# Patient Record
Sex: MALE | Race: WHITE | Employment: OTHER
[De-identification: names, ages, dates, MRNs, and addresses within clinical notes are randomized per-mention and may not be internally consistent; named-entity substitution may affect disease eponyms.]

---

## 2017-01-12 ENCOUNTER — CARE COORDINATION (OUTPATIENT)
Dept: CARE COORDINATION | Facility: CLINIC | Age: 72
End: 2017-01-12

## 2017-01-18 PROBLEM — N30.00 ACUTE CYSTITIS WITHOUT HEMATURIA: Status: ACTIVE | Noted: 2017-01-18

## 2017-01-23 ENCOUNTER — TELEPHONE (OUTPATIENT)
Dept: PHARMACY | Facility: CLINIC | Age: 72
End: 2017-01-23

## 2017-02-04 ENCOUNTER — HOSPITAL ENCOUNTER (OUTPATIENT)
Dept: PREADMISSION TESTING | Age: 72
Discharge: HOME | End: 2017-02-04
Payer: MEDICARE

## 2017-02-04 VITALS
OXYGEN SATURATION: 98 % | TEMPERATURE: 98 F | WEIGHT: 186 LBS | HEIGHT: 68 IN | SYSTOLIC BLOOD PRESSURE: 116 MMHG | RESPIRATION RATE: 16 BRPM | DIASTOLIC BLOOD PRESSURE: 69 MMHG | BODY MASS INDEX: 28.19 KG/M2 | HEART RATE: 68 BPM

## 2017-02-04 LAB
ABSOLUTE EOS #: 0.7 K/UL (ref 0–0.4)
ABSOLUTE LYMPH #: 1.2 K/UL (ref 1–4.8)
ABSOLUTE MONO #: 0.6 K/UL (ref 0.1–1.3)
ANION GAP SERPL CALCULATED.3IONS-SCNC: 15 MMOL/L (ref 9–17)
BASOPHILS # BLD: 1 % (ref 0–2)
BASOPHILS ABSOLUTE: 0.1 K/UL (ref 0–0.2)
BUN BLDV-MCNC: 42 MG/DL (ref 8–23)
BUN/CREAT BLD: ABNORMAL (ref 9–20)
CALCIUM SERPL-MCNC: 9.2 MG/DL (ref 8.6–10.4)
CHLORIDE BLD-SCNC: 104 MMOL/L (ref 98–107)
CO2: 19 MMOL/L (ref 20–31)
CREAT SERPL-MCNC: 3.1 MG/DL (ref 0.7–1.2)
DIFFERENTIAL TYPE: ABNORMAL
EOSINOPHILS RELATIVE PERCENT: 7 % (ref 0–4)
GFR AFRICAN AMERICAN: 24 ML/MIN
GFR NON-AFRICAN AMERICAN: 20 ML/MIN
GFR SERPL CREATININE-BSD FRML MDRD: ABNORMAL ML/MIN/{1.73_M2}
GFR SERPL CREATININE-BSD FRML MDRD: ABNORMAL ML/MIN/{1.73_M2}
GLUCOSE BLD-MCNC: 105 MG/DL (ref 70–99)
HCT VFR BLD CALC: 34.3 % (ref 41–53)
HEMOGLOBIN: 11.4 G/DL (ref 13.5–17.5)
LYMPHOCYTES # BLD: 13 % (ref 24–44)
MCH RBC QN AUTO: 29.6 PG (ref 26–34)
MCHC RBC AUTO-ENTMCNC: 33.3 G/DL (ref 31–37)
MCV RBC AUTO: 88.9 FL (ref 80–100)
MONOCYTES # BLD: 7 % (ref 1–7)
PDW BLD-RTO: 14.6 % (ref 11.5–14.9)
PLATELET # BLD: 188 K/UL (ref 150–450)
PLATELET ESTIMATE: ABNORMAL
PMV BLD AUTO: 7.8 FL (ref 6–12)
POTASSIUM SERPL-SCNC: 5.4 MMOL/L (ref 3.7–5.3)
RBC # BLD: 3.86 M/UL (ref 4.5–5.9)
RBC # BLD: ABNORMAL 10*6/UL
SEG NEUTROPHILS: 72 % (ref 36–66)
SEGMENTED NEUTROPHILS ABSOLUTE COUNT: 6.5 K/UL (ref 1.3–9.1)
SODIUM BLD-SCNC: 138 MMOL/L (ref 135–144)
WBC # BLD: 9.1 K/UL (ref 3.5–11)
WBC # BLD: ABNORMAL 10*3/UL

## 2017-02-04 PROCEDURE — 80048 BASIC METABOLIC PNL TOTAL CA: CPT

## 2017-02-04 PROCEDURE — 85025 COMPLETE CBC W/AUTO DIFF WBC: CPT

## 2017-02-04 PROCEDURE — 36415 COLL VENOUS BLD VENIPUNCTURE: CPT

## 2017-02-04 ASSESSMENT — PAIN SCALES - GENERAL: PAINLEVEL_OUTOF10: 0

## 2017-02-16 ENCOUNTER — APPOINTMENT (OUTPATIENT)
Dept: ULTRASOUND IMAGING | Age: 72
DRG: 329 | End: 2017-02-16
Attending: SURGERY
Payer: MEDICARE

## 2017-02-16 ENCOUNTER — ANESTHESIA EVENT (OUTPATIENT)
Dept: OPERATING ROOM | Age: 72
DRG: 329 | End: 2017-02-16
Payer: MEDICARE

## 2017-02-16 ENCOUNTER — HOSPITAL ENCOUNTER (INPATIENT)
Age: 72
LOS: 13 days | Discharge: NURSING, REHAB OR LONG-TERM CARE | DRG: 329 | End: 2017-03-01
Attending: SURGERY | Admitting: SURGERY
Payer: MEDICARE

## 2017-02-16 ENCOUNTER — ANESTHESIA (OUTPATIENT)
Dept: OPERATING ROOM | Age: 72
DRG: 329 | End: 2017-02-16
Payer: MEDICARE

## 2017-02-16 LAB
-: ABNORMAL
ABSOLUTE EOS #: 0.3 K/UL (ref 0–0.4)
ABSOLUTE LYMPH #: 0.9 K/UL (ref 1–4.8)
ABSOLUTE MONO #: 0.6 K/UL (ref 0.1–1.3)
AMORPHOUS: ABNORMAL
ANION GAP SERPL CALCULATED.3IONS-SCNC: 21 MMOL/L (ref 9–17)
ANION GAP SERPL CALCULATED.3IONS-SCNC: 23 MMOL/L (ref 9–17)
BACTERIA: ABNORMAL
BASOPHILS # BLD: 1 % (ref 0–2)
BASOPHILS ABSOLUTE: 0.1 K/UL (ref 0–0.2)
BILIRUBIN URINE: NEGATIVE
BUN BLDV-MCNC: 102 MG/DL (ref 8–23)
BUN BLDV-MCNC: 97 MG/DL (ref 8–23)
BUN/CREAT BLD: ABNORMAL (ref 9–20)
BUN/CREAT BLD: ABNORMAL (ref 9–20)
CALCIUM SERPL-MCNC: 9.3 MG/DL (ref 8.6–10.4)
CALCIUM SERPL-MCNC: 9.4 MG/DL (ref 8.6–10.4)
CASTS UA: ABNORMAL /LPF
CHLORIDE BLD-SCNC: 95 MMOL/L (ref 98–107)
CHLORIDE BLD-SCNC: 99 MMOL/L (ref 98–107)
CHLORIDE, UR: <20 MMOL/L
CO2: 15 MMOL/L (ref 20–31)
CO2: 16 MMOL/L (ref 20–31)
COLOR: YELLOW
COMMENT UA: ABNORMAL
CREAT SERPL-MCNC: 4.51 MG/DL (ref 0.7–1.2)
CREAT SERPL-MCNC: 4.63 MG/DL (ref 0.7–1.2)
CREATININE URINE: 256.6 MG/DL (ref 39–259)
CRYSTALS, UA: ABNORMAL /HPF
DIFFERENTIAL TYPE: ABNORMAL
EOSINOPHILS RELATIVE PERCENT: 3 % (ref 0–4)
EPITHELIAL CELLS UA: ABNORMAL /HPF
GFR AFRICAN AMERICAN: 15 ML/MIN
GFR AFRICAN AMERICAN: 16 ML/MIN
GFR NON-AFRICAN AMERICAN: 13 ML/MIN
GFR NON-AFRICAN AMERICAN: 13 ML/MIN
GFR SERPL CREATININE-BSD FRML MDRD: ABNORMAL ML/MIN/{1.73_M2}
GLUCOSE BLD-MCNC: 108 MG/DL (ref 70–99)
GLUCOSE BLD-MCNC: 126 MG/DL (ref 70–99)
GLUCOSE URINE: NEGATIVE
HCT VFR BLD CALC: 38.6 % (ref 41–53)
HEMOGLOBIN: 13 G/DL (ref 13.5–17.5)
INR BLD: 1.1
KETONES, URINE: NEGATIVE
LEUKOCYTE ESTERASE, URINE: ABNORMAL
LYMPHOCYTES # BLD: 8 % (ref 24–44)
MCH RBC QN AUTO: 29 PG (ref 26–34)
MCHC RBC AUTO-ENTMCNC: 33.5 G/DL (ref 31–37)
MCV RBC AUTO: 86.5 FL (ref 80–100)
MONOCYTES # BLD: 5 % (ref 1–7)
MUCUS: ABNORMAL
NITRITE, URINE: NEGATIVE
OTHER OBSERVATIONS UA: ABNORMAL
PARTIAL THROMBOPLASTIN TIME: 27.8 SEC (ref 23–31)
PDW BLD-RTO: 14.5 % (ref 11.5–14.9)
PH UA: 5 (ref 5–8)
PLATELET # BLD: 186 K/UL (ref 150–450)
PLATELET ESTIMATE: ABNORMAL
PMV BLD AUTO: 8 FL (ref 6–12)
POTASSIUM SERPL-SCNC: 4.1 MMOL/L (ref 3.7–5.3)
POTASSIUM SERPL-SCNC: 5.3 MMOL/L (ref 3.7–5.3)
POTASSIUM, UR: 44.6 MMOL/L
PROTEIN UA: ABNORMAL
PROTHROMBIN TIME: 11.3 SEC (ref 9.7–12)
RBC # BLD: 4.46 M/UL (ref 4.5–5.9)
RBC # BLD: ABNORMAL 10*6/UL
RBC UA: ABNORMAL /HPF
RENAL EPITHELIAL, UA: ABNORMAL /HPF
SEG NEUTROPHILS: 83 % (ref 36–66)
SEGMENTED NEUTROPHILS ABSOLUTE COUNT: 9.1 K/UL (ref 1.3–9.1)
SODIUM BLD-SCNC: 134 MMOL/L (ref 135–144)
SODIUM BLD-SCNC: 135 MMOL/L (ref 135–144)
SODIUM,UR: 20 MMOL/L
SPECIFIC GRAVITY UA: 1.02 (ref 1–1.03)
TRICHOMONAS: ABNORMAL
TURBIDITY: ABNORMAL
URINE HGB: NEGATIVE
UROBILINOGEN, URINE: NORMAL
WBC # BLD: 10.9 K/UL (ref 3.5–11)
WBC # BLD: ABNORMAL 10*3/UL
WBC UA: ABNORMAL /HPF
YEAST: ABNORMAL

## 2017-02-16 PROCEDURE — 85025 COMPLETE CBC W/AUTO DIFF WBC: CPT

## 2017-02-16 PROCEDURE — 36415 COLL VENOUS BLD VENIPUNCTURE: CPT

## 2017-02-16 PROCEDURE — 80048 BASIC METABOLIC PNL TOTAL CA: CPT

## 2017-02-16 PROCEDURE — 85610 PROTHROMBIN TIME: CPT

## 2017-02-16 PROCEDURE — 76770 US EXAM ABDO BACK WALL COMP: CPT

## 2017-02-16 PROCEDURE — 84300 ASSAY OF URINE SODIUM: CPT

## 2017-02-16 PROCEDURE — 81001 URINALYSIS AUTO W/SCOPE: CPT

## 2017-02-16 PROCEDURE — 85730 THROMBOPLASTIN TIME PARTIAL: CPT

## 2017-02-16 PROCEDURE — 1200000000 HC SEMI PRIVATE

## 2017-02-16 PROCEDURE — 84133 ASSAY OF URINE POTASSIUM: CPT

## 2017-02-16 PROCEDURE — 2500000003 HC RX 250 WO HCPCS: Performed by: INTERNAL MEDICINE

## 2017-02-16 PROCEDURE — 6360000002 HC RX W HCPCS: Performed by: SURGERY

## 2017-02-16 PROCEDURE — 2580000003 HC RX 258: Performed by: SURGERY

## 2017-02-16 PROCEDURE — 82436 ASSAY OF URINE CHLORIDE: CPT

## 2017-02-16 PROCEDURE — 2580000003 HC RX 258: Performed by: INTERNAL MEDICINE

## 2017-02-16 PROCEDURE — 82570 ASSAY OF URINE CREATININE: CPT

## 2017-02-16 RX ORDER — ONDANSETRON 2 MG/ML
4 INJECTION INTRAMUSCULAR; INTRAVENOUS
Status: DISCONTINUED | OUTPATIENT
Start: 2017-02-16 | End: 2017-02-16 | Stop reason: HOSPADM

## 2017-02-16 RX ORDER — FENTANYL CITRATE 50 UG/ML
25 INJECTION, SOLUTION INTRAMUSCULAR; INTRAVENOUS EVERY 5 MIN PRN
Status: DISCONTINUED | OUTPATIENT
Start: 2017-02-16 | End: 2017-02-16 | Stop reason: HOSPADM

## 2017-02-16 RX ORDER — MORPHINE SULFATE 2 MG/ML
1 INJECTION, SOLUTION INTRAMUSCULAR; INTRAVENOUS EVERY 5 MIN PRN
Status: DISCONTINUED | OUTPATIENT
Start: 2017-02-16 | End: 2017-02-16 | Stop reason: HOSPADM

## 2017-02-16 RX ORDER — LOPERAMIDE HYDROCHLORIDE 2 MG/1
2 CAPSULE ORAL 4 TIMES DAILY PRN
Status: DISCONTINUED | OUTPATIENT
Start: 2017-02-16 | End: 2017-02-19

## 2017-02-16 RX ORDER — MEPERIDINE HYDROCHLORIDE 25 MG/ML
12.5 INJECTION INTRAMUSCULAR; INTRAVENOUS; SUBCUTANEOUS EVERY 5 MIN PRN
Status: DISCONTINUED | OUTPATIENT
Start: 2017-02-16 | End: 2017-02-16 | Stop reason: HOSPADM

## 2017-02-16 RX ORDER — HYDROCODONE BITARTRATE AND ACETAMINOPHEN 5; 325 MG/1; MG/1
2 TABLET ORAL PRN
Status: DISCONTINUED | OUTPATIENT
Start: 2017-02-16 | End: 2017-02-16 | Stop reason: HOSPADM

## 2017-02-16 RX ORDER — HYDRALAZINE HYDROCHLORIDE 20 MG/ML
5 INJECTION INTRAMUSCULAR; INTRAVENOUS EVERY 10 MIN PRN
Status: DISCONTINUED | OUTPATIENT
Start: 2017-02-16 | End: 2017-02-16 | Stop reason: HOSPADM

## 2017-02-16 RX ORDER — FENTANYL CITRATE 50 UG/ML
50 INJECTION, SOLUTION INTRAMUSCULAR; INTRAVENOUS EVERY 5 MIN PRN
Status: DISCONTINUED | OUTPATIENT
Start: 2017-02-16 | End: 2017-02-16 | Stop reason: HOSPADM

## 2017-02-16 RX ORDER — SODIUM CHLORIDE 0.9 % (FLUSH) 0.9 %
10 SYRINGE (ML) INJECTION EVERY 12 HOURS SCHEDULED
Status: DISCONTINUED | OUTPATIENT
Start: 2017-02-16 | End: 2017-02-16 | Stop reason: HOSPADM

## 2017-02-16 RX ORDER — ASPIRIN 81 MG/1
81 TABLET ORAL DAILY
Status: DISCONTINUED | OUTPATIENT
Start: 2017-02-16 | End: 2017-02-19

## 2017-02-16 RX ORDER — CLOPIDOGREL BISULFATE 75 MG/1
75 TABLET ORAL ONCE
Status: DISCONTINUED | OUTPATIENT
Start: 2017-02-16 | End: 2017-02-19

## 2017-02-16 RX ORDER — OXYCODONE HYDROCHLORIDE AND ACETAMINOPHEN 5; 325 MG/1; MG/1
1 TABLET ORAL EVERY 4 HOURS PRN
Status: DISCONTINUED | OUTPATIENT
Start: 2017-02-16 | End: 2017-02-16 | Stop reason: HOSPADM

## 2017-02-16 RX ORDER — ONDANSETRON 2 MG/ML
4 INJECTION INTRAMUSCULAR; INTRAVENOUS EVERY 4 HOURS PRN
Status: DISCONTINUED | OUTPATIENT
Start: 2017-02-16 | End: 2017-02-16 | Stop reason: HOSPADM

## 2017-02-16 RX ORDER — HYDROCODONE BITARTRATE AND ACETAMINOPHEN 5; 325 MG/1; MG/1
1 TABLET ORAL PRN
Status: DISCONTINUED | OUTPATIENT
Start: 2017-02-16 | End: 2017-02-16 | Stop reason: HOSPADM

## 2017-02-16 RX ORDER — CARVEDILOL 6.25 MG/1
6.25 TABLET ORAL 2 TIMES DAILY WITH MEALS
Status: DISCONTINUED | OUTPATIENT
Start: 2017-02-16 | End: 2017-02-19

## 2017-02-16 RX ORDER — SODIUM CHLORIDE 9 MG/ML
INJECTION, SOLUTION INTRAVENOUS CONTINUOUS
Status: DISCONTINUED | OUTPATIENT
Start: 2017-02-16 | End: 2017-02-16

## 2017-02-16 RX ORDER — SODIUM CHLORIDE, SODIUM LACTATE, POTASSIUM CHLORIDE, CALCIUM CHLORIDE 600; 310; 30; 20 MG/100ML; MG/100ML; MG/100ML; MG/100ML
INJECTION, SOLUTION INTRAVENOUS CONTINUOUS
Status: DISCONTINUED | OUTPATIENT
Start: 2017-02-16 | End: 2017-02-16

## 2017-02-16 RX ORDER — DIPHENHYDRAMINE HYDROCHLORIDE 50 MG/ML
12.5 INJECTION INTRAMUSCULAR; INTRAVENOUS
Status: DISCONTINUED | OUTPATIENT
Start: 2017-02-16 | End: 2017-02-16 | Stop reason: HOSPADM

## 2017-02-16 RX ORDER — MORPHINE SULFATE 4 MG/ML
4 INJECTION, SOLUTION INTRAMUSCULAR; INTRAVENOUS
Status: DISCONTINUED | OUTPATIENT
Start: 2017-02-16 | End: 2017-02-16 | Stop reason: HOSPADM

## 2017-02-16 RX ORDER — METOCLOPRAMIDE HYDROCHLORIDE 5 MG/ML
10 INJECTION INTRAMUSCULAR; INTRAVENOUS
Status: DISCONTINUED | OUTPATIENT
Start: 2017-02-16 | End: 2017-02-16 | Stop reason: HOSPADM

## 2017-02-16 RX ORDER — SODIUM CHLORIDE 0.9 % (FLUSH) 0.9 %
10 SYRINGE (ML) INJECTION PRN
Status: DISCONTINUED | OUTPATIENT
Start: 2017-02-16 | End: 2017-02-16 | Stop reason: HOSPADM

## 2017-02-16 RX ORDER — OXYCODONE HYDROCHLORIDE AND ACETAMINOPHEN 5; 325 MG/1; MG/1
2 TABLET ORAL EVERY 4 HOURS PRN
Status: DISCONTINUED | OUTPATIENT
Start: 2017-02-16 | End: 2017-02-16 | Stop reason: HOSPADM

## 2017-02-16 RX ORDER — LABETALOL HYDROCHLORIDE 5 MG/ML
5 INJECTION, SOLUTION INTRAVENOUS EVERY 10 MIN PRN
Status: DISCONTINUED | OUTPATIENT
Start: 2017-02-16 | End: 2017-02-16 | Stop reason: HOSPADM

## 2017-02-16 RX ORDER — MORPHINE SULFATE 2 MG/ML
2 INJECTION, SOLUTION INTRAMUSCULAR; INTRAVENOUS
Status: DISCONTINUED | OUTPATIENT
Start: 2017-02-16 | End: 2017-02-16 | Stop reason: HOSPADM

## 2017-02-16 RX ADMIN — Medication 2 G: at 20:02

## 2017-02-16 RX ADMIN — SODIUM BICARBONATE: 84 INJECTION, SOLUTION INTRAVENOUS at 17:07

## 2017-02-16 RX ADMIN — SODIUM CHLORIDE, POTASSIUM CHLORIDE, SODIUM LACTATE AND CALCIUM CHLORIDE: 600; 310; 30; 20 INJECTION, SOLUTION INTRAVENOUS at 09:51

## 2017-02-16 ASSESSMENT — PAIN - FUNCTIONAL ASSESSMENT: PAIN_FUNCTIONAL_ASSESSMENT: 0-10

## 2017-02-17 LAB
ANION GAP SERPL CALCULATED.3IONS-SCNC: 17 MMOL/L (ref 9–17)
ANION GAP SERPL CALCULATED.3IONS-SCNC: 23 MMOL/L (ref 9–17)
BUN BLDV-MCNC: 85 MG/DL (ref 8–23)
BUN BLDV-MCNC: 99 MG/DL (ref 8–23)
BUN/CREAT BLD: ABNORMAL (ref 9–20)
BUN/CREAT BLD: ABNORMAL (ref 9–20)
CALCIUM SERPL-MCNC: 7.9 MG/DL (ref 8.6–10.4)
CALCIUM SERPL-MCNC: 8.9 MG/DL (ref 8.6–10.4)
CHLORIDE BLD-SCNC: 89 MMOL/L (ref 98–107)
CHLORIDE BLD-SCNC: 94 MMOL/L (ref 98–107)
CO2: 18 MMOL/L (ref 20–31)
CO2: 22 MMOL/L (ref 20–31)
CREAT SERPL-MCNC: 3.21 MG/DL (ref 0.7–1.2)
CREAT SERPL-MCNC: 3.87 MG/DL (ref 0.7–1.2)
GFR AFRICAN AMERICAN: 19 ML/MIN
GFR AFRICAN AMERICAN: 23 ML/MIN
GFR NON-AFRICAN AMERICAN: 15 ML/MIN
GFR NON-AFRICAN AMERICAN: 19 ML/MIN
GFR SERPL CREATININE-BSD FRML MDRD: ABNORMAL ML/MIN/{1.73_M2}
GLUCOSE BLD-MCNC: 105 MG/DL (ref 70–99)
GLUCOSE BLD-MCNC: 112 MG/DL (ref 70–99)
HCT VFR BLD CALC: 37.9 % (ref 41–53)
HEMOGLOBIN: 12.5 G/DL (ref 13.5–17.5)
MCH RBC QN AUTO: 28.6 PG (ref 26–34)
MCHC RBC AUTO-ENTMCNC: 33.1 G/DL (ref 31–37)
MCV RBC AUTO: 86.4 FL (ref 80–100)
PDW BLD-RTO: 14.4 % (ref 11.5–14.9)
PLATELET # BLD: 193 K/UL (ref 150–450)
PMV BLD AUTO: 8.1 FL (ref 6–12)
POTASSIUM SERPL-SCNC: 3.7 MMOL/L (ref 3.7–5.3)
POTASSIUM SERPL-SCNC: 4.3 MMOL/L (ref 3.7–5.3)
RBC # BLD: 4.39 M/UL (ref 4.5–5.9)
SODIUM BLD-SCNC: 128 MMOL/L (ref 135–144)
SODIUM BLD-SCNC: 135 MMOL/L (ref 135–144)
WBC # BLD: 9.1 K/UL (ref 3.5–11)

## 2017-02-17 PROCEDURE — 36415 COLL VENOUS BLD VENIPUNCTURE: CPT

## 2017-02-17 PROCEDURE — 2500000003 HC RX 250 WO HCPCS: Performed by: INTERNAL MEDICINE

## 2017-02-17 PROCEDURE — 2580000003 HC RX 258: Performed by: INTERNAL MEDICINE

## 2017-02-17 PROCEDURE — 93005 ELECTROCARDIOGRAM TRACING: CPT

## 2017-02-17 PROCEDURE — 99223 1ST HOSP IP/OBS HIGH 75: CPT | Performed by: INTERNAL MEDICINE

## 2017-02-17 PROCEDURE — 80048 BASIC METABOLIC PNL TOTAL CA: CPT

## 2017-02-17 PROCEDURE — 1200000000 HC SEMI PRIVATE

## 2017-02-17 PROCEDURE — 85027 COMPLETE CBC AUTOMATED: CPT

## 2017-02-17 PROCEDURE — 6360000002 HC RX W HCPCS: Performed by: SURGERY

## 2017-02-17 PROCEDURE — 6370000000 HC RX 637 (ALT 250 FOR IP): Performed by: INTERNAL MEDICINE

## 2017-02-17 PROCEDURE — 99222 1ST HOSP IP/OBS MODERATE 55: CPT | Performed by: INTERNAL MEDICINE

## 2017-02-17 RX ORDER — 0.9 % SODIUM CHLORIDE 0.9 %
500 INTRAVENOUS SOLUTION INTRAVENOUS ONCE
Status: COMPLETED | OUTPATIENT
Start: 2017-02-17 | End: 2017-02-17

## 2017-02-17 RX ORDER — 0.9 % SODIUM CHLORIDE 0.9 %
1000 INTRAVENOUS SOLUTION INTRAVENOUS ONCE
Status: COMPLETED | OUTPATIENT
Start: 2017-02-17 | End: 2017-02-17

## 2017-02-17 RX ADMIN — Medication 2 G: at 02:59

## 2017-02-17 RX ADMIN — CARVEDILOL 6.25 MG: 6.25 TABLET, FILM COATED ORAL at 17:22

## 2017-02-17 RX ADMIN — SODIUM CHLORIDE 500 ML: 9 INJECTION, SOLUTION INTRAVENOUS at 00:40

## 2017-02-17 RX ADMIN — SODIUM BICARBONATE: 84 INJECTION, SOLUTION INTRAVENOUS at 23:59

## 2017-02-17 RX ADMIN — SODIUM BICARBONATE: 84 INJECTION, SOLUTION INTRAVENOUS at 15:02

## 2017-02-17 RX ADMIN — SODIUM CHLORIDE 1000 ML: 9 INJECTION, SOLUTION INTRAVENOUS at 11:38

## 2017-02-18 ENCOUNTER — ANESTHESIA EVENT (OUTPATIENT)
Dept: OPERATING ROOM | Age: 72
DRG: 329 | End: 2017-02-18
Payer: MEDICARE

## 2017-02-18 LAB
ANION GAP SERPL CALCULATED.3IONS-SCNC: 13 MMOL/L (ref 9–17)
ANION GAP SERPL CALCULATED.3IONS-SCNC: 18 MMOL/L (ref 9–17)
BUN BLDV-MCNC: 62 MG/DL (ref 8–23)
BUN BLDV-MCNC: 76 MG/DL (ref 8–23)
BUN/CREAT BLD: ABNORMAL (ref 9–20)
BUN/CREAT BLD: ABNORMAL (ref 9–20)
CALCIUM SERPL-MCNC: 8.1 MG/DL (ref 8.6–10.4)
CALCIUM SERPL-MCNC: 8.5 MG/DL (ref 8.6–10.4)
CHLORIDE BLD-SCNC: 88 MMOL/L (ref 98–107)
CHLORIDE BLD-SCNC: 88 MMOL/L (ref 98–107)
CO2: 26 MMOL/L (ref 20–31)
CO2: 31 MMOL/L (ref 20–31)
CREAT SERPL-MCNC: 2.48 MG/DL (ref 0.7–1.2)
CREAT SERPL-MCNC: 2.78 MG/DL (ref 0.7–1.2)
FOLATE: 12.8 NG/ML
GFR AFRICAN AMERICAN: 27 ML/MIN
GFR AFRICAN AMERICAN: 31 ML/MIN
GFR NON-AFRICAN AMERICAN: 23 ML/MIN
GFR NON-AFRICAN AMERICAN: 26 ML/MIN
GFR SERPL CREATININE-BSD FRML MDRD: ABNORMAL ML/MIN/{1.73_M2}
GLUCOSE BLD-MCNC: 110 MG/DL (ref 70–99)
GLUCOSE BLD-MCNC: 98 MG/DL (ref 70–99)
IRON SATURATION: 24 % (ref 20–55)
IRON: 65 UG/DL (ref 59–158)
POTASSIUM SERPL-SCNC: 3.6 MMOL/L (ref 3.7–5.3)
POTASSIUM SERPL-SCNC: 4.1 MMOL/L (ref 3.7–5.3)
SODIUM BLD-SCNC: 132 MMOL/L (ref 135–144)
SODIUM BLD-SCNC: 132 MMOL/L (ref 135–144)
TOTAL IRON BINDING CAPACITY: 268 UG/DL (ref 250–450)
UNSATURATED IRON BINDING CAPACITY: 203 UG/DL (ref 112–347)
VITAMIN B-12: 229 PG/ML (ref 211–946)

## 2017-02-18 PROCEDURE — 2580000003 HC RX 258: Performed by: INTERNAL MEDICINE

## 2017-02-18 PROCEDURE — 6370000000 HC RX 637 (ALT 250 FOR IP): Performed by: INTERNAL MEDICINE

## 2017-02-18 PROCEDURE — 83550 IRON BINDING TEST: CPT

## 2017-02-18 PROCEDURE — 83540 ASSAY OF IRON: CPT

## 2017-02-18 PROCEDURE — 82746 ASSAY OF FOLIC ACID SERUM: CPT

## 2017-02-18 PROCEDURE — 2500000003 HC RX 250 WO HCPCS: Performed by: INTERNAL MEDICINE

## 2017-02-18 PROCEDURE — 36415 COLL VENOUS BLD VENIPUNCTURE: CPT

## 2017-02-18 PROCEDURE — 80048 BASIC METABOLIC PNL TOTAL CA: CPT

## 2017-02-18 PROCEDURE — 82607 VITAMIN B-12: CPT

## 2017-02-18 PROCEDURE — 1200000000 HC SEMI PRIVATE

## 2017-02-18 PROCEDURE — 99232 SBSQ HOSP IP/OBS MODERATE 35: CPT | Performed by: INTERNAL MEDICINE

## 2017-02-18 RX ORDER — SODIUM CHLORIDE 9 MG/ML
INJECTION, SOLUTION INTRAVENOUS CONTINUOUS
Status: DISCONTINUED | OUTPATIENT
Start: 2017-02-18 | End: 2017-02-24

## 2017-02-18 RX ORDER — PANTOPRAZOLE SODIUM 40 MG/1
40 TABLET, DELAYED RELEASE ORAL
Status: DISCONTINUED | OUTPATIENT
Start: 2017-02-18 | End: 2017-02-19

## 2017-02-18 RX ORDER — DIPHENOXYLATE HYDROCHLORIDE AND ATROPINE SULFATE 2.5; .025 MG/1; MG/1
1 TABLET ORAL 2 TIMES DAILY
Status: DISCONTINUED | OUTPATIENT
Start: 2017-02-18 | End: 2017-02-19

## 2017-02-18 RX ADMIN — SODIUM CHLORIDE: 9 INJECTION, SOLUTION INTRAVENOUS at 14:19

## 2017-02-18 RX ADMIN — DIPHENOXYLATE HYDROCHLORIDE AND ATROPINE SULFATE 1 TABLET: 2.5; .025 TABLET ORAL at 17:08

## 2017-02-18 RX ADMIN — CARVEDILOL 6.25 MG: 6.25 TABLET, FILM COATED ORAL at 17:08

## 2017-02-18 RX ADMIN — SODIUM CHLORIDE: 9 INJECTION, SOLUTION INTRAVENOUS at 21:37

## 2017-02-18 RX ADMIN — SODIUM BICARBONATE: 84 INJECTION, SOLUTION INTRAVENOUS at 10:06

## 2017-02-19 ENCOUNTER — ANESTHESIA (OUTPATIENT)
Dept: OPERATING ROOM | Age: 72
DRG: 329 | End: 2017-02-19
Payer: MEDICARE

## 2017-02-19 ENCOUNTER — APPOINTMENT (OUTPATIENT)
Dept: GENERAL RADIOLOGY | Age: 72
DRG: 329 | End: 2017-02-19
Attending: SURGERY
Payer: MEDICARE

## 2017-02-19 VITALS — OXYGEN SATURATION: 99 % | DIASTOLIC BLOOD PRESSURE: 56 MMHG | SYSTOLIC BLOOD PRESSURE: 117 MMHG | TEMPERATURE: 93.6 F

## 2017-02-19 LAB
ANION GAP SERPL CALCULATED.3IONS-SCNC: 12 MMOL/L (ref 9–17)
BUN BLDV-MCNC: 55 MG/DL (ref 8–23)
BUN/CREAT BLD: ABNORMAL (ref 9–20)
CALCIUM SERPL-MCNC: 8.2 MG/DL (ref 8.6–10.4)
CHLORIDE BLD-SCNC: 93 MMOL/L (ref 98–107)
CO2: 30 MMOL/L (ref 20–31)
CREAT SERPL-MCNC: 2.27 MG/DL (ref 0.7–1.2)
GFR AFRICAN AMERICAN: 35 ML/MIN
GFR NON-AFRICAN AMERICAN: 29 ML/MIN
GFR SERPL CREATININE-BSD FRML MDRD: ABNORMAL ML/MIN/{1.73_M2}
GFR SERPL CREATININE-BSD FRML MDRD: ABNORMAL ML/MIN/{1.73_M2}
GLUCOSE BLD-MCNC: 101 MG/DL (ref 70–99)
POTASSIUM SERPL-SCNC: 4.2 MMOL/L (ref 3.7–5.3)
SODIUM BLD-SCNC: 135 MMOL/L (ref 135–144)

## 2017-02-19 PROCEDURE — C1751 CATH, INF, PER/CENT/MIDLINE: HCPCS | Performed by: SURGERY

## 2017-02-19 PROCEDURE — C1765 ADHESION BARRIER: HCPCS | Performed by: SURGERY

## 2017-02-19 PROCEDURE — 2500000003 HC RX 250 WO HCPCS: Performed by: SURGERY

## 2017-02-19 PROCEDURE — 80048 BASIC METABOLIC PNL TOTAL CA: CPT

## 2017-02-19 PROCEDURE — 71010 XR CHEST LIMITED: CPT | Performed by: RADIOLOGY

## 2017-02-19 PROCEDURE — 2580000003 HC RX 258: Performed by: INTERNAL MEDICINE

## 2017-02-19 PROCEDURE — 36415 COLL VENOUS BLD VENIPUNCTURE: CPT

## 2017-02-19 PROCEDURE — 0WQF0ZZ REPAIR ABDOMINAL WALL, OPEN APPROACH: ICD-10-PCS | Performed by: SURGERY

## 2017-02-19 PROCEDURE — 6360000002 HC RX W HCPCS: Performed by: SURGERY

## 2017-02-19 PROCEDURE — S0028 INJECTION, FAMOTIDINE, 20 MG: HCPCS | Performed by: SURGERY

## 2017-02-19 PROCEDURE — 0DNW0ZZ RELEASE PERITONEUM, OPEN APPROACH: ICD-10-PCS | Performed by: SURGERY

## 2017-02-19 PROCEDURE — 2500000003 HC RX 250 WO HCPCS

## 2017-02-19 PROCEDURE — 6360000002 HC RX W HCPCS: Performed by: ANESTHESIOLOGY

## 2017-02-19 PROCEDURE — 2500000003 HC RX 250 WO HCPCS: Performed by: ANESTHESIOLOGY

## 2017-02-19 PROCEDURE — 3600000013 HC SURGERY LEVEL 3 ADDTL 15MIN: Performed by: SURGERY

## 2017-02-19 PROCEDURE — 7100000000 HC PACU RECOVERY - FIRST 15 MIN: Performed by: SURGERY

## 2017-02-19 PROCEDURE — 99232 SBSQ HOSP IP/OBS MODERATE 35: CPT | Performed by: INTERNAL MEDICINE

## 2017-02-19 PROCEDURE — 7100000001 HC PACU RECOVERY - ADDTL 15 MIN: Performed by: SURGERY

## 2017-02-19 PROCEDURE — 87086 URINE CULTURE/COLONY COUNT: CPT

## 2017-02-19 PROCEDURE — 3600000003 HC SURGERY LEVEL 3 BASE: Performed by: SURGERY

## 2017-02-19 PROCEDURE — 94770 HC ETCO2 MONITOR DAILY: CPT

## 2017-02-19 PROCEDURE — 71010 XR CHEST LIMITED: CPT

## 2017-02-19 PROCEDURE — 2580000003 HC RX 258: Performed by: ANESTHESIOLOGY

## 2017-02-19 PROCEDURE — 2580000003 HC RX 258: Performed by: SURGERY

## 2017-02-19 PROCEDURE — 2060000000 HC ICU INTERMEDIATE R&B

## 2017-02-19 PROCEDURE — 6370000000 HC RX 637 (ALT 250 FOR IP): Performed by: INTERNAL MEDICINE

## 2017-02-19 PROCEDURE — 3700000000 HC ANESTHESIA ATTENDED CARE: Performed by: SURGERY

## 2017-02-19 PROCEDURE — 88305 TISSUE EXAM BY PATHOLOGIST: CPT

## 2017-02-19 PROCEDURE — 3700000001 HC ADD 15 MINUTES (ANESTHESIA): Performed by: SURGERY

## 2017-02-19 PROCEDURE — 94664 DEMO&/EVAL PT USE INHALER: CPT

## 2017-02-19 PROCEDURE — 0DBB0ZZ EXCISION OF ILEUM, OPEN APPROACH: ICD-10-PCS | Performed by: SURGERY

## 2017-02-19 PROCEDURE — 94762 N-INVAS EAR/PLS OXIMTRY CONT: CPT

## 2017-02-19 RX ORDER — MORPHINE SULFATE/0.9% NACL/PF 1 MG/ML
SYRINGE (ML) INJECTION CONTINUOUS
Status: DISCONTINUED | OUTPATIENT
Start: 2017-02-19 | End: 2017-02-20

## 2017-02-19 RX ORDER — EPHEDRINE SULFATE 50 MG/ML
INJECTION, SOLUTION INTRAVENOUS PRN
Status: DISCONTINUED | OUTPATIENT
Start: 2017-02-19 | End: 2017-02-19 | Stop reason: SDUPTHER

## 2017-02-19 RX ORDER — LABETALOL HYDROCHLORIDE 5 MG/ML
5 INJECTION, SOLUTION INTRAVENOUS EVERY 10 MIN PRN
Status: DISCONTINUED | OUTPATIENT
Start: 2017-02-19 | End: 2017-02-19 | Stop reason: HOSPADM

## 2017-02-19 RX ORDER — GLYCOPYRROLATE 0.2 MG/ML
INJECTION INTRAMUSCULAR; INTRAVENOUS PRN
Status: DISCONTINUED | OUTPATIENT
Start: 2017-02-19 | End: 2017-02-19 | Stop reason: SDUPTHER

## 2017-02-19 RX ORDER — PROPOFOL 10 MG/ML
INJECTION, EMULSION INTRAVENOUS PRN
Status: DISCONTINUED | OUTPATIENT
Start: 2017-02-19 | End: 2017-02-19 | Stop reason: SDUPTHER

## 2017-02-19 RX ORDER — NEOSTIGMINE METHYLSULFATE 1 MG/ML
INJECTION, SOLUTION INTRAVENOUS PRN
Status: DISCONTINUED | OUTPATIENT
Start: 2017-02-19 | End: 2017-02-19 | Stop reason: SDUPTHER

## 2017-02-19 RX ORDER — FENTANYL CITRATE 50 UG/ML
INJECTION, SOLUTION INTRAMUSCULAR; INTRAVENOUS PRN
Status: DISCONTINUED | OUTPATIENT
Start: 2017-02-19 | End: 2017-02-19 | Stop reason: SDUPTHER

## 2017-02-19 RX ORDER — MORPHINE SULFATE 2 MG/ML
2 INJECTION, SOLUTION INTRAMUSCULAR; INTRAVENOUS EVERY 5 MIN PRN
Status: DISCONTINUED | OUTPATIENT
Start: 2017-02-19 | End: 2017-02-19 | Stop reason: HOSPADM

## 2017-02-19 RX ORDER — SODIUM CHLORIDE 0.9 % (FLUSH) 0.9 %
10 SYRINGE (ML) INJECTION PRN
Status: DISCONTINUED | OUTPATIENT
Start: 2017-02-19 | End: 2017-03-01 | Stop reason: HOSPADM

## 2017-02-19 RX ORDER — SODIUM CHLORIDE 9 MG/ML
INJECTION, SOLUTION INTRAVENOUS CONTINUOUS
Status: DISCONTINUED | OUTPATIENT
Start: 2017-02-19 | End: 2017-02-20

## 2017-02-19 RX ORDER — DIPHENHYDRAMINE HYDROCHLORIDE 50 MG/ML
25 INJECTION INTRAMUSCULAR; INTRAVENOUS EVERY 6 HOURS PRN
Status: DISCONTINUED | OUTPATIENT
Start: 2017-02-19 | End: 2017-02-20

## 2017-02-19 RX ORDER — CISATRACURIUM BESYLATE 2 MG/ML
INJECTION, SOLUTION INTRAVENOUS PRN
Status: DISCONTINUED | OUTPATIENT
Start: 2017-02-19 | End: 2017-02-19 | Stop reason: SDUPTHER

## 2017-02-19 RX ORDER — SENNA AND DOCUSATE SODIUM 50; 8.6 MG/1; MG/1
1 TABLET, FILM COATED ORAL DAILY
Status: DISCONTINUED | OUTPATIENT
Start: 2017-02-19 | End: 2017-02-20

## 2017-02-19 RX ORDER — DOCUSATE SODIUM 100 MG/1
100 CAPSULE, LIQUID FILLED ORAL 2 TIMES DAILY
Status: DISCONTINUED | OUTPATIENT
Start: 2017-02-19 | End: 2017-02-20

## 2017-02-19 RX ORDER — NALOXONE HYDROCHLORIDE 0.4 MG/ML
0.4 INJECTION, SOLUTION INTRAMUSCULAR; INTRAVENOUS; SUBCUTANEOUS PRN
Status: DISCONTINUED | OUTPATIENT
Start: 2017-02-19 | End: 2017-02-21 | Stop reason: ALTCHOICE

## 2017-02-19 RX ORDER — LIDOCAINE HYDROCHLORIDE 10 MG/ML
INJECTION, SOLUTION INFILTRATION; PERINEURAL PRN
Status: DISCONTINUED | OUTPATIENT
Start: 2017-02-19 | End: 2017-02-19 | Stop reason: SDUPTHER

## 2017-02-19 RX ORDER — SUCCINYLCHOLINE CHLORIDE 20 MG/ML
INJECTION INTRAMUSCULAR; INTRAVENOUS PRN
Status: DISCONTINUED | OUTPATIENT
Start: 2017-02-19 | End: 2017-02-19 | Stop reason: SDUPTHER

## 2017-02-19 RX ORDER — MEPERIDINE HYDROCHLORIDE 25 MG/ML
12.5 INJECTION INTRAMUSCULAR; INTRAVENOUS; SUBCUTANEOUS EVERY 5 MIN PRN
Status: DISCONTINUED | OUTPATIENT
Start: 2017-02-19 | End: 2017-02-19 | Stop reason: HOSPADM

## 2017-02-19 RX ORDER — ONDANSETRON 2 MG/ML
4 INJECTION INTRAMUSCULAR; INTRAVENOUS
Status: DISCONTINUED | OUTPATIENT
Start: 2017-02-19 | End: 2017-02-19 | Stop reason: HOSPADM

## 2017-02-19 RX ORDER — SODIUM CHLORIDE 0.9 % (FLUSH) 0.9 %
10 SYRINGE (ML) INJECTION EVERY 12 HOURS SCHEDULED
Status: DISCONTINUED | OUTPATIENT
Start: 2017-02-19 | End: 2017-03-01 | Stop reason: HOSPADM

## 2017-02-19 RX ORDER — NALOXONE HYDROCHLORIDE 0.4 MG/ML
0.4 INJECTION, SOLUTION INTRAMUSCULAR; INTRAVENOUS; SUBCUTANEOUS PRN
Status: DISCONTINUED | OUTPATIENT
Start: 2017-02-19 | End: 2017-02-20

## 2017-02-19 RX ORDER — ONDANSETRON 2 MG/ML
4 INJECTION INTRAMUSCULAR; INTRAVENOUS EVERY 4 HOURS PRN
Status: DISCONTINUED | OUTPATIENT
Start: 2017-02-19 | End: 2017-03-01 | Stop reason: HOSPADM

## 2017-02-19 RX ORDER — SODIUM CHLORIDE 9 MG/ML
INJECTION, SOLUTION INTRAVENOUS CONTINUOUS PRN
Status: DISCONTINUED | OUTPATIENT
Start: 2017-02-19 | End: 2017-02-19 | Stop reason: SDUPTHER

## 2017-02-19 RX ORDER — DIPHENHYDRAMINE HYDROCHLORIDE 50 MG/ML
12.5 INJECTION INTRAMUSCULAR; INTRAVENOUS
Status: DISCONTINUED | OUTPATIENT
Start: 2017-02-19 | End: 2017-02-19 | Stop reason: HOSPADM

## 2017-02-19 RX ADMIN — METOROPROLOL TARTRATE 5 MG: 5 INJECTION, SOLUTION INTRAVENOUS at 16:10

## 2017-02-19 RX ADMIN — CISATRACURIUM BESYLATE 4 MG: 2 INJECTION, SOLUTION INTRAVENOUS at 09:08

## 2017-02-19 RX ADMIN — FENTANYL CITRATE 250 MCG: 50 INJECTION, SOLUTION INTRAMUSCULAR; INTRAVENOUS at 09:02

## 2017-02-19 RX ADMIN — CISATRACURIUM BESYLATE 1 MG: 2 INJECTION, SOLUTION INTRAVENOUS at 09:02

## 2017-02-19 RX ADMIN — SUCCINYLCHOLINE CHLORIDE 140 MG: 20 INJECTION, SOLUTION INTRAMUSCULAR; INTRAVENOUS at 09:05

## 2017-02-19 RX ADMIN — MORPHINE SULFATE 30 MG: 1 INJECTION INTRAVENOUS at 19:33

## 2017-02-19 RX ADMIN — HYDROMORPHONE HYDROCHLORIDE 0.5 MG: 1 INJECTION, SOLUTION INTRAMUSCULAR; INTRAVENOUS; SUBCUTANEOUS at 11:30

## 2017-02-19 RX ADMIN — FENTANYL CITRATE 125 MCG: 50 INJECTION, SOLUTION INTRAMUSCULAR; INTRAVENOUS at 10:07

## 2017-02-19 RX ADMIN — SODIUM CHLORIDE: 9 INJECTION, SOLUTION INTRAVENOUS at 14:50

## 2017-02-19 RX ADMIN — CARVEDILOL 6.25 MG: 6.25 TABLET, FILM COATED ORAL at 07:27

## 2017-02-19 RX ADMIN — EPHEDRINE SULFATE 10 MG: 50 INJECTION INTRAMUSCULAR; INTRAVENOUS; SUBCUTANEOUS at 09:25

## 2017-02-19 RX ADMIN — Medication 10 ML: at 21:36

## 2017-02-19 RX ADMIN — METRONIDAZOLE 5000 MG: 500 INJECTION, SOLUTION INTRAVENOUS at 09:22

## 2017-02-19 RX ADMIN — EPHEDRINE SULFATE 10 MG: 50 INJECTION INTRAMUSCULAR; INTRAVENOUS; SUBCUTANEOUS at 09:17

## 2017-02-19 RX ADMIN — SODIUM CHLORIDE: 9 INJECTION, SOLUTION INTRAVENOUS at 21:36

## 2017-02-19 RX ADMIN — SODIUM CHLORIDE: 9 INJECTION, SOLUTION INTRAVENOUS at 09:00

## 2017-02-19 RX ADMIN — GLYCOPYRROLATE 0.4 MG: 0.2 INJECTION, SOLUTION INTRAMUSCULAR; INTRAVENOUS at 11:01

## 2017-02-19 RX ADMIN — PROPOFOL 120 MG: 10 INJECTION, EMULSION INTRAVENOUS at 09:02

## 2017-02-19 RX ADMIN — ONDANSETRON 4 MG: 2 INJECTION INTRAMUSCULAR; INTRAVENOUS at 20:02

## 2017-02-19 RX ADMIN — NEOSTIGMINE METHYLSULFATE 2 MG: 1 INJECTION, SOLUTION INTRAVENOUS at 11:01

## 2017-02-19 RX ADMIN — CISATRACURIUM BESYLATE 4 MG: 2 INJECTION, SOLUTION INTRAVENOUS at 10:00

## 2017-02-19 RX ADMIN — EPHEDRINE SULFATE 10 MG: 50 INJECTION INTRAMUSCULAR; INTRAVENOUS; SUBCUTANEOUS at 10:00

## 2017-02-19 RX ADMIN — CEFAZOLIN SODIUM 500 ML: 10 INJECTION, POWDER, FOR SOLUTION INTRAVENOUS at 09:22

## 2017-02-19 RX ADMIN — LIDOCAINE HYDROCHLORIDE 50 MG: 10 INJECTION, SOLUTION INFILTRATION; PERINEURAL at 09:02

## 2017-02-19 RX ADMIN — HYDROMORPHONE HYDROCHLORIDE 0.5 MG: 1 INJECTION, SOLUTION INTRAMUSCULAR; INTRAVENOUS; SUBCUTANEOUS at 11:25

## 2017-02-19 RX ADMIN — SODIUM CHLORIDE: 9 INJECTION, SOLUTION INTRAVENOUS at 06:16

## 2017-02-19 RX ADMIN — MORPHINE SULFATE 30 MG: 1 INJECTION INTRAVENOUS at 11:57

## 2017-02-19 RX ADMIN — CEFAZOLIN SODIUM: 10 INJECTION, POWDER, FOR SOLUTION INTRAVENOUS at 19:43

## 2017-02-19 RX ADMIN — FAMOTIDINE 20 MG: 10 INJECTION, SOLUTION INTRAVENOUS at 16:04

## 2017-02-19 RX ADMIN — FENTANYL CITRATE 100 MCG: 50 INJECTION, SOLUTION INTRAMUSCULAR; INTRAVENOUS at 10:15

## 2017-02-19 ASSESSMENT — PAIN SCALES - GENERAL
PAINLEVEL_OUTOF10: 8
PAINLEVEL_OUTOF10: 5
PAINLEVEL_OUTOF10: 8
PAINLEVEL_OUTOF10: 10
PAINLEVEL_OUTOF10: 0
PAINLEVEL_OUTOF10: 7
PAINLEVEL_OUTOF10: 6
PAINLEVEL_OUTOF10: 10
PAINLEVEL_OUTOF10: 6
PAINLEVEL_OUTOF10: 7
PAINLEVEL_OUTOF10: 0
PAINLEVEL_OUTOF10: 7
PAINLEVEL_OUTOF10: 5
PAINLEVEL_OUTOF10: 6
PAINLEVEL_OUTOF10: 10
PAINLEVEL_OUTOF10: 0

## 2017-02-19 ASSESSMENT — PAIN DESCRIPTION - PAIN TYPE
TYPE: SURGICAL PAIN

## 2017-02-19 ASSESSMENT — PAIN DESCRIPTION - LOCATION
LOCATION: ABDOMEN

## 2017-02-19 ASSESSMENT — PAIN DESCRIPTION - PROGRESSION
CLINICAL_PROGRESSION: GRADUALLY IMPROVING
CLINICAL_PROGRESSION: NOT CHANGED
CLINICAL_PROGRESSION: GRADUALLY IMPROVING

## 2017-02-19 ASSESSMENT — PAIN DESCRIPTION - ORIENTATION
ORIENTATION: MID
ORIENTATION: MID

## 2017-02-19 ASSESSMENT — PAIN DESCRIPTION - FREQUENCY
FREQUENCY: CONTINUOUS
FREQUENCY: CONTINUOUS

## 2017-02-19 ASSESSMENT — PAIN DESCRIPTION - ONSET
ONSET: PROGRESSIVE
ONSET: AWAKENED FROM SLEEP

## 2017-02-19 ASSESSMENT — PAIN DESCRIPTION - DESCRIPTORS
DESCRIPTORS: SHARP
DESCRIPTORS: SHARP

## 2017-02-20 LAB
ANION GAP SERPL CALCULATED.3IONS-SCNC: 14 MMOL/L (ref 9–17)
BUN BLDV-MCNC: 42 MG/DL (ref 8–23)
BUN/CREAT BLD: ABNORMAL (ref 9–20)
CALCIUM SERPL-MCNC: 7.7 MG/DL (ref 8.6–10.4)
CHLORIDE BLD-SCNC: 100 MMOL/L (ref 98–107)
CO2: 26 MMOL/L (ref 20–31)
CREAT SERPL-MCNC: 2.23 MG/DL (ref 0.7–1.2)
CULTURE: NORMAL
CULTURE: NORMAL
GFR AFRICAN AMERICAN: 35 ML/MIN
GFR NON-AFRICAN AMERICAN: 29 ML/MIN
GFR SERPL CREATININE-BSD FRML MDRD: ABNORMAL ML/MIN/{1.73_M2}
GFR SERPL CREATININE-BSD FRML MDRD: ABNORMAL ML/MIN/{1.73_M2}
GLUCOSE BLD-MCNC: 129 MG/DL (ref 70–99)
HCT VFR BLD CALC: 34.6 % (ref 41–53)
HEMOGLOBIN: 11.6 G/DL (ref 13.5–17.5)
Lab: NORMAL
MCH RBC QN AUTO: 29.7 PG (ref 26–34)
MCHC RBC AUTO-ENTMCNC: 33.4 G/DL (ref 31–37)
MCV RBC AUTO: 88.8 FL (ref 80–100)
PDW BLD-RTO: 14.7 % (ref 11.5–14.9)
PLATELET # BLD: 140 K/UL (ref 150–450)
PMV BLD AUTO: 8.6 FL (ref 6–12)
POTASSIUM SERPL-SCNC: 4.3 MMOL/L (ref 3.7–5.3)
RBC # BLD: 3.89 M/UL (ref 4.5–5.9)
SODIUM BLD-SCNC: 140 MMOL/L (ref 135–144)
SPECIMEN DESCRIPTION: NORMAL
SPECIMEN DESCRIPTION: NORMAL
STATUS: NORMAL
WBC # BLD: 12.5 K/UL (ref 3.5–11)

## 2017-02-20 PROCEDURE — 2060000000 HC ICU INTERMEDIATE R&B

## 2017-02-20 PROCEDURE — 2580000003 HC RX 258: Performed by: SURGERY

## 2017-02-20 PROCEDURE — 97162 PT EVAL MOD COMPLEX 30 MIN: CPT

## 2017-02-20 PROCEDURE — S0028 INJECTION, FAMOTIDINE, 20 MG: HCPCS | Performed by: SURGERY

## 2017-02-20 PROCEDURE — 2580000003 HC RX 258: Performed by: INTERNAL MEDICINE

## 2017-02-20 PROCEDURE — 94770 HC ETCO2 MONITOR DAILY: CPT

## 2017-02-20 PROCEDURE — 85027 COMPLETE CBC AUTOMATED: CPT

## 2017-02-20 PROCEDURE — 97530 THERAPEUTIC ACTIVITIES: CPT

## 2017-02-20 PROCEDURE — 97166 OT EVAL MOD COMPLEX 45 MIN: CPT

## 2017-02-20 PROCEDURE — 6370000000 HC RX 637 (ALT 250 FOR IP): Performed by: SURGERY

## 2017-02-20 PROCEDURE — G8988 SELF CARE GOAL STATUS: HCPCS

## 2017-02-20 PROCEDURE — 36415 COLL VENOUS BLD VENIPUNCTURE: CPT

## 2017-02-20 PROCEDURE — 99232 SBSQ HOSP IP/OBS MODERATE 35: CPT | Performed by: INTERNAL MEDICINE

## 2017-02-20 PROCEDURE — 97110 THERAPEUTIC EXERCISES: CPT

## 2017-02-20 PROCEDURE — G8987 SELF CARE CURRENT STATUS: HCPCS

## 2017-02-20 PROCEDURE — 2500000003 HC RX 250 WO HCPCS: Performed by: SURGERY

## 2017-02-20 PROCEDURE — 6360000002 HC RX W HCPCS: Performed by: SURGERY

## 2017-02-20 PROCEDURE — 80048 BASIC METABOLIC PNL TOTAL CA: CPT

## 2017-02-20 RX ORDER — BISACODYL 10 MG
10 SUPPOSITORY, RECTAL RECTAL DAILY PRN
Status: DISCONTINUED | OUTPATIENT
Start: 2017-02-20 | End: 2017-02-25

## 2017-02-20 RX ORDER — MORPHINE SULFATE/0.9% NACL/PF 1 MG/ML
SYRINGE (ML) INJECTION CONTINUOUS
Status: DISCONTINUED | OUTPATIENT
Start: 2017-02-20 | End: 2017-02-21 | Stop reason: ALTCHOICE

## 2017-02-20 RX ORDER — DIPHENHYDRAMINE HYDROCHLORIDE 50 MG/ML
25 INJECTION INTRAMUSCULAR; INTRAVENOUS EVERY 6 HOURS PRN
Status: DISCONTINUED | OUTPATIENT
Start: 2017-02-20 | End: 2017-03-01 | Stop reason: HOSPADM

## 2017-02-20 RX ADMIN — Medication 1 LOZENGE: at 15:26

## 2017-02-20 RX ADMIN — DOCUSATE SODIUM 100 MG: 100 CAPSULE, LIQUID FILLED ORAL at 10:17

## 2017-02-20 RX ADMIN — MORPHINE SULFATE 1.2 MG: 1 INJECTION INTRAVENOUS at 18:32

## 2017-02-20 RX ADMIN — SODIUM CHLORIDE: 9 INJECTION, SOLUTION INTRAVENOUS at 12:15

## 2017-02-20 RX ADMIN — SODIUM CHLORIDE: 9 INJECTION, SOLUTION INTRAVENOUS at 04:53

## 2017-02-20 RX ADMIN — ONDANSETRON 4 MG: 2 INJECTION INTRAMUSCULAR; INTRAVENOUS at 09:33

## 2017-02-20 RX ADMIN — ENOXAPARIN SODIUM 40 MG: 40 INJECTION SUBCUTANEOUS at 08:15

## 2017-02-20 RX ADMIN — Medication 1 LOZENGE: at 12:23

## 2017-02-20 RX ADMIN — METOROPROLOL TARTRATE 5 MG: 5 INJECTION, SOLUTION INTRAVENOUS at 08:17

## 2017-02-20 RX ADMIN — CEFAZOLIN SODIUM: 10 INJECTION, POWDER, FOR SOLUTION INTRAVENOUS at 02:39

## 2017-02-20 RX ADMIN — DOCUSATE SODIUM AND SENNOSIDES 1 TABLET: 8.6; 5 TABLET, FILM COATED ORAL at 10:17

## 2017-02-20 RX ADMIN — FAMOTIDINE 20 MG: 10 INJECTION, SOLUTION INTRAVENOUS at 08:15

## 2017-02-20 RX ADMIN — Medication 10 ML: at 08:15

## 2017-02-20 ASSESSMENT — PAIN DESCRIPTION - DESCRIPTORS
DESCRIPTORS: DISCOMFORT
DESCRIPTORS: DISCOMFORT

## 2017-02-20 ASSESSMENT — PAIN DESCRIPTION - ONSET: ONSET: ON-GOING

## 2017-02-20 ASSESSMENT — PAIN SCALES - GENERAL
PAINLEVEL_OUTOF10: 0
PAINLEVEL_OUTOF10: 4
PAINLEVEL_OUTOF10: 5
PAINLEVEL_OUTOF10: 3
PAINLEVEL_OUTOF10: 5
PAINLEVEL_OUTOF10: 3
PAINLEVEL_OUTOF10: 5

## 2017-02-20 ASSESSMENT — PAIN DESCRIPTION - PAIN TYPE: TYPE: SURGICAL PAIN

## 2017-02-20 ASSESSMENT — PAIN DESCRIPTION - FREQUENCY
FREQUENCY: CONTINUOUS
FREQUENCY: CONTINUOUS

## 2017-02-20 ASSESSMENT — PAIN DESCRIPTION - ORIENTATION: ORIENTATION: MID

## 2017-02-20 ASSESSMENT — PAIN DESCRIPTION - LOCATION
LOCATION: ABDOMEN
LOCATION: ABDOMEN

## 2017-02-20 ASSESSMENT — PAIN DESCRIPTION - PROGRESSION: CLINICAL_PROGRESSION: GRADUALLY IMPROVING

## 2017-02-21 ENCOUNTER — APPOINTMENT (OUTPATIENT)
Dept: GENERAL RADIOLOGY | Age: 72
DRG: 329 | End: 2017-02-21
Attending: SURGERY
Payer: MEDICARE

## 2017-02-21 LAB
ANION GAP SERPL CALCULATED.3IONS-SCNC: 16 MMOL/L (ref 9–17)
BUN BLDV-MCNC: 35 MG/DL (ref 8–23)
BUN/CREAT BLD: ABNORMAL (ref 9–20)
CALCIUM SERPL-MCNC: 8.3 MG/DL (ref 8.6–10.4)
CHLORIDE BLD-SCNC: 103 MMOL/L (ref 98–107)
CO2: 25 MMOL/L (ref 20–31)
CREAT SERPL-MCNC: 2.18 MG/DL (ref 0.7–1.2)
GFR AFRICAN AMERICAN: 36 ML/MIN
GFR NON-AFRICAN AMERICAN: 30 ML/MIN
GFR SERPL CREATININE-BSD FRML MDRD: ABNORMAL ML/MIN/{1.73_M2}
GFR SERPL CREATININE-BSD FRML MDRD: ABNORMAL ML/MIN/{1.73_M2}
GLUCOSE BLD-MCNC: 98 MG/DL (ref 70–99)
HCT VFR BLD CALC: 36.1 % (ref 41–53)
HEMOGLOBIN: 11.8 G/DL (ref 13.5–17.5)
MCH RBC QN AUTO: 29.2 PG (ref 26–34)
MCHC RBC AUTO-ENTMCNC: 32.6 G/DL (ref 31–37)
MCV RBC AUTO: 89.7 FL (ref 80–100)
PDW BLD-RTO: 14.7 % (ref 11.5–14.9)
PLATELET # BLD: 132 K/UL (ref 150–450)
PMV BLD AUTO: 8.4 FL (ref 6–12)
POTASSIUM SERPL-SCNC: 4.2 MMOL/L (ref 3.7–5.3)
RBC # BLD: 4.03 M/UL (ref 4.5–5.9)
SODIUM BLD-SCNC: 144 MMOL/L (ref 135–144)
SURGICAL PATHOLOGY REPORT: NORMAL
WBC # BLD: 12.2 K/UL (ref 3.5–11)

## 2017-02-21 PROCEDURE — S0028 INJECTION, FAMOTIDINE, 20 MG: HCPCS | Performed by: SURGERY

## 2017-02-21 PROCEDURE — 74270 X-RAY XM COLON 1CNTRST STD: CPT | Performed by: RADIOLOGY

## 2017-02-21 PROCEDURE — 36415 COLL VENOUS BLD VENIPUNCTURE: CPT

## 2017-02-21 PROCEDURE — 6370000000 HC RX 637 (ALT 250 FOR IP): Performed by: SURGERY

## 2017-02-21 PROCEDURE — 2500000003 HC RX 250 WO HCPCS: Performed by: SURGERY

## 2017-02-21 PROCEDURE — 85027 COMPLETE CBC AUTOMATED: CPT

## 2017-02-21 PROCEDURE — 2060000000 HC ICU INTERMEDIATE R&B

## 2017-02-21 PROCEDURE — 74270 X-RAY XM COLON 1CNTRST STD: CPT

## 2017-02-21 PROCEDURE — 99232 SBSQ HOSP IP/OBS MODERATE 35: CPT | Performed by: INTERNAL MEDICINE

## 2017-02-21 PROCEDURE — 94770 HC ETCO2 MONITOR DAILY: CPT

## 2017-02-21 PROCEDURE — 80048 BASIC METABOLIC PNL TOTAL CA: CPT

## 2017-02-21 PROCEDURE — 6360000004 HC RX CONTRAST MEDICATION: Performed by: SURGERY

## 2017-02-21 PROCEDURE — 6370000000 HC RX 637 (ALT 250 FOR IP): Performed by: INTERNAL MEDICINE

## 2017-02-21 PROCEDURE — 2580000003 HC RX 258: Performed by: INTERNAL MEDICINE

## 2017-02-21 PROCEDURE — 2580000003 HC RX 258: Performed by: SURGERY

## 2017-02-21 PROCEDURE — 6360000002 HC RX W HCPCS: Performed by: SURGERY

## 2017-02-21 PROCEDURE — G9500 RAD EXPOS IND/EXP TM DOC: HCPCS | Performed by: RADIOLOGY

## 2017-02-21 RX ORDER — CARVEDILOL 6.25 MG/1
6.25 TABLET ORAL 2 TIMES DAILY WITH MEALS
Status: DISCONTINUED | OUTPATIENT
Start: 2017-02-21 | End: 2017-02-27

## 2017-02-21 RX ORDER — OXYCODONE HYDROCHLORIDE AND ACETAMINOPHEN 5; 325 MG/1; MG/1
1 TABLET ORAL EVERY 4 HOURS PRN
Status: DISCONTINUED | OUTPATIENT
Start: 2017-02-21 | End: 2017-03-01 | Stop reason: HOSPADM

## 2017-02-21 RX ORDER — DOCUSATE SODIUM 100 MG/1
100 CAPSULE, LIQUID FILLED ORAL 2 TIMES DAILY
Status: DISCONTINUED | OUTPATIENT
Start: 2017-02-21 | End: 2017-02-25

## 2017-02-21 RX ORDER — ASPIRIN 81 MG/1
81 TABLET ORAL DAILY
Status: DISCONTINUED | OUTPATIENT
Start: 2017-02-21 | End: 2017-03-01 | Stop reason: HOSPADM

## 2017-02-21 RX ORDER — MORPHINE SULFATE 2 MG/ML
2 INJECTION, SOLUTION INTRAMUSCULAR; INTRAVENOUS
Status: DISCONTINUED | OUTPATIENT
Start: 2017-02-21 | End: 2017-03-01 | Stop reason: HOSPADM

## 2017-02-21 RX ORDER — MORPHINE SULFATE 10 MG/ML
6 INJECTION, SOLUTION INTRAMUSCULAR; INTRAVENOUS
Status: DISCONTINUED | OUTPATIENT
Start: 2017-02-21 | End: 2017-03-01 | Stop reason: HOSPADM

## 2017-02-21 RX ORDER — OXYCODONE HYDROCHLORIDE AND ACETAMINOPHEN 5; 325 MG/1; MG/1
2 TABLET ORAL EVERY 4 HOURS PRN
Status: DISCONTINUED | OUTPATIENT
Start: 2017-02-21 | End: 2017-03-01 | Stop reason: HOSPADM

## 2017-02-21 RX ADMIN — DOCUSATE SODIUM 100 MG: 100 CAPSULE, LIQUID FILLED ORAL at 12:07

## 2017-02-21 RX ADMIN — Medication 10 ML: at 20:24

## 2017-02-21 RX ADMIN — DOCUSATE SODIUM 100 MG: 100 CAPSULE, LIQUID FILLED ORAL at 20:24

## 2017-02-21 RX ADMIN — CARVEDILOL 6.25 MG: 6.25 TABLET, FILM COATED ORAL at 16:31

## 2017-02-21 RX ADMIN — IOHEXOL 1000 ML: 240 INJECTION, SOLUTION INTRATHECAL; INTRAVASCULAR; INTRAVENOUS; ORAL at 15:30

## 2017-02-21 RX ADMIN — FAMOTIDINE 20 MG: 10 INJECTION, SOLUTION INTRAVENOUS at 07:34

## 2017-02-21 RX ADMIN — Medication 1 LOZENGE: at 07:34

## 2017-02-21 RX ADMIN — SODIUM CHLORIDE: 9 INJECTION, SOLUTION INTRAVENOUS at 10:20

## 2017-02-21 RX ADMIN — BISACODYL 10 MG: 10 SUPPOSITORY RECTAL at 07:34

## 2017-02-21 RX ADMIN — SODIUM CHLORIDE: 9 INJECTION, SOLUTION INTRAVENOUS at 22:54

## 2017-02-21 RX ADMIN — MORPHINE SULFATE 2 MG: 2 INJECTION, SOLUTION INTRAMUSCULAR; INTRAVENOUS at 18:47

## 2017-02-21 RX ADMIN — ENOXAPARIN SODIUM 40 MG: 40 INJECTION SUBCUTANEOUS at 07:34

## 2017-02-21 RX ADMIN — Medication 10 ML: at 07:34

## 2017-02-21 RX ADMIN — MORPHINE SULFATE 2 MG: 2 INJECTION, SOLUTION INTRAMUSCULAR; INTRAVENOUS at 16:46

## 2017-02-21 RX ADMIN — MORPHINE SULFATE 2 MG: 2 INJECTION, SOLUTION INTRAMUSCULAR; INTRAVENOUS at 15:16

## 2017-02-21 RX ADMIN — ASPIRIN 81 MG: 81 TABLET, COATED ORAL at 16:46

## 2017-02-21 ASSESSMENT — PAIN DESCRIPTION - LOCATION: LOCATION: ABDOMEN

## 2017-02-21 ASSESSMENT — PAIN SCALES - GENERAL
PAINLEVEL_OUTOF10: 4
PAINLEVEL_OUTOF10: 3
PAINLEVEL_OUTOF10: 8
PAINLEVEL_OUTOF10: 0
PAINLEVEL_OUTOF10: 6
PAINLEVEL_OUTOF10: 8
PAINLEVEL_OUTOF10: 4
PAINLEVEL_OUTOF10: 0

## 2017-02-21 ASSESSMENT — PAIN DESCRIPTION - PAIN TYPE: TYPE: SURGICAL PAIN

## 2017-02-22 ENCOUNTER — CARE COORDINATOR VISIT (OUTPATIENT)
Dept: CASE MANAGEMENT | Age: 72
End: 2017-02-22

## 2017-02-22 LAB
ANION GAP SERPL CALCULATED.3IONS-SCNC: 14 MMOL/L (ref 9–17)
BUN BLDV-MCNC: 33 MG/DL (ref 8–23)
BUN/CREAT BLD: ABNORMAL (ref 9–20)
CALCIUM SERPL-MCNC: 8.6 MG/DL (ref 8.6–10.4)
CHLORIDE BLD-SCNC: 104 MMOL/L (ref 98–107)
CO2: 26 MMOL/L (ref 20–31)
CREAT SERPL-MCNC: 1.98 MG/DL (ref 0.7–1.2)
GFR AFRICAN AMERICAN: 41 ML/MIN
GFR NON-AFRICAN AMERICAN: 33 ML/MIN
GFR SERPL CREATININE-BSD FRML MDRD: ABNORMAL ML/MIN/{1.73_M2}
GFR SERPL CREATININE-BSD FRML MDRD: ABNORMAL ML/MIN/{1.73_M2}
GLUCOSE BLD-MCNC: 107 MG/DL (ref 70–99)
HCT VFR BLD CALC: 35.7 % (ref 41–53)
HEMOGLOBIN: 11.6 G/DL (ref 13.5–17.5)
MCH RBC QN AUTO: 29.5 PG (ref 26–34)
MCHC RBC AUTO-ENTMCNC: 32.6 G/DL (ref 31–37)
MCV RBC AUTO: 90.4 FL (ref 80–100)
PDW BLD-RTO: 15.2 % (ref 11.5–14.9)
PLATELET # BLD: 156 K/UL (ref 150–450)
PMV BLD AUTO: 8.6 FL (ref 6–12)
POTASSIUM SERPL-SCNC: 4.5 MMOL/L (ref 3.7–5.3)
RBC # BLD: 3.95 M/UL (ref 4.5–5.9)
SODIUM BLD-SCNC: 144 MMOL/L (ref 135–144)
WBC # BLD: 17.2 K/UL (ref 3.5–11)

## 2017-02-22 PROCEDURE — 85027 COMPLETE CBC AUTOMATED: CPT

## 2017-02-22 PROCEDURE — S0028 INJECTION, FAMOTIDINE, 20 MG: HCPCS | Performed by: SURGERY

## 2017-02-22 PROCEDURE — 80048 BASIC METABOLIC PNL TOTAL CA: CPT

## 2017-02-22 PROCEDURE — 97530 THERAPEUTIC ACTIVITIES: CPT

## 2017-02-22 PROCEDURE — 97116 GAIT TRAINING THERAPY: CPT

## 2017-02-22 PROCEDURE — 6370000000 HC RX 637 (ALT 250 FOR IP): Performed by: INTERNAL MEDICINE

## 2017-02-22 PROCEDURE — 2500000003 HC RX 250 WO HCPCS: Performed by: SURGERY

## 2017-02-22 PROCEDURE — 2580000003 HC RX 258: Performed by: SURGERY

## 2017-02-22 PROCEDURE — 97110 THERAPEUTIC EXERCISES: CPT

## 2017-02-22 PROCEDURE — 6360000002 HC RX W HCPCS: Performed by: SURGERY

## 2017-02-22 PROCEDURE — 6370000000 HC RX 637 (ALT 250 FOR IP): Performed by: SURGERY

## 2017-02-22 PROCEDURE — 2060000000 HC ICU INTERMEDIATE R&B

## 2017-02-22 PROCEDURE — 36415 COLL VENOUS BLD VENIPUNCTURE: CPT

## 2017-02-22 RX ADMIN — DOCUSATE SODIUM 100 MG: 100 CAPSULE, LIQUID FILLED ORAL at 08:30

## 2017-02-22 RX ADMIN — DOCUSATE SODIUM 100 MG: 100 CAPSULE, LIQUID FILLED ORAL at 21:03

## 2017-02-22 RX ADMIN — CARVEDILOL 6.25 MG: 6.25 TABLET, FILM COATED ORAL at 18:04

## 2017-02-22 RX ADMIN — CARVEDILOL 6.25 MG: 6.25 TABLET, FILM COATED ORAL at 10:45

## 2017-02-22 RX ADMIN — Medication 10 ML: at 21:03

## 2017-02-22 RX ADMIN — ASPIRIN 81 MG: 81 TABLET, COATED ORAL at 08:31

## 2017-02-22 RX ADMIN — FAMOTIDINE 20 MG: 10 INJECTION, SOLUTION INTRAVENOUS at 08:35

## 2017-02-22 RX ADMIN — Medication 10 ML: at 08:39

## 2017-02-22 RX ADMIN — ENOXAPARIN SODIUM 40 MG: 40 INJECTION SUBCUTANEOUS at 08:31

## 2017-02-22 RX ADMIN — MORPHINE SULFATE 2 MG: 2 INJECTION, SOLUTION INTRAMUSCULAR; INTRAVENOUS at 03:56

## 2017-02-22 ASSESSMENT — PAIN DESCRIPTION - LOCATION
LOCATION: ABDOMEN
LOCATION: ABDOMEN

## 2017-02-22 ASSESSMENT — PAIN DESCRIPTION - PAIN TYPE
TYPE: SURGICAL PAIN
TYPE: SURGICAL PAIN

## 2017-02-22 ASSESSMENT — PAIN SCALES - GENERAL
PAINLEVEL_OUTOF10: 7
PAINLEVEL_OUTOF10: 8

## 2017-02-23 LAB
ANION GAP SERPL CALCULATED.3IONS-SCNC: 9 MMOL/L (ref 9–17)
BUN BLDV-MCNC: 31 MG/DL (ref 8–23)
BUN/CREAT BLD: ABNORMAL (ref 9–20)
CALCIUM SERPL-MCNC: 8.4 MG/DL (ref 8.6–10.4)
CHLORIDE BLD-SCNC: 105 MMOL/L (ref 98–107)
CO2: 26 MMOL/L (ref 20–31)
CREAT SERPL-MCNC: 1.91 MG/DL (ref 0.7–1.2)
GFR AFRICAN AMERICAN: 42 ML/MIN
GFR NON-AFRICAN AMERICAN: 35 ML/MIN
GFR SERPL CREATININE-BSD FRML MDRD: ABNORMAL ML/MIN/{1.73_M2}
GFR SERPL CREATININE-BSD FRML MDRD: ABNORMAL ML/MIN/{1.73_M2}
GLUCOSE BLD-MCNC: 128 MG/DL (ref 70–99)
HCT VFR BLD CALC: 30.4 % (ref 41–53)
HEMOGLOBIN: 10.1 G/DL (ref 13.5–17.5)
MCH RBC QN AUTO: 29.9 PG (ref 26–34)
MCHC RBC AUTO-ENTMCNC: 33.3 G/DL (ref 31–37)
MCV RBC AUTO: 89.7 FL (ref 80–100)
PDW BLD-RTO: 14.8 % (ref 11.5–14.9)
PLATELET # BLD: 134 K/UL (ref 150–450)
PMV BLD AUTO: 8.5 FL (ref 6–12)
POTASSIUM SERPL-SCNC: 4 MMOL/L (ref 3.7–5.3)
RBC # BLD: 3.39 M/UL (ref 4.5–5.9)
SODIUM BLD-SCNC: 140 MMOL/L (ref 135–144)
WBC # BLD: 16.7 K/UL (ref 3.5–11)

## 2017-02-23 PROCEDURE — S0028 INJECTION, FAMOTIDINE, 20 MG: HCPCS | Performed by: SURGERY

## 2017-02-23 PROCEDURE — 2060000000 HC ICU INTERMEDIATE R&B

## 2017-02-23 PROCEDURE — 2500000003 HC RX 250 WO HCPCS: Performed by: SURGERY

## 2017-02-23 PROCEDURE — 85027 COMPLETE CBC AUTOMATED: CPT

## 2017-02-23 PROCEDURE — 2580000003 HC RX 258: Performed by: SURGERY

## 2017-02-23 PROCEDURE — 6370000000 HC RX 637 (ALT 250 FOR IP): Performed by: SURGERY

## 2017-02-23 PROCEDURE — 36415 COLL VENOUS BLD VENIPUNCTURE: CPT

## 2017-02-23 PROCEDURE — 6360000002 HC RX W HCPCS: Performed by: SURGERY

## 2017-02-23 PROCEDURE — 6370000000 HC RX 637 (ALT 250 FOR IP): Performed by: INTERNAL MEDICINE

## 2017-02-23 PROCEDURE — 93971 EXTREMITY STUDY: CPT

## 2017-02-23 PROCEDURE — 97530 THERAPEUTIC ACTIVITIES: CPT

## 2017-02-23 PROCEDURE — 99232 SBSQ HOSP IP/OBS MODERATE 35: CPT | Performed by: INTERNAL MEDICINE

## 2017-02-23 PROCEDURE — 2580000003 HC RX 258: Performed by: INTERNAL MEDICINE

## 2017-02-23 PROCEDURE — 80048 BASIC METABOLIC PNL TOTAL CA: CPT

## 2017-02-23 RX ADMIN — ENOXAPARIN SODIUM 40 MG: 40 INJECTION SUBCUTANEOUS at 08:41

## 2017-02-23 RX ADMIN — CARVEDILOL 6.25 MG: 6.25 TABLET, FILM COATED ORAL at 08:41

## 2017-02-23 RX ADMIN — CARVEDILOL 6.25 MG: 6.25 TABLET, FILM COATED ORAL at 16:58

## 2017-02-23 RX ADMIN — OXYCODONE HYDROCHLORIDE AND ACETAMINOPHEN 2 TABLET: 5; 325 TABLET ORAL at 00:57

## 2017-02-23 RX ADMIN — MORPHINE SULFATE 2 MG: 2 INJECTION, SOLUTION INTRAMUSCULAR; INTRAVENOUS at 11:10

## 2017-02-23 RX ADMIN — DOCUSATE SODIUM 100 MG: 100 CAPSULE, LIQUID FILLED ORAL at 08:42

## 2017-02-23 RX ADMIN — SODIUM CHLORIDE: 9 INJECTION, SOLUTION INTRAVENOUS at 14:23

## 2017-02-23 RX ADMIN — FAMOTIDINE 20 MG: 10 INJECTION, SOLUTION INTRAVENOUS at 08:42

## 2017-02-23 RX ADMIN — Medication 10 ML: at 08:43

## 2017-02-23 RX ADMIN — ASPIRIN 81 MG: 81 TABLET, COATED ORAL at 08:42

## 2017-02-23 RX ADMIN — OXYCODONE HYDROCHLORIDE AND ACETAMINOPHEN 2 TABLET: 5; 325 TABLET ORAL at 18:13

## 2017-02-23 ASSESSMENT — PAIN DESCRIPTION - DESCRIPTORS
DESCRIPTORS: TENDER;SORE
DESCRIPTORS: DISCOMFORT
DESCRIPTORS: DISCOMFORT

## 2017-02-23 ASSESSMENT — PAIN SCALES - GENERAL
PAINLEVEL_OUTOF10: 5
PAINLEVEL_OUTOF10: 3
PAINLEVEL_OUTOF10: 8
PAINLEVEL_OUTOF10: 0
PAINLEVEL_OUTOF10: 0
PAINLEVEL_OUTOF10: 8
PAINLEVEL_OUTOF10: 8
PAINLEVEL_OUTOF10: 0
PAINLEVEL_OUTOF10: 7

## 2017-02-23 ASSESSMENT — PAIN DESCRIPTION - ONSET
ONSET: ON-GOING

## 2017-02-23 ASSESSMENT — PAIN DESCRIPTION - PROGRESSION
CLINICAL_PROGRESSION: GRADUALLY IMPROVING

## 2017-02-23 ASSESSMENT — PAIN DESCRIPTION - PAIN TYPE
TYPE: ACUTE PAIN;SURGICAL PAIN
TYPE: SURGICAL PAIN
TYPE: ACUTE PAIN;SURGICAL PAIN

## 2017-02-23 ASSESSMENT — PAIN DESCRIPTION - FREQUENCY
FREQUENCY: INTERMITTENT
FREQUENCY: CONTINUOUS
FREQUENCY: CONTINUOUS

## 2017-02-23 ASSESSMENT — PAIN DESCRIPTION - ORIENTATION
ORIENTATION: MID

## 2017-02-23 ASSESSMENT — PAIN DESCRIPTION - LOCATION
LOCATION: ABDOMEN

## 2017-02-24 LAB
ANION GAP SERPL CALCULATED.3IONS-SCNC: 12 MMOL/L (ref 9–17)
BUN BLDV-MCNC: 30 MG/DL (ref 8–23)
BUN/CREAT BLD: ABNORMAL (ref 9–20)
CALCIUM SERPL-MCNC: 8.4 MG/DL (ref 8.6–10.4)
CHLORIDE BLD-SCNC: 106 MMOL/L (ref 98–107)
CO2: 23 MMOL/L (ref 20–31)
CREAT SERPL-MCNC: 1.68 MG/DL (ref 0.7–1.2)
GFR AFRICAN AMERICAN: 49 ML/MIN
GFR NON-AFRICAN AMERICAN: 40 ML/MIN
GFR SERPL CREATININE-BSD FRML MDRD: ABNORMAL ML/MIN/{1.73_M2}
GFR SERPL CREATININE-BSD FRML MDRD: ABNORMAL ML/MIN/{1.73_M2}
GLUCOSE BLD-MCNC: 129 MG/DL (ref 70–99)
HCT VFR BLD CALC: 33.5 % (ref 41–53)
HEMOGLOBIN: 11 G/DL (ref 13.5–17.5)
MCH RBC QN AUTO: 29.6 PG (ref 26–34)
MCHC RBC AUTO-ENTMCNC: 33 G/DL (ref 31–37)
MCV RBC AUTO: 89.6 FL (ref 80–100)
PDW BLD-RTO: 15 % (ref 11.5–14.9)
PLATELET # BLD: 157 K/UL (ref 150–450)
PMV BLD AUTO: 8.3 FL (ref 6–12)
POTASSIUM SERPL-SCNC: 4 MMOL/L (ref 3.7–5.3)
RBC # BLD: 3.74 M/UL (ref 4.5–5.9)
SODIUM BLD-SCNC: 141 MMOL/L (ref 135–144)
WBC # BLD: 17.2 K/UL (ref 3.5–11)

## 2017-02-24 PROCEDURE — 2580000003 HC RX 258: Performed by: SURGERY

## 2017-02-24 PROCEDURE — 6370000000 HC RX 637 (ALT 250 FOR IP): Performed by: INTERNAL MEDICINE

## 2017-02-24 PROCEDURE — S0028 INJECTION, FAMOTIDINE, 20 MG: HCPCS | Performed by: SURGERY

## 2017-02-24 PROCEDURE — 2500000003 HC RX 250 WO HCPCS: Performed by: SURGERY

## 2017-02-24 PROCEDURE — 1200000000 HC SEMI PRIVATE

## 2017-02-24 PROCEDURE — 85027 COMPLETE CBC AUTOMATED: CPT

## 2017-02-24 PROCEDURE — 97110 THERAPEUTIC EXERCISES: CPT

## 2017-02-24 PROCEDURE — 6370000000 HC RX 637 (ALT 250 FOR IP): Performed by: SURGERY

## 2017-02-24 PROCEDURE — 97116 GAIT TRAINING THERAPY: CPT

## 2017-02-24 PROCEDURE — 99232 SBSQ HOSP IP/OBS MODERATE 35: CPT | Performed by: INTERNAL MEDICINE

## 2017-02-24 PROCEDURE — 80048 BASIC METABOLIC PNL TOTAL CA: CPT

## 2017-02-24 PROCEDURE — 36415 COLL VENOUS BLD VENIPUNCTURE: CPT

## 2017-02-24 PROCEDURE — 6360000002 HC RX W HCPCS: Performed by: SURGERY

## 2017-02-24 RX ADMIN — FAMOTIDINE 20 MG: 10 INJECTION, SOLUTION INTRAVENOUS at 07:45

## 2017-02-24 RX ADMIN — OXYCODONE HYDROCHLORIDE AND ACETAMINOPHEN 2 TABLET: 5; 325 TABLET ORAL at 11:37

## 2017-02-24 RX ADMIN — Medication 10 ML: at 01:26

## 2017-02-24 RX ADMIN — Medication 10 ML: at 07:46

## 2017-02-24 RX ADMIN — CARVEDILOL 6.25 MG: 6.25 TABLET, FILM COATED ORAL at 17:45

## 2017-02-24 RX ADMIN — OXYCODONE HYDROCHLORIDE AND ACETAMINOPHEN 2 TABLET: 5; 325 TABLET ORAL at 01:26

## 2017-02-24 RX ADMIN — DOCUSATE SODIUM 100 MG: 100 CAPSULE, LIQUID FILLED ORAL at 19:48

## 2017-02-24 RX ADMIN — CARVEDILOL 6.25 MG: 6.25 TABLET, FILM COATED ORAL at 07:45

## 2017-02-24 RX ADMIN — ENOXAPARIN SODIUM 40 MG: 40 INJECTION SUBCUTANEOUS at 07:45

## 2017-02-24 RX ADMIN — DOCUSATE SODIUM 100 MG: 100 CAPSULE, LIQUID FILLED ORAL at 07:45

## 2017-02-24 RX ADMIN — ASPIRIN 81 MG: 81 TABLET, COATED ORAL at 07:45

## 2017-02-24 RX ADMIN — OXYCODONE HYDROCHLORIDE AND ACETAMINOPHEN 2 TABLET: 5; 325 TABLET ORAL at 17:45

## 2017-02-24 ASSESSMENT — PAIN DESCRIPTION - ORIENTATION: ORIENTATION: RIGHT;LEFT;MID

## 2017-02-24 ASSESSMENT — PAIN DESCRIPTION - PAIN TYPE: TYPE: SURGICAL PAIN

## 2017-02-24 ASSESSMENT — PAIN SCALES - GENERAL
PAINLEVEL_OUTOF10: 0
PAINLEVEL_OUTOF10: 8
PAINLEVEL_OUTOF10: 6
PAINLEVEL_OUTOF10: 4
PAINLEVEL_OUTOF10: 4

## 2017-02-24 ASSESSMENT — PAIN DESCRIPTION - PROGRESSION: CLINICAL_PROGRESSION: GRADUALLY IMPROVING

## 2017-02-24 ASSESSMENT — PAIN DESCRIPTION - LOCATION: LOCATION: ABDOMEN

## 2017-02-24 ASSESSMENT — PAIN DESCRIPTION - ONSET: ONSET: ON-GOING

## 2017-02-24 ASSESSMENT — PAIN DESCRIPTION - FREQUENCY: FREQUENCY: INTERMITTENT

## 2017-02-24 ASSESSMENT — PAIN DESCRIPTION - DESCRIPTORS: DESCRIPTORS: SORE

## 2017-02-25 ENCOUNTER — APPOINTMENT (OUTPATIENT)
Dept: GENERAL RADIOLOGY | Age: 72
DRG: 329 | End: 2017-02-25
Attending: SURGERY
Payer: MEDICARE

## 2017-02-25 LAB
ANION GAP SERPL CALCULATED.3IONS-SCNC: 14 MMOL/L (ref 9–17)
BUN BLDV-MCNC: 41 MG/DL (ref 8–23)
BUN/CREAT BLD: ABNORMAL (ref 9–20)
CALCIUM SERPL-MCNC: 8.6 MG/DL (ref 8.6–10.4)
CHLORIDE BLD-SCNC: 102 MMOL/L (ref 98–107)
CO2: 22 MMOL/L (ref 20–31)
CREAT SERPL-MCNC: 2.15 MG/DL (ref 0.7–1.2)
GFR AFRICAN AMERICAN: 37 ML/MIN
GFR NON-AFRICAN AMERICAN: 30 ML/MIN
GFR SERPL CREATININE-BSD FRML MDRD: ABNORMAL ML/MIN/{1.73_M2}
GFR SERPL CREATININE-BSD FRML MDRD: ABNORMAL ML/MIN/{1.73_M2}
GLUCOSE BLD-MCNC: 104 MG/DL (ref 70–99)
HCT VFR BLD CALC: 33.1 % (ref 41–53)
HEMOGLOBIN: 10.9 G/DL (ref 13.5–17.5)
MCH RBC QN AUTO: 29.6 PG (ref 26–34)
MCHC RBC AUTO-ENTMCNC: 33 G/DL (ref 31–37)
MCV RBC AUTO: 89.5 FL (ref 80–100)
PDW BLD-RTO: 14.9 % (ref 11.5–14.9)
PLATELET # BLD: 147 K/UL (ref 150–450)
PMV BLD AUTO: 8.5 FL (ref 6–12)
POTASSIUM SERPL-SCNC: 5.1 MMOL/L (ref 3.7–5.3)
RBC # BLD: 3.69 M/UL (ref 4.5–5.9)
SODIUM BLD-SCNC: 138 MMOL/L (ref 135–144)
WBC # BLD: 18.1 K/UL (ref 3.5–11)

## 2017-02-25 PROCEDURE — S0028 INJECTION, FAMOTIDINE, 20 MG: HCPCS | Performed by: SURGERY

## 2017-02-25 PROCEDURE — 99232 SBSQ HOSP IP/OBS MODERATE 35: CPT | Performed by: INTERNAL MEDICINE

## 2017-02-25 PROCEDURE — 71010 XR CHEST PORTABLE: CPT

## 2017-02-25 PROCEDURE — 2500000003 HC RX 250 WO HCPCS: Performed by: SURGERY

## 2017-02-25 PROCEDURE — 97110 THERAPEUTIC EXERCISES: CPT

## 2017-02-25 PROCEDURE — 6370000000 HC RX 637 (ALT 250 FOR IP): Performed by: SURGERY

## 2017-02-25 PROCEDURE — 2580000003 HC RX 258: Performed by: INTERNAL MEDICINE

## 2017-02-25 PROCEDURE — 85027 COMPLETE CBC AUTOMATED: CPT

## 2017-02-25 PROCEDURE — 1200000000 HC SEMI PRIVATE

## 2017-02-25 PROCEDURE — 6360000002 HC RX W HCPCS: Performed by: SURGERY

## 2017-02-25 PROCEDURE — 36415 COLL VENOUS BLD VENIPUNCTURE: CPT

## 2017-02-25 PROCEDURE — 80048 BASIC METABOLIC PNL TOTAL CA: CPT

## 2017-02-25 PROCEDURE — 97530 THERAPEUTIC ACTIVITIES: CPT

## 2017-02-25 PROCEDURE — 2580000003 HC RX 258: Performed by: SURGERY

## 2017-02-25 PROCEDURE — 6370000000 HC RX 637 (ALT 250 FOR IP): Performed by: INTERNAL MEDICINE

## 2017-02-25 PROCEDURE — 87040 BLOOD CULTURE FOR BACTERIA: CPT

## 2017-02-25 PROCEDURE — 97116 GAIT TRAINING THERAPY: CPT

## 2017-02-25 PROCEDURE — 71010 XR CHEST PORTABLE: CPT | Performed by: RADIOLOGY

## 2017-02-25 RX ORDER — LOPERAMIDE HYDROCHLORIDE 2 MG/1
2 CAPSULE ORAL 3 TIMES DAILY PRN
Status: DISCONTINUED | OUTPATIENT
Start: 2017-02-25 | End: 2017-03-01 | Stop reason: HOSPADM

## 2017-02-25 RX ORDER — SODIUM CHLORIDE 9 MG/ML
INJECTION, SOLUTION INTRAVENOUS CONTINUOUS
Status: DISCONTINUED | OUTPATIENT
Start: 2017-02-25 | End: 2017-02-28

## 2017-02-25 RX ADMIN — OXYCODONE HYDROCHLORIDE AND ACETAMINOPHEN 2 TABLET: 5; 325 TABLET ORAL at 00:23

## 2017-02-25 RX ADMIN — Medication 10 ML: at 21:31

## 2017-02-25 RX ADMIN — OXYCODONE HYDROCHLORIDE AND ACETAMINOPHEN 2 TABLET: 5; 325 TABLET ORAL at 21:31

## 2017-02-25 RX ADMIN — Medication 10 ML: at 00:26

## 2017-02-25 RX ADMIN — DOCUSATE SODIUM 100 MG: 100 CAPSULE, LIQUID FILLED ORAL at 07:28

## 2017-02-25 RX ADMIN — ASPIRIN 81 MG: 81 TABLET, COATED ORAL at 07:28

## 2017-02-25 RX ADMIN — SODIUM CHLORIDE: 9 INJECTION, SOLUTION INTRAVENOUS at 08:56

## 2017-02-25 RX ADMIN — Medication 10 ML: at 07:28

## 2017-02-25 RX ADMIN — OXYCODONE HYDROCHLORIDE AND ACETAMINOPHEN 2 TABLET: 5; 325 TABLET ORAL at 10:36

## 2017-02-25 RX ADMIN — ENOXAPARIN SODIUM 40 MG: 40 INJECTION SUBCUTANEOUS at 07:27

## 2017-02-25 RX ADMIN — CARVEDILOL 6.25 MG: 6.25 TABLET, FILM COATED ORAL at 17:31

## 2017-02-25 RX ADMIN — FAMOTIDINE 20 MG: 10 INJECTION, SOLUTION INTRAVENOUS at 07:27

## 2017-02-25 RX ADMIN — CARVEDILOL 6.25 MG: 6.25 TABLET, FILM COATED ORAL at 07:27

## 2017-02-25 RX ADMIN — OXYCODONE HYDROCHLORIDE AND ACETAMINOPHEN 2 TABLET: 5; 325 TABLET ORAL at 17:53

## 2017-02-25 RX ADMIN — OXYCODONE HYDROCHLORIDE AND ACETAMINOPHEN 2 TABLET: 5; 325 TABLET ORAL at 06:46

## 2017-02-25 ASSESSMENT — PAIN DESCRIPTION - LOCATION
LOCATION: ABDOMEN
LOCATION: ABDOMEN

## 2017-02-25 ASSESSMENT — PAIN DESCRIPTION - FREQUENCY
FREQUENCY: INTERMITTENT
FREQUENCY: INTERMITTENT

## 2017-02-25 ASSESSMENT — PAIN DESCRIPTION - PAIN TYPE
TYPE: SURGICAL PAIN
TYPE: SURGICAL PAIN

## 2017-02-25 ASSESSMENT — PAIN SCALES - GENERAL
PAINLEVEL_OUTOF10: 6
PAINLEVEL_OUTOF10: 0
PAINLEVEL_OUTOF10: 6
PAINLEVEL_OUTOF10: 7
PAINLEVEL_OUTOF10: 5
PAINLEVEL_OUTOF10: 8
PAINLEVEL_OUTOF10: 7
PAINLEVEL_OUTOF10: 8
PAINLEVEL_OUTOF10: 6
PAINLEVEL_OUTOF10: 8
PAINLEVEL_OUTOF10: 6

## 2017-02-25 ASSESSMENT — PAIN DESCRIPTION - ORIENTATION
ORIENTATION: MID
ORIENTATION: RIGHT;LEFT;MID

## 2017-02-25 ASSESSMENT — PAIN DESCRIPTION - DESCRIPTORS
DESCRIPTORS: SORE
DESCRIPTORS: ACHING;SORE

## 2017-02-25 ASSESSMENT — PAIN DESCRIPTION - PROGRESSION: CLINICAL_PROGRESSION: GRADUALLY IMPROVING

## 2017-02-25 ASSESSMENT — PAIN DESCRIPTION - ONSET: ONSET: ON-GOING

## 2017-02-26 LAB
-: ABNORMAL
AMORPHOUS: ABNORMAL
ANION GAP SERPL CALCULATED.3IONS-SCNC: 11 MMOL/L (ref 9–17)
BACTERIA: ABNORMAL
BILIRUBIN URINE: ABNORMAL
BUN BLDV-MCNC: 51 MG/DL (ref 8–23)
BUN/CREAT BLD: ABNORMAL (ref 9–20)
CALCIUM SERPL-MCNC: 8.2 MG/DL (ref 8.6–10.4)
CASTS UA: ABNORMAL /LPF
CHLORIDE BLD-SCNC: 104 MMOL/L (ref 98–107)
CO2: 24 MMOL/L (ref 20–31)
COLOR: YELLOW
COMMENT UA: ABNORMAL
CREAT SERPL-MCNC: 2.27 MG/DL (ref 0.7–1.2)
CRYSTALS, UA: ABNORMAL /HPF
EPITHELIAL CELLS UA: ABNORMAL /HPF
GFR AFRICAN AMERICAN: 35 ML/MIN
GFR NON-AFRICAN AMERICAN: 29 ML/MIN
GFR SERPL CREATININE-BSD FRML MDRD: ABNORMAL ML/MIN/{1.73_M2}
GFR SERPL CREATININE-BSD FRML MDRD: ABNORMAL ML/MIN/{1.73_M2}
GLUCOSE BLD-MCNC: 95 MG/DL (ref 70–99)
GLUCOSE URINE: NEGATIVE
HCT VFR BLD CALC: 31.3 % (ref 41–53)
HEMOGLOBIN: 9.8 G/DL (ref 13.5–17.5)
KETONES, URINE: NEGATIVE
LEUKOCYTE ESTERASE, URINE: ABNORMAL
MCH RBC QN AUTO: 28.3 PG (ref 26–34)
MCHC RBC AUTO-ENTMCNC: 31.3 G/DL (ref 31–37)
MCV RBC AUTO: 90.4 FL (ref 80–100)
MUCUS: ABNORMAL
NITRITE, URINE: POSITIVE
OTHER OBSERVATIONS UA: ABNORMAL
PDW BLD-RTO: 15.1 % (ref 11.5–14.9)
PH UA: 5.5 (ref 5–8)
PLATELET # BLD: 199 K/UL (ref 150–450)
PMV BLD AUTO: 8.3 FL (ref 6–12)
POTASSIUM SERPL-SCNC: 4.2 MMOL/L (ref 3.7–5.3)
PROTEIN UA: ABNORMAL
RBC # BLD: 3.46 M/UL (ref 4.5–5.9)
RBC UA: ABNORMAL /HPF
RENAL EPITHELIAL, UA: ABNORMAL /HPF
SODIUM BLD-SCNC: 139 MMOL/L (ref 135–144)
SPECIFIC GRAVITY UA: 1.02 (ref 1–1.03)
TRICHOMONAS: ABNORMAL
TURBIDITY: ABNORMAL
URINE HGB: ABNORMAL
UROBILINOGEN, URINE: NORMAL
WBC # BLD: 11.2 K/UL (ref 3.5–11)
WBC UA: ABNORMAL /HPF
YEAST: ABNORMAL

## 2017-02-26 PROCEDURE — 80048 BASIC METABOLIC PNL TOTAL CA: CPT

## 2017-02-26 PROCEDURE — 81001 URINALYSIS AUTO W/SCOPE: CPT

## 2017-02-26 PROCEDURE — 6370000000 HC RX 637 (ALT 250 FOR IP): Performed by: SURGERY

## 2017-02-26 PROCEDURE — 6370000000 HC RX 637 (ALT 250 FOR IP): Performed by: INTERNAL MEDICINE

## 2017-02-26 PROCEDURE — 2580000003 HC RX 258: Performed by: SURGERY

## 2017-02-26 PROCEDURE — 85027 COMPLETE CBC AUTOMATED: CPT

## 2017-02-26 PROCEDURE — 87077 CULTURE AEROBIC IDENTIFY: CPT

## 2017-02-26 PROCEDURE — 97110 THERAPEUTIC EXERCISES: CPT

## 2017-02-26 PROCEDURE — 87086 URINE CULTURE/COLONY COUNT: CPT

## 2017-02-26 PROCEDURE — 6360000002 HC RX W HCPCS: Performed by: SURGERY

## 2017-02-26 PROCEDURE — 1200000000 HC SEMI PRIVATE

## 2017-02-26 PROCEDURE — 97116 GAIT TRAINING THERAPY: CPT

## 2017-02-26 PROCEDURE — 2580000003 HC RX 258: Performed by: INTERNAL MEDICINE

## 2017-02-26 PROCEDURE — 87186 SC STD MICRODIL/AGAR DIL: CPT

## 2017-02-26 PROCEDURE — 97530 THERAPEUTIC ACTIVITIES: CPT

## 2017-02-26 PROCEDURE — 99232 SBSQ HOSP IP/OBS MODERATE 35: CPT | Performed by: INTERNAL MEDICINE

## 2017-02-26 PROCEDURE — 87184 SC STD DISK METHOD PER PLATE: CPT

## 2017-02-26 RX ORDER — OXYCODONE HYDROCHLORIDE AND ACETAMINOPHEN 5; 325 MG/1; MG/1
TABLET ORAL
Qty: 30 TABLET | Refills: 0 | Status: SHIPPED | OUTPATIENT
Start: 2017-02-26 | End: 2017-06-20 | Stop reason: ALTCHOICE

## 2017-02-26 RX ADMIN — SODIUM CHLORIDE: 9 INJECTION, SOLUTION INTRAVENOUS at 20:55

## 2017-02-26 RX ADMIN — ENOXAPARIN SODIUM 40 MG: 40 INJECTION SUBCUTANEOUS at 08:47

## 2017-02-26 RX ADMIN — OXYCODONE HYDROCHLORIDE AND ACETAMINOPHEN 2 TABLET: 5; 325 TABLET ORAL at 03:03

## 2017-02-26 RX ADMIN — OXYCODONE HYDROCHLORIDE AND ACETAMINOPHEN 2 TABLET: 5; 325 TABLET ORAL at 20:55

## 2017-02-26 RX ADMIN — OXYCODONE HYDROCHLORIDE AND ACETAMINOPHEN 2 TABLET: 5; 325 TABLET ORAL at 16:06

## 2017-02-26 RX ADMIN — OXYCODONE HYDROCHLORIDE AND ACETAMINOPHEN 2 TABLET: 5; 325 TABLET ORAL at 08:44

## 2017-02-26 RX ADMIN — SODIUM CHLORIDE: 9 INJECTION, SOLUTION INTRAVENOUS at 03:03

## 2017-02-26 RX ADMIN — ASPIRIN 81 MG: 81 TABLET, COATED ORAL at 08:47

## 2017-02-26 RX ADMIN — Medication 10 ML: at 08:47

## 2017-02-26 ASSESSMENT — PAIN DESCRIPTION - PROGRESSION

## 2017-02-26 ASSESSMENT — PAIN DESCRIPTION - PAIN TYPE
TYPE: SURGICAL PAIN

## 2017-02-26 ASSESSMENT — PAIN DESCRIPTION - DESCRIPTORS
DESCRIPTORS: ACHING;SORE
DESCRIPTORS: ACHING;SORE
DESCRIPTORS: SORE

## 2017-02-26 ASSESSMENT — PAIN SCALES - GENERAL
PAINLEVEL_OUTOF10: 7
PAINLEVEL_OUTOF10: 5
PAINLEVEL_OUTOF10: 8
PAINLEVEL_OUTOF10: 6
PAINLEVEL_OUTOF10: 0
PAINLEVEL_OUTOF10: 8

## 2017-02-26 ASSESSMENT — PAIN DESCRIPTION - FREQUENCY
FREQUENCY: INTERMITTENT

## 2017-02-26 ASSESSMENT — PAIN DESCRIPTION - ONSET
ONSET: ON-GOING
ONSET: ON-GOING
ONSET: GRADUAL

## 2017-02-26 ASSESSMENT — PAIN DESCRIPTION - LOCATION
LOCATION: ABDOMEN

## 2017-02-26 ASSESSMENT — PAIN DESCRIPTION - ORIENTATION
ORIENTATION: MID

## 2017-02-27 ENCOUNTER — CARE COORDINATOR VISIT (OUTPATIENT)
Dept: CASE MANAGEMENT | Age: 72
End: 2017-02-27

## 2017-02-27 LAB
ANION GAP SERPL CALCULATED.3IONS-SCNC: 12 MMOL/L (ref 9–17)
BUN BLDV-MCNC: 51 MG/DL (ref 8–23)
BUN/CREAT BLD: ABNORMAL (ref 9–20)
CALCIUM SERPL-MCNC: 8 MG/DL (ref 8.6–10.4)
CHLORIDE BLD-SCNC: 105 MMOL/L (ref 98–107)
CO2: 25 MMOL/L (ref 20–31)
CREAT SERPL-MCNC: 2.25 MG/DL (ref 0.7–1.2)
GFR AFRICAN AMERICAN: 35 ML/MIN
GFR NON-AFRICAN AMERICAN: 29 ML/MIN
GFR SERPL CREATININE-BSD FRML MDRD: ABNORMAL ML/MIN/{1.73_M2}
GFR SERPL CREATININE-BSD FRML MDRD: ABNORMAL ML/MIN/{1.73_M2}
GLUCOSE BLD-MCNC: 98 MG/DL (ref 70–99)
HCT VFR BLD CALC: 34.5 % (ref 41–53)
HEMOGLOBIN: 10.9 G/DL (ref 13.5–17.5)
MCH RBC QN AUTO: 28.7 PG (ref 26–34)
MCHC RBC AUTO-ENTMCNC: 31.6 G/DL (ref 31–37)
MCV RBC AUTO: 90.9 FL (ref 80–100)
PDW BLD-RTO: 15.5 % (ref 11.5–14.9)
PLATELET # BLD: 246 K/UL (ref 150–450)
PMV BLD AUTO: 8 FL (ref 6–12)
POTASSIUM SERPL-SCNC: 4.2 MMOL/L (ref 3.7–5.3)
RBC # BLD: 3.79 M/UL (ref 4.5–5.9)
SODIUM BLD-SCNC: 142 MMOL/L (ref 135–144)
WBC # BLD: 10.7 K/UL (ref 3.5–11)

## 2017-02-27 PROCEDURE — 6360000002 HC RX W HCPCS: Performed by: INTERNAL MEDICINE

## 2017-02-27 PROCEDURE — 84300 ASSAY OF URINE SODIUM: CPT

## 2017-02-27 PROCEDURE — 87205 SMEAR GRAM STAIN: CPT

## 2017-02-27 PROCEDURE — 6360000002 HC RX W HCPCS: Performed by: SURGERY

## 2017-02-27 PROCEDURE — 36415 COLL VENOUS BLD VENIPUNCTURE: CPT

## 2017-02-27 PROCEDURE — 80048 BASIC METABOLIC PNL TOTAL CA: CPT

## 2017-02-27 PROCEDURE — 6370000000 HC RX 637 (ALT 250 FOR IP): Performed by: INTERNAL MEDICINE

## 2017-02-27 PROCEDURE — 87493 C DIFF AMPLIFIED PROBE: CPT

## 2017-02-27 PROCEDURE — 85027 COMPLETE CBC AUTOMATED: CPT

## 2017-02-27 PROCEDURE — 87070 CULTURE OTHR SPECIMN AEROBIC: CPT

## 2017-02-27 PROCEDURE — 97530 THERAPEUTIC ACTIVITIES: CPT

## 2017-02-27 PROCEDURE — 99232 SBSQ HOSP IP/OBS MODERATE 35: CPT | Performed by: INTERNAL MEDICINE

## 2017-02-27 PROCEDURE — 1200000000 HC SEMI PRIVATE

## 2017-02-27 PROCEDURE — 2580000003 HC RX 258: Performed by: INTERNAL MEDICINE

## 2017-02-27 PROCEDURE — 6370000000 HC RX 637 (ALT 250 FOR IP): Performed by: SURGERY

## 2017-02-27 RX ORDER — CIPROFLOXACIN 2 MG/ML
400 INJECTION, SOLUTION INTRAVENOUS EVERY 12 HOURS
Status: DISCONTINUED | OUTPATIENT
Start: 2017-02-27 | End: 2017-03-01

## 2017-02-27 RX ORDER — CARVEDILOL 3.12 MG/1
3.12 TABLET ORAL 2 TIMES DAILY WITH MEALS
Status: DISCONTINUED | OUTPATIENT
Start: 2017-02-27 | End: 2017-03-01 | Stop reason: HOSPADM

## 2017-02-27 RX ADMIN — SODIUM CHLORIDE: 9 INJECTION, SOLUTION INTRAVENOUS at 21:59

## 2017-02-27 RX ADMIN — CARVEDILOL 6.25 MG: 6.25 TABLET, FILM COATED ORAL at 08:44

## 2017-02-27 RX ADMIN — ENOXAPARIN SODIUM 40 MG: 40 INJECTION SUBCUTANEOUS at 08:44

## 2017-02-27 RX ADMIN — ASPIRIN 81 MG: 81 TABLET, COATED ORAL at 08:44

## 2017-02-27 RX ADMIN — OXYCODONE HYDROCHLORIDE AND ACETAMINOPHEN 2 TABLET: 5; 325 TABLET ORAL at 15:23

## 2017-02-27 RX ADMIN — OXYCODONE HYDROCHLORIDE AND ACETAMINOPHEN 2 TABLET: 5; 325 TABLET ORAL at 08:51

## 2017-02-27 RX ADMIN — OXYCODONE HYDROCHLORIDE AND ACETAMINOPHEN 2 TABLET: 5; 325 TABLET ORAL at 19:48

## 2017-02-27 RX ADMIN — CIPROFLOXACIN 400 MG: 2 INJECTION, SOLUTION INTRAVENOUS at 15:25

## 2017-02-27 RX ADMIN — CARVEDILOL 3.12 MG: 3.12 TABLET, FILM COATED ORAL at 18:30

## 2017-02-27 ASSESSMENT — PAIN DESCRIPTION - LOCATION
LOCATION: ABDOMEN
LOCATION: ABDOMEN

## 2017-02-27 ASSESSMENT — PAIN DESCRIPTION - DESCRIPTORS
DESCRIPTORS: ACHING;DISCOMFORT
DESCRIPTORS: ACHING;DISCOMFORT

## 2017-02-27 ASSESSMENT — PAIN SCALES - GENERAL
PAINLEVEL_OUTOF10: 8
PAINLEVEL_OUTOF10: 8
PAINLEVEL_OUTOF10: 5
PAINLEVEL_OUTOF10: 0
PAINLEVEL_OUTOF10: 8

## 2017-02-27 ASSESSMENT — PAIN DESCRIPTION - ORIENTATION
ORIENTATION: MID
ORIENTATION: MID

## 2017-02-27 ASSESSMENT — PAIN DESCRIPTION - PAIN TYPE
TYPE: SURGICAL PAIN
TYPE: SURGICAL PAIN

## 2017-02-28 ENCOUNTER — HOSPITAL ENCOUNTER (OUTPATIENT)
Dept: WOUND CARE | Age: 72
Discharge: HOME | End: 2017-02-28
Payer: MEDICARE

## 2017-02-28 LAB
-: NORMAL
ANION GAP SERPL CALCULATED.3IONS-SCNC: 10 MMOL/L (ref 9–17)
BUN BLDV-MCNC: 42 MG/DL (ref 8–23)
BUN/CREAT BLD: ABNORMAL (ref 9–20)
CALCIUM SERPL-MCNC: 7.8 MG/DL (ref 8.6–10.4)
CHLORIDE BLD-SCNC: 110 MMOL/L (ref 98–107)
CO2: 25 MMOL/L (ref 20–31)
CREAT SERPL-MCNC: 1.92 MG/DL (ref 0.7–1.2)
CREATININE URINE: 107.3 MG/DL (ref 39–259)
EOSINOPHIL,URINE: NEGATIVE
GFR AFRICAN AMERICAN: 42 ML/MIN
GFR NON-AFRICAN AMERICAN: 35 ML/MIN
GFR SERPL CREATININE-BSD FRML MDRD: ABNORMAL ML/MIN/{1.73_M2}
GFR SERPL CREATININE-BSD FRML MDRD: ABNORMAL ML/MIN/{1.73_M2}
GLUCOSE BLD-MCNC: 114 MG/DL (ref 70–99)
HCT VFR BLD CALC: 31.3 % (ref 41–53)
HEMOGLOBIN: 9.7 G/DL (ref 13.5–17.5)
MAGNESIUM: 1.7 MG/DL (ref 1.6–2.6)
MCH RBC QN AUTO: 28.3 PG (ref 26–34)
MCHC RBC AUTO-ENTMCNC: 31 G/DL (ref 31–37)
MCV RBC AUTO: 91.1 FL (ref 80–100)
MICROALBUMIN/CREAT 24H UR: <12 MG/L
MICROALBUMIN/CREAT UR-RTO: 11 MCG/MG CREAT
PDW BLD-RTO: 15.6 % (ref 11.5–14.9)
PHOSPHORUS: 2.4 MG/DL (ref 2.5–4.5)
PLATELET # BLD: 221 K/UL (ref 150–450)
PMV BLD AUTO: 7.8 FL (ref 6–12)
POTASSIUM SERPL-SCNC: 4.4 MMOL/L (ref 3.7–5.3)
RBC # BLD: 3.44 M/UL (ref 4.5–5.9)
REASON FOR REJECTION: NORMAL
SODIUM BLD-SCNC: 145 MMOL/L (ref 135–144)
SODIUM,UR: 20 MMOL/L
VITAMIN D 25-HYDROXY: 5.4 NG/ML (ref 30–100)
WBC # BLD: 7.8 K/UL (ref 3.5–11)
ZZ NTE CLEAN UP: ORDERED TEST: NORMAL
ZZ NTE WITH NAME CLEAN UP: SPECIMEN SOURCE: NORMAL

## 2017-02-28 PROCEDURE — 99232 SBSQ HOSP IP/OBS MODERATE 35: CPT | Performed by: INTERNAL MEDICINE

## 2017-02-28 PROCEDURE — 87205 SMEAR GRAM STAIN: CPT

## 2017-02-28 PROCEDURE — 84100 ASSAY OF PHOSPHORUS: CPT

## 2017-02-28 PROCEDURE — 36415 COLL VENOUS BLD VENIPUNCTURE: CPT

## 2017-02-28 PROCEDURE — 82570 ASSAY OF URINE CREATININE: CPT

## 2017-02-28 PROCEDURE — 84300 ASSAY OF URINE SODIUM: CPT

## 2017-02-28 PROCEDURE — 1200000000 HC SEMI PRIVATE

## 2017-02-28 PROCEDURE — 83735 ASSAY OF MAGNESIUM: CPT

## 2017-02-28 PROCEDURE — 2500000003 HC RX 250 WO HCPCS: Performed by: INTERNAL MEDICINE

## 2017-02-28 PROCEDURE — 6360000002 HC RX W HCPCS: Performed by: SURGERY

## 2017-02-28 PROCEDURE — 6360000002 HC RX W HCPCS: Performed by: INTERNAL MEDICINE

## 2017-02-28 PROCEDURE — 6370000000 HC RX 637 (ALT 250 FOR IP): Performed by: INTERNAL MEDICINE

## 2017-02-28 PROCEDURE — 85027 COMPLETE CBC AUTOMATED: CPT

## 2017-02-28 PROCEDURE — 2580000003 HC RX 258: Performed by: INTERNAL MEDICINE

## 2017-02-28 PROCEDURE — 87086 URINE CULTURE/COLONY COUNT: CPT

## 2017-02-28 PROCEDURE — 82043 UR ALBUMIN QUANTITATIVE: CPT

## 2017-02-28 PROCEDURE — 80048 BASIC METABOLIC PNL TOTAL CA: CPT

## 2017-02-28 PROCEDURE — 6370000000 HC RX 637 (ALT 250 FOR IP): Performed by: SURGERY

## 2017-02-28 PROCEDURE — 82306 VITAMIN D 25 HYDROXY: CPT

## 2017-02-28 RX ORDER — ERGOCALCIFEROL 1.25 MG/1
50000 CAPSULE ORAL WEEKLY
Status: DISCONTINUED | OUTPATIENT
Start: 2017-02-28 | End: 2017-03-01 | Stop reason: HOSPADM

## 2017-02-28 RX ORDER — DEXTROSE MONOHYDRATE 50 MG/ML
INJECTION, SOLUTION INTRAVENOUS CONTINUOUS
Status: DISCONTINUED | OUTPATIENT
Start: 2017-02-28 | End: 2017-03-01

## 2017-02-28 RX ADMIN — CIPROFLOXACIN 400 MG: 2 INJECTION, SOLUTION INTRAVENOUS at 04:07

## 2017-02-28 RX ADMIN — OXYCODONE HYDROCHLORIDE AND ACETAMINOPHEN 2 TABLET: 5; 325 TABLET ORAL at 14:34

## 2017-02-28 RX ADMIN — CIPROFLOXACIN 400 MG: 2 INJECTION, SOLUTION INTRAVENOUS at 16:36

## 2017-02-28 RX ADMIN — SODIUM CHLORIDE: 9 INJECTION, SOLUTION INTRAVENOUS at 07:09

## 2017-02-28 RX ADMIN — CARVEDILOL 3.12 MG: 3.12 TABLET, FILM COATED ORAL at 10:17

## 2017-02-28 RX ADMIN — OXYCODONE HYDROCHLORIDE AND ACETAMINOPHEN 2 TABLET: 5; 325 TABLET ORAL at 10:17

## 2017-02-28 RX ADMIN — SODIUM PHOSPHATE, MONOBASIC, MONOHYDRATE AND SODIUM PHOSPHATE, DIBASIC, ANHYDROUS 15 MMOL: 276; 142 INJECTION, SOLUTION INTRAVENOUS at 12:23

## 2017-02-28 RX ADMIN — ASPIRIN 81 MG: 81 TABLET, COATED ORAL at 10:17

## 2017-02-28 RX ADMIN — ERGOCALCIFEROL 50000 UNITS: 1.25 CAPSULE ORAL at 18:32

## 2017-02-28 RX ADMIN — ENOXAPARIN SODIUM 40 MG: 40 INJECTION SUBCUTANEOUS at 10:17

## 2017-02-28 RX ADMIN — DEXTROSE MONOHYDRATE: 50 INJECTION, SOLUTION INTRAVENOUS at 12:23

## 2017-02-28 ASSESSMENT — PAIN DESCRIPTION - PAIN TYPE
TYPE: SURGICAL PAIN

## 2017-02-28 ASSESSMENT — PAIN SCALES - GENERAL
PAINLEVEL_OUTOF10: 8
PAINLEVEL_OUTOF10: 5
PAINLEVEL_OUTOF10: 3
PAINLEVEL_OUTOF10: 8

## 2017-02-28 ASSESSMENT — PAIN DESCRIPTION - PROGRESSION

## 2017-02-28 ASSESSMENT — PAIN DESCRIPTION - ORIENTATION
ORIENTATION: MID;RIGHT;LEFT
ORIENTATION: MID;RIGHT;LEFT

## 2017-02-28 ASSESSMENT — PAIN DESCRIPTION - LOCATION
LOCATION: ABDOMEN

## 2017-02-28 ASSESSMENT — PAIN DESCRIPTION - FREQUENCY
FREQUENCY: CONTINUOUS
FREQUENCY: CONTINUOUS

## 2017-02-28 ASSESSMENT — PAIN DESCRIPTION - DESCRIPTORS
DESCRIPTORS: CRAMPING
DESCRIPTORS: CRAMPING

## 2017-02-28 ASSESSMENT — PAIN DESCRIPTION - ONSET
ONSET: ON-GOING
ONSET: ON-GOING

## 2017-03-01 VITALS
WEIGHT: 202.6 LBS | BODY MASS INDEX: 30.71 KG/M2 | DIASTOLIC BLOOD PRESSURE: 55 MMHG | OXYGEN SATURATION: 97 % | TEMPERATURE: 98.4 F | RESPIRATION RATE: 16 BRPM | HEART RATE: 75 BPM | SYSTOLIC BLOOD PRESSURE: 111 MMHG | HEIGHT: 68 IN

## 2017-03-01 LAB
ANION GAP SERPL CALCULATED.3IONS-SCNC: 12 MMOL/L (ref 9–17)
BUN BLDV-MCNC: 34 MG/DL (ref 8–23)
BUN/CREAT BLD: ABNORMAL (ref 9–20)
CALCIUM SERPL-MCNC: 7.7 MG/DL (ref 8.6–10.4)
CHLORIDE BLD-SCNC: 106 MMOL/L (ref 98–107)
CO2: 23 MMOL/L (ref 20–31)
CREAT SERPL-MCNC: 1.76 MG/DL (ref 0.7–1.2)
CULTURE: ABNORMAL
CULTURE: ABNORMAL
CULTURE: NORMAL
DIRECT EXAM: NORMAL
GFR AFRICAN AMERICAN: 46 ML/MIN
GFR NON-AFRICAN AMERICAN: 38 ML/MIN
GFR SERPL CREATININE-BSD FRML MDRD: ABNORMAL ML/MIN/{1.73_M2}
GFR SERPL CREATININE-BSD FRML MDRD: ABNORMAL ML/MIN/{1.73_M2}
GLUCOSE BLD-MCNC: 107 MG/DL (ref 70–99)
HCT VFR BLD CALC: 31.5 % (ref 41–53)
HEMOGLOBIN: 10.1 G/DL (ref 13.5–17.5)
Lab: ABNORMAL
Lab: NORMAL
Lab: NORMAL
MAGNESIUM: 1.5 MG/DL (ref 1.6–2.6)
MCH RBC QN AUTO: 29.2 PG (ref 26–34)
MCHC RBC AUTO-ENTMCNC: 32 G/DL (ref 31–37)
MCV RBC AUTO: 91.2 FL (ref 80–100)
ORGANISM: ABNORMAL
ORGANISM: ABNORMAL
PDW BLD-RTO: 15.4 % (ref 11.5–14.9)
PHOSPHORUS: 2.2 MG/DL (ref 2.5–4.5)
PLATELET # BLD: 240 K/UL (ref 150–450)
PMV BLD AUTO: 7.6 FL (ref 6–12)
POTASSIUM SERPL-SCNC: 3.4 MMOL/L (ref 3.7–5.3)
RBC # BLD: 3.45 M/UL (ref 4.5–5.9)
SODIUM BLD-SCNC: 141 MMOL/L (ref 135–144)
SPECIMEN DESCRIPTION: ABNORMAL
SPECIMEN DESCRIPTION: ABNORMAL
SPECIMEN DESCRIPTION: NORMAL
STATUS: ABNORMAL
STATUS: NORMAL
STATUS: NORMAL
WBC # BLD: 10.7 K/UL (ref 3.5–11)

## 2017-03-01 PROCEDURE — 6370000000 HC RX 637 (ALT 250 FOR IP): Performed by: SURGERY

## 2017-03-01 PROCEDURE — 6360000002 HC RX W HCPCS: Performed by: INTERNAL MEDICINE

## 2017-03-01 PROCEDURE — 80048 BASIC METABOLIC PNL TOTAL CA: CPT

## 2017-03-01 PROCEDURE — 36415 COLL VENOUS BLD VENIPUNCTURE: CPT

## 2017-03-01 PROCEDURE — 83735 ASSAY OF MAGNESIUM: CPT

## 2017-03-01 PROCEDURE — 6370000000 HC RX 637 (ALT 250 FOR IP): Performed by: INTERNAL MEDICINE

## 2017-03-01 PROCEDURE — 84100 ASSAY OF PHOSPHORUS: CPT

## 2017-03-01 PROCEDURE — 6360000002 HC RX W HCPCS: Performed by: SURGERY

## 2017-03-01 PROCEDURE — 97530 THERAPEUTIC ACTIVITIES: CPT

## 2017-03-01 PROCEDURE — 2580000003 HC RX 258: Performed by: SURGERY

## 2017-03-01 PROCEDURE — 85027 COMPLETE CBC AUTOMATED: CPT

## 2017-03-01 PROCEDURE — 2580000003 HC RX 258: Performed by: INTERNAL MEDICINE

## 2017-03-01 PROCEDURE — 99232 SBSQ HOSP IP/OBS MODERATE 35: CPT | Performed by: INTERNAL MEDICINE

## 2017-03-01 RX ORDER — CIPROFLOXACIN 500 MG/1
500 TABLET, FILM COATED ORAL EVERY 12 HOURS SCHEDULED
Status: DISCONTINUED | OUTPATIENT
Start: 2017-03-01 | End: 2017-03-01 | Stop reason: HOSPADM

## 2017-03-01 RX ORDER — CIPROFLOXACIN 500 MG/1
500 TABLET, FILM COATED ORAL EVERY 12 HOURS SCHEDULED
Qty: 14 TABLET | Refills: 0 | DISCHARGE
Start: 2017-03-01 | End: 2017-03-08

## 2017-03-01 RX ORDER — FUROSEMIDE 10 MG/ML
20 INJECTION INTRAMUSCULAR; INTRAVENOUS ONCE
Status: COMPLETED | OUTPATIENT
Start: 2017-03-01 | End: 2017-03-01

## 2017-03-01 RX ORDER — FUROSEMIDE 20 MG/1
20 TABLET ORAL DAILY
Qty: 60 TABLET | Refills: 3 | Status: SHIPPED | OUTPATIENT
Start: 2017-03-01 | End: 2017-10-15 | Stop reason: SDUPTHER

## 2017-03-01 RX ORDER — ERGOCALCIFEROL (VITAMIN D2) 1250 MCG
50000 CAPSULE ORAL WEEKLY
Qty: 16 CAPSULE | Refills: 0 | Status: SHIPPED | OUTPATIENT
Start: 2017-03-01 | End: 2017-04-19 | Stop reason: SDUPTHER

## 2017-03-01 RX ORDER — POTASSIUM CHLORIDE 20 MEQ/1
20 TABLET, EXTENDED RELEASE ORAL 2 TIMES DAILY WITH MEALS
Status: DISCONTINUED | OUTPATIENT
Start: 2017-03-01 | End: 2017-03-01 | Stop reason: HOSPADM

## 2017-03-01 RX ADMIN — LOPERAMIDE HYDROCHLORIDE 2 MG: 2 CAPSULE ORAL at 08:29

## 2017-03-01 RX ADMIN — LOPERAMIDE HYDROCHLORIDE 2 MG: 2 CAPSULE ORAL at 03:29

## 2017-03-01 RX ADMIN — ASPIRIN 81 MG: 81 TABLET, COATED ORAL at 08:36

## 2017-03-01 RX ADMIN — CARVEDILOL 3.12 MG: 3.12 TABLET, FILM COATED ORAL at 07:54

## 2017-03-01 RX ADMIN — LOPERAMIDE HYDROCHLORIDE 2 MG: 2 CAPSULE ORAL at 13:17

## 2017-03-01 RX ADMIN — LOPERAMIDE HYDROCHLORIDE 2 MG: 2 CAPSULE ORAL at 17:42

## 2017-03-01 RX ADMIN — POTASSIUM CHLORIDE 20 MEQ: 20 TABLET, EXTENDED RELEASE ORAL at 11:37

## 2017-03-01 RX ADMIN — CARVEDILOL 3.12 MG: 3.12 TABLET, FILM COATED ORAL at 17:42

## 2017-03-01 RX ADMIN — ENOXAPARIN SODIUM 40 MG: 40 INJECTION SUBCUTANEOUS at 08:28

## 2017-03-01 RX ADMIN — POTASSIUM CHLORIDE 20 MEQ: 20 TABLET, EXTENDED RELEASE ORAL at 17:42

## 2017-03-01 RX ADMIN — CIPROFLOXACIN HYDROCHLORIDE 500 MG: 500 TABLET, FILM COATED ORAL at 11:36

## 2017-03-01 RX ADMIN — FUROSEMIDE 20 MG: 10 INJECTION, SOLUTION INTRAVENOUS at 13:21

## 2017-03-01 RX ADMIN — DEXTROSE MONOHYDRATE: 50 INJECTION, SOLUTION INTRAVENOUS at 03:52

## 2017-03-01 RX ADMIN — MAGNESIUM SULFATE HEPTAHYDRATE 2 G: 500 INJECTION, SOLUTION INTRAMUSCULAR; INTRAVENOUS at 09:29

## 2017-03-01 RX ADMIN — CIPROFLOXACIN 400 MG: 2 INJECTION, SOLUTION INTRAVENOUS at 03:52

## 2017-03-01 ASSESSMENT — PAIN DESCRIPTION - PROGRESSION

## 2017-03-02 LAB
EKG ATRIAL RATE: 77 BPM
EKG P AXIS: 15 DEGREES
EKG P-R INTERVAL: 240 MS
EKG Q-T INTERVAL: 408 MS
EKG QRS DURATION: 136 MS
EKG QTC CALCULATION (BAZETT): 461 MS
EKG R AXIS: -33 DEGREES
EKG T AXIS: 62 DEGREES
EKG VENTRICULAR RATE: 77 BPM

## 2017-03-03 LAB
CULTURE: NORMAL
Lab: NORMAL
SPECIMEN DESCRIPTION: NORMAL
STATUS: NORMAL
STATUS: NORMAL

## 2017-03-07 ENCOUNTER — HOSPITAL ENCOUNTER (OUTPATIENT)
Dept: WOUND CARE | Age: 72
Discharge: HOME | End: 2017-03-07
Payer: MEDICARE

## 2017-03-07 VITALS
DIASTOLIC BLOOD PRESSURE: 61 MMHG | RESPIRATION RATE: 16 BRPM | TEMPERATURE: 98.8 F | HEART RATE: 86 BPM | SYSTOLIC BLOOD PRESSURE: 108 MMHG

## 2017-03-07 DIAGNOSIS — L97.521 SKIN ULCER OF LEFT FOOT, LIMITED TO BREAKDOWN OF SKIN (HCC): Chronic | ICD-10-CM

## 2017-03-07 DIAGNOSIS — I77.9 PERIPHERAL ARTERIAL OCCLUSIVE DISEASE (HCC): Chronic | ICD-10-CM

## 2017-03-07 DIAGNOSIS — T81.89XA DELAYED SURGICAL WOUND HEALING, INITIAL ENCOUNTER: ICD-10-CM

## 2017-03-07 PROBLEM — N30.00 ACUTE CYSTITIS WITHOUT HEMATURIA: Status: RESOLVED | Noted: 2017-01-18 | Resolved: 2017-03-07

## 2017-03-07 PROCEDURE — G0463 HOSPITAL OUTPT CLINIC VISIT: HCPCS

## 2017-03-07 PROCEDURE — 11042 DBRDMT SUBQ TIS 1ST 20SQCM/<: CPT

## 2017-03-07 PROCEDURE — 6370000000 HC RX 637 (ALT 250 FOR IP): Performed by: SURGERY

## 2017-03-07 PROCEDURE — 97607 NEG PRS WND THR NDME<=50SQCM: CPT

## 2017-03-07 RX ADMIN — LIDOCAINE HYDROCHLORIDE: 20 JELLY TOPICAL at 10:59

## 2017-03-07 ASSESSMENT — PAIN SCALES - GENERAL: PAINLEVEL_OUTOF10: 0

## 2017-03-14 ENCOUNTER — CARE COORDINATION (OUTPATIENT)
Dept: CASE MANAGEMENT | Age: 72
End: 2017-03-14

## 2017-03-14 ENCOUNTER — HOSPITAL ENCOUNTER (OUTPATIENT)
Dept: WOUND CARE | Age: 72
Discharge: HOME | End: 2017-03-14
Payer: MEDICARE

## 2017-03-14 VITALS
SYSTOLIC BLOOD PRESSURE: 135 MMHG | TEMPERATURE: 97.6 F | RESPIRATION RATE: 16 BRPM | DIASTOLIC BLOOD PRESSURE: 77 MMHG | HEART RATE: 84 BPM

## 2017-03-14 PROCEDURE — 11042 DBRDMT SUBQ TIS 1ST 20SQCM/<: CPT

## 2017-03-14 RX ORDER — FERROUS SULFATE 325(65) MG
325 TABLET ORAL
COMMUNITY
End: 2017-06-20 | Stop reason: ALTCHOICE

## 2017-03-21 ENCOUNTER — HOSPITAL ENCOUNTER (OUTPATIENT)
Dept: WOUND CARE | Age: 72
Discharge: HOME HEALTH | End: 2017-03-21
Payer: COMMERCIAL

## 2017-03-21 VITALS
DIASTOLIC BLOOD PRESSURE: 64 MMHG | SYSTOLIC BLOOD PRESSURE: 133 MMHG | TEMPERATURE: 97.6 F | RESPIRATION RATE: 16 BRPM | HEART RATE: 77 BPM

## 2017-03-21 PROBLEM — L97.521 SKIN ULCER OF LEFT FOOT, LIMITED TO BREAKDOWN OF SKIN (HCC): Chronic | Status: RESOLVED | Noted: 2017-03-07 | Resolved: 2017-03-21

## 2017-03-21 PROCEDURE — 97597 DBRDMT OPN WND 1ST 20 CM/<: CPT

## 2017-03-21 ASSESSMENT — PAIN SCALES - GENERAL: PAINLEVEL_OUTOF10: 0

## 2017-03-23 ENCOUNTER — CARE COORDINATION (OUTPATIENT)
Dept: CARE COORDINATION | Age: 72
End: 2017-03-23

## 2017-03-23 ENCOUNTER — CARE COORDINATION (OUTPATIENT)
Dept: CASE MANAGEMENT | Age: 72
End: 2017-03-23

## 2017-03-24 ENCOUNTER — CARE COORDINATION (OUTPATIENT)
Dept: CASE MANAGEMENT | Age: 72
End: 2017-03-24

## 2017-03-27 ENCOUNTER — HOSPITAL ENCOUNTER (OUTPATIENT)
Age: 72
Setting detail: SPECIMEN
Discharge: HOME | End: 2017-03-27
Payer: MEDICARE

## 2017-03-27 LAB
ABSOLUTE EOS #: 0.3 K/UL (ref 0–0.4)
ABSOLUTE LYMPH #: 1 K/UL (ref 1–4.8)
ABSOLUTE MONO #: 0.5 K/UL (ref 0.2–0.8)
ALBUMIN SERPL-MCNC: 3.2 G/DL (ref 3.5–5.2)
ALBUMIN/GLOBULIN RATIO: ABNORMAL (ref 1–2.5)
ALP BLD-CCNC: 83 U/L (ref 40–129)
ALT SERPL-CCNC: 6 U/L (ref 5–41)
ANION GAP SERPL CALCULATED.3IONS-SCNC: 12 MMOL/L (ref 9–17)
AST SERPL-CCNC: 11 U/L
BASOPHILS # BLD: 0 % (ref 0–2)
BASOPHILS ABSOLUTE: 0 K/UL (ref 0–0.2)
BILIRUB SERPL-MCNC: 0.68 MG/DL (ref 0.3–1.2)
BUN BLDV-MCNC: 33 MG/DL (ref 8–23)
BUN/CREAT BLD: 23 (ref 9–20)
CALCIUM SERPL-MCNC: 8.7 MG/DL (ref 8.6–10.4)
CHLORIDE BLD-SCNC: 104 MMOL/L (ref 98–107)
CO2: 26 MMOL/L (ref 20–31)
CREAT SERPL-MCNC: 1.43 MG/DL (ref 0.7–1.2)
DIFFERENTIAL TYPE: ABNORMAL
EOSINOPHILS RELATIVE PERCENT: 4 % (ref 1–4)
GFR AFRICAN AMERICAN: 59 ML/MIN
GFR NON-AFRICAN AMERICAN: 49 ML/MIN
GFR SERPL CREATININE-BSD FRML MDRD: ABNORMAL ML/MIN/{1.73_M2}
GFR SERPL CREATININE-BSD FRML MDRD: ABNORMAL ML/MIN/{1.73_M2}
GLUCOSE BLD-MCNC: 89 MG/DL (ref 70–99)
HCT VFR BLD CALC: 28 % (ref 41–53)
HEMOGLOBIN: 9 G/DL (ref 13.5–17.5)
LYMPHOCYTES # BLD: 16 % (ref 24–44)
MCH RBC QN AUTO: 28.7 PG (ref 26–34)
MCHC RBC AUTO-ENTMCNC: 32.2 G/DL (ref 31–37)
MCV RBC AUTO: 89.1 FL (ref 80–100)
MONOCYTES # BLD: 9 % (ref 1–7)
PDW BLD-RTO: 16.1 % (ref 11.5–14.5)
PLATELET # BLD: 203 K/UL (ref 130–400)
PLATELET ESTIMATE: ABNORMAL
PMV BLD AUTO: 7.9 FL (ref 6–12)
POTASSIUM SERPL-SCNC: 4.3 MMOL/L (ref 3.7–5.3)
RBC # BLD: 3.14 M/UL (ref 4.5–5.9)
RBC # BLD: ABNORMAL 10*6/UL
SEG NEUTROPHILS: 71 % (ref 36–66)
SEGMENTED NEUTROPHILS ABSOLUTE COUNT: 4.4 K/UL (ref 1.8–7.7)
SODIUM BLD-SCNC: 142 MMOL/L (ref 135–144)
TOTAL PROTEIN: 6.1 G/DL (ref 6.4–8.3)
WBC # BLD: 6.3 K/UL (ref 3.5–11)
WBC # BLD: ABNORMAL 10*3/UL

## 2017-03-27 PROCEDURE — 80053 COMPREHEN METABOLIC PANEL: CPT

## 2017-03-27 PROCEDURE — P9603 ONE-WAY ALLOW PRORATED MILES: HCPCS

## 2017-03-27 PROCEDURE — 36415 COLL VENOUS BLD VENIPUNCTURE: CPT

## 2017-03-27 PROCEDURE — 85025 COMPLETE CBC W/AUTO DIFF WBC: CPT

## 2017-03-28 ENCOUNTER — CARE COORDINATION (OUTPATIENT)
Dept: CASE MANAGEMENT | Age: 72
End: 2017-03-28

## 2017-03-28 ENCOUNTER — HOSPITAL ENCOUNTER (OUTPATIENT)
Dept: WOUND CARE | Age: 72
Discharge: HOME | End: 2017-03-28
Payer: COMMERCIAL

## 2017-03-28 VITALS
SYSTOLIC BLOOD PRESSURE: 122 MMHG | RESPIRATION RATE: 16 BRPM | TEMPERATURE: 97.6 F | HEART RATE: 74 BPM | DIASTOLIC BLOOD PRESSURE: 72 MMHG

## 2017-03-28 PROCEDURE — 6370000000 HC RX 637 (ALT 250 FOR IP): Performed by: SURGERY

## 2017-03-28 PROCEDURE — 97597 DBRDMT OPN WND 1ST 20 CM/<: CPT

## 2017-03-28 RX ADMIN — LIDOCAINE HYDROCHLORIDE: 20 JELLY TOPICAL at 10:24

## 2017-03-31 ENCOUNTER — CARE COORDINATION (OUTPATIENT)
Dept: CASE MANAGEMENT | Age: 72
End: 2017-03-31

## 2017-04-01 ENCOUNTER — CARE COORDINATION (OUTPATIENT)
Dept: CASE MANAGEMENT | Age: 72
End: 2017-04-01

## 2017-04-02 ENCOUNTER — CARE COORDINATION (OUTPATIENT)
Dept: CASE MANAGEMENT | Age: 72
End: 2017-04-02

## 2017-04-03 ENCOUNTER — CARE COORDINATION (OUTPATIENT)
Dept: CARE COORDINATION | Age: 72
End: 2017-04-03

## 2017-04-03 ENCOUNTER — CARE COORDINATION (OUTPATIENT)
Dept: CASE MANAGEMENT | Age: 72
End: 2017-04-03

## 2017-04-04 ENCOUNTER — HOSPITAL ENCOUNTER (OUTPATIENT)
Dept: WOUND CARE | Age: 72
Discharge: HOME | End: 2017-04-04
Payer: MEDICARE

## 2017-04-04 VITALS
DIASTOLIC BLOOD PRESSURE: 70 MMHG | RESPIRATION RATE: 16 BRPM | HEART RATE: 70 BPM | SYSTOLIC BLOOD PRESSURE: 140 MMHG | TEMPERATURE: 97.7 F

## 2017-04-04 PROCEDURE — 6370000000 HC RX 637 (ALT 250 FOR IP): Performed by: NURSE PRACTITIONER

## 2017-04-04 PROCEDURE — 97597 DBRDMT OPN WND 1ST 20 CM/<: CPT

## 2017-04-04 PROCEDURE — 97597 DBRDMT OPN WND 1ST 20 CM/<: CPT | Performed by: NURSE PRACTITIONER

## 2017-04-04 RX ADMIN — LIDOCAINE HYDROCHLORIDE: 20 JELLY TOPICAL at 09:53

## 2017-04-11 ENCOUNTER — HOSPITAL ENCOUNTER (OUTPATIENT)
Dept: WOUND CARE | Age: 72
Discharge: HOME | End: 2017-04-11
Payer: MEDICARE

## 2017-04-11 VITALS — HEART RATE: 75 BPM | RESPIRATION RATE: 18 BRPM | TEMPERATURE: 97.8 F

## 2017-04-11 PROCEDURE — 99212 OFFICE O/P EST SF 10 MIN: CPT

## 2017-04-11 PROCEDURE — 6370000000 HC RX 637 (ALT 250 FOR IP): Performed by: SURGERY

## 2017-04-11 RX ADMIN — LIDOCAINE HYDROCHLORIDE: 20 JELLY TOPICAL at 09:54

## 2017-05-17 ENCOUNTER — HOSPITAL ENCOUNTER (OUTPATIENT)
Age: 72
Setting detail: SPECIMEN
Discharge: HOME | End: 2017-05-17
Payer: MEDICARE

## 2017-05-17 DIAGNOSIS — E55.9 VITAMIN D DEFICIENCY: ICD-10-CM

## 2017-05-17 DIAGNOSIS — N18.9 ANEMIA IN CKD (CHRONIC KIDNEY DISEASE): ICD-10-CM

## 2017-05-17 DIAGNOSIS — N18.30 BENIGN HYPERTENSION WITH CKD (CHRONIC KIDNEY DISEASE) STAGE III (HCC): ICD-10-CM

## 2017-05-17 DIAGNOSIS — I12.9 BENIGN HYPERTENSION WITH CKD (CHRONIC KIDNEY DISEASE) STAGE III (HCC): ICD-10-CM

## 2017-05-17 DIAGNOSIS — N18.30 CKD (CHRONIC KIDNEY DISEASE) STAGE 3, GFR 30-59 ML/MIN (HCC): ICD-10-CM

## 2017-05-17 DIAGNOSIS — D63.1 ANEMIA IN CKD (CHRONIC KIDNEY DISEASE): ICD-10-CM

## 2017-05-17 LAB
ANION GAP SERPL CALCULATED.3IONS-SCNC: 14 MMOL/L (ref 9–17)
BUN BLDV-MCNC: 25 MG/DL (ref 8–23)
BUN/CREAT BLD: ABNORMAL (ref 9–20)
CALCIUM SERPL-MCNC: 9.1 MG/DL (ref 8.6–10.4)
CHLORIDE BLD-SCNC: 103 MMOL/L (ref 98–107)
CO2: 25 MMOL/L (ref 20–31)
CREAT SERPL-MCNC: 1.59 MG/DL (ref 0.7–1.2)
GFR AFRICAN AMERICAN: 52 ML/MIN
GFR NON-AFRICAN AMERICAN: 43 ML/MIN
GFR SERPL CREATININE-BSD FRML MDRD: ABNORMAL ML/MIN/{1.73_M2}
GFR SERPL CREATININE-BSD FRML MDRD: ABNORMAL ML/MIN/{1.73_M2}
GLUCOSE BLD-MCNC: 96 MG/DL (ref 70–99)
POTASSIUM SERPL-SCNC: 4.2 MMOL/L (ref 3.7–5.3)
SODIUM BLD-SCNC: 142 MMOL/L (ref 135–144)

## 2017-05-24 ENCOUNTER — CARE COORDINATION (OUTPATIENT)
Dept: CARE COORDINATION | Age: 72
End: 2017-05-24

## 2017-07-06 ENCOUNTER — HOSPITAL ENCOUNTER (OUTPATIENT)
Age: 72
Setting detail: SPECIMEN
Discharge: HOME | End: 2017-07-06
Payer: MEDICARE

## 2017-07-06 DIAGNOSIS — N18.30 BENIGN HYPERTENSION WITH CKD (CHRONIC KIDNEY DISEASE) STAGE III (HCC): ICD-10-CM

## 2017-07-06 DIAGNOSIS — N18.30 CKD (CHRONIC KIDNEY DISEASE) STAGE 3, GFR 30-59 ML/MIN (HCC): ICD-10-CM

## 2017-07-06 DIAGNOSIS — E55.9 VITAMIN D DEFICIENCY: ICD-10-CM

## 2017-07-06 DIAGNOSIS — I12.9 BENIGN HYPERTENSION WITH CKD (CHRONIC KIDNEY DISEASE) STAGE III (HCC): ICD-10-CM

## 2017-07-06 LAB — VITAMIN D 25-HYDROXY: 33.6 NG/ML (ref 30–100)

## 2017-08-16 ENCOUNTER — TELEPHONE (OUTPATIENT)
Dept: INTERNAL MEDICINE CLINIC | Age: 72
End: 2017-08-16

## 2017-08-16 ENCOUNTER — OFFICE VISIT (OUTPATIENT)
Dept: INTERNAL MEDICINE CLINIC | Age: 72
End: 2017-08-16
Payer: MEDICARE

## 2017-08-16 VITALS
BODY MASS INDEX: 34.25 KG/M2 | WEIGHT: 226 LBS | HEIGHT: 68 IN | DIASTOLIC BLOOD PRESSURE: 84 MMHG | SYSTOLIC BLOOD PRESSURE: 130 MMHG

## 2017-08-16 DIAGNOSIS — K50.019 CROHN'S DISEASE OF SMALL INTESTINE WITH COMPLICATION (HCC): Chronic | ICD-10-CM

## 2017-08-16 DIAGNOSIS — I10 ESSENTIAL HYPERTENSION: Chronic | ICD-10-CM

## 2017-08-16 DIAGNOSIS — K50.119 CROHN'S DISEASE OF COLON, UNSPECIFIED COMPLICATION (HCC): ICD-10-CM

## 2017-08-16 DIAGNOSIS — R73.9 HYPERGLYCEMIA: Primary | ICD-10-CM

## 2017-08-16 DIAGNOSIS — N18.3 CHRONIC KIDNEY DISEASE (CKD), STAGE 3 (MODERATE): Chronic | ICD-10-CM

## 2017-08-16 PROBLEM — Z93.3 COLOSTOMY IN PLACE (HCC): Status: RESOLVED | Noted: 2017-08-16 | Resolved: 2017-08-16

## 2017-08-16 PROBLEM — Z93.3 COLOSTOMY IN PLACE (HCC): Status: ACTIVE | Noted: 2017-08-16

## 2017-08-16 PROCEDURE — G8417 CALC BMI ABV UP PARAM F/U: HCPCS | Performed by: INTERNAL MEDICINE

## 2017-08-16 PROCEDURE — 99214 OFFICE O/P EST MOD 30 MIN: CPT | Performed by: INTERNAL MEDICINE

## 2017-08-16 PROCEDURE — G8427 DOCREV CUR MEDS BY ELIG CLIN: HCPCS | Performed by: INTERNAL MEDICINE

## 2017-08-16 PROCEDURE — 3017F COLORECTAL CA SCREEN DOC REV: CPT | Performed by: INTERNAL MEDICINE

## 2017-08-16 PROCEDURE — 4040F PNEUMOC VAC/ADMIN/RCVD: CPT | Performed by: INTERNAL MEDICINE

## 2017-08-16 PROCEDURE — 1036F TOBACCO NON-USER: CPT | Performed by: INTERNAL MEDICINE

## 2017-08-16 PROCEDURE — 1123F ACP DISCUSS/DSCN MKR DOCD: CPT | Performed by: INTERNAL MEDICINE

## 2017-08-16 PROCEDURE — G8598 ASA/ANTIPLAT THER USED: HCPCS | Performed by: INTERNAL MEDICINE

## 2017-08-16 RX ORDER — LANOLIN ALCOHOL/MO/W.PET/CERES
325 CREAM (GRAM) TOPICAL 2 TIMES DAILY
Qty: 90 TABLET | Refills: 3 | Status: SHIPPED | OUTPATIENT
Start: 2017-08-16 | End: 2017-10-16 | Stop reason: SDUPTHER

## 2017-08-16 RX ORDER — AMLODIPINE BESYLATE 5 MG/1
5 TABLET ORAL DAILY
Qty: 30 TABLET | Refills: 3 | Status: SHIPPED | OUTPATIENT
Start: 2017-08-16 | End: 2017-08-16

## 2017-08-16 ASSESSMENT — PATIENT HEALTH QUESTIONNAIRE - PHQ9
SUM OF ALL RESPONSES TO PHQ QUESTIONS 1-9: 0
SUM OF ALL RESPONSES TO PHQ9 QUESTIONS 1 & 2: 0
2. FEELING DOWN, DEPRESSED OR HOPELESS: 0
1. LITTLE INTEREST OR PLEASURE IN DOING THINGS: 0

## 2017-08-17 ENCOUNTER — HOSPITAL ENCOUNTER (OUTPATIENT)
Age: 72
Setting detail: SPECIMEN
Discharge: HOME | End: 2017-08-17
Payer: MEDICARE

## 2017-08-17 DIAGNOSIS — R73.9 HYPERGLYCEMIA: ICD-10-CM

## 2017-08-17 LAB
ANION GAP SERPL CALCULATED.3IONS-SCNC: 18 MMOL/L (ref 9–17)
BUN BLDV-MCNC: 28 MG/DL (ref 8–23)
BUN/CREAT BLD: ABNORMAL (ref 9–20)
CALCIUM SERPL-MCNC: 9.1 MG/DL (ref 8.6–10.4)
CHLORIDE BLD-SCNC: 104 MMOL/L (ref 98–107)
CO2: 26 MMOL/L (ref 20–31)
CREAT SERPL-MCNC: 1.52 MG/DL (ref 0.7–1.2)
ESTIMATED AVERAGE GLUCOSE: 111 MG/DL
GFR AFRICAN AMERICAN: 55 ML/MIN
GFR NON-AFRICAN AMERICAN: 45 ML/MIN
GFR SERPL CREATININE-BSD FRML MDRD: ABNORMAL ML/MIN/{1.73_M2}
GFR SERPL CREATININE-BSD FRML MDRD: ABNORMAL ML/MIN/{1.73_M2}
GLUCOSE BLD-MCNC: 115 MG/DL (ref 70–99)
HBA1C MFR BLD: 5.5 % (ref 4–6)
POTASSIUM SERPL-SCNC: 4.4 MMOL/L (ref 3.7–5.3)
SODIUM BLD-SCNC: 148 MMOL/L (ref 135–144)

## 2017-09-27 ENCOUNTER — HOSPITAL ENCOUNTER (OUTPATIENT)
Age: 72
Setting detail: SPECIMEN
Discharge: HOME | End: 2017-09-27
Payer: MEDICARE

## 2017-09-27 DIAGNOSIS — N18.30 CKD (CHRONIC KIDNEY DISEASE) STAGE 3, GFR 30-59 ML/MIN (HCC): ICD-10-CM

## 2017-09-27 DIAGNOSIS — I12.9 BENIGN HYPERTENSION WITH CKD (CHRONIC KIDNEY DISEASE) STAGE III (HCC): ICD-10-CM

## 2017-09-27 DIAGNOSIS — N18.30 BENIGN HYPERTENSION WITH CKD (CHRONIC KIDNEY DISEASE) STAGE III (HCC): ICD-10-CM

## 2017-09-27 LAB
ABSOLUTE EOS #: 0.34 K/UL (ref 0–0.4)
ABSOLUTE LYMPH #: 1.09 K/UL (ref 1–4.8)
ABSOLUTE MONO #: 0.34 K/UL (ref 0.1–0.8)
ALBUMIN SERPL-MCNC: 3.9 G/DL (ref 3.5–5.2)
ALBUMIN/GLOBULIN RATIO: 1.3 (ref 1–2.5)
ALP BLD-CCNC: 90 U/L (ref 40–129)
ALT SERPL-CCNC: 25 U/L (ref 5–41)
ANION GAP SERPL CALCULATED.3IONS-SCNC: 13 MMOL/L (ref 9–17)
AST SERPL-CCNC: 21 U/L
BASOPHILS # BLD: 0 %
BASOPHILS ABSOLUTE: 0 K/UL (ref 0–0.2)
BILIRUB SERPL-MCNC: 0.93 MG/DL (ref 0.3–1.2)
BUN BLDV-MCNC: 23 MG/DL (ref 8–23)
BUN/CREAT BLD: ABNORMAL (ref 9–20)
CALCIUM SERPL-MCNC: 9 MG/DL (ref 8.6–10.4)
CHLORIDE BLD-SCNC: 103 MMOL/L (ref 98–107)
CO2: 27 MMOL/L (ref 20–31)
CREAT SERPL-MCNC: 1.6 MG/DL (ref 0.7–1.2)
DIFFERENTIAL TYPE: ABNORMAL
EOSINOPHILS RELATIVE PERCENT: 4 %
GFR AFRICAN AMERICAN: 52 ML/MIN
GFR NON-AFRICAN AMERICAN: 43 ML/MIN
GFR SERPL CREATININE-BSD FRML MDRD: ABNORMAL ML/MIN/{1.73_M2}
GFR SERPL CREATININE-BSD FRML MDRD: ABNORMAL ML/MIN/{1.73_M2}
GLUCOSE BLD-MCNC: 126 MG/DL (ref 70–99)
HCT VFR BLD CALC: 37.1 % (ref 41–53)
HEMOGLOBIN: 11.8 G/DL (ref 13.5–17.5)
LYMPHOCYTES # BLD: 13 %
MAGNESIUM: 2.2 MG/DL (ref 1.6–2.6)
MCH RBC QN AUTO: 26.7 PG (ref 26–34)
MCHC RBC AUTO-ENTMCNC: 31.8 G/DL (ref 31–37)
MCV RBC AUTO: 83.9 FL (ref 80–100)
MONOCYTES # BLD: 4 %
MORPHOLOGY: ABNORMAL
PDW BLD-RTO: 19 % (ref 12.5–15.4)
PHOSPHORUS: 2.1 MG/DL (ref 2.5–4.5)
PLATELET # BLD: 200 K/UL (ref 140–450)
PLATELET ESTIMATE: ABNORMAL
PMV BLD AUTO: 7.9 FL (ref 6–12)
POTASSIUM SERPL-SCNC: 4.4 MMOL/L (ref 3.7–5.3)
RBC # BLD: 4.42 M/UL (ref 4.5–5.9)
RBC # BLD: ABNORMAL 10*6/UL
SEG NEUTROPHILS: 79 %
SEGMENTED NEUTROPHILS ABSOLUTE COUNT: 6.63 K/UL (ref 1.8–7.7)
SODIUM BLD-SCNC: 143 MMOL/L (ref 135–144)
TOTAL PROTEIN: 6.9 G/DL (ref 6.4–8.3)
WBC # BLD: 8.4 K/UL (ref 3.5–11)
WBC # BLD: ABNORMAL 10*3/UL

## 2017-10-16 ENCOUNTER — OFFICE VISIT (OUTPATIENT)
Dept: INTERNAL MEDICINE CLINIC | Age: 72
End: 2017-10-16
Payer: MEDICARE

## 2017-10-16 VITALS
BODY MASS INDEX: 35.35 KG/M2 | WEIGHT: 233.25 LBS | SYSTOLIC BLOOD PRESSURE: 130 MMHG | DIASTOLIC BLOOD PRESSURE: 78 MMHG | HEIGHT: 68 IN

## 2017-10-16 DIAGNOSIS — I25.10 CORONARY ARTERY DISEASE INVOLVING NATIVE CORONARY ARTERY OF NATIVE HEART WITHOUT ANGINA PECTORIS: Primary | Chronic | ICD-10-CM

## 2017-10-16 DIAGNOSIS — I10 ESSENTIAL HYPERTENSION: Chronic | ICD-10-CM

## 2017-10-16 DIAGNOSIS — K50.019 CROHN'S DISEASE OF SMALL INTESTINE WITH COMPLICATION (HCC): Chronic | ICD-10-CM

## 2017-10-16 PROCEDURE — G8484 FLU IMMUNIZE NO ADMIN: HCPCS | Performed by: INTERNAL MEDICINE

## 2017-10-16 PROCEDURE — 3017F COLORECTAL CA SCREEN DOC REV: CPT | Performed by: INTERNAL MEDICINE

## 2017-10-16 PROCEDURE — 99213 OFFICE O/P EST LOW 20 MIN: CPT | Performed by: INTERNAL MEDICINE

## 2017-10-16 PROCEDURE — G8598 ASA/ANTIPLAT THER USED: HCPCS | Performed by: INTERNAL MEDICINE

## 2017-10-16 PROCEDURE — G8427 DOCREV CUR MEDS BY ELIG CLIN: HCPCS | Performed by: INTERNAL MEDICINE

## 2017-10-16 PROCEDURE — 1036F TOBACCO NON-USER: CPT | Performed by: INTERNAL MEDICINE

## 2017-10-16 PROCEDURE — G8417 CALC BMI ABV UP PARAM F/U: HCPCS | Performed by: INTERNAL MEDICINE

## 2017-10-16 PROCEDURE — 4040F PNEUMOC VAC/ADMIN/RCVD: CPT | Performed by: INTERNAL MEDICINE

## 2017-10-16 PROCEDURE — 1123F ACP DISCUSS/DSCN MKR DOCD: CPT | Performed by: INTERNAL MEDICINE

## 2017-10-16 RX ORDER — LANOLIN ALCOHOL/MO/W.PET/CERES
325 CREAM (GRAM) TOPICAL 2 TIMES DAILY
Qty: 90 TABLET | Refills: 3 | Status: SHIPPED | OUTPATIENT
Start: 2017-10-16 | End: 2018-10-15 | Stop reason: SDUPTHER

## 2017-10-16 RX ORDER — AMLODIPINE BESYLATE 5 MG/1
5 TABLET ORAL DAILY
Qty: 90 TABLET | Refills: 3 | Status: SHIPPED | OUTPATIENT
Start: 2017-10-16 | End: 2018-08-20 | Stop reason: SDUPTHER

## 2017-10-16 NOTE — PROGRESS NOTES
screen  04/18/2018    Colon cancer screen colonoscopy  12/06/2019    AAA screen  Completed       HPI\    Chief Complaint   Patient presents with    Hypertension     pt states BP has been elevated lately, he did bring readings with him.  Hyperlipidemia    Chronic Kidney Disease     The patient reports HTN been well controlled on current medication. No complaints of dizziness, shortness of breath or chest pain. The patient does check his BP at home noted    CRF doing well cr is 1.6 k is 4.4     cronhs post sx doing well         Review of Systems  Past Medical History:   Diagnosis Date    Abdominal hernia     Atrophy of muscle of multiple sites 8/19/2016    CAD (coronary artery disease)     CKD (chronic kidney disease) stage 3, GFR 30-59 ml/min     Colon polyp     [facility]     Crohn's colitis ([facility]     Decubitus ulcer of coccyx, stage 2 8/8/2016    Diarrhea     Diverticulosis     Hemorrhoids     History of atrial fibrillation     Hypertension     I[facility] in place [facility]) 8/18/2016    Internal hemorrhoids     NSVT (nonsustained ventricular tachycardia) ([facility] 08/18/2016    Other and unspecified hyperlipidemia     HISTORY OF HIGH CHOLESTEROL    Past heart attack     Tobacco use disorder     Tubular adenoma      Social History   Substance Use Topics    Smoking status: Former Smoker     Types: Cigarettes     Quit date: 8/1/2016    Smokeless tobacco: Never Used    Alcohol use No       ROS  CVS no chest pain no sob no orthopnea  Resp no cough   General no weight loss no fatigue no fever      Objective:   Physical Exam     Blood pressure 130/78, height 5' 7.99\" (1.727 m), weight 233 lb 4 oz (105.8 kg).   General Appearance NAD conversant  Neck FROM supple no JVD  Lungs normal effort Clear to auscultation  Cardio regular rhythm no murmur  Abdomen Soft nontender no HSM  Ext chronic changes 1 + edema  no cyanosis no clubbing   Skin redness chronic   Neuro no focal deficits

## 2018-03-19 ENCOUNTER — HOSPITAL ENCOUNTER (OUTPATIENT)
Age: 73
Setting detail: SPECIMEN
Discharge: HOME | End: 2018-03-19
Payer: MEDICARE

## 2018-03-19 DIAGNOSIS — N18.30 CKD (CHRONIC KIDNEY DISEASE) STAGE 3, GFR 30-59 ML/MIN (HCC): ICD-10-CM

## 2018-03-19 DIAGNOSIS — N18.30 HYPERTENSIVE KIDNEY DISEASE WITH CHRONIC KIDNEY DISEASE STAGE III (HCC): ICD-10-CM

## 2018-03-19 DIAGNOSIS — I12.9 BENIGN HYPERTENSION WITH CKD (CHRONIC KIDNEY DISEASE) STAGE III (HCC): ICD-10-CM

## 2018-03-19 DIAGNOSIS — I12.9 HYPERTENSIVE KIDNEY DISEASE WITH CHRONIC KIDNEY DISEASE STAGE III (HCC): ICD-10-CM

## 2018-03-19 DIAGNOSIS — N18.30 BENIGN HYPERTENSION WITH CKD (CHRONIC KIDNEY DISEASE) STAGE III (HCC): ICD-10-CM

## 2018-03-19 LAB
ABSOLUTE EOS #: 0.32 K/UL (ref 0–0.44)
ABSOLUTE IMMATURE GRANULOCYTE: 0.03 K/UL (ref 0–0.3)
ABSOLUTE LYMPH #: 1.16 K/UL (ref 1.1–3.7)
ABSOLUTE MONO #: 0.51 K/UL (ref 0.1–1.2)
ALBUMIN SERPL-MCNC: 3.9 G/DL (ref 3.5–5.2)
ALBUMIN/GLOBULIN RATIO: 1.2 (ref 1–2.5)
ALP BLD-CCNC: 81 U/L (ref 40–129)
ALT SERPL-CCNC: 25 U/L (ref 5–41)
ANION GAP SERPL CALCULATED.3IONS-SCNC: 11 MMOL/L (ref 9–17)
AST SERPL-CCNC: 26 U/L
BASOPHILS # BLD: 0 % (ref 0–2)
BASOPHILS ABSOLUTE: 0.03 K/UL (ref 0–0.2)
BILIRUB SERPL-MCNC: 0.96 MG/DL (ref 0.3–1.2)
BUN BLDV-MCNC: 21 MG/DL (ref 8–23)
BUN/CREAT BLD: ABNORMAL (ref 9–20)
CALCIUM SERPL-MCNC: 9 MG/DL (ref 8.6–10.4)
CHLORIDE BLD-SCNC: 103 MMOL/L (ref 98–107)
CO2: 30 MMOL/L (ref 20–31)
CREAT SERPL-MCNC: 1.39 MG/DL (ref 0.7–1.2)
CREATININE URINE: 233 MG/DL (ref 39–259)
DIFFERENTIAL TYPE: ABNORMAL
EOSINOPHILS RELATIVE PERCENT: 4 % (ref 1–4)
GFR AFRICAN AMERICAN: >60 ML/MIN
GFR NON-AFRICAN AMERICAN: 50 ML/MIN
GFR SERPL CREATININE-BSD FRML MDRD: ABNORMAL ML/MIN/{1.73_M2}
GFR SERPL CREATININE-BSD FRML MDRD: ABNORMAL ML/MIN/{1.73_M2}
GLUCOSE BLD-MCNC: 111 MG/DL (ref 70–99)
HCT VFR BLD CALC: 41.5 % (ref 40.7–50.3)
HEMOGLOBIN: 12.4 G/DL (ref 13–17)
IMMATURE GRANULOCYTES: 0 %
LYMPHOCYTES # BLD: 14 % (ref 24–43)
MAGNESIUM: 2.1 MG/DL (ref 1.6–2.6)
MCH RBC QN AUTO: 27.3 PG (ref 25.2–33.5)
MCHC RBC AUTO-ENTMCNC: 29.9 G/DL (ref 28.4–34.8)
MCV RBC AUTO: 91.4 FL (ref 82.6–102.9)
MONOCYTES # BLD: 6 % (ref 3–12)
NRBC AUTOMATED: 0 PER 100 WBC
PDW BLD-RTO: 17.2 % (ref 11.8–14.4)
PHOSPHORUS: 2.5 MG/DL (ref 2.5–4.5)
PLATELET # BLD: 179 K/UL (ref 138–453)
PLATELET ESTIMATE: ABNORMAL
PMV BLD AUTO: 9.6 FL (ref 8.1–13.5)
POTASSIUM SERPL-SCNC: 4.6 MMOL/L (ref 3.7–5.3)
RBC # BLD: 4.54 M/UL (ref 4.21–5.77)
RBC # BLD: ABNORMAL 10*6/UL
SEG NEUTROPHILS: 76 % (ref 36–65)
SEGMENTED NEUTROPHILS ABSOLUTE COUNT: 6.35 K/UL (ref 1.5–8.1)
SODIUM BLD-SCNC: 144 MMOL/L (ref 135–144)
TOTAL PROTEIN, URINE: 57 MG/DL
TOTAL PROTEIN: 7.1 G/DL (ref 6.4–8.3)
URINE TOTAL PROTEIN CREATININE RATIO: 0.24 (ref 0–0.2)
WBC # BLD: 8.4 K/UL (ref 3.5–11.3)
WBC # BLD: ABNORMAL 10*3/UL

## 2018-03-26 PROBLEM — K50.019 CROHN'S DISEASE OF SMALL INTESTINE WITH COMPLICATION (HCC): Status: RESOLVED | Noted: 2017-08-16 | Resolved: 2018-03-26

## 2018-04-09 ENCOUNTER — HOSPITAL ENCOUNTER (OUTPATIENT)
Age: 73
Setting detail: SPECIMEN
Discharge: HOME | End: 2018-04-09
Payer: MEDICARE

## 2018-04-09 DIAGNOSIS — I12.9 BENIGN HYPERTENSION WITH CKD (CHRONIC KIDNEY DISEASE) STAGE III (HCC): ICD-10-CM

## 2018-04-09 DIAGNOSIS — N18.30 ANEMIA IN STAGE 3 CHRONIC KIDNEY DISEASE (HCC): ICD-10-CM

## 2018-04-09 DIAGNOSIS — D63.1 ANEMIA IN STAGE 3 CHRONIC KIDNEY DISEASE (HCC): ICD-10-CM

## 2018-04-09 DIAGNOSIS — N18.30 BENIGN HYPERTENSION WITH CKD (CHRONIC KIDNEY DISEASE) STAGE III (HCC): ICD-10-CM

## 2018-04-09 DIAGNOSIS — N18.30 CKD (CHRONIC KIDNEY DISEASE) STAGE 3, GFR 30-59 ML/MIN (HCC): ICD-10-CM

## 2018-04-09 LAB — VITAMIN D 25-HYDROXY: 46.6 NG/ML (ref 30–100)

## 2018-04-16 ENCOUNTER — OFFICE VISIT (OUTPATIENT)
Dept: INTERNAL MEDICINE CLINIC | Age: 73
End: 2018-04-16
Payer: MEDICARE

## 2018-04-16 VITALS
DIASTOLIC BLOOD PRESSURE: 82 MMHG | SYSTOLIC BLOOD PRESSURE: 130 MMHG | WEIGHT: 237.44 LBS | BODY MASS INDEX: 35.99 KG/M2 | HEIGHT: 68 IN

## 2018-04-16 DIAGNOSIS — K50.019 CROHN'S DISEASE OF SMALL INTESTINE WITH COMPLICATION (HCC): ICD-10-CM

## 2018-04-16 DIAGNOSIS — I10 ESSENTIAL HYPERTENSION: Primary | Chronic | ICD-10-CM

## 2018-04-16 DIAGNOSIS — I25.10 CORONARY ARTERY DISEASE INVOLVING NATIVE CORONARY ARTERY OF NATIVE HEART WITHOUT ANGINA PECTORIS: Chronic | ICD-10-CM

## 2018-04-16 DIAGNOSIS — I77.9 PERIPHERAL ARTERIAL OCCLUSIVE DISEASE (HCC): ICD-10-CM

## 2018-04-16 DIAGNOSIS — N18.30 CRF (CHRONIC RENAL FAILURE), STAGE 3 (MODERATE) (HCC): ICD-10-CM

## 2018-04-16 DIAGNOSIS — Z90.49 H/O RESECTION OF SMALL BOWEL: Chronic | ICD-10-CM

## 2018-04-16 PROCEDURE — 99213 OFFICE O/P EST LOW 20 MIN: CPT | Performed by: INTERNAL MEDICINE

## 2018-04-16 PROCEDURE — 1123F ACP DISCUSS/DSCN MKR DOCD: CPT | Performed by: INTERNAL MEDICINE

## 2018-04-16 PROCEDURE — 3017F COLORECTAL CA SCREEN DOC REV: CPT | Performed by: INTERNAL MEDICINE

## 2018-04-16 PROCEDURE — G8599 NO ASA/ANTIPLAT THER USE RNG: HCPCS | Performed by: INTERNAL MEDICINE

## 2018-04-16 PROCEDURE — 4040F PNEUMOC VAC/ADMIN/RCVD: CPT | Performed by: INTERNAL MEDICINE

## 2018-04-16 PROCEDURE — G8417 CALC BMI ABV UP PARAM F/U: HCPCS | Performed by: INTERNAL MEDICINE

## 2018-04-16 PROCEDURE — G8427 DOCREV CUR MEDS BY ELIG CLIN: HCPCS | Performed by: INTERNAL MEDICINE

## 2018-04-16 PROCEDURE — 1036F TOBACCO NON-USER: CPT | Performed by: INTERNAL MEDICINE

## 2018-04-16 RX ORDER — ATORVASTATIN CALCIUM 10 MG/1
10 TABLET, FILM COATED ORAL
COMMUNITY
Start: 2018-03-29 | End: 2018-10-15 | Stop reason: SINTOL

## 2018-08-21 RX ORDER — AMLODIPINE BESYLATE 5 MG/1
5 TABLET ORAL DAILY
Qty: 90 TABLET | Refills: 3 | Status: SHIPPED | OUTPATIENT
Start: 2018-08-21 | End: 2018-10-15 | Stop reason: SDUPTHER

## 2018-10-15 ENCOUNTER — OFFICE VISIT (OUTPATIENT)
Dept: INTERNAL MEDICINE CLINIC | Age: 73
End: 2018-10-15
Payer: MEDICARE

## 2018-10-15 VITALS
BODY MASS INDEX: 35.99 KG/M2 | WEIGHT: 237.44 LBS | HEIGHT: 68 IN | DIASTOLIC BLOOD PRESSURE: 84 MMHG | SYSTOLIC BLOOD PRESSURE: 130 MMHG

## 2018-10-15 DIAGNOSIS — K50.019 CROHN'S DISEASE OF SMALL INTESTINE WITH COMPLICATION (HCC): Chronic | ICD-10-CM

## 2018-10-15 DIAGNOSIS — N18.30 STAGE 3 CHRONIC KIDNEY DISEASE (HCC): ICD-10-CM

## 2018-10-15 DIAGNOSIS — I77.9 PERIPHERAL ARTERIAL OCCLUSIVE DISEASE (HCC): Chronic | ICD-10-CM

## 2018-10-15 DIAGNOSIS — Z23 NEED FOR VACCINATION: Primary | ICD-10-CM

## 2018-10-15 DIAGNOSIS — I10 ESSENTIAL HYPERTENSION: Chronic | ICD-10-CM

## 2018-10-15 DIAGNOSIS — I25.10 CORONARY ARTERY DISEASE INVOLVING NATIVE CORONARY ARTERY OF NATIVE HEART WITHOUT ANGINA PECTORIS: Chronic | ICD-10-CM

## 2018-10-15 DIAGNOSIS — E66.09 CLASS 2 OBESITY DUE TO EXCESS CALORIES WITHOUT SERIOUS COMORBIDITY WITH BODY MASS INDEX (BMI) OF 36.0 TO 36.9 IN ADULT: ICD-10-CM

## 2018-10-15 PROCEDURE — G8417 CALC BMI ABV UP PARAM F/U: HCPCS | Performed by: INTERNAL MEDICINE

## 2018-10-15 PROCEDURE — 1036F TOBACCO NON-USER: CPT | Performed by: INTERNAL MEDICINE

## 2018-10-15 PROCEDURE — 99213 OFFICE O/P EST LOW 20 MIN: CPT | Performed by: INTERNAL MEDICINE

## 2018-10-15 PROCEDURE — 90670 PCV13 VACCINE IM: CPT | Performed by: INTERNAL MEDICINE

## 2018-10-15 PROCEDURE — 1101F PT FALLS ASSESS-DOCD LE1/YR: CPT | Performed by: INTERNAL MEDICINE

## 2018-10-15 PROCEDURE — 1123F ACP DISCUSS/DSCN MKR DOCD: CPT | Performed by: INTERNAL MEDICINE

## 2018-10-15 PROCEDURE — 3017F COLORECTAL CA SCREEN DOC REV: CPT | Performed by: INTERNAL MEDICINE

## 2018-10-15 PROCEDURE — 90662 IIV NO PRSV INCREASED AG IM: CPT | Performed by: INTERNAL MEDICINE

## 2018-10-15 PROCEDURE — G8598 ASA/ANTIPLAT THER USED: HCPCS | Performed by: INTERNAL MEDICINE

## 2018-10-15 PROCEDURE — G8427 DOCREV CUR MEDS BY ELIG CLIN: HCPCS | Performed by: INTERNAL MEDICINE

## 2018-10-15 PROCEDURE — G0008 ADMIN INFLUENZA VIRUS VAC: HCPCS | Performed by: INTERNAL MEDICINE

## 2018-10-15 PROCEDURE — G8482 FLU IMMUNIZE ORDER/ADMIN: HCPCS | Performed by: INTERNAL MEDICINE

## 2018-10-15 PROCEDURE — 4040F PNEUMOC VAC/ADMIN/RCVD: CPT | Performed by: INTERNAL MEDICINE

## 2018-10-15 PROCEDURE — G0009 ADMIN PNEUMOCOCCAL VACCINE: HCPCS | Performed by: INTERNAL MEDICINE

## 2018-10-15 ASSESSMENT — PATIENT HEALTH QUESTIONNAIRE - PHQ9
2. FEELING DOWN, DEPRESSED OR HOPELESS: 0
1. LITTLE INTEREST OR PLEASURE IN DOING THINGS: 0
SUM OF ALL RESPONSES TO PHQ QUESTIONS 1-9: 0
SUM OF ALL RESPONSES TO PHQ9 QUESTIONS 1 & 2: 0
SUM OF ALL RESPONSES TO PHQ QUESTIONS 1-9: 0

## 2018-10-15 NOTE — PROGRESS NOTES
 loperamide (IMODIUM) 2 MG capsule Take 1 capsule by mouth 4 times daily as needed for Diarrhea 60 capsule 0    aspirin 81 MG EC tablet Take 1 tablet by mouth daily. (Patient taking differently: Take 81 mg by mouth every morning ) 90 tablet 3     No current facility-administered medications on file prior to visit. SOCIAL HISTORY    Reviewed and no change from previous record. Lethia Duane  reports that he quit smoking about 2 years ago. His smoking use included Cigarettes. He has a 12.50 pack-year smoking history.  He has never used smokeless tobacco.    FAMILY HISTORY:    Reviewed and No change from previous visit    REVIEW OF SYSTEMS:    ENT: negative  Respiratory: no cough, shortness of breath, or wheezing  Cardiovascular: no chest pain or dyspnea on exertion  Gastrointestinal: no abdominal pain, black or bloody stools  Neurological: no TIA or stroke symptoms  Endocrine: negative  Genito-Urinary: no dysuria, trouble voiding, or hematuria  Musculoskeletal: negative  Dermatological: negative    PHYSICAL EXAM:      Vitals:    10/15/18 1305   BP: 130/84     General appearance - alert, well appearing, and in no distress  Neck - supple, no significant adenopathy  Chest - clear to auscultation, no wheezes, rales or rhonchi, symmetric air entry  Heart - normal rate, regular rhythm, normal S1, S2, no murmurs, rubs, clicks or gallops  Back exam - full range of motion, no tenderness, palpable spasm or pain on motion  Neurological - alert, oriented, normal speech, no focal findings or movement disorder noted  Musculoskeletal - no joint tenderness, deformity or swelling  Extremities - peripheral pulses normal, no pedal edema, no clubbing or cyanosis  Skin - normal coloration and turgor, no rashes, no suspicious skin lesions noted    LABORATORY FINDINGS:    CBC:  Lab Results   Component Value Date    WBC 8.4 03/19/2018    HGB 12.4 03/19/2018     03/19/2018     04/18/2012     BMP:    Lab Results   Component tablet; Refill: 3    6. Stage 3 chronic kidney disease (HCC)    - ferrous sulfate (FE TABS) 325 (65 Fe) MG EC tablet; Take 1 tablet by mouth 2 times daily  Dispense: 90 tablet; Refill: 3    7. Class 2 obesity due to excess calories without serious comorbidity with body mass index (BMI) of 36.0 to 36.9 in adult  Life style modifications          FOLLOW UP:       1.  Arabella Sue received counseling on the following healthy behaviors: nutrition, exercise and medication adherence    3. Discussed use, benefit, and side effects of prescribed medications. Barriers to medication compliance addressed. All patient questions answered. Pt voiced understanding. 4.  Reviewed prior labs and health maintenance  5. Continue current medications, diet and exercise. Cassie Cai MD, PGY-3 Internal Medicine Resident  [address] [place] [id number], [facility]    10/15/2018  1:30 PM       IM Attending    Pt seen and examined today   I have discussed the care of pt , including pertinent history and exam findings,  with the resident. I have reviewed the key elements of all parts of the encounter with the resident. I agree with the assessment, plan and orders as documented by the resident.     Electronically signed by Cassie Bledsoe MD on 10/28/2018 at 6:43 PM

## 2018-10-15 NOTE — PROGRESS NOTES
Subjective:      Patient ID: Mono Hughes is a 67 y.o. male. Chronic Disease Visit Information    BP Readings from Last 3 Encounters:   10/15/18 130/84   04/16/18 130/82   03/26/18 138/84          Hemoglobin A1C (%)   Date Value   08/17/2017 5.5     Microalb/Crt. Ratio (mcg/mg creat)   Date Value   02/28/2017 11     LDL Cholesterol (mg/dL)   Date Value   04/18/2013 86     HDL (mg/dL)   Date Value   04/18/2013 39 (L)     BUN (mg/dL)   Date Value   03/19/2018 21     CREATININE (mg/dL)   Date Value   03/19/2018 1.39 (H)     Glucose (mg/dL)   Date Value   03/19/2018 111 (H)   04/18/2012 94            Have you changed or started any medications since your last visit including any over-the-counter medicines, vitamins, or herbal medicines? no   Are you having any side effects from any of your medications? -  no  Have you stopped taking any of your medications? Is so, why? -  no    Have you seen any other physician or provider since your last visit? No  Have you had any other diagnostic tests since your last visit? No  Have you been seen in the emergency room and/or had an admission to a hospital since we last saw you? No  Have you had your annual diabetic retinal (eye) exam? No  Have you had your routine dental cleaning in the past 6 months? yes -     Have you activated your [employer] account? If not, what are your barriers?  No:      Patient Care Team:  Courtney Brooks MD as PCP - General  Courtney Brooks MD as PCP - S Attributed Provider  Primitivo Pond MD as Consulting Physician (Gastroenterology)     Chief Complaint   Patient presents with    Hypertension    Hyperlipidemia              Medical History Review  Past Medical, Family, and Social History reviewed and does contribute to the patient presenting condition    Health Maintenance   Topic Date Due    Hepatitis C screen  1945    Lipid screen  04/18/2018    DTaP/Tdap/Td vaccine (1 - Tdap) 04/16/2019 (Originally 11/25/1964)    Shingles Vaccine (1 of 2 - 2 Dose Series) 10/09/2019 (Originally 11/25/1995)    Potassium monitoring  03/19/2019    Creatinine monitoring  03/19/2019    Pneumococcal low/med risk (2 of 2 - PPSV23) 10/15/2019    Colon cancer screen colonoscopy  12/06/2019    Flu vaccine  Completed    AAA screen  Completed       HPI    Review of Systems    Objective:   Physical Exam    Assessment:          Plan:           Jocelyn Solomon MD

## 2018-10-28 RX ORDER — CARVEDILOL 12.5 MG/1
12.5 TABLET ORAL 2 TIMES DAILY WITH MEALS
Qty: 60 TABLET | Refills: 3 | Status: SHIPPED | OUTPATIENT
Start: 2018-10-28 | End: 2020-07-20

## 2018-10-28 RX ORDER — LANOLIN ALCOHOL/MO/W.PET/CERES
325 CREAM (GRAM) TOPICAL 2 TIMES DAILY
Qty: 90 TABLET | Refills: 3 | Status: SHIPPED | OUTPATIENT
Start: 2018-10-28 | End: 2020-07-20

## 2018-10-28 RX ORDER — LOPERAMIDE HYDROCHLORIDE 2 MG/1
2 CAPSULE ORAL 4 TIMES DAILY PRN
Qty: 60 CAPSULE | Refills: 2 | Status: SHIPPED | OUTPATIENT
Start: 2018-10-28 | End: 2020-06-01 | Stop reason: SDUPTHER

## 2018-10-28 RX ORDER — ASPIRIN 81 MG/1
81 TABLET ORAL EVERY MORNING
Qty: 30 TABLET | Refills: 3 | Status: SHIPPED | OUTPATIENT
Start: 2018-10-28 | End: 2022-09-12

## 2018-10-28 RX ORDER — FUROSEMIDE 20 MG/1
20 TABLET ORAL DAILY
Qty: 90 TABLET | Refills: 3 | Status: SHIPPED | OUTPATIENT
Start: 2018-10-28 | End: 2019-09-18 | Stop reason: SDUPTHER

## 2018-10-28 RX ORDER — FOLIC ACID/MULTIVIT,IRON,MINER 0.4MG-18MG
TABLET ORAL
Qty: 30 CAPSULE | Refills: 1 | Status: SHIPPED | OUTPATIENT
Start: 2018-10-28 | End: 2020-07-20

## 2018-10-28 RX ORDER — AMLODIPINE BESYLATE 5 MG/1
5 TABLET ORAL DAILY
Qty: 90 TABLET | Refills: 3 | Status: SHIPPED | OUTPATIENT
Start: 2018-10-28 | End: 2019-09-18

## 2018-11-21 ENCOUNTER — TELEPHONE (OUTPATIENT)
Dept: INTERNAL MEDICINE CLINIC | Age: 73
End: 2018-11-21

## 2018-11-21 DIAGNOSIS — Z13.1 DIABETES MELLITUS SCREENING: Primary | ICD-10-CM

## 2018-11-21 DIAGNOSIS — E78.49 OTHER HYPERLIPIDEMIA: ICD-10-CM

## 2018-11-21 DIAGNOSIS — Z86.79 HISTORY OF ATRIAL FIBRILLATION: Chronic | ICD-10-CM

## 2018-11-21 DIAGNOSIS — E83.10 DISORDER OF IRON METABOLISM: ICD-10-CM

## 2018-11-26 ENCOUNTER — HOSPITAL ENCOUNTER (OUTPATIENT)
Age: 73
Setting detail: SPECIMEN
Discharge: HOME | End: 2018-11-26
Payer: MEDICARE

## 2018-11-26 DIAGNOSIS — E83.10 DISORDER OF IRON METABOLISM: ICD-10-CM

## 2018-11-26 DIAGNOSIS — Z13.1 DIABETES MELLITUS SCREENING: ICD-10-CM

## 2018-11-26 DIAGNOSIS — E78.49 OTHER HYPERLIPIDEMIA: ICD-10-CM

## 2018-11-26 DIAGNOSIS — Z86.79 HISTORY OF ATRIAL FIBRILLATION: Chronic | ICD-10-CM

## 2018-11-26 LAB
CHOLESTEROL/HDL RATIO: 3.6
CHOLESTEROL: 142 MG/DL
ESTIMATED AVERAGE GLUCOSE: 128 MG/DL
HBA1C MFR BLD: 6.1 % (ref 4–6)
HDLC SERPL-MCNC: 39 MG/DL
LDL CHOLESTEROL: 81 MG/DL (ref 0–130)
TRIGL SERPL-MCNC: 110 MG/DL
VLDLC SERPL CALC-MCNC: ABNORMAL MG/DL (ref 1–30)

## 2019-04-03 ENCOUNTER — OFFICE VISIT (OUTPATIENT)
Dept: INTERNAL MEDICINE CLINIC | Age: 74
End: 2019-04-03
Payer: MEDICARE

## 2019-04-03 VITALS
HEIGHT: 68 IN | SYSTOLIC BLOOD PRESSURE: 128 MMHG | WEIGHT: 237.44 LBS | DIASTOLIC BLOOD PRESSURE: 74 MMHG | BODY MASS INDEX: 35.99 KG/M2

## 2019-04-03 DIAGNOSIS — K50.019 CROHN'S DISEASE OF SMALL INTESTINE WITH COMPLICATION (HCC): ICD-10-CM

## 2019-04-03 DIAGNOSIS — I77.9 PERIPHERAL ARTERIAL OCCLUSIVE DISEASE (HCC): ICD-10-CM

## 2019-04-03 PROCEDURE — 99213 OFFICE O/P EST LOW 20 MIN: CPT | Performed by: INTERNAL MEDICINE

## 2019-04-03 PROCEDURE — 3017F COLORECTAL CA SCREEN DOC REV: CPT | Performed by: INTERNAL MEDICINE

## 2019-04-03 PROCEDURE — 4040F PNEUMOC VAC/ADMIN/RCVD: CPT | Performed by: INTERNAL MEDICINE

## 2019-04-03 PROCEDURE — G8598 ASA/ANTIPLAT THER USED: HCPCS | Performed by: INTERNAL MEDICINE

## 2019-04-03 PROCEDURE — 1036F TOBACCO NON-USER: CPT | Performed by: INTERNAL MEDICINE

## 2019-04-03 PROCEDURE — 1123F ACP DISCUSS/DSCN MKR DOCD: CPT | Performed by: INTERNAL MEDICINE

## 2019-04-03 PROCEDURE — G8417 CALC BMI ABV UP PARAM F/U: HCPCS | Performed by: INTERNAL MEDICINE

## 2019-04-03 PROCEDURE — G8427 DOCREV CUR MEDS BY ELIG CLIN: HCPCS | Performed by: INTERNAL MEDICINE

## 2019-04-03 ASSESSMENT — PATIENT HEALTH QUESTIONNAIRE - PHQ9
SUM OF ALL RESPONSES TO PHQ QUESTIONS 1-9: 0
2. FEELING DOWN, DEPRESSED OR HOPELESS: 0
1. LITTLE INTEREST OR PLEASURE IN DOING THINGS: 0
SUM OF ALL RESPONSES TO PHQ9 QUESTIONS 1 & 2: 0
SUM OF ALL RESPONSES TO PHQ QUESTIONS 1-9: 0

## 2019-04-03 NOTE — PROGRESS NOTES
Subjective:      Patient ID: Natali Patel is a 68 y.o. male. Chronic Disease Visit Information    BP Readings from Last 3 Encounters:   04/03/19 128/74   10/15/18 130/84   04/16/18 130/82          Hemoglobin A1C (%)   Date Value   11/26/2018 6.1 (H)   08/17/2017 5.5     Microalb/Crt. Ratio (mcg/mg creat)   Date Value   02/28/2017 11     LDL Cholesterol (mg/dL)   Date Value   11/26/2018 81     HDL (mg/dL)   Date Value   11/26/2018 39 (L)     BUN (mg/dL)   Date Value   03/19/2018 21     CREATININE (mg/dL)   Date Value   03/19/2018 1.39 (H)     Glucose (mg/dL)   Date Value   03/19/2018 111 (H)   04/18/2012 94            Have you changed or started any medications since your last visit including any over-the-counter medicines, vitamins, or herbal medicines? no   Are you having any side effects from any of your medications? -  no  Have you stopped taking any of your medications? Is so, why? -  no    Have you seen any other physician or provider since your last visit? No  Have you had any other diagnostic tests since your last visit? No  Have you been seen in the emergency room and/or had an admission to a hospital since we last saw you? No  Have you had your annual diabetic retinal (eye) exam? No  Have you had your routine dental cleaning in the past 6 months? no    Have you activated your [facility] account? If not, what are your barriers?  No:      Patient Care Team:  Homero Hernadez MD as PCP - General  Homero Hernadez MD as PCP - S Attributed Provider  John Galvan MD as Consulting Physician (Gastroenterology)     Chief Complaint   Patient presents with    Hypertension    Coronary Artery Disease    Other     risk assessment 3              Medical History Review  Past Medical, Family, and Social History reviewed and does contribute to the patient presenting condition    Health Maintenance   Topic Date Due    Hepatitis C screen  1945    DTaP/Tdap/Td vaccine (1 - Tdap) 11/25/1964    Potassium monitoring  2019    Creatinine monitoring  2019    Shingles Vaccine (1 of 2) 10/09/2019 (Originally 1995)    Pneumococcal 65+ years Vaccine (2 of 2 - PPSV23) 10/15/2019    A1C test (Diabetic or Prediabetic)  2019    Colon cancer screen colonoscopy  2019    Lipid screen  2023    Flu vaccine  Completed    AAA screen  Completed       HPI  Chief Complaint   Patient presents with    Hypertension    Coronary Artery Disease    Other     risk assessment 3     The patient reports HTN been well controlled on current medication. No complaints of dizziness, shortness of breath or chest pain.  The patient does not check his BP at home on norvasc lasix coreg       Review of Systems  Past Medical History:   Diagnosis Date    Abdominal hernia     Atrophy of muscle of multiple sites 2016    CAD (coronary artery disease)     CKD (chronic kidney disease) stage 3, GFR 30-59 ml/min ([facility]     Colon polyp     Crohn disease ([address]     Cro[facility]'s colitis ([facility] [address] [facility]     Decubitus ulcer of coccyx, stage 2 2016    Diarrhea     Diverticulosis     Hemorrhoids     History of atrial fibrillation     Hypertension     Ileostomy in place [facility]) 2016    Internal hemorrhoids     NSVT (nonsustained ventricular tachycardia) ([facility] [address] 2016    Other and unspecified hyperlipidemia     HISTORY OF HIGH CHOLESTEROL    Past heart attack     Tobacco use disorder     Tubular adenoma      Social History     Tobacco Use    Smoking status: Former Smoker     Packs/day: 0.50     Years: 25.00     Pack years: 12.50     Types: Cigarettes     Last attempt to quit: 2016     Years since quittin.7    Smokeless tobacco: Never Used   Substance Use Topics    Alcohol use: No     Alcohol/week: 0.0 oz       ROS  CVS no chest pain no sob no orthopnea  Resp no cough   General no weight loss no fatigue no fever      Blood pressure 128/74, height 5' 7.99\" (1.727 m), weight 237 lb 7 oz (107.7 kg).  General Appearance NAD conversant  Neck FROM supple no JVD  Lungs normal effort Clear to auscultation  Cardio regular rhythm no murmur  Abdomen Soft nontender no HSM  Ext 2+ edema no cyanosis no clubbing   Skin no rashes no ulcers  Neuro no focal deficits   Musculoskeletal no spinal tenderness no kyphosis     Objective:   Physical Exam    Assessment:       Diagnosis Orders   1. Peripheral arterial occlusive disease ([address]     2. Crohn's disease of small intestine with complication ([address]     3.  Crohn's disease of small intestine with complication ([address]             Plan:    pt cronhs resolved s/p sx    HTN well controlled   pad stable          Earl Ricci MD

## 2019-09-18 ENCOUNTER — OFFICE VISIT (OUTPATIENT)
Dept: INTERNAL MEDICINE CLINIC | Age: 74
End: 2019-09-18
Payer: MEDICARE

## 2019-09-18 ENCOUNTER — HOSPITAL ENCOUNTER (OUTPATIENT)
Age: 74
Setting detail: SPECIMEN
Discharge: HOME | End: 2019-09-18
Payer: MEDICARE

## 2019-09-18 VITALS
HEIGHT: 68 IN | WEIGHT: 213 LBS | SYSTOLIC BLOOD PRESSURE: 124 MMHG | BODY MASS INDEX: 32.28 KG/M2 | DIASTOLIC BLOOD PRESSURE: 72 MMHG

## 2019-09-18 DIAGNOSIS — R35.0 BENIGN PROSTATIC HYPERPLASIA WITH URINARY FREQUENCY: Primary | ICD-10-CM

## 2019-09-18 DIAGNOSIS — I10 ESSENTIAL HYPERTENSION: Chronic | ICD-10-CM

## 2019-09-18 DIAGNOSIS — N40.1 BENIGN PROSTATIC HYPERPLASIA WITH URINARY FREQUENCY: Primary | ICD-10-CM

## 2019-09-18 DIAGNOSIS — R35.0 BENIGN PROSTATIC HYPERPLASIA WITH URINARY FREQUENCY: ICD-10-CM

## 2019-09-18 DIAGNOSIS — I50.22 CHRONIC SYSTOLIC (CONGESTIVE) HEART FAILURE (HCC): ICD-10-CM

## 2019-09-18 DIAGNOSIS — R60.0 PEDAL EDEMA: ICD-10-CM

## 2019-09-18 DIAGNOSIS — N40.1 BENIGN PROSTATIC HYPERPLASIA WITH URINARY FREQUENCY: ICD-10-CM

## 2019-09-18 LAB — PROSTATE SPECIFIC ANTIGEN: 4.43 UG/L

## 2019-09-18 PROCEDURE — G8417 CALC BMI ABV UP PARAM F/U: HCPCS | Performed by: INTERNAL MEDICINE

## 2019-09-18 PROCEDURE — G8427 DOCREV CUR MEDS BY ELIG CLIN: HCPCS | Performed by: INTERNAL MEDICINE

## 2019-09-18 PROCEDURE — 99214 OFFICE O/P EST MOD 30 MIN: CPT | Performed by: INTERNAL MEDICINE

## 2019-09-18 PROCEDURE — 3017F COLORECTAL CA SCREEN DOC REV: CPT | Performed by: INTERNAL MEDICINE

## 2019-09-18 PROCEDURE — 4040F PNEUMOC VAC/ADMIN/RCVD: CPT | Performed by: INTERNAL MEDICINE

## 2019-09-18 PROCEDURE — 1123F ACP DISCUSS/DSCN MKR DOCD: CPT | Performed by: INTERNAL MEDICINE

## 2019-09-18 PROCEDURE — 1036F TOBACCO NON-USER: CPT | Performed by: INTERNAL MEDICINE

## 2019-09-18 PROCEDURE — G8598 ASA/ANTIPLAT THER USED: HCPCS | Performed by: INTERNAL MEDICINE

## 2019-09-18 RX ORDER — TAMSULOSIN HYDROCHLORIDE 0.4 MG/1
0.4 CAPSULE ORAL DAILY
Qty: 90 CAPSULE | Refills: 1 | Status: SHIPPED | OUTPATIENT
Start: 2019-09-18 | End: 2019-10-24 | Stop reason: SDUPTHER

## 2019-09-18 RX ORDER — FUROSEMIDE 20 MG/1
20 TABLET ORAL DAILY
Qty: 90 TABLET | Refills: 3 | Status: SHIPPED | OUTPATIENT
Start: 2019-09-18 | End: 2020-06-01 | Stop reason: SDUPTHER

## 2019-09-18 NOTE — PROGRESS NOTES
kg).  General Appearance NAD conversant  Neck FROM supple no JVD  Lungs normal effort Clear to auscultation  Cardio regular rhythm no murmur  Abdomen Soft nontender no HSM  Ext b/l le edema no cyanosis no clubbing   Skin no rashes no ulcers  Neuro no focal deficits   Musculoskeletal no spinal tenderness no kyphosis       Objective:   Physical Exam    Assessment:       Diagnosis Orders   1. Benign prostatic hyperplasia with urinary frequency  PSA, Diagnostic   2. Essential hypertension  furosemide (LASIX) 20 MG tablet   3. Pedal edema  furosemide (LASIX) 20 MG tablet   4.  Chronic systolic (congestive) heart failure (HCC)           Plan:    Add lasix last echo reviewed will f/u in 1 month for f/u echo    HTN well controlled           Magy Norton MD

## 2019-09-25 DIAGNOSIS — N40.1 BENIGN PROSTATIC HYPERPLASIA WITH URINARY FREQUENCY: Primary | ICD-10-CM

## 2019-09-25 DIAGNOSIS — R35.0 BENIGN PROSTATIC HYPERPLASIA WITH URINARY FREQUENCY: Primary | ICD-10-CM

## 2019-09-25 DIAGNOSIS — R97.20 ELEVATED PSA: ICD-10-CM

## 2019-09-29 PROBLEM — I50.22 CHRONIC SYSTOLIC (CONGESTIVE) HEART FAILURE (HCC): Status: ACTIVE | Noted: 2019-09-29

## 2019-10-24 RX ORDER — TAMSULOSIN HYDROCHLORIDE 0.4 MG/1
0.8 CAPSULE ORAL DAILY
Qty: 180 CAPSULE | Refills: 3 | Status: SHIPPED | OUTPATIENT
Start: 2019-10-24 | End: 2020-07-20

## 2019-11-27 ENCOUNTER — TELEPHONE (OUTPATIENT)
Dept: INTERNAL MEDICINE CLINIC | Age: 74
End: 2019-11-27

## 2019-12-03 ENCOUNTER — OFFICE VISIT (OUTPATIENT)
Dept: INTERNAL MEDICINE CLINIC | Age: 74
End: 2019-12-03
Payer: MEDICARE

## 2019-12-03 VITALS
SYSTOLIC BLOOD PRESSURE: 138 MMHG | OXYGEN SATURATION: 92 % | BODY MASS INDEX: 29.86 KG/M2 | HEIGHT: 68 IN | RESPIRATION RATE: 18 BRPM | WEIGHT: 197 LBS | DIASTOLIC BLOOD PRESSURE: 80 MMHG | HEART RATE: 95 BPM

## 2019-12-03 DIAGNOSIS — I10 ESSENTIAL HYPERTENSION: Chronic | ICD-10-CM

## 2019-12-03 DIAGNOSIS — N18.30 STAGE 3 CHRONIC KIDNEY DISEASE (HCC): Primary | Chronic | ICD-10-CM

## 2019-12-03 PROCEDURE — 1123F ACP DISCUSS/DSCN MKR DOCD: CPT | Performed by: INTERNAL MEDICINE

## 2019-12-03 PROCEDURE — G8417 CALC BMI ABV UP PARAM F/U: HCPCS | Performed by: INTERNAL MEDICINE

## 2019-12-03 PROCEDURE — 3017F COLORECTAL CA SCREEN DOC REV: CPT | Performed by: INTERNAL MEDICINE

## 2019-12-03 PROCEDURE — 99214 OFFICE O/P EST MOD 30 MIN: CPT | Performed by: INTERNAL MEDICINE

## 2019-12-03 PROCEDURE — 4040F PNEUMOC VAC/ADMIN/RCVD: CPT | Performed by: INTERNAL MEDICINE

## 2019-12-03 PROCEDURE — G8598 ASA/ANTIPLAT THER USED: HCPCS | Performed by: INTERNAL MEDICINE

## 2019-12-03 PROCEDURE — G8427 DOCREV CUR MEDS BY ELIG CLIN: HCPCS | Performed by: INTERNAL MEDICINE

## 2019-12-03 PROCEDURE — 1036F TOBACCO NON-USER: CPT | Performed by: INTERNAL MEDICINE

## 2019-12-03 PROCEDURE — G8484 FLU IMMUNIZE NO ADMIN: HCPCS | Performed by: INTERNAL MEDICINE

## 2019-12-03 ASSESSMENT — ENCOUNTER SYMPTOMS
COUGH: 0
TROUBLE SWALLOWING: 0
SHORTNESS OF BREATH: 0
BLOOD IN STOOL: 0
COLOR CHANGE: 0
EYE DISCHARGE: 0
WHEEZING: 0
ABDOMINAL DISTENTION: 0
EYE PAIN: 0
DIARRHEA: 0

## 2020-06-01 RX ORDER — LOPERAMIDE HYDROCHLORIDE 2 MG/1
2 CAPSULE ORAL 4 TIMES DAILY PRN
Qty: 60 CAPSULE | Refills: 2 | Status: SHIPPED | OUTPATIENT
Start: 2020-06-01 | End: 2020-07-20

## 2020-06-01 RX ORDER — FUROSEMIDE 20 MG/1
20 TABLET ORAL DAILY
Qty: 90 TABLET | Refills: 3 | Status: SHIPPED | OUTPATIENT
Start: 2020-06-01 | End: 2021-03-29

## 2020-06-01 NOTE — TELEPHONE ENCOUNTER
Medication: furosemide   immodium  Last visit: 12/03/19  Next visit: 7/20/2020  Last refill: 09/18/19  Pharmacy: [facility]

## 2020-07-20 ENCOUNTER — OFFICE VISIT (OUTPATIENT)
Dept: INTERNAL MEDICINE CLINIC | Age: 75
End: 2020-07-20
Payer: MEDICARE

## 2020-07-20 VITALS
HEIGHT: 67 IN | HEART RATE: 81 BPM | OXYGEN SATURATION: 96 % | SYSTOLIC BLOOD PRESSURE: 168 MMHG | BODY MASS INDEX: 35 KG/M2 | WEIGHT: 223 LBS | DIASTOLIC BLOOD PRESSURE: 72 MMHG | TEMPERATURE: 98 F

## 2020-07-20 PROCEDURE — 3017F COLORECTAL CA SCREEN DOC REV: CPT | Performed by: INTERNAL MEDICINE

## 2020-07-20 PROCEDURE — 4040F PNEUMOC VAC/ADMIN/RCVD: CPT | Performed by: INTERNAL MEDICINE

## 2020-07-20 PROCEDURE — G0438 PPPS, INITIAL VISIT: HCPCS | Performed by: INTERNAL MEDICINE

## 2020-07-20 PROCEDURE — 1123F ACP DISCUSS/DSCN MKR DOCD: CPT | Performed by: INTERNAL MEDICINE

## 2020-07-20 RX ORDER — LOPERAMIDE HYDROCHLORIDE 2 MG/1
2 CAPSULE ORAL PRN
COMMUNITY
End: 2020-11-03 | Stop reason: SDUPTHER

## 2020-07-20 ASSESSMENT — PATIENT HEALTH QUESTIONNAIRE - PHQ9
SUM OF ALL RESPONSES TO PHQ QUESTIONS 1-9: 2
SUM OF ALL RESPONSES TO PHQ QUESTIONS 1-9: 2

## 2020-07-20 ASSESSMENT — LIFESTYLE VARIABLES: HOW OFTEN DO YOU HAVE A DRINK CONTAINING ALCOHOL: 0

## 2020-07-20 NOTE — PATIENT INSTRUCTIONS
Personalized Preventive Plan for Roddy Mcguire - 7/20/2020  Medicare offers a range of preventive health benefits. Some of the tests and screenings are paid in full while other may be subject to a deductible, co-insurance, and/or copay. Some of these benefits include a comprehensive review of your medical history including lifestyle, illnesses that may run in your family, and various assessments and screenings as appropriate. After reviewing your medical record and screening and assessments performed today your provider may have ordered immunizations, labs, imaging, and/or referrals for you. A list of these orders (if applicable) as well as your Preventive Care list are included within your After Visit Summary for your review. Other Preventive Recommendations:    · A preventive eye exam performed by an eye specialist is recommended every 1-2 years to screen for glaucoma; cataracts, macular degeneration, and other eye disorders. · A preventive dental visit is recommended every 6 months. · Try to get at least 150 minutes of exercise per week or 10,000 steps per day on a pedometer . · Order or download the FREE \"Exercise & Physical Activity: Your Everyday Guide\" from [employer]. Call 4-243.229.3902 or search [employer] online. · You need 2599-9559 mg of calcium and 2098-5927 IU of vitamin D per day. It is possible to meet your calcium requirement with diet alone, but a vitamin D supplement is usually necessary to meet this goal.  · When exposed to the sun, use a sunscreen that protects against both UVA and UVB radiation with an SPF of 30 or greater. Reapply every 2 to 3 hours or after sweating, drying off with a towel, or swimming. · Always wear a seat belt when traveling in a car. Always wear a helmet when riding a bicycle or motorcycle.

## 2020-07-20 NOTE — PROGRESS NOTES
Medicare Annual Wellness Visit  Name: Marimar Macias Date: 2020   MRN: [id number] Sex: Male   Age: 76 y.o. Ethnicity: Non-/Non    : 1945 Race: Rohit [facility] is here for Medicare AWV    Screenings for behavioral, psychosocial and functional/safety risks, and cognitive dysfunction are all negative except as indicated below. These results, as well as other patient data from the [address] form, are documented in Flowsheets linked to this Encounter. No Known Allergies      Prior to Visit Medications    Medication Sig Taking? Authorizing Provider   Cholecalciferol (VITAMIN D3) 125 MCG (5000 UT) TABS Take by mouth Yes Historical Provider, MD   loperamide (IMODIUM) 2 MG capsule Take 2 mg by mouth as needed for Diarrhea Yes Historical Provider, MD   furosemide (LASIX) 20 MG tablet Take 1 tablet by mouth daily Yes Nelli King MD   aspirin 81 MG EC tablet Take 1 tablet by mouth every morning Yes Jesica Lake MD         Past Medical History:   Diagnosis Date    Abdominal hernia     Atrophy of muscle of multiple sites 2016    CAD (coronary artery disease)     CKD (chronic kidney disease) stage 3, GFR 30-59 ml/min ([facility])     Colon polyp     [facility]     Crohn's colitis ([facility]     Decubitus ulcer of coccyx, stage 2 ([facility] [address] [facility] 2016    Diarrhea     Diverticulosis     Hemorrhoids     History of atrial fibrillation     Hypertension     Ileostomy in place [facility]) 2016    Internal hemorrhoids     NSVT (nonsustained ventricular tachycardia) ([facility] [address] [facility] 2016    Other and unspecified hyperlipidemia     HISTORY OF HIGH CHOLESTEROL    Past heart attack     Tobacco use disorder     Tubular adenoma        Past Surgical History:   Procedure Laterality Date    ABDOMEN SURGERY  2016    explor.  lap., ileostomy    ABDOMEN SURGERY  2017    colostomy/ileostomy takedown with wound vac application hernia peristomal repair    CARDIAC CATHETERIZATION  2010    NO STENTS    COLONOSCOPY  6/20/2005  4/2/2007  11/10/2009    COLONOSCOPY  4/19/16    extreme spasmatic colon; extensive diverticulosis; ventral herniation; flat polyp; large internal hemorrhoids    COLONOSCOPY  12/06/2016    through ileostomy    ILEOSTOMY OR JEJUNOSTOMY      POLYPECTOMY      DC REPAIR INCISIONAL HERNIA,REDUCIBLE N/A 2/19/2017    HERNIA PERISTOMAL REPAIR RIGHT performed by Jared Paulson MD at [address] N/A 2/19/2017    COLOSTOMY ILEOSTOMY TAKEDOWN WITH WOUND VAC APPLICATION performed by Jared Paulson MD at [address] [facility] History   Problem Relation Age of Onset    Cancer Father     Heart Disease Father     Diabetes Mother     Heart Disease Mother     High Blood Pressure Mother        CareTeam (Including outside providers/suppliers regularly involved in providing care):   Patient Care Team:  Danay Rizzo MD as PCP - General (Internal Medicine)  Danay Rizzo MD as PCP - [facility] Empaneled Provider  Jerry Garcia MD as Consulting Physician (Gastroenterology)    Wt Readings from Last 3 Encounters:   07/20/20 223 lb (101.2 kg)   12/03/19 197 lb (89.4 kg)   09/18/19 213 lb (96.6 kg)     Vitals:    07/20/20 1318   BP: (!) 168/72   Pulse: 81   Temp: 98 °F (36.7 °C)   SpO2: 96%   Weight: 223 lb (101.2 kg)   Height: 5' 7\" (1.702 m)     Body mass index is 34.93 kg/m². Based upon direct observation of the patient, evaluation of cognition reveals recent and remote memory intact.     Skin: warm and dry, no rash or erythema  Head: normocephalic and atraumatic  Eyes: pupils equal, round, and reactive to light, extraocular eye movements intact, conjunctivae normal  ENT: tympanic membrane, external ear and ear canal normal bilaterally, nose without deformity, nasal mucosa and turbinates normal without polyps  Neck: supple and non-tender without mass, no thyromegaly or thyroid nodules, no cervical lymphadenopathy  Pulmonary/Chest: clear to auscultation bilaterally- no wheezes, rales or rhonchi, normal air movement, no respiratory distress  Cardiovascular: normal rate, regular rhythm, normal S1 and S2, no murmurs, rubs, clicks, or gallops, distal pulses intact, no carotid bruits  Abdomen: soft, non-tender, non-distended, normal bowel sounds, no masses or organomegaly  Extremities: no cyanosis, clubbing or edema  Musculoskeletal: normal range of motion, no joint swelling, deformity or tenderness  Neurologic: reflexes normal and symmetric, no cranial nerve deficit, gait, coordination and speech normal    Patient's complete Health Risk Assessment and screening values have been reviewed and are found in Flowsheets. The following problems were reviewed today and where indicated follow up appointments were made and/or referrals ordered. Positive Risk Factor Screenings with Interventions:     Cognitive:  Clock Drawing Test (CDT) Score: Normal  Total Score Interpretation: Positive Mini-Cog  Cognitive Impairment Interventions:  · Labs ordered- bmp    General Health:  General  In general, how would you say your health is?: Fair  In the past 7 days, have you experienced any of the following? New or Increased Pain, New or Increased Fatigue, Loneliness, Social Isolation, Stress or Anger?: (!) Anger  Do you get the social and emotional support that you need?: Yes  Do you have a Living Will?: Yes  General Health Risk Interventions:  · refuisgn any more colon screening    Health Habits/Nutrition:  Health Habits/Nutrition  Do you exercise for at least 20 minutes 2-3 times per week?: Yes  Have you lost any weight without trying in the past 3 months?: No  Do you eat fewer than 2 meals per day?: No  Have you seen a dentist within the past year?: (!) No  Body mass index is 34.93 kg/m².   Health Habits/Nutrition Interventions:  · Inadequate physical activity:  patient agrees to exercise for at least 150 minutes/week    Hearing/Vision:  No exam data present  Hearing/Vision  Do you or your family notice any trouble with your hearing?: No  Do you have difficulty driving, watching TV, or doing any of your daily activities because of your eyesight?: No  Have you had an eye exam within the past year?: (!) No  Hearing/Vision Interventions:  · no issues    Personalized Preventive Plan   Current Health Maintenance Status  Immunization History   Administered Date(s) Administered    Influenza, High Dose (Fluzone 65 yrs and older) 10/15/2018    Pneumococcal Conjugate 13-valent ([facility] Stalling) 10/15/2018        Health Maintenance   Topic Date Due    Hepatitis C screen  1945    DTaP/Tdap/Td vaccine (1 - Tdap) 11/25/1964    Shingles Vaccine (1 of 2) 11/25/1995    Potassium monitoring  03/19/2019    Creatinine monitoring  03/19/2019    Annual Wellness Visit (AWV)  05/29/2019    Pneumococcal 65+ years Vaccine (2 of 2 - PPSV23) 10/15/2019    A1C test (Diabetic or Prediabetic)  11/26/2019    Colon cancer screen colonoscopy  12/06/2019    Flu vaccine (1) 09/01/2020    PSA counseling  09/18/2020    Lipid screen  11/26/2023    AAA screen  Completed    Hepatitis A vaccine  Aged Out    Hepatitis B vaccine  Aged Out    Hib vaccine  Aged Out    Meningococcal (ACWY) vaccine  Aged Out     Recommendations for for[MD] Due: see orders and patient instructions/AVS.  . Recommended screening schedule for the next 5-10 years is provided to the patient in written form: see Patient Elizabeth Bello was seen today for medicare awv. Diagnoses and all orders for this visit:    Stage 3 chronic kidney disease ([facility]  -     CBC Auto Differential; Future  -     Basic Metabolic Panel; Future  -     Hepatitis C Antibody; Future  -     Echocardiogram complete; Future    Refused pneumococcal vaccine    Prediabetes  -     Hemoglobin A1C; Future  -     Lipid Panel; Future  -     Echocardiogram complete;  Future    Abnormal electrocardiogram (ECG) (EKG)   - Echocardiogram complete;  Future        pt has multiple colon and colectomy for crohns  Refusing any further colon testing    chf   With ecema  Stasis dermatis  jessica do echo

## 2020-07-20 NOTE — PROGRESS NOTES
Visit Information    Have you changed or started any medications since your last visit including any over-the-counter medicines, vitamins, or herbal medicines? no   Are you having any side effects from any of your medications? -  no  Have you stopped taking any of your medications? Is so, why? -  no    Have you seen any other physician or provider since your last visit? No  Have you had any other diagnostic tests since your last visit? No  Have you been seen in the emergency room and/or had an admission to a hospital since we last saw you? No  Have you had your routine dental cleaning in the past 6 months? no    Have you activated your [employer] account? If not, what are your barriers?  Yes     Patient Care Team:  Tiffany Saunders MD as PCP - General (Internal Medicine)  Tiffany Saunders MD as PCP - [facility] Provider  Pankaj Ely MD as Consulting Physician (Gastroenterology)    Medical History Review  Past Medical, Family, and Social History reviewed and does not contribute to the patient presenting condition    Health Maintenance   Topic Date Due    Hepatitis C screen  1945    DTaP/Tdap/Td vaccine (1 - Tdap) 11/25/1964    Shingles Vaccine (1 of 2) 11/25/1995    Potassium monitoring  03/19/2019    Creatinine monitoring  03/19/2019    Annual Wellness Visit (AWV)  05/29/2019    Pneumococcal 65+ years Vaccine (2 of 2 - PPSV23) 10/15/2019    A1C test (Diabetic or Prediabetic)  11/26/2019    Colon cancer screen colonoscopy  12/06/2019    Flu vaccine (1) 09/01/2020    PSA counseling  09/18/2020    Lipid screen  11/26/2023    AAA screen  Completed    Hepatitis A vaccine  Aged Out    Hepatitis B vaccine  Aged Out    Hib vaccine  Aged Out    Meningococcal (ACWY) vaccine  Aged Out     Chief Complaint   Patient presents with   [employer] AWV

## 2020-07-21 ENCOUNTER — HOSPITAL ENCOUNTER (OUTPATIENT)
Age: 75
Setting detail: SPECIMEN
Discharge: HOME | End: 2020-07-21
Payer: MEDICARE

## 2020-07-21 LAB
ABSOLUTE EOS #: 0.26 K/UL (ref 0–0.44)
ABSOLUTE IMMATURE GRANULOCYTE: 0.04 K/UL (ref 0–0.3)
ABSOLUTE LYMPH #: 0.85 K/UL (ref 1.1–3.7)
ABSOLUTE MONO #: 0.53 K/UL (ref 0.1–1.2)
ANION GAP SERPL CALCULATED.3IONS-SCNC: 14 MMOL/L (ref 9–17)
BASOPHILS # BLD: 0 % (ref 0–2)
BASOPHILS ABSOLUTE: 0.03 K/UL (ref 0–0.2)
BUN BLDV-MCNC: 26 MG/DL (ref 8–23)
BUN/CREAT BLD: ABNORMAL (ref 9–20)
CALCIUM SERPL-MCNC: 8.6 MG/DL (ref 8.6–10.4)
CHLORIDE BLD-SCNC: 101 MMOL/L (ref 98–107)
CO2: 27 MMOL/L (ref 20–31)
CREAT SERPL-MCNC: 1.42 MG/DL (ref 0.7–1.2)
DIFFERENTIAL TYPE: ABNORMAL
EOSINOPHILS RELATIVE PERCENT: 3 % (ref 1–4)
ESTIMATED AVERAGE GLUCOSE: 123 MG/DL
GFR AFRICAN AMERICAN: 59 ML/MIN
GFR NON-AFRICAN AMERICAN: 49 ML/MIN
GFR SERPL CREATININE-BSD FRML MDRD: ABNORMAL ML/MIN/{1.73_M2}
GFR SERPL CREATININE-BSD FRML MDRD: ABNORMAL ML/MIN/{1.73_M2}
GLUCOSE BLD-MCNC: 77 MG/DL (ref 70–99)
HBA1C MFR BLD: 5.9 % (ref 4–6)
HCT VFR BLD CALC: 41.9 % (ref 40.7–50.3)
HEMOGLOBIN: 11.9 G/DL (ref 13–17)
HEPATITIS C ANTIBODY: NONREACTIVE
IMMATURE GRANULOCYTES: 1 %
LYMPHOCYTES # BLD: 11 % (ref 24–43)
MCH RBC QN AUTO: 26 PG (ref 25.2–33.5)
MCHC RBC AUTO-ENTMCNC: 28.4 G/DL (ref 28.4–34.8)
MCV RBC AUTO: 91.5 FL (ref 82.6–102.9)
MONOCYTES # BLD: 7 % (ref 3–12)
NRBC AUTOMATED: 0 PER 100 WBC
PDW BLD-RTO: 17.8 % (ref 11.8–14.4)
PLATELET # BLD: 264 K/UL (ref 138–453)
PLATELET ESTIMATE: ABNORMAL
PMV BLD AUTO: 10.4 FL (ref 8.1–13.5)
POTASSIUM SERPL-SCNC: 4.7 MMOL/L (ref 3.7–5.3)
RBC # BLD: 4.58 M/UL (ref 4.21–5.77)
RBC # BLD: ABNORMAL 10*6/UL
SEG NEUTROPHILS: 78 % (ref 36–65)
SEGMENTED NEUTROPHILS ABSOLUTE COUNT: 6.29 K/UL (ref 1.5–8.1)
SODIUM BLD-SCNC: 142 MMOL/L (ref 135–144)
WBC # BLD: 8 K/UL (ref 3.5–11.3)
WBC # BLD: ABNORMAL 10*3/UL

## 2020-07-31 ENCOUNTER — HOSPITAL ENCOUNTER (OUTPATIENT)
Dept: NON INVASIVE DIAGNOSTICS | Age: 75
Discharge: HOME | End: 2020-07-31
Payer: MEDICARE

## 2020-07-31 LAB
LV EF: 43 %
LVEF MODALITY: NORMAL

## 2020-07-31 PROCEDURE — 93306 TTE W/DOPPLER COMPLETE: CPT

## 2020-08-03 ENCOUNTER — OFFICE VISIT (OUTPATIENT)
Dept: INTERNAL MEDICINE CLINIC | Age: 75
End: 2020-08-03
Payer: MEDICARE

## 2020-08-03 VITALS
HEIGHT: 67 IN | WEIGHT: 226 LBS | HEART RATE: 88 BPM | TEMPERATURE: 98.6 F | RESPIRATION RATE: 16 BRPM | BODY MASS INDEX: 35.47 KG/M2 | SYSTOLIC BLOOD PRESSURE: 150 MMHG | DIASTOLIC BLOOD PRESSURE: 76 MMHG

## 2020-08-03 PROBLEM — I89.0 LYMPHEDEMA OF BOTH LOWER EXTREMITIES: Status: ACTIVE | Noted: 2020-08-03

## 2020-08-03 PROCEDURE — 99214 OFFICE O/P EST MOD 30 MIN: CPT | Performed by: INTERNAL MEDICINE

## 2020-08-03 PROCEDURE — 1036F TOBACCO NON-USER: CPT | Performed by: INTERNAL MEDICINE

## 2020-08-03 PROCEDURE — G8427 DOCREV CUR MEDS BY ELIG CLIN: HCPCS | Performed by: INTERNAL MEDICINE

## 2020-08-03 PROCEDURE — 4040F PNEUMOC VAC/ADMIN/RCVD: CPT | Performed by: INTERNAL MEDICINE

## 2020-08-03 PROCEDURE — 1123F ACP DISCUSS/DSCN MKR DOCD: CPT | Performed by: INTERNAL MEDICINE

## 2020-08-03 PROCEDURE — 3017F COLORECTAL CA SCREEN DOC REV: CPT | Performed by: INTERNAL MEDICINE

## 2020-08-03 PROCEDURE — G8417 CALC BMI ABV UP PARAM F/U: HCPCS | Performed by: INTERNAL MEDICINE

## 2020-08-03 ASSESSMENT — ENCOUNTER SYMPTOMS
WHEEZING: 0
COLOR CHANGE: 0
COUGH: 0
TROUBLE SWALLOWING: 0
BLOOD IN STOOL: 0
EYE DISCHARGE: 0
DIARRHEA: 0
EYE PAIN: 0
ABDOMINAL DISTENTION: 0
SHORTNESS OF BREATH: 0

## 2020-08-03 ASSESSMENT — PATIENT HEALTH QUESTIONNAIRE - PHQ9
SUM OF ALL RESPONSES TO PHQ9 QUESTIONS 1 & 2: 0
SUM OF ALL RESPONSES TO PHQ QUESTIONS 1-9: 0
1. LITTLE INTEREST OR PLEASURE IN DOING THINGS: 0
SUM OF ALL RESPONSES TO PHQ QUESTIONS 1-9: 0
2. FEELING DOWN, DEPRESSED OR HOPELESS: 0

## 2020-08-03 NOTE — PROGRESS NOTES
Visit Information    Have you changed or started any medications since your last visit including any over-the-counter medicines, vitamins, or herbal medicines? no   Are you having any side effects from any of your medications? -  no  Have you stopped taking any of your medications? Is so, why? -  no    Have you seen any other physician or provider since your last visit? No  Have you had any other diagnostic tests since your last visit? No  Have you been seen in the emergency room and/or had an admission to a hospital since we last saw you? No  Have you had your routine dental cleaning in the past 6 months? no    Have you activated your [employer] account? If not, what are your barriers? No:     Patient Care Team:  Iliana Arteaga MD as PCP - General (Internal Medicine)  Iliana Arteaga MD as PCP - [facility]  Yasir Paul MD as Consulting Physician (Gastroenterology)    Medical History Review  Past Medical, Family, and Social History reviewed and does contribute to the patient presenting condition    Health Maintenance   Topic Date Due    DTaP/Tdap/Td vaccine (1 - Tdap) 11/25/1964    Shingles Vaccine (1 of 2) 11/25/1995    Pneumococcal 65+ years Vaccine (2 of 2 - PPSV23) 10/15/2019    Colon cancer screen colonoscopy  12/06/2019    Flu vaccine (1) 09/01/2020    PSA counseling  09/18/2020    A1C test (Diabetic or Prediabetic)  07/21/2021    Annual Wellness Visit (AWV)  07/21/2021    Potassium monitoring  07/21/2021    Creatinine monitoring  07/21/2021    Lipid screen  11/26/2023    AAA screen  Completed    Hepatitis C screen  Completed    Hepatitis A vaccine  Aged Out    Hepatitis B vaccine  Aged Out    Hib vaccine  Aged Out    Meningococcal (ACWY) vaccine  Aged Out     Chief Complaint   Patient presents with    Chronic Kidney Disease     stage 3. last A1C was 5.9 on 7-.

## 2020-08-03 NOTE — PROGRESS NOTES
[address] [facility] [address]  [place] [address]  Dept: [phone]  Dept Fax: [phone]    Andrew Mendez is a 76 y.o. male who presents today for his medical conditions/complaintsas noted below. Andrew Mendez is c/o of   Chief Complaint   Patient presents with    Chronic Kidney Disease     stage 3. last A1C was 5.9 on 7-. HPI:     HTN  Onset more than 2 years ago  rosario mild to mod  Controlled with current po meds  Not associated with headaches or blurry vision  No chest pain  HLD  Onset more than 5 years ago  Severity is mild, not getting worse  Not associated with pancreatitis  Tolerating statin well no muscle pain    radha leg edema        Hemoglobin A1C (%)   Date Value   07/21/2020 5.9   11/26/2018 6.1 (H)   08/17/2017 5.5             ( goal A1Cis < 7)   Microalb/Crt.  Ratio (mcg/mg creat)   Date Value   02/28/2017 11     LDL Cholesterol (mg/dL)   Date Value   11/26/2018 81   04/18/2013 86   04/18/2012 74       (goal LDL is <100)   AST (U/L)   Date Value   03/19/2018 26     ALT (U/L)   Date Value   03/19/2018 25     BUN (mg/dL)   Date Value   07/21/2020 26 (H)     BP Readings from Last 3 Encounters:   08/03/20 (!) 150/76   07/20/20 (!) 168/72   12/03/19 138/80          (goal 120/80)    Past Medical History:   Diagnosis Date    Abdominal hernia     Atrophy of muscle of multiple sites 8/19/2016    CAD (coronary artery disease)     CKD (chronic kidney disease) stage 3, GFR 30-59 ml/min ([facility])     Colon polyp     Crohn disease ([address]     Crohn's colitis ([address]     Decubitus ulcer of coccyx, stage 2 ([address] 8/8/2016    Diarrhea     Diverticulosis     Hemorrhoids     History of atrial fibrillation     Hypertension     Ileostomy in place ([address] 8/18/2016    Internal hemorrhoids     NSVT (nonsustained ventricular tachycardia) ([address] 08/18/2016    Other and unspecified hyperlipidemia     HISTORY OF HIGH CHOLESTEROL    Past heart attack     Tobacco use disorder     Tubular adenoma       Past Surgical History:   Procedure Laterality Date    ABDOMEN SURGERY  2016    explor. lap., ileostomy    ABDOMEN SURGERY  2017    colostomy/ileostomy takedown with wound vac application hernia peristomal repair    CARDIAC CATHETERIZATION      NO STENTS    COLONOSCOPY  2005  2007  11/10/2009    COLONOSCOPY  16    extreme spasmatic colon; extensive diverticulosis; ventral herniation; flat polyp; large internal hemorrhoids    COLONOSCOPY  2016    through ileostomy    ILEOSTOMY OR JEJUNOSTOMY      POLYPECTOMY      GA REPAIR INCISIONAL HERNIA,REDUCIBLE N/A 2017    HERNIA PERISTOMAL REPAIR RIGHT performed by Aguila Stiles MD at [address] N/A 2017    COLOSTOMY ILEOSTOMY TAKEDOWN WITH WOUND VAC APPLICATION performed by Aguila Stiles MD at [address] History   Problem Relation Age of Onset    Cancer Father     Heart Disease Father     Diabetes Mother     Heart Disease Mother     High Blood Pressure Mother        Social History     Tobacco Use    Smoking status: Former Smoker     Packs/day: 0.50     Years: 25.00     Pack years: 12.50     Types: Cigarettes     Last attempt to quit: 2016     Years since quittin.0    Smokeless tobacco: Never Used   Substance Use Topics    Alcohol use: No     Alcohol/week: 0.0 standard drinks      Current Outpatient Medications   Medication Sig Dispense Refill    Cholecalciferol (VITAMIN D3) 125 MCG (5000 UT) TABS Take by mouth      loperamide (IMODIUM) 2 MG capsule Take 2 mg by mouth as needed for Diarrhea      furosemide (LASIX) 20 MG tablet Take 1 tablet by mouth daily 90 tablet 3    aspirin 81 MG EC tablet Take 1 tablet by mouth every morning 30 tablet 3     No current facility-administered medications for this visit.       No Known Allergies    Health Maintenance   Topic Date Due    DTaP/Tdap/Td vaccine (1 - Tdap) 1964    Shingles Vaccine (1 of 2) 11/25/1995    Pneumococcal 65+ years Vaccine (2 of 2 - PPSV23) 10/15/2019    Colon cancer screen colonoscopy  12/06/2019    Flu vaccine (1) 09/01/2020    PSA counseling  09/18/2020    A1C test (Diabetic or Prediabetic)  07/21/2021    Annual Wellness Visit (AWV)  07/21/2021    Potassium monitoring  07/21/2021    Creatinine monitoring  07/21/2021    Lipid screen  11/26/2023    AAA screen  Completed    Hepatitis C screen  Completed    Hepatitis A vaccine  Aged Out    Hepatitis B vaccine  Aged Out    Hib vaccine  Aged Out    Meningococcal (ACWY) vaccine  Aged Out       Subjective:     Review of Systems   Constitutional: Negative for appetite change, diaphoresis and fatigue. HENT: Negative for ear discharge and trouble swallowing. Eyes: Negative for pain and discharge. Respiratory: Negative for cough, shortness of breath and wheezing. Cardiovascular: Positive for leg swelling. Negative for chest pain and palpitations. Gastrointestinal: Negative for abdominal distention, blood in stool and diarrhea. Endocrine: Negative for polydipsia and polyphagia. Genitourinary: Negative for difficulty urinating and frequency. Musculoskeletal: Positive for arthralgias. Negative for gait problem, myalgias and neck pain. Skin: Negative for color change and rash. Allergic/Immunologic: Negative for environmental allergies and food allergies. Neurological: Negative for dizziness and headaches. Hematological: Negative for adenopathy. Does not bruise/bleed easily. Psychiatric/Behavioral: Negative for behavioral problems and sleep disturbance. Objective:     Physical Exam  Constitutional:       Appearance: He is well-developed. He is not diaphoretic. HENT:      Head: Normocephalic and atraumatic. Eyes:      General:         Right eye: No discharge. Left eye: No discharge. Extraocular Movements:      Right eye: Normal extraocular motion.       Left eye: Normal extraocular 88   Temp 98.6 °F (37 °C)   Resp 16   Ht 5' 7\" (1.702 m)   Wt 226 lb (102.5 kg)   BMI 35.40 kg/m²     Assessment:       Diagnosis Orders   1. Lymphedema of both lower extremities  Stephanie Bee MD, Vascular Surgery, [place]   2. Screening for malignant neoplasm of colon  Cologuard (For External Results Only)   3. Chronic systolic (congestive) heart failure ([facility]     4. Crohn's disease of small intestine with complication ([facility]     5. Peripheral arterial occlusive disease (HCC)     6. Stage 3 chronic kidney disease ([facility]               Plan:      No follow-ups on file. Orders Placed This Encounter   Procedures    Cologuard (For External Results Only)     This test is performed by an external laboratory and is used for result attachment only. It is required that this order requisition be faxed to: [employer] @ [phone]. See [email or web address] for further information. Standing Status:   Future     Standing Expiration Date:   9/3/2020   Stephanie Bee MD, Vascular Surgery, [place]     Referral Priority:   Routine     Referral Type:   Eval and Treat     Referral Reason:   Specialty Services Required     Referred to Provider:   Belle Murillo MD     Requested Specialty:   Vascular Surgery     Number of Visits Requested:   1     No orders of the defined types were placed in this encounter. leg elevation  Ace wrpas  Ref to vascualr     Patient given educational materials - see patient instructions. Discussed use, benefit, and side effects of prescribed medications. All patientquestions answered. Pt voiced understanding. Reviewed health maintenance. Instructedto continue current medications, diet and exercise. Patient agreed with treatmentplan. Follow up as directed.      Electronically signed by Kishan Kirkland MD on 8/3/2020 at 2:44 PM

## 2020-09-01 ENCOUNTER — HOSPITAL ENCOUNTER (OUTPATIENT)
Dept: OCCUPATIONAL THERAPY | Age: 75
Setting detail: THERAPIES SERIES
Discharge: HOME | End: 2020-09-01
Payer: MEDICARE

## 2020-09-01 PROCEDURE — 97535 SELF CARE MNGMENT TRAINING: CPT

## 2020-09-01 PROCEDURE — 97166 OT EVAL MOD COMPLEX 45 MIN: CPT

## 2020-09-01 NOTE — CONSULTS
[facility]  Lymphedema Services  Initial Evaluation      Date:  2020  Patient: Griselda Franklin  : 1945             MRN: [id number]  Referring Physician: Ravindra Subramanian MD  Phone Number: [phone]  Fax Number: [phone]  Insurance: MEDICARE   Medical Diagnosis: Lymphedema of both lower extremities     Rehab Codes: I89.0  Onset Date:     Visit# / total visits: ; Progress note for Medicare patient due at visit 10      Patient is a 76year old [x]male []female referred to the Lymphedema Clinic with a diagnosis of B LE Lymphedema. PHYSICIAN NOTES from Dr. Gary Wills on 2018: This patient is followed in our office for lymphedema of the legs. Since last seen there has been significant improvement with the swelling after patient started elevating his legs and wrapping them and using the lymphopress boots. He denies any pain in the legs. OT INTAKE: Pt arrives to today's treatment with no compression in place. Pt states that he wraps his legs daily with ACE bandages that he uses daily. Pt states that he also uses his vasopneuatic pump several times a week to assist with keeping the legs down in size. Pt states that after her wraps his legs they are drastically down in size. Pt state that he is not certain that the pump is helping. Pt is uncertain what pressure the pump is set, however he puts its on the highest setting. Pt states that his pump is set up to run in 15 min increments only, so it will shut off and then he turns back on for up to 1 hour. Prior to leaving pt is also set up with compression tubigrips for the ( B LE, size E  ) (calf and foot)  as well as provided with education for proper use. Pt is encouraged to monitor the extremity daily to ensure the device does not fold over. Pt is also instructed to take tubigrip off nightly and inspect the extremity for any wounds/ reduction in size.  Pt states she has good understanding and has home []  One story   [x] Two story   [] Split level   Number of stairs to enter 4   With handrail on the [x]  Right to enter   [] Left to enter   Bathroom has a []  Tub only  [] Tub/shower combo   [x] Walk in shower    []  Grab bars   Washing machine is on []  Main level   [] Second level   [x] Basement   Employer    Job Status []  Normal duty   [] Light duty   [] Off due to condition    [x]  Retired   [] Not employed   [] Disability  [] Other:  []  Return to work:    Work activities/duties      ADL/IADL Previous level of function Current level of function Who currently assists the patient with task   Bathing  [x] Independent  [] Assist [x] Independent  [] Assist    Dress/grooming [x] Independent  [] Assist [x] Independent  [] Assist    Transfer/mobility [x] Independent  [] Assist [x] Independent  [] Assist    Feeding [x] Independent  [] Assist [x] Independent  [] Assist    Toileting [x] Independent  [] Assist [x] Independent  [] Assist    Driving [x] Independent  [] Assist [x] Independent  [] Assist    Housekeeping [x] Independent  [] Assist [x] Independent  [] Assist    Grocery shop/meal prep [x] Independent  [] Assist [x] Independent  [] Assist      Gait Prior level of function Current level of function    [x] Independent  [] Assist [x] Independent  [] Assist   Device: [] Independent [] Independent    [] Straight Cane [] Quad cane [] Straight Cane [] Quad cane    [] Standard walker [] Rolling walker   [] 4 wheeled walker [] Standard walker [] Rolling walker   [] 4 wheeled walker    [] Wheelchair [] Wheelchair     Restrictions/Precautions:   [x] None              [] No blood pressure/blood draw in: [] LUE [] RUE     Fall Risk   [x] No    [] Yes        If yes, intervention:     Self MLD suspended d/t:  [] Acute cellulitis  [] recent thrombosis [] inflammation w/ infection [] untreated CHF []Other:    Conservative Treatments Utilized in the Past:  [] None     [x] Compression Garments      [] Bandaging [] Elevation [] Exercise    []Self MLD       [x] Vasopneumatic Pump            Current Lymphedmea Treatments:   [] None     [x] Compression Garments    [] Bandaging [] Elevation  [] Exercise    []Self MLD       [x] Vasopneumatic Pump               Lymphedema Contraindication Assessment:  [] None      [] CHF primary cause of swelling- Monitor weight loss - (no more than 5 pounds in 1 day)    [] Hyperthyroidism-No MLD to neck   []DVT-acute, No MLD to limb       [] Pelvic DVT-MLD lifetime contraindication    [] Kidney Dysfunction     [] Prenancy   [] Advanced Renal Dz-No compression bandaging  [x] Age 60+-No MLD to neck         [] GFF-No abdominal MLD     [] Limb paralysis-Precaution with bandaging d/t no sensory feedback  [] Cardiac edema/Heart disease-absolute contraindication-No pneumo-massage or bandaging  [] Inflammatory intestine (Chrohns- had surgery and intenstines removed, diverticulitis, ulcerative colitis) -No abdominal MLD  [] Cellulitis (warm, tender/pain, swelling, rash, fever, less defined erythematous borders) - No MLD until 72 hours of antibiotics   [] Erysipelas (fiery/red, swollen/shiny, raised defined borders).  Fever, chills, shivering - No MLD until 72 hours of antibiotics        History of Cancer:  []Yes [x]No    Skin Integrity/Appearance: location   [] Normal                          [x] Dry              [] Warmth/Red/Itching/ Rash  [] Recent cellulitis     [] Papillomatosis                    [] Brawny/hardened/ Fibrosis     [] Jessamine hump-dorsum         [] Fatty lobules                                                  [] Loose, lobular                     []  Soft, doughy       [] Hyperplasia                        []Moist/weeping/blisters with lymphorrhea     [] Hyperkeratosis                    [] Congestive Dermatitis     [] Hyperpigmentation/hemosiderin staining/gaitor distribution                                                               Symptom Onset:   [x] Slow   [] Rapid     [] Acute  Swelling Location:   [x] Truncal/abdominal swelling   [] Genital swelling  [] Fungal rash in skin folds   Stemmer sign:       [] Absent         [x] Positive    Wounds Location:   [] None  [] Being addressed by Reva Carpenter  [] Erythema ( Superficial Reddening)     [] Excoriation (Linear erosion marks ex:cat scratch)      [x] Exudate (fluids expelled)    [x] Purulent (Pus or discharge present in drainage)  [] Maceration (soften/breaking down of skin due to prolonged exposure to moisture)  [] Denuded (loss of epidermis due to moisture or friction)    [] Shallow, painful venous                    [] Radiation burns/ulcers              [] Eschar (hard crust/scab)                Color of affected area:  [] Normal [] Mottled [] Flushed/erythema   [] Other    Pitting Edema:  [x] R LE  [x]  L LE   [] R UE    [] L UE  [] 1+ Edema [x] 2+ Edema  [] 3+ Edema [] 4+ Edema    Aggravating factors:  [] Activity  [] Stress  [x] Sleep Position- pt sleeps in a chair without legs elevated  [] Travel [] Hot Temperatures [] Diet   []  Other   Relieving factors:       [] Activity  [x] Compression  [] Rest  [] Cold Temperatures [] Diet   []  Other   Response to elevation: [] Persists   [] Reduces   [x] Unaffected    Education Included:  [x] General Lymphedema Information  [] Self MLD [] Self Bandaging [] Kinesiotaping    [] Nutrition   [] Home Exercises   [] Skin/nail care   [] NLN Risk Reduction Practice handout    Learner: [x] Patient [] Spouse  []Other [] Family  Method: [x] Verbal [] Demonstration [] Handouts [] Exercise booklet  Response:  [x] Verbalized understanding [] Demonstrated understanding      [] Needs assist            [] No understanding    Physical Status:  Range of motion: Deficits [x] Yes [] No       Comment: Some difficulty noted, however does not let it stop him. Strength:       Deficits [x] Yes [] No        Comment: Some weakness noted in the B LE.    Sensation:       Deficits [] Yes [x] No        Comment:  Transfer: Deficits [] Yes [x] No        Comment:  Ambulation:       Deficits [x] Yes [] No        Comment: Difficulty walking longer distances d/t easily fatigues and the legs starting to burn. Functional Status:  Self Care:   [x] Independent [] Needs Assist [] Dependent   Home Maintenance:    [x] Independent [] Needs Assist [] Dependent     Measurements Lower Extremity  Measurements taken from nail base of D2 digit.   They are circumferential.   Measurements in Centimeters Right Left   MTS          Dorsum      10 29.2 33.0   Inframalleolar  16       40.0 41.0   Supramalleolar    20  29.7 33.7   10   27 34.0 39.5   20   37 .37.5 47.0   30  47 38.5 42.0   Knee   52 38.5 40.0   10 cm above knee     Thigh-widest     Hip-widest     Total 247.4 276.2     Assessment  Knowledge of home program:  [x] Good [] Fair  [] Poor  Family can assist with programming: [x] Yes  [] No  Instructions for patient:   [x] Verbal [x] Demonstration [x] Written    [] Education reviewed   [] Risk Reduction Practices I-VI handout issued    Compression Garment recommendation for medical management:    Possible Lower Extremity Garments Recommendations:   [x] Compression Stockings ( Knee High) of patient choice  [] Circ-aid Juxtafit Essentials-leggings and compression anklets, 6 month warranty  [x] Erwina Shy Wrap B LE - foot and leg pieces   [x] CompreFlex Lite with sock lines ( foot/ ankle compression)   [x] CompreBoot  [x] Compression Marilynn Pants  [] [facility] [employer]   [] Custom made Flat Knit Marilynn Pants      Regular:   [x] 20/30 mmHg-UEs  [] 30/40 mmHg-Stage II -starting point for LEs, no skin changes [] 40/50 mmHg-Stage III, skin changes  [] 50/60 mmHg    Custom:   [] 18-21mmHg         [x]23-32mmhg            Lymphedema Stage: Per ISL Guidelines  [] 0 - Subclinical with no evidence on physical exam  [] 1 - Early onset, swelling/heaviness, pitting edema, subsides with limb elevation  [] 2 - More advanced, fibrosis resulting in non-pitting edema, does not respond to elevation, thickening of the tissues  [x] 3 - Elephantiasis, pitting absent, huge limbs with dry/scaly, papillomatosis, hyperkeratosis, fluid may be leaking with recurrent cellulitis. Acanthosis (deep body folds). Elevation &   diuretics ineffective. Patient would benefit from skilled occupational therapy services in order to: To reduce the size of the leg and improve overall quality of life. Rehabilitation Potential/ Commitment: [x] Good [] Fair     [] Poor  Post-treatment symptom assessment for swelling, heaviness, tightness to the affected limb, chest or truncal area: Same as on arrival as this is patients evaluation. Patient's Goal: To reduce the size of the extremity. Therapy Goals:                 LOWER EXTREMITY GOALS:     1. Pt will demonstrate compliance of maintaining lymphedema precautions to reduce the risks of infection and further exacerbations by the 3rd visit. 2. (Pt/Family) will demonstrate proper technique with bandaging and understand the principles and theory behind bandaging technique in order to assist with decreasing swelling up to ( 5+ ) cm.     3. Pt will demonstrate independence with decongestive exercise program in order to expedite fluid rerouting. 4. Pt will demonstrate competence with (traditional) lymphatic drainage massage in order to reroute lymphatic pathways for decreased swelling. 5. Pt will demonstrate competence with kinesiotape technique to affected area(s) to assist with decreasing swelling. 6. Pt will demonstrate independence donning the device with safe and effective use of pneumatic pump in order for possible home use. 7. Pt will demonstrate safe and effective use of compression garment to maintain decreased size upon discharge. 8. Pt will demonstrate safe and effective use of alternative bandaging/ velcro devices to simplify and reduce size upon discharge.     Plan:   Pt will be seen 1-3 times per week for 10 visits and reduce down as appropriate within 90 days, with focus on short and long term goals listed above. Continue to address:  []Bandaging  [] Self Bandaging/Family Assist  [x]MLD  [x]Self Massage  [x]Exercise  [x]Education  [x]Obtain referral for garments  []Medical Hold  []Discharge to Home Program    Functional Assessment Used: Lymphedema Life Impact Scale (LLIS)  Functional Impact Score: [x] Eval ____ [] 10th visit____  [] 20th visit ____ [] 30th visit____  [] D/C ____      Physican signature is required for :  [x] Medicare requirement and/or POC- faxed to physician   [x] Eval  [] 10th visit  [] 20th visit  [] 30th visit   [] D/C     Clearance needed:  []Yes    [x]No       [] Obtained  Physician/s giving Clearance:    Charges   Minutes   Units   Evaluation (80146)            Low            Moderate 45           High     Manual Therapy ([id number]): pre,intra or post hands-on MLD service to modulate pain, increase ROM, reduce swelling, inflammation, or restriction; improve contractile/noncontractile tissue extensibility     Therapeutic activities (46836): dynamic activities to improve functional performance with or w/o bandages with direct pt contact using multiple perameters e.g. balance, strength, ROM & multiple outcomes     Therapeutic procedure (42292)-exercise for strength,endurance, ROM, flexibility (active, active-assist or passive)     Self care/home mgmt (54709)-pt educ re: self MLD for home program, self bandaging, do's/don'ts, activity guidelines 30    Vasopneumatic Pump:      Total Treatment Time          Today's total: $  112.54  Last total: $  Total: $    Time In:  1:00 PM  Time Out: 2:30 PM      Electronically signed by Brunilda Ponce OT on 9/1/2020 at 8:30 AM      Physician Signature: _________________________        Date: _______________  By signing above or cosigning this note, I have reviewed this plan of care and certify a need to continue medically necessary rehabilitation services.       *PLEASE SIGN ABOVE AND FAX BACK .[phone] WITH ALL PAGES FOR CONSENT TO CONTINUE LYMPHEDEMA TREATMENT*

## 2020-09-02 ENCOUNTER — TELEPHONE (OUTPATIENT)
Dept: INTERNAL MEDICINE CLINIC | Age: 75
End: 2020-09-02

## 2020-09-02 NOTE — TELEPHONE ENCOUNTER
PT called & stated he was seen for the first time yesterday at the [address] [place] [address] therapist recommended pt call office & request Dr. Jorge Luis Alvarez to rx atbx to pts pharmacy for bilateral leg swelling & redness. Writer tried to get pt to schedule an appt  to come in and be seen but pt refused & stated he was just here on 8/3/20.     No Known Allergies

## 2020-09-02 NOTE — TELEPHONE ENCOUNTER
Per Dr. Kathreen Homans, labs ordered. Writer lm on pts phone per Dr. Kathreen Homans,   Get labs done and schedule a VV next Thursday 9/10/20 w/ Dr. Kathreen Homans. Pt is also to check for body fever. Dr. Kathreen Homans is not prescribing atbx's at this time.

## 2020-09-02 NOTE — TELEPHONE ENCOUNTER
Order cbc and crp  And make a virual vist next Thursday  No abx at thist time  Check for fever body temp

## 2020-09-03 ENCOUNTER — HOSPITAL ENCOUNTER (OUTPATIENT)
Age: 75
Setting detail: SPECIMEN
Discharge: HOME | End: 2020-09-03
Payer: MEDICARE

## 2020-09-03 LAB
ABSOLUTE EOS #: 0.37 K/UL (ref 0–0.44)
ABSOLUTE IMMATURE GRANULOCYTE: 0.03 K/UL (ref 0–0.3)
ABSOLUTE LYMPH #: 1.11 K/UL (ref 1.1–3.7)
ABSOLUTE MONO #: 0.4 K/UL (ref 0.1–1.2)
BASOPHILS # BLD: 1 % (ref 0–2)
BASOPHILS ABSOLUTE: 0.04 K/UL (ref 0–0.2)
C-REACTIVE PROTEIN: 36.3 MG/L (ref 0–5)
DIFFERENTIAL TYPE: ABNORMAL
EOSINOPHILS RELATIVE PERCENT: 5 % (ref 1–4)
HCT VFR BLD CALC: 39.4 % (ref 40.7–50.3)
HEMOGLOBIN: 11.3 G/DL (ref 13–17)
IMMATURE GRANULOCYTES: 0 %
LYMPHOCYTES # BLD: 16 % (ref 24–43)
MCH RBC QN AUTO: 26.3 PG (ref 25.2–33.5)
MCHC RBC AUTO-ENTMCNC: 28.7 G/DL (ref 28.4–34.8)
MCV RBC AUTO: 91.8 FL (ref 82.6–102.9)
MONOCYTES # BLD: 6 % (ref 3–12)
NRBC AUTOMATED: 0 PER 100 WBC
PDW BLD-RTO: 18.2 % (ref 11.8–14.4)
PLATELET # BLD: 239 K/UL (ref 138–453)
PLATELET ESTIMATE: ABNORMAL
PMV BLD AUTO: 9.5 FL (ref 8.1–13.5)
RBC # BLD: 4.29 M/UL (ref 4.21–5.77)
RBC # BLD: ABNORMAL 10*6/UL
SEG NEUTROPHILS: 72 % (ref 36–65)
SEGMENTED NEUTROPHILS ABSOLUTE COUNT: 5.18 K/UL (ref 1.5–8.1)
WBC # BLD: 7.1 K/UL (ref 3.5–11.3)
WBC # BLD: ABNORMAL 10*3/UL

## 2020-09-04 NOTE — TELEPHONE ENCOUNTER
Pt ty'd appt w/ Susy for next Thurs 9/10/20. Labs completed that were requested by Dr. Loretta Martin.

## 2020-09-10 ENCOUNTER — OFFICE VISIT (OUTPATIENT)
Dept: INTERNAL MEDICINE CLINIC | Age: 75
End: 2020-09-10
Payer: MEDICARE

## 2020-09-10 VITALS
WEIGHT: 222 LBS | HEIGHT: 67 IN | TEMPERATURE: 97 F | OXYGEN SATURATION: 96 % | HEART RATE: 67 BPM | SYSTOLIC BLOOD PRESSURE: 138 MMHG | BODY MASS INDEX: 34.84 KG/M2 | DIASTOLIC BLOOD PRESSURE: 86 MMHG

## 2020-09-10 PROBLEM — E66.01 MORBIDLY OBESE (HCC): Status: ACTIVE | Noted: 2020-09-10

## 2020-09-10 PROCEDURE — 1123F ACP DISCUSS/DSCN MKR DOCD: CPT | Performed by: INTERNAL MEDICINE

## 2020-09-10 PROCEDURE — G8417 CALC BMI ABV UP PARAM F/U: HCPCS | Performed by: INTERNAL MEDICINE

## 2020-09-10 PROCEDURE — 1036F TOBACCO NON-USER: CPT | Performed by: INTERNAL MEDICINE

## 2020-09-10 PROCEDURE — 4040F PNEUMOC VAC/ADMIN/RCVD: CPT | Performed by: INTERNAL MEDICINE

## 2020-09-10 PROCEDURE — 3017F COLORECTAL CA SCREEN DOC REV: CPT | Performed by: INTERNAL MEDICINE

## 2020-09-10 PROCEDURE — G8427 DOCREV CUR MEDS BY ELIG CLIN: HCPCS | Performed by: INTERNAL MEDICINE

## 2020-09-10 PROCEDURE — 99214 OFFICE O/P EST MOD 30 MIN: CPT | Performed by: INTERNAL MEDICINE

## 2020-09-10 RX ORDER — CEFUROXIME AXETIL 250 MG/1
250 TABLET ORAL 2 TIMES DAILY
Qty: 20 TABLET | Refills: 0 | Status: SHIPPED | OUTPATIENT
Start: 2020-09-10 | End: 2020-09-20

## 2020-09-10 RX ORDER — DIAPER,BRIEF,INFANT-TODD,DISP
EACH MISCELLANEOUS
Qty: 1 TUBE | Refills: 1 | Status: SHIPPED | OUTPATIENT
Start: 2020-09-10 | End: 2020-09-17

## 2020-09-10 ASSESSMENT — ENCOUNTER SYMPTOMS
COLOR CHANGE: 0
WHEEZING: 0
EYE DISCHARGE: 0
EYE PAIN: 0
ABDOMINAL DISTENTION: 0
SHORTNESS OF BREATH: 0
TROUBLE SWALLOWING: 0
COUGH: 0
BLOOD IN STOOL: 0
DIARRHEA: 0

## 2020-09-10 NOTE — PROGRESS NOTES
Visit Information    Have you changed or started any medications since your last visit including any over-the-counter medicines, vitamins, or herbal medicines? no   Are you having any side effects from any of your medications? -  no  Have you stopped taking any of your medications? Is so, why? -  no    Have you seen any other physician or provider since your last visit? Yes - Records Obtained  Have you had any other diagnostic tests since your last visit? Yes - Records Obtained  Have you been seen in the emergency room and/or had an admission to a hospital since we last saw you? No  Have you had your routine dental cleaning in the past 6 months? no    Have you activated your [employer] account? If not, what are your barriers?  No:      Patient Care Team:  Sarah Grijalva MD as PCP - General (Internal Medicine)  Sarah Grijalva MD as PCP - [facility]  Elizabeth Valderrama MD as Consulting Physician (Gastroenterology)    Medical History Review  Past Medical, Family, and Social History reviewed and does not contribute to the patient presenting condition    Health Maintenance   Topic Date Due    DTaP/Tdap/Td vaccine (1 - Tdap) 11/25/1964    Shingles Vaccine (1 of 2) 11/25/1995    Pneumococcal 65+ years Vaccine (2 of 2 - PPSV23) 10/15/2019    Colon cancer screen colonoscopy  12/06/2019    Flu vaccine (1) 09/01/2020    PSA counseling  09/18/2020    A1C test (Diabetic or Prediabetic)  07/21/2021    Annual Wellness Visit (AWV)  07/21/2021    Potassium monitoring  07/21/2021    Creatinine monitoring  07/21/2021    Lipid screen  11/26/2023    AAA screen  Completed    Hepatitis C screen  Completed    Hepatitis A vaccine  Aged Out    Hepatitis B vaccine  Aged Out    Hib vaccine  Aged Out    Meningococcal (ACWY) vaccine  Aged Out

## 2020-09-10 NOTE — PROGRESS NOTES
[address]  [place] [address]  Dept: [phone]  Dept Fax: [phone]    [place] Don is a 76 y.o. male who presents today for his medical conditions/complaintsas noted below. [place] is c/o of   Chief Complaint   Patient presents with    Follow-up         HPI:     HTN  Onset more than 2 years ago  rosario mild to mod  Controlled with current po meds  Not associated with headaches or blurry vision  No chest pain  edemqa legs  Same  celllutlits        Hemoglobin A1C (%)   Date Value   07/21/2020 5.9   11/26/2018 6.1 (H)   08/17/2017 5.5             ( goal A1Cis < 7)   Microalb/Crt. Ratio (mcg/mg creat)   Date Value   02/28/2017 11     LDL Cholesterol (mg/dL)   Date Value   11/26/2018 81   04/18/2013 86   04/18/2012 74       (goal LDL is <100)   AST (U/L)   Date Value   03/19/2018 26     ALT (U/L)   Date Value   03/19/2018 25     BUN (mg/dL)   Date Value   07/21/2020 26 (H)     BP Readings from Last 3 Encounters:   09/10/20 138/86   08/03/20 (!) 150/76   07/20/20 (!) 168/72          (goal 120/80)    Past Medical History:   Diagnosis Date    Abdominal hernia     Atrophy of muscle of multiple sites 8/19/2016    CAD (coronary artery disease)     CKD (chronic kidney disease) stage 3, GFR 30-59 ml/min (HCC)     Colon polyp     Crohn disease ([address]     Crohn's colitis ([address]     Decubitus ulcer of coccyx, stage 2 ([address] 8/8/2016    Diarrhea     Diverticulosis     Hemorrhoids     History of atrial fibrillation     Hypertension     Ileostomy in place [facility]) 8/18/2016    Internal hemorrhoids     NSVT (nonsustained ventricular tachycardia) ([facility] 08/18/2016    Other and unspecified hyperlipidemia     HISTORY OF HIGH CHOLESTEROL    Past heart attack     Tobacco use disorder     Tubular adenoma       Past Surgical History:   Procedure Laterality Date    ABDOMEN SURGERY  08/02/2016    explor.  lap., ileostomy    ABDOMEN SURGERY  02/19/2017 Conjunctiva/sclera: Conjunctivae normal.      Right eye: Right conjunctiva is not injected. Left eye: Left conjunctiva is not injected. Neck:      Musculoskeletal: Normal range of motion and neck supple. No edema or erythema. Thyroid: No thyroid mass or thyromegaly. Vascular: No JVD. Cardiovascular:      Rate and Rhythm: Normal rate and regular rhythm. Heart sounds: No murmur. No friction rub. Pulmonary:      Effort: Pulmonary effort is normal. No tachypnea, bradypnea, accessory muscle usage or respiratory distress. Breath sounds: Normal breath sounds. No wheezing or rales. Abdominal:      General: Bowel sounds are normal. There is no distension. Palpations: Abdomen is soft. Tenderness: There is no abdominal tenderness. There is no rebound. Musculoskeletal: Normal range of motion. General: No tenderness. Right lower leg: Edema present. Left lower leg: Edema present. Comments: seever stasis dermatiis both legs  With diffuse scales     Lymphadenopathy:      Head:      Right side of head: No submental or submandibular adenopathy. Left side of head: No submental or submandibular adenopathy. Cervical: No cervical adenopathy. Skin:     General: Skin is warm. Coloration: Skin is not pale. Findings: No bruising, ecchymosis or rash. Neurological:      Mental Status: He is alert and oriented to person, place, and time. Cranial Nerves: No cranial nerve deficit. Sensory: No sensory deficit. Motor: No atrophy or abnormal muscle tone. Coordination: Coordination normal.   Psychiatric:         Mood and Affect: Mood is not anxious. Affect is not angry. Speech: Speech is not slurred. Behavior: Behavior normal. Behavior is not aggressive. Thought Content: Thought content does not include homicidal ideation. Cognition and Memory: Memory is not impaired.        /86   Pulse 67   Temp 97 °F (36.1 °C)   Ht 5' 7\" (1.702 m)   Wt 222 lb (100.7 kg)   SpO2 96%   BMI 34.77 kg/m²     Assessment:       Diagnosis Orders   1. Essential hypertension     2. Morbidly obese (HCC)     3. Stage 3 chronic kidney disease ([facility]     4. Cro[facility]'s disease of small intestine with complication ([facility]     5. Chronic systolic (congestive) heart failure (HCC)     6. Lymphedema of both lower extremities     7. Cellulitis of lower extremity, unspecified laterality     8. Venous stasis dermatitis of both lower extremities               Plan:      No follow-ups on file. No orders of the defined types were placed in this encounter. Orders Placed This Encounter   Medications    cefUROXime (CEFTIN) 250 MG tablet     Sig: Take 1 tablet by mouth 2 times daily for 10 days     Dispense:  20 tablet     Refill:  0    hydrocortisone 1 % cream     Sig: Apply topically 2 times daily. Dispense:  1 Tube     Refill:  1       Patient given educational materials - see patient instructions. Discussed use, benefit, and side effects of prescribed medications. All patientquestions answered. Pt voiced understanding. Reviewed health maintenance. Instructedto continue current medications, diet and exercise. Patient agreed with treatmentplan. Follow up as directed.      Electronically signed by Mauro Christine MD on 9/10/2020 at 11:33 AM

## 2020-10-06 ENCOUNTER — HOSPITAL ENCOUNTER (OUTPATIENT)
Dept: OCCUPATIONAL THERAPY | Age: 75
Setting detail: THERAPIES SERIES
Discharge: HOME | End: 2020-10-06
Payer: MEDICARE

## 2020-10-06 PROCEDURE — 97140 MANUAL THERAPY 1/> REGIONS: CPT

## 2020-10-06 PROCEDURE — 97110 THERAPEUTIC EXERCISES: CPT

## 2020-10-06 PROCEDURE — 97535 SELF CARE MNGMENT TRAINING: CPT

## 2020-10-06 NOTE — FLOWSHEET NOTE
[facility]  Lymphedema Services  Treatment Note      Date:  10/6/2020  Patient: Buck Negron  : 1945             MRN: [id number]  Referring Physician: Carolin Reaves MD  Phone Number: [phone]  Fax Number: [phone]  Insurance: MEDICARE   Medical Diagnosis: Lymphedema of both lower extremities     Rehab Codes: I89.0  Onset Date:     Visit# / total visits: ; Progress note for Medicare patient due at visit 10      SUBJECTIVE: 10/6/2020: Pt arrives to today's treatment with compression tubigrips in place. Pt states he is not certain that they are helping. Pt states that he is wearing these , but will remove them to wash them. When he does this he will wrap his legs with ACE bandages and notice an even better reduction in size. Pt states that he is also using his pump daily, however there are some days that he does miss. Pt states he does not know a difference when he uses the compression, however, when he uses the pump the legs are smaller in size. Pt states that he got the vasopneuamtic pump online off the Internet for $100.00 that are in 3 separate sections, pt states he does not use the thigh portion. Pt states that he also ordered Velcro devices in a magazine as well, which he does not use. Pt states that he has not used this in quiet some time. Pt is also educated on NLN/ risk reduction techniques of which the pt verbalizes good understanding. Examples provided were the following:   · Keep the affected extremity clean/dry and no picking of loose skin. · Apply moisturizer daily to prevent chapping of skin. · Use care with razors to avoid nicks and skin irritation. · If scratches/punctures to skin occur, wash and apply a neosporin like product and observe for signs of infection.    · If rash, itching, redness, pain, increased skin temperature, increased swelling, fever or flu like symptoms occur, contact PCP immediately and doff bandages that are in place. · Avoid exposure to extreme cold which can be associated with rebound swelling or chapping of skin. · Avoid prolonged exposure to heat, particular hot tubs and saunas. · Proper sitting positions  · Protect exposed skin with sunscreen and insect repellant. Pt is educated on MLD/deep breathing techniques, clearing nodes at the deep abdominals, cervical, bilateral underarms, bilateral groin and superficial abdominals. Pt compelted 5-10 reps with  ( min ) vc's required for correct techniques. Pt states she has no other questions as this time. OT bandages pt from the lake of the foot to the base of the knee with approx 50% pull to encourage lymphatic flow production and maintaining a reduced size. OT applies Eucerine lotion to the L LE only to include the foot and leg. Stockinette is applied over top of the leg as a protective barrier from the lotion. Artiflex fluff is applied to the L foot as the protective barrier from the foot and Rosidal foam is place on the leg from the ankle to just below the knee. Comprilan bandage is then placed from the lake of the foot to the base of the knee with approx 50% pull. Herringbone technique is used to progress up the leg and assist with venous return, and lymphatic flow production/ stimulating a suctioning effect. Pain:  [] YES    [x] NO  Location:  B FEET - pt states he will still get a burning sensation in his feet at night time.    Pain Rating: ( 0-10 scale) : 0  Pain Description:  [x] Achy,Worse at end of day  [] Distention, Discomfort  [] Painful with palpation  [] Guarding, Tingling, Numbness      [] Hypersensitivity       Restrictions/Precautions:   [x] None              [] No blood pressure/blood draw in: [] LUE [] RUE     Fall Risk   [x] No    [] Yes        If yes, intervention:     Self MLD suspended d/t:  [] Acute cellulitis  [] recent thrombosis [] inflammation w/ infection [] untreated CHF []Other:    Lymphedema Contraindication [] Excoriation (Linear erosion marks ex:cat scratch)      [x] Exudate (fluids expelled)    [x] Purulent (Pus or discharge present in drainage)  [] Maceration (soften/breaking down of skin due to prolonged exposure to moisture)  [] Denuded (loss of epidermis due to moisture or friction)    [] Shallow, painful venous                    [] Radiation burns/ulcers              [] Eschar (hard crust/scab)                Color of affected area:  [] Normal [] Mottled [] Flushed/erythema   [] Other    Pitting Edema:  [x] R LE  [x]  L LE   [] R UE    [] L UE  [] 1+ Edema [x] 2+ Edema  [] 3+ Edema [] 4+ Edema    Education Included:  [x] General Lymphedema Information  [] Self MLD [] Self Bandaging [] Kinesiotaping    [] Nutrition   [] Home Exercises   [] Skin/nail care   [] NLN Risk Reduction Practice handout    Learner: [x] Patient [] Spouse  []Other [] Family  Method: [x] Verbal [] Demonstration [] Handouts [] Exercise booklet  Response:  [x] Verbalized understanding [] Demonstrated understanding      [] Needs assist            [] No understanding    Physical Status:  Range of motion: Deficits [x] Yes [] No       Comment: Some difficulty noted, however does not let it stop him. Strength:       Deficits [x] Yes [] No        Comment: Some weakness noted in the B LE. Sensation:       Deficits [] Yes [x] No        Comment:  Transfer:       Deficits [] Yes [x] No        Comment:  Ambulation:       Deficits [x] Yes [] No        Comment: Difficulty walking longer distances d/t easily fatigues and the legs starting to burn. Functional Status:  Self Care:   [x] Independent [] Needs Assist [] Dependent   Home Maintenance:    [x] Independent [] Needs Assist [] Dependent     Measurements Lower Extremity  Measurements taken from nail base of D2 digit.   They are circumferential.   Measurements in Centimeters Right Left   MTS          Dorsum      10 28.0 30.0   Inframalleolar  16       37.5 40.5   Supramalleolar    20  28.5 32.8 10   27 33.5 39.5   20   37 41.0 47.5   30  47 38.5 43.5   Knee   52 38.5 42.0   10 cm above knee     Thigh-widest     Hip-widest     Total 245.5    247.4 275.8    276.2     Assessment Patient would benefit from skilled occupational therapy services in order to: To reduce the size of the leg and improve overall quality of life. Knowledge of home program:  [x] Good [] Fair  [] Poor  Family can assist with programming: [x] Yes  [] No  Instructions for patient:   [x] Verbal [x] Demonstration [x] Written    [] Education reviewed   [] Risk Reduction Practices I-VI handout issued    Rehabilitation Potential/ Commitment: [x] Good [] Fair     [] Poor  Post-treatment symptom assessment for swelling, heaviness, tightness to the affected limb, chest or truncal area: Same as on arrival as this is patients evaluation. Therapy Goals:                 LOWER EXTREMITY GOALS:     1. Pt will demonstrate compliance of maintaining lymphedema precautions to reduce the risks of infection and further exacerbations by the 3rd visit. 2. (Pt/Family) will demonstrate proper technique with bandaging and understand the principles and theory behind bandaging technique in order to assist with decreasing swelling up to ( 5+ ) cm.     3. Pt will demonstrate independence with decongestive exercise program in order to expedite fluid rerouting. 4. Pt will demonstrate competence with (traditional) lymphatic drainage massage in order to reroute lymphatic pathways for decreased swelling. 5. Pt will demonstrate competence with kinesiotape technique to affected area(s) to assist with decreasing swelling. 6. Pt will demonstrate independence donning the device with safe and effective use of pneumatic pump in order for possible home use. 7. Pt will demonstrate safe and effective use of compression garment to maintain decreased size upon discharge.     8. Pt will demonstrate safe and effective use of alternative bandaging/ velcro

## 2020-10-08 ENCOUNTER — HOSPITAL ENCOUNTER (OUTPATIENT)
Dept: OCCUPATIONAL THERAPY | Age: 75
Setting detail: THERAPIES SERIES
Discharge: HOME | End: 2020-10-08
Payer: MEDICARE

## 2020-10-08 PROCEDURE — 97535 SELF CARE MNGMENT TRAINING: CPT

## 2020-10-08 PROCEDURE — 97140 MANUAL THERAPY 1/> REGIONS: CPT

## 2020-10-08 PROCEDURE — 97110 THERAPEUTIC EXERCISES: CPT

## 2020-10-08 NOTE — FLOWSHEET NOTE
[facility]  Lymphedema Services  Treatment Note      Date:  10/8/2020  Patient: Alysa Patino  : 1945             MRN: [id number]  Referring Physician: Jaxson Lo MD  Phone Number: [phone]  Fax Number: [phone]  Insurance: MEDICARE   Medical Diagnosis: Lymphedema of both lower extremities     Rehab Codes: I89.0  Onset Date:     Visit# / total visits: 3/9; Progress note for Medicare patient due at visit 10      SUBJECTIVE: Pt arrives to today's treatment with compression bandages in place. Pt states they were not bothersome to him over the last several days and he did not feel any major differences. Pt states that he did use the pumps, walked 6000 steps and rode his bike 5 miles. Pt is educated that he is to not use his pumps overtop of the compression bandages that he had in place. Bandages are removed and LE is cleansed. A visible reduction in size of the LE is noted today. New measurements were unable to be taken today due to the extensive time it took for wound care and cleansing of the LE. OT completes washing of the B LE with soap and water after all bandages and previous wound bandages are removed. Pt states that the exercises we went over are going ok. He was able to do them 2x a day without difficulty. Pt then reviews MLD/deep breathing techniques that they were educated on at the previous session. Pt compelted 5-10 reps with  ( min ) vc's required for correct techniques. Pt states she has no other questions as this time. Wound care is again completed, prior to donning the bandges with ABD pads placed over top of areas with leakage. OT bandages pt from the lake of the foot to the base of the knee with approx 50% pull to encourage lymphatic flow production and maintaining a reduced size. OT applies Eucerine lotion to the L LE only to include the foot and leg.  Stockinette is applied over top of the leg as a protective barrier from the lotion. Artiflex fluff is applied to the L foot as the protective barrier from the foot and Rosidal foam is place on the leg from the ankle to just below the knee. Comprilan bandage is then placed from the lake of the foot to the base of the knee with approx 50% pull. Herringbone technique is used to progress up the leg and assist with venous return, and lymphatic flow production/ stimulating a suctioning effect. Pain:  [] YES    [x] NO  Location:  B FEET - pt states he will still get a burning sensation in his feet at night time. Pain Rating: ( 0-10 scale) : 0  Pain Description:  [x] Achy,Worse at end of day  [] Distention, Discomfort  [] Painful with palpation  [] Guarding, Tingling, Numbness      [] Hypersensitivity       Restrictions/Precautions:   [x] None              [] No blood pressure/blood draw in: [] LUE [] RUE     Fall Risk   [x] No    [] Yes        If yes, intervention:     Self MLD suspended d/t:  [] Acute cellulitis  [] recent thrombosis [] inflammation w/ infection [] untreated CHF []Other:    Lymphedema Contraindication Assessment:  [] None      [] CHF primary cause of swelling- Monitor weight loss - (no more than 5 pounds in 1 day)    [] Hyperthyroidism-No MLD to neck   []DVT-acute, No MLD to limb       [] Pelvic DVT-MLD lifetime contraindication    [] Kidney Dysfunction     [] Prenancy   [] Advanced Renal Dz-No compression bandaging  [x] Age 60+-No MLD to neck         [] GFF-No abdominal MLD     [] Limb paralysis-Precaution with bandaging d/t no sensory feedback  [] Cardiac edema/Heart disease-absolute contraindication-No pneumo-massage or bandaging  [] Inflammatory intestine (Chrohns- had surgery and intenstines removed, diverticulitis, ulcerative colitis) -No abdominal MLD  [] Cellulitis (warm, tender/pain, swelling, rash, fever, less defined erythematous borders) - No MLD until 72 hours of antibiotics   [] Erysipelas (fiery/red, swollen/shiny, raised defined borders).  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                    [x] Brawny/hardened/ Fibrosis     [] Richardson hump-dorsum         [] Fatty lobules                                                  [] Loose, lobular                     []  Soft, doughy       [x] Hyperplasia                        [x]Moist/weeping/blisters with lymphorrhea     [x] Hyperkeratosis                    [] Congestive Dermatitis     [x] Hyperpigmentation/hemosiderin staining/gaitor distribution                                                               Symptom Onset:   [x] Slow   [] Rapid     [] Acute  Swelling Location:   [x] Truncal/abdominal swelling   [] Genital swelling  [] Fungal rash in skin folds   Stemmer sign:       [] Absent         [x] Positive    Wounds Location:   [] None  [] Being addressed by Reva Carpenter  [] Erythema ( Superficial Reddening)     [] Excoriation (Linear erosion marks ex:cat scratch)      [x] Exudate (fluids expelled)    [x] Purulent (Pus or discharge present in drainage)  [x] Maceration (soften/breaking down of skin due to prolonged exposure to moisture)  [x] Denuded (loss of epidermis due to moisture or friction)    [] Shallow, painful venous                    [] Radiation burns/ulcers              [] Eschar (hard crust/scab)                Pitting Edema:  [x] R LE  [x]  L LE   [] R UE    [] L UE  [] 1+ Edema [x] 2+ Edema  [] 3+ Edema [] 4+ Edema    Education Included:  [x] General Lymphedema Information  [] Self MLD [] Self Bandaging [] Kinesiotaping    [] Nutrition   [] Home Exercises   [] Skin/nail care   [] NLN Risk Reduction Practice handout    Learner: [x] Patient [] Spouse  []Other [] Family  Method: [x] Verbal [] Demonstration [] Handouts [] Exercise booklet  Response:  [x] Verbalized understanding [] Demonstrated understanding      [] Needs assist            [] No understanding    Physical Status:  Range of motion: Deficits [x] Yes [] No       Comment: Some difficulty noted, however does not let it stop him. Strength:       Deficits [x] Yes [] No        Comment: Some weakness noted in the B LE. Sensation:       Deficits [] Yes [x] No        Comment:  Transfer:       Deficits [] Yes [x] No        Comment:  Ambulation:       Deficits [x] Yes [] No        Comment: Difficulty walking longer distances d/t easily fatigues and the legs starting to burn. Functional Status:  Self Care:   [x] Independent [] Needs Assist [] Dependent   Home Maintenance:    [x] Independent [] Needs Assist [] Dependent     Measurements Lower Extremity  Measurements taken from nail base of D2 digit. They are circumferential.   Measurements in Centimeters Right Left   MTS          Dorsum      10 28.0 30.0   Inframalleolar  16       37.5 40.5   Supramalleolar    20  28.5 32.8   10   27 33.5 39.5   20   37 41.0 47.5   30  47 38.5 43.5   Knee   52 38.5 42.0   10 cm above knee     Thigh-widest     Hip-widest     Total 245.5    247.4 275.8    276.2     Assessment Patient would benefit from skilled occupational therapy services in order to: To reduce the size of the leg and improve overall quality of life. Knowledge of home program:  [x] Good [] Fair  [] Poor  Family can assist with programming: [x] Yes  [] No  Instructions for patient:   [x] Verbal [x] Demonstration [x] Written    [] Education reviewed   [] Risk Reduction Practices I-VI handout issued    Rehabilitation Potential/ Commitment: [x] Good [] Fair     [] Poor  Post-treatment symptom assessment for swelling, heaviness, tightness to the affected limb, chest or truncal area: Decrease in edema, less pain and heaviness. Therapy Goals:                 LOWER EXTREMITY GOALS:     1.  Pt will demonstrate compliance of maintaining lymphedema precautions to reduce the risks of infection and further exacerbations by the 3rd visit. 2. (Pt/Family) will demonstrate proper technique with bandaging and understand the principles and theory behind bandaging technique in order to assist with decreasing swelling up to ( 5+ ) cm.     3. Pt will demonstrate independence with decongestive exercise program in order to expedite fluid rerouting. 4. Pt will demonstrate competence with (traditional) lymphatic drainage massage in order to reroute lymphatic pathways for decreased swelling. 5. Pt will demonstrate competence with kinesiotape technique to affected area(s) to assist with decreasing swelling. 6. Pt will demonstrate independence donning the device with safe and effective use of pneumatic pump in order for possible home use. 7. Pt will demonstrate safe and effective use of compression garment to maintain decreased size upon discharge. 8. Pt will demonstrate safe and effective use of alternative bandaging/ velcro devices to simplify and reduce size upon discharge. Plan:   Pt will be seen 1-3 times per week for 10 visits and reduce down as appropriate within 90 days, with focus on short and long term goals listed above.    Continue to address:  [x]Bandaging  [] Self Bandaging/Family Assist  [x]MLD  [x]Self Massage  [x]Exercise  [x]Education  [x]Obtain referral for garments  []Medical Hold  []Discharge to Home Program    Functional Assessment Used: Lymphedema Life Impact Scale (LLIS)  Functional Impact Score: [x] Eval _43%___ [] 10th visit____  [] 20th visit ____ [] 30th visit____  [] D/C ____        Charges   Minutes   Units   Evaluation (38763)            Low            Moderate            High     Manual Therapy (12100): pre,intra or post hands-on MLD service to modulate pain, increase ROM, reduce swelling, inflammation, or restriction; improve contractile/noncontractile tissue extensibility 45    Therapeutic activities (83085): dynamic activities to improve functional performance with or w/o bandages with direct pt contact using multiple perameters e.g. balance, strength, ROM & multiple outcomes     Therapeutic procedure (67228)-exercise for strength,endurance, ROM, flexibility (active, active-assist or passive) 15    Self care/home mgmt (75688)-pt educ re: self MLD for home program, self bandaging, do's/don'ts, activity guidelines 30    Vasopneumatic Pump:      Total Treatment Time    90      Today's total: $ 146.14  Last total: $ 260.20  Total: $  406.34    Time In: 2:30 PM  Time Out: 4:00 PM      Electronically signed by Zhou Terry OT on 10/8/2020 at 2:36 PM

## 2020-10-13 ENCOUNTER — HOSPITAL ENCOUNTER (OUTPATIENT)
Dept: OCCUPATIONAL THERAPY | Age: 75
Setting detail: THERAPIES SERIES
Discharge: HOME | End: 2020-10-13
Payer: MEDICARE

## 2020-10-13 PROCEDURE — 97110 THERAPEUTIC EXERCISES: CPT

## 2020-10-13 PROCEDURE — 97535 SELF CARE MNGMENT TRAINING: CPT

## 2020-10-13 PROCEDURE — 97140 MANUAL THERAPY 1/> REGIONS: CPT

## 2020-10-13 NOTE — FLOWSHEET NOTE
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                    [x] Brawny/hardened/ Fibrosis     [] Runnels hump-dorsum         [] Fatty lobules                                                  [] Loose, lobular                     []  Soft, doughy       [x] Hyperplasia                        [x]Moist/weeping/blisters with lymphorrhea     [x] Hyperkeratosis                    [] Congestive Dermatitis     [x] Hyperpigmentation/hemosiderin staining/gaitor distribution                                                               Symptom Onset:   [x] Slow   [] Rapid     [] Acute  Swelling Location:   [x] Truncal/abdominal swelling   [] Genital swelling  [] Fungal rash in skin folds   Stemmer sign:       [] Absent         [x] Positive    Wounds Location:   [] None  [] Being addressed by Reva Carpenter  [] Erythema ( Superficial Reddening)     [] Excoriation (Linear erosion marks ex:cat scratch)      [x] Exudate (fluids expelled)    [x] Purulent (Pus or discharge present in drainage)  [x] Maceration (soften/breaking down of skin due to prolonged exposure to moisture)  [x] Denuded (loss of epidermis due to moisture or friction)    [] Shallow, painful venous                    [] Radiation burns/ulcers              [] Eschar (hard crust/scab)                Pitting Edema:  [x] R LE  [x]  L LE   [] R UE    [] L UE  [] 1+ Edema [x] 2+ Edema  [] 3+ Edema [] 4+ Edema    Education Included:  [x] General Lymphedema Information  [] Self MLD [] Self Bandaging [] Kinesiotaping    [] Nutrition   [] Home Exercises   [] Skin/nail care   [] NLN Risk Reduction Practice handout    Learner: [x] Patient [] Spouse  []Other [] Family  Method: [x] Verbal [] Demonstration [] Handouts [] Exercise booklet  Response:  [x] Verbalized understanding [] Demonstrated understanding      [] Needs assist            [] No understanding    Physical Status:  Range of motion: Deficits [x] Yes [] No       Comment: Some difficulty noted, however does not let it stop him.   Strength:       Deficits [x] Yes [] No        Comment: Some weakness noted in the B LE. Sensation:       Deficits [] Yes [x] No        Comment:  Transfer:       Deficits [] Yes [x] No        Comment:  Ambulation:       Deficits [x] Yes [] No        Comment: Difficulty walking longer distances d/t easily fatigues and the legs starting to burn. Functional Status:  Self Care:   [x] Independent [] Needs Assist [] Dependent   Home Maintenance:    [x] Independent [] Needs Assist [] Dependent     Measurements Lower Extremity  Measurements taken from nail base of D2 digit. They are circumferential.   Measurements in Centimeters Right Left   MTS          Dorsum      10 24.5 26.5   Inframalleolar  16       34.5 37.0   Supramalleolar    20  26.4 30.0   10   27 29.5 34.5   20   37 32.5 40.5   30  47 37.5 42.5   Knee   52 37.0 40.5   10 cm above knee     Thigh-widest     Hip-widest     Total 221.9    245.5    247.4 251.5    275.8    276.2     Assessment Patient would benefit from skilled occupational therapy services in order to: To reduce the size of the leg and improve overall quality of life. Knowledge of home program:  [x] Good [] Fair  [] Poor  Family can assist with programming: [x] Yes  [] No  Instructions for patient:   [x] Verbal [x] Demonstration [x] Written    [] Education reviewed   [] Risk Reduction Practices I-VI handout issued    Rehabilitation Potential/ Commitment: [x] Good [] Fair     [] Poor  Post-treatment symptom assessment for swelling, heaviness, tightness to the affected limb, chest or truncal area: Decrease in edema, less pain and heaviness. Therapy Goals:                 LOWER EXTREMITY GOALS:     1. Pt will demonstrate compliance of maintaining lymphedema precautions to reduce the risks of infection and further exacerbations by the 3rd visit.     2. (Pt/Family) will demonstrate proper technique with bandaging and understand the principles and theory behind bandaging technique in

## 2020-10-15 ENCOUNTER — HOSPITAL ENCOUNTER (OUTPATIENT)
Dept: OCCUPATIONAL THERAPY | Age: 75
Setting detail: THERAPIES SERIES
Discharge: HOME | End: 2020-10-15
Payer: MEDICARE

## 2020-10-15 PROCEDURE — 97140 MANUAL THERAPY 1/> REGIONS: CPT

## 2020-10-15 PROCEDURE — 97535 SELF CARE MNGMENT TRAINING: CPT

## 2020-10-15 PROCEDURE — 97110 THERAPEUTIC EXERCISES: CPT

## 2020-10-15 NOTE — FLOWSHEET NOTE
[facility]  Lymphedema Services  Treatment Note      Date:  10/15/2020  Patient: Brock Jeffries  : 1945             MRN: [id number]  Referring Physician: Magen Wynne MD  Phone Number: [phone]  Fax Number: [phone]  Insurance: MEDICARE   Medical Diagnosis: Lymphedema of both lower extremities     Rehab Codes: I89.0  Onset Date:     Visit# / total visits: ; Progress note for Medicare patient due at visit 10      SUBJECTIVE: Pt arrives to today treatement with no compression in place. Pt states he took them off around 4 am this morning to shower and soak his legs prior to coming in. Pt states that he did not put lotion on prior to coming in today as he was not certain he should of. Overall his legs are down nicely in size and pt is finding it easier to walk and moved around. Pt states that he continues to do 5 miles on his stationary bike and 6000 steps on his stepping machine. A visible reduction in size of the LE is noted today, with seepage lessening and showing improvements. New measurements are taken, which do show a significant decrease in size as noted below. Pt states that he continues to do his exercises 2x daily without difficulty. Pt demonstrates review of MLD/deep breathing techniques as well as MLD to the upper thigh ( top/outter and inside)  that they were educated on at the previous session. Pt compelted 5-10 reps with  ( min ) vc's required for correct techniques. Pt states she has no other questions as this time. Pt is educated on the difference of velcro devices, agreeable to be sized at next session. Also initiated discussion on vasopneautmic pumps. Pt forgot to bring bandage with him today. OT completes wound care, by wrapping the legs in ABD padding and additional coverings. Size E tubigrips are placed on on the B LE over the wound items. Pt is provided with instcutions that he is agreeable with.           Pain:  [] YES [x] NO  Location:  B FEET - pt states he will still get a burning sensation in his feet at night time.    Pain Rating: ( 0-10 scale) : 0  Pain Description:  [x] Achy,Worse at end of day  [] Distention, Discomfort  [] Painful with palpation  [] Guarding, Tingling, Numbness      [] Hypersensitivity       Restrictions/Precautions:   [x] None              [] No blood pressure/blood draw in: [] LUE [] RUE     Fall Risk   [x] No    [] Yes        If yes, intervention:     Self MLD suspended d/t:  [] Acute cellulitis  [] recent thrombosis [] inflammation w/ infection [] untreated CHF []Other:    Lymphedema Contraindication Assessment:  [x] Age 60+-No MLD to neck         [x] Inflammatory intestine (Chrohns- had surgery and intenstines removed, diverticulitis, ulcerative colitis) -No abdominal MLD  [] Cellulitis (warm, tender/pain, swelling, rash, fever, less defined erythematous borders) - No MLD until 72 hours of antibiotics     History of Cancer:  []Yes [x]No    Skin Integrity/Appearance: location   [] Normal                          [x] Dry              [] Warmth/Red/Itching/ Rash  [] Recent 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                    [x] Brawny/hardened/ Fibrosis     [] [place] hump-dorsum         [] Fatty lobules                                                  [] Loose, lobular                     []  Soft, doughy       [x] Hyperplasia                        [x]Moist/weeping/blisters with lymphorrhea     [x] Hyperkeratosis                    [] Congestive Dermatitis     [x] Hyperpigmentation/hemosiderin staining/gaitor distribution                                                               Symptom Onset:   [x] Slow   [] Rapid     [] Acute  Swelling Location:   [x] Truncal/abdominal swelling   [] Genital swelling  [] Fungal rash in skin folds   Stemmer sign:       [] Absent         [x] Positive    Wounds Location:   [] None  [] Being addressed by Reva Carpenter  [] Erythema ( Superficial Reddening)     [] Excoriation (Linear erosion marks ex:cat scratch)      [x] Exudate (fluids expelled)    [x] Purulent (Pus or discharge present in drainage)  [x] Maceration (soften/breaking down of skin due to prolonged exposure to moisture)  [x] Denuded (loss of epidermis due to moisture or friction)    [] Shallow, painful venous                    [] Radiation burns/ulcers              [] Eschar (hard crust/scab)                Pitting Edema:  [x] R LE  [x]  L LE   [] R UE    [] L UE  [] 1+ Edema [x] 2+ Edema  [] 3+ Edema [] 4+ Edema    Education Included:  [x] General Lymphedema Information  [] Self MLD [] Self Bandaging [] Kinesiotaping    [] Nutrition   [] Home Exercises   [] Skin/nail care   [] NLN Risk Reduction Practice handout    Learner: [x] Patient [] Spouse  []Other [] Family  Method: [x] Verbal [] Demonstration [] Handouts [] Exercise booklet  Response:  [x] Verbalized understanding [] Demonstrated understanding      [] Needs assist            [] No understanding    Physical Status:  Range of motion: Deficits [x] Yes [] No       Comment: Some difficulty noted, however does not let it stop him. Strength:       Deficits [x] Yes [] No        Comment: Some weakness noted in the B LE. Sensation:       Deficits [] Yes [x] No        Comment:  Transfer:       Deficits [] Yes [x] No        Comment:  Ambulation:       Deficits [x] Yes [] No        Comment: Difficulty walking longer distances d/t easily fatigues and the legs starting to burn. Functional Status:  Self Care:   [x] Independent [] Needs Assist [] Dependent   Home Maintenance:    [x] Independent [] Needs Assist [] Dependent. Measurements Lower Extremity  Measurements taken from nail base of D2 digit.   They are circumferential.   Measurements in Centimeters Right Left   MTS          [place]      10 26.0 29.0   Inframalleolar  16       36.2 38.0   Supramalleolar    20  28.2 31.5   10   27 30.5 37.5   20   37 34.5 44.0   30  47 38.0 42.0   Knee   52 39.0 41.0   10 cm above knee     Thigh-widest     Hip-widest     Total 221.9    245.5    247.4 251.5    275.8    276.2     Assessment Patient would benefit from skilled occupational therapy services in order to: To reduce the size of the leg and improve overall quality of life. Knowledge of home program:  [x] Good [] Fair  [] Poor  Family can assist with programming: [x] Yes  [] No  Instructions for patient:   [x] Verbal [x] Demonstration [x] Written    [] Education reviewed   [] Risk Reduction Practices I-VI handout issued    Rehabilitation Potential/ Commitment: [x] Good [] Fair     [] Poor  Post-treatment symptom assessment for swelling, heaviness, tightness to the affected limb, chest or truncal area: Decrease in edema, less pain and heaviness. Therapy Goals:                 LOWER EXTREMITY GOALS:     1. Pt will demonstrate compliance of maintaining lymphedema precautions to reduce the risks of infection and further exacerbations by the 3rd visit. 2. (Pt/Family) will demonstrate proper technique with bandaging and understand the principles and theory behind bandaging technique in order to assist with decreasing swelling up to ( 5+ ) cm.     3. Pt will demonstrate independence with decongestive exercise program in order to expedite fluid rerouting. 4. Pt will demonstrate competence with (traditional) lymphatic drainage massage in order to reroute lymphatic pathways for decreased swelling. 5. Pt will demonstrate competence with kinesiotape technique to affected area(s) to assist with decreasing swelling. 6. Pt will demonstrate independence donning the device with safe and effective use of pneumatic pump in order for possible home use. 7. Pt will demonstrate safe and effective use of compression garment to maintain decreased size upon discharge. 8. Pt will demonstrate safe and effective use of alternative bandaging/ velcro devices to simplify and reduce size upon discharge.     Plan:   Pt will be seen 1-3 times per week for 10 visits and reduce down as appropriate within 90 days, with focus on short and long term goals listed above.    Continue to address:  [x]Bandaging  [] Self Bandaging/Family Assist  [x]MLD  [x]Self Massage  [x]Exercise  [x]Education  [x]Obtain referral for garments  []Medical Hold  []Discharge to Home Program    Functional Assessment Used: Lymphedema Life Impact Scale (LLIS)  Functional Impact Score: [x] Eval _43%___ [] 10th visit____  [] 20th visit ____ [] 30th visit____  [] D/C ____        Charges   Minutes   Units   Evaluation (88415)            Low            Moderate            High     Manual Therapy (02847): pre,intra or post hands-on MLD service to modulate pain, increase ROM, reduce swelling, inflammation, or restriction; improve contractile/noncontractile tissue extensibility 23 2   Therapeutic activities (69869): dynamic activities to improve functional performance with or w/o bandages with direct pt contact using multiple perameters e.g. balance, strength, ROM & multiple outcomes     Therapeutic procedure (00114)-exercise for strength,endurance, ROM, flexibility (active, active-assist or passive) 30 2   Self care/home mgmt (32999)-pt educ re: self MLD for home program, self bandaging, do's/don'ts, activity guidelines 30 2   Vasopneumatic Pump:      Total Treatment Time    83      Today's total: $ 147.66  Last total: $  554.00   Total: $ 701.66    Time In: 1:00 PM  Time Out: 2:23 PM      Electronically signed by Fatou Yates OT on 10/15/2020 at 1:09 PM

## 2020-10-20 ENCOUNTER — HOSPITAL ENCOUNTER (OUTPATIENT)
Dept: OCCUPATIONAL THERAPY | Age: 75
Setting detail: THERAPIES SERIES
Discharge: HOME | End: 2020-10-20
Payer: MEDICARE

## 2020-10-20 PROCEDURE — 97535 SELF CARE MNGMENT TRAINING: CPT

## 2020-10-20 PROCEDURE — 97110 THERAPEUTIC EXERCISES: CPT

## 2020-10-20 PROCEDURE — 97140 MANUAL THERAPY 1/> REGIONS: CPT

## 2020-10-20 NOTE — FLOWSHEET NOTE
[facility]  Lymphedema Services  Treatment Note      Date:  10/20/2020  Patient: Casey Uriostegui  : 1945             MRN: [id number]  Referring Physician: Tony Mustafa MD  Phone Number: [phone]  Fax Number: [phone]  Insurance: MEDICARE   Medical Diagnosis: Lymphedema of both lower extremities     Rehab Codes: I89.0  Onset Date:     Visit# / total visits: ; Progress note for Medicare patient due at visit 10      SUBJECTIVE: Pt arrives to today treatement with no compression in place. Pt states he took them off around 10 am this morning to shower and soak his legs prior to coming in. Pt states that he continues to do 5 miles on his stationary bike and 6000 steps on his stepping machine. Pt states that he used a soft wash cloth to wash his feet this time and noticed that it did not cause as many microabrasions. A visible reduction in size of the LE is noted today, with seepage lessening and showing improvements. New measurements are taken, which do show a significant decrease in size as noted below. Pt states that he continues to do his exercises 2x daily without difficulty. Pt demonstrates review of MLD/deep breathing techniques as well as MLD to the upper thigh ( top/outter and inside)  that they were educated on at the previous session. Pt compelted 5-10 reps with  ( min ) vc's required for correct techniques. Pt states she has no other questions as this time. Pt is fitted for velcro devices today and OT assist pt in ordering. Pt is provided with a copy of order number and phone number to contact the company if needed. Wound care is completed. OT bandages pt from the lake of the foot to the base of the knee with approx 50% pull to encourage lymphatic flow production and maintaining a reduced size. OT applies Eucerine lotion to the L LE only to include the foot and leg.  Stockinette is applied over top of the leg as a protective barrier from the lotion. Artiflex fluff is applied to the L foot as the protective barrier from the foot and Rosidal foam is place on the leg from the ankle to just below the knee. Comprilan bandage is then placed from the lake of the foot to the base of the knee with approx 50% pull. Herringbone technique is used to progress up the leg and assist with venous return, and lymphatic flow production/ stimulating a suctioning effect. Pain:  [] YES    [x] NO  Location:  B FEET - pt states he will still get a burning sensation in his feet at night time.    Pain Rating: ( 0-10 scale) : 0  Pain Description:  [x] Achy,Worse at end of day  [] Distention, Discomfort  [] Painful with palpation  [] Guarding, Tingling, Numbness      [] Hypersensitivity       Restrictions/Precautions:   [x] None              [] No blood pressure/blood draw in: [] LUE [] RUE     Fall Risk   [x] No    [] Yes        If yes, intervention:     Self MLD suspended d/t:  [] Acute cellulitis  [] recent thrombosis [] inflammation w/ infection [] untreated CHF []Other:    Lymphedema Contraindication Assessment:  [x] Age 60+-No MLD to neck         [x] Inflammatory intestine (Chrohns- had surgery and intenstines removed, diverticulitis, ulcerative colitis) -No abdominal MLD  [] Cellulitis (warm, tender/pain, swelling, rash, fever, less defined erythematous borders) - No MLD until 72 hours of antibiotics     History of Cancer:  []Yes [x]No    Skin Integrity/Appearance: location   [] Normal                          [x] Dry              [] Warmth/Red/Itching/ Rash  [] Recent 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                    [x] Brawny/hardened/ Fibrosis     [] [place] hump-dorsum         [] Fatty lobules                                                  [] Loose, lobular                     []  Soft, doughy       [x] Hyperplasia                        [x]Moist/weeping/blisters with lymphorrhea     [x] Hyperkeratosis                    [] Congestive Dermatitis     [x] Hyperpigmentation/hemosiderin staining/gaitor distribution                                                               Symptom Onset:   [x] Slow   [] Rapid     [] Acute  Swelling Location:   [x] Truncal/abdominal swelling   [] Genital swelling  [] Fungal rash in skin folds   Stemmer sign:       [] Absent         [x] Positive    Wounds Location:   [] None  [] Being addressed by Reva Carpenter  [] Erythema ( Superficial Reddening)     [] Excoriation (Linear erosion marks ex:cat scratch)      [x] Exudate (fluids expelled)    [x] Purulent (Pus or discharge present in drainage)  [x] Maceration (soften/breaking down of skin due to prolonged exposure to moisture)  [x] Denuded (loss of epidermis due to moisture or friction)    [] Shallow, painful venous                    [] Radiation burns/ulcers              [] Eschar (hard crust/scab)                Pitting Edema:  [x] R LE  [x]  L LE   [] R UE    [] L UE  [] 1+ Edema [x] 2+ Edema  [] 3+ Edema [] 4+ Edema    Education Included:  [x] General Lymphedema Information  [] Self MLD [] Self Bandaging [] Kinesiotaping    [] Nutrition   [] Home Exercises   [] Skin/nail care   [] NLN Risk Reduction Practice handout    Learner: [x] Patient [] Spouse  []Other [] Family  Method: [x] Verbal [] Demonstration [] Handouts [] Exercise booklet  Response:  [x] Verbalized understanding [] Demonstrated understanding      [] Needs assist            [] No understanding    Physical Status:  Range of motion: Deficits [x] Yes [] No       Comment: Some difficulty noted, however does not let it stop him. Strength:       Deficits [x] Yes [] No        Comment: Some weakness noted in the B LE. Sensation:       Deficits [] Yes [x] No        Comment:  Transfer:       Deficits [] Yes [x] No        Comment:  Ambulation:       Deficits [x] Yes [] No        Comment: Difficulty walking longer distances d/t easily fatigues and the legs starting to burn.    Functional Status:  Self Care:   [x] signed by Toni Chaidez OT on 10/20/2020 at 1:12 PM

## 2020-10-22 ENCOUNTER — HOSPITAL ENCOUNTER (OUTPATIENT)
Dept: OCCUPATIONAL THERAPY | Age: 75
Setting detail: THERAPIES SERIES
Discharge: HOME | End: 2020-10-22
Payer: MEDICARE

## 2020-10-22 PROCEDURE — 97016 VASOPNEUMATIC DEVICE THERAPY: CPT

## 2020-10-22 PROCEDURE — 97110 THERAPEUTIC EXERCISES: CPT

## 2020-10-22 PROCEDURE — 97535 SELF CARE MNGMENT TRAINING: CPT

## 2020-10-22 PROCEDURE — 97140 MANUAL THERAPY 1/> REGIONS: CPT

## 2020-10-22 NOTE — FLOWSHEET NOTE
[facility]  Lymphedema Services  Treatment Note      Date:  10/22/2020  Patient: Joe Minaya  : 1945             MRN: [id number]  Referring Physician: Bharath Foote MD  Phone Number: [phone]  Fax Number: [phone]  Insurance: MEDICARE   Medical Diagnosis: Lymphedema of both lower extremities     Rehab Codes: I89.0  Onset Date:     Visit# / total visits: ; Progress note for Medicare patient due at visit 10      SUBJECTIVE: Pt arrives to today treatement with no compression in place. Pt states he took them off around 10 am this morning to shower and soak his legs prior to coming in. Pt states that he continues to do 5 miles on his stationary bike and 6000 steps on his stepping machine. Pt states that he used a soft wash cloth to wash his feet this time and noticed that it did not cause as many microabrasions. A visible reduction in size of the LE is noted today, with seepage lessening and showing improvements. New measurements are taken, which do show a significant decrease in size as noted below. Pt states that he continues to do his exercises 2x daily without difficulty. Pt demonstrates review of MLD/deep breathing techniques as well as MLD to the upper thigh ( top/outter and inside)  that they were educated on at the previous session. Pt compelted 5-10 reps with  ( min ) vc's required for correct techniques. Pt states she has no other questions as this time. Pt is educated on the difference between 2 vasopneumatic pumps today. OT reminds the pt that the MLD exercises are of most importance used in conjunction with the pump she will see best results. Pt trails the basic pump in clinc today. Reduction is noted and minimal seepage is expelled from the wounds. Wound care is completed. OT bandages pt from the lake of the foot to the base of the knee with approx 50% pull to encourage lymphatic flow production and maintaining a reduced size.   OT applies Eucerine lotion to the L LE only to include the foot and leg. Stockinette is applied over top of the leg as a protective barrier from the lotion. Artiflex fluff is applied to the L foot as the protective barrier from the foot and Rosidal foam is place on the leg from the ankle to just below the knee. Comprilan bandage is then placed from the lake of the foot to the base of the knee with approx 50% pull. Herringbone technique is used to progress up the leg and assist with venous return, and lymphatic flow production/ stimulating a suctioning effect. Pain:  [] YES    [x] NO  Location:  B FEET - pt states he will still get a burning sensation in his feet at night time.    Pain Rating: ( 0-10 scale) : 0  Pain Description:  [x] Achy,Worse at end of day  [] Distention, Discomfort  [] Painful with palpation  [] Guarding, Tingling, Numbness      [] Hypersensitivity       Restrictions/Precautions:   [x] None              [] No blood pressure/blood draw in: [] LUE [] RUE     Fall Risk   [x] No    [] Yes        If yes, intervention:     Self MLD suspended d/t:  [] Acute cellulitis  [] recent thrombosis [] inflammation w/ infection [] untreated CHF []Other:    Lymphedema Contraindication Assessment:  [x] Age 60+-No MLD to neck         [x] Inflammatory intestine (Chrohns- had surgery and intenstines removed, diverticulitis, ulcerative colitis) -No abdominal MLD  [] Cellulitis (warm, tender/pain, swelling, rash, fever, less defined erythematous borders) - No MLD until 72 hours of antibiotics     History of Cancer:  []Yes [x]No    Skin Integrity/Appearance: location   [] Normal                          [x] Dry              [] Warmth/Red/Itching/ Rash  [] Recent 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                    [x] Brawny/hardened/ Fibrosis     [] [place] hump-dorsum         [] Fatty lobules                                                  [] Loose, lobular                     []  Soft, doughy       [x] Hyperplasia [x]Moist/weeping/blisters with lymphorrhea     [x] Hyperkeratosis                    [] Congestive Dermatitis     [x] Hyperpigmentation/hemosiderin staining/gaitor distribution                                                               Symptom Onset:   [x] Slow   [] Rapid     [] Acute  Swelling Location:   [x] Truncal/abdominal swelling   [] Genital swelling  [] Fungal rash in skin folds   Stemmer sign:       [] Absent         [x] Positive    Wounds Location:   [] None  [] Being addressed by Reva Carpenter  [] Erythema ( Superficial Reddening)     [] Excoriation (Linear erosion marks ex:cat scratch)      [x] Exudate (fluids expelled)    [x] Purulent (Pus or discharge present in drainage)  [x] Maceration (soften/breaking down of skin due to prolonged exposure to moisture)  [x] Denuded (loss of epidermis due to moisture or friction)    [] Shallow, painful venous                    [] Radiation burns/ulcers              [] Eschar (hard crust/scab)                Pitting Edema:  [x] R LE  [x]  L LE   [] R UE    [] L UE  [] 1+ Edema [x] 2+ Edema  [] 3+ Edema [] 4+ Edema    Education Included:  [x] General Lymphedema Information  [] Self MLD [] Self Bandaging [] Kinesiotaping    [] Nutrition   [] Home Exercises   [] Skin/nail care   [] NLN Risk Reduction Practice handout    Learner: [x] Patient [] Spouse  []Other [] Family  Method: [x] Verbal [] Demonstration [] Handouts [] Exercise booklet  Response:  [x] Verbalized understanding [] Demonstrated understanding      [] Needs assist            [] No understanding    Physical Status:  Range of motion: Deficits [x] Yes [] No       Comment: Some difficulty noted, however does not let it stop him. Strength:       Deficits [x] Yes [] No        Comment: Some weakness noted in the B LE.    Sensation:       Deficits [] Yes [x] No        Comment:  Transfer:       Deficits [] Yes [x] No        Comment:  Ambulation:       Deficits [x] Yes [] No Comment: Difficulty walking longer distances d/t easily fatigues and the legs starting to burn. Functional Status:  Self Care:   [x] Independent [] Needs Assist [] Dependent   Home Maintenance:    [x] Independent [] Needs Assist [] Dependent. Measurements Lower Extremity  Measurements taken from nail base of D2 digit. They are circumferential.   Measurements in Centimeters Right Left   MTS          Dorsum      10 24.5 27.3   Inframalleolar  16       34.7 36.4   Supramalleolar    20  27.0 30.0   10   27 27.3 34.0   20   37 31.4 38.5   30  47 37.4 40.0   Knee   52 38.5 40.7   10 cm above knee     Thigh-widest     Hip-widest     Total 220.8    226.8    232.4    221.9    245.5    247.4 246.9    250.0    263    251.5    275.8    276.2     Assessment Patient would benefit from skilled occupational therapy services in order to: To reduce the size of the leg and improve overall quality of life. Knowledge of home program:  [x] Good [] Fair  [] Poor  Family can assist with programming: [x] Yes  [] No  Instructions for patient:   [x] Verbal [x] Demonstration [x] Written    [] Education reviewed   [] Risk Reduction Practices I-VI handout issued    Rehabilitation Potential/ Commitment: [x] Good [] Fair     [] Poor  Post-treatment symptom assessment for swelling, heaviness, tightness to the affected limb, chest or truncal area: Decrease in edema, less pain and heaviness. Therapy Goals:                 LOWER EXTREMITY GOALS:     1. Pt will demonstrate compliance of maintaining lymphedema precautions to reduce the risks of infection and further exacerbations by the 3rd visit. 2. (Pt/Family) will demonstrate proper technique with bandaging and understand the principles and theory behind bandaging technique in order to assist with decreasing swelling up to ( 5+ ) cm.     3. Pt will demonstrate independence with decongestive exercise program in order to expedite fluid rerouting.     4. Pt will demonstrate competence with (traditional) lymphatic drainage massage in order to reroute lymphatic pathways for decreased swelling. 5. Pt will demonstrate competence with kinesiotape technique to affected area(s) to assist with decreasing swelling. 6. Pt will demonstrate independence donning the device with safe and effective use of pneumatic pump in order for possible home use. 7. Pt will demonstrate safe and effective use of compression garment to maintain decreased size upon discharge. 8. Pt will demonstrate safe and effective use of alternative bandaging/ velcro devices to simplify and reduce size upon discharge. Plan:   Pt will be seen 1-3 times per week for 10 visits and reduce down as appropriate within 90 days, with focus on short and long term goals listed above.    Continue to address:  [x]Bandaging  [] Self Bandaging/Family Assist  [x]MLD  [x]Self Massage  [x]Exercise  [x]Education  [x]Obtain referral for garments  []Medical Hold  []Discharge to Home Program    Functional Assessment Used: Lymphedema Life Impact Scale (LLIS)  Functional Impact Score: [x] Eval _43%___ [] 10th visit____  [] 20th visit ____ [] 30th visit____  [] D/C ____        Charges   Minutes   Units   Evaluation (03932)            Low            Moderate            High     Manual Therapy (84811): pre,intra or post hands-on MLD service to modulate pain, increase ROM, reduce swelling, inflammation, or restriction; improve contractile/noncontractile tissue extensibility 15    Therapeutic activities (13136): dynamic activities to improve functional performance with or w/o bandages with direct pt contact using multiple perameters e.g. balance, strength, ROM & multiple outcomes     Therapeutic procedure (49072)-exercise for strength,endurance, ROM, flexibility (active, active-assist or passive) 15    Self care/home mgmt (70937)-pt educ re: self MLD for home program, self bandaging, do's/don'ts, activity guidelines 45    Vasopneumatic Pump:  15    Total Treatment Time          Today's total: $136.95  Last total: $ 850.95  Total: $ 987.90    Time In: 1:00  PM  Time Out: 2:30 PM      Electronically signed by Chente Muñoz OT on 10/22/2020 at 1:02 PM

## 2020-10-27 ENCOUNTER — HOSPITAL ENCOUNTER (OUTPATIENT)
Dept: OCCUPATIONAL THERAPY | Age: 75
Setting detail: THERAPIES SERIES
Discharge: HOME | End: 2020-10-27
Payer: MEDICARE

## 2020-10-27 PROCEDURE — 97140 MANUAL THERAPY 1/> REGIONS: CPT

## 2020-10-27 PROCEDURE — 97016 VASOPNEUMATIC DEVICE THERAPY: CPT

## 2020-10-27 PROCEDURE — 97535 SELF CARE MNGMENT TRAINING: CPT

## 2020-10-27 NOTE — FLOWSHEET NOTE
[facility]  Lymphedema Services  Treatment Note      Date:  10/27/2020  Patient: Radha Corral  : 1945             MRN: [id number]  Referring Physician: Nico Gaffney MD  Phone Number: [phone]  Fax Number: [phone]  Insurance: MEDICARE   Medical Diagnosis: Lymphedema of both lower extremities     Rehab Codes: I89.0  Onset Date:     Visit# / total visits: ; Progress note for Medicare patient due at visit 10      SUBJECTIVE: Pt arrives to today treatement with no compression in place. Pt states he took them off around 10 am this morning to shower and soak his legs prior to coming in. Pt states that he has increased to 8 miles on his stationary bike and 6000 steps on his stepping machine. Pt states that he has been washing with a smooth cloth as on his last several times, which has helped minimize abrasions. A visible reduction in size of the LE is noted today, with minimal to no seepage remaining as a noted improvement. New measurements are taken, which do show a significant decrease in size as noted below. Pt trails the basic pump in clinc today while discussing home management and the option for the advanced pump. Reduction is noted and minimal to no seepage is expelled from the wounds. Pt is agreeable to have information sent to [address] [place] [address] for insurance verification. OT also follows up on order that was purchased last week. [employer] report the devices will ship out at the end of this week and pt will have them early next week. Minimal wound care needed completed today. OT then follows up with bandages pt from the lake of the foot to the base of the knee with approx 50% pull to encourage lymphatic flow production and maintaining a reduced size. OT applies Eucerine lotion to the L LE only to include the foot and leg. Stockinette is applied over top of the leg as a protective barrier from the lotion.  Artiflex fluff is applied to the L foot as the protective barrier from the foot and Rosidal foam is place on the leg from the ankle to just below the knee. Comprilan bandage is then placed from the lake of the foot to the base of the knee with approx 50% pull. Herringbone technique is used to progress up the leg and assist with venous return, and lymphatic flow production/ stimulating a suctioning effect. Pain:  [] YES    [x] NO  Location:  B FEET - pt states he will still get a burning sensation in his feet at night time.    Pain Rating: ( 0-10 scale) : 0  Pain Description:  [x] Achy,Worse at end of day  [] Distention, Discomfort  [] Painful with palpation  [] Guarding, Tingling, Numbness      [] Hypersensitivity       Restrictions/Precautions:   [x] None              [] No blood pressure/blood draw in: [] LUE [] RUE     Fall Risk   [x] No    [] Yes        If yes, intervention:     Self MLD suspended d/t:  [] Acute cellulitis  [] recent thrombosis [] inflammation w/ infection [] untreated CHF []Other:    Lymphedema Contraindication Assessment:  [x] Age 60+-No MLD to neck         [x] Inflammatory intestine (Chrohns- had surgery and intenstines removed, diverticulitis, ulcerative colitis) -No abdominal MLD  [] Cellulitis (warm, tender/pain, swelling, rash, fever, less defined erythematous borders) - No MLD until 72 hours of antibiotics     History of Cancer:  []Yes [x]No    Skin Integrity/Appearance: location   [] Normal                          [x] Dry              [] Warmth/Red/Itching/ Rash  [] Recent 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                    [x] Brawny/hardened/ Fibrosis     [] [place] hump-dorsum         [] Fatty lobules                                                  [] Loose, lobular                     []  Soft, doughy       [x] Hyperplasia                        [x]Moist/weeping/blisters with lymphorrhea     [x] Hyperkeratosis                    [] Congestive Dermatitis     [x] Hyperpigmentation/hemosiderin staining/gaitor distribution                                                               Symptom Onset:   [x] Slow   [] Rapid     [] Acute  Swelling Location:   [x] Truncal/abdominal swelling   [] Genital swelling  [] Fungal rash in skin folds   Stemmer sign:       [] Absent         [x] Positive    Wounds Location:   [] None  [] Being addressed by Reva Carpenter  [] Erythema ( Superficial Reddening)     [] Excoriation (Linear erosion marks ex:cat scratch)      [] Exudate (fluids expelled)    [] Purulent (Pus or discharge present in drainage)  [] Maceration (soften/breaking down of skin due to prolonged exposure to moisture)  [x] Denuded (loss of epidermis due to moisture or friction)    [] Shallow, painful venous                    [] Radiation burns/ulcers              [] Eschar (hard crust/scab)                Pitting Edema:  [x] R LE  [x]  L LE   [] R UE    [] L UE  [] 1+ Edema [x] 2+ Edema  [] 3+ Edema [] 4+ Edema    Education Included:  [x] General Lymphedema Information  [] Self MLD [] Self Bandaging [] Kinesiotaping    [] Nutrition   [] Home Exercises   [] Skin/nail care   [] NLN Risk Reduction Practice handout    Learner: [x] Patient [] Spouse  []Other [] Family  Method: [x] Verbal [] Demonstration [] Handouts [] Exercise booklet  Response:  [x] Verbalized understanding [] Demonstrated understanding      [] Needs assist            [] No understanding    Physical Status:  Range of motion: Deficits [x] Yes [] No       Comment: Some difficulty noted, however does not let it stop him. Strength:       Deficits [x] Yes [] No        Comment: Some weakness noted in the B LE. Sensation:       Deficits [] Yes [x] No        Comment:  Transfer:       Deficits [] Yes [x] No        Comment:  Ambulation:       Deficits [x] Yes [] No        Comment: Difficulty walking longer distances d/t easily fatigues and the legs starting to burn.    Functional Status:  Self Care:   [x] Independent [] Needs Assist [] Dependent Home Maintenance:    [x] Independent [] Needs Assist [] Dependent. Measurements Lower Extremity  Measurements taken from nail base of D2 digit. They are circumferential.   Measurements in Centimeters Right Left   MTS          Dorsum      10 24.0 26.5   Inframalleolar  16       35.0 36.4   Supramalleolar    20  27.0 29.5   10   27 26.2 32.4   20   37 31.4 36.0   30  47 39.0 39.0   Knee   52 38.0 40.0   10 cm above knee     Thigh-widest     Hip-widest     Total 220.6    220.8    226.8    232.4    221.9    245.5    247.4 239.8    246.9    250.0    263    251.5    275.8    276.2     Assessment Patient would benefit from skilled occupational therapy services in order to: To reduce the size of the leg and improve overall quality of life. Knowledge of home program:  [x] Good [] Fair  [] Poor  Family can assist with programming: [x] Yes  [] No  Instructions for patient:   [x] Verbal [x] Demonstration [x] Written    [] Education reviewed   [] Risk Reduction Practices I-VI handout issued    Rehabilitation Potential/ Commitment: [x] Good [] Fair     [] Poor  Post-treatment symptom assessment for swelling, heaviness, tightness to the affected limb, chest or truncal area: Continued decreased in edema noted as well as minimal to no seepage remaining. Therapy Goals:                 LOWER EXTREMITY GOALS:     1. Pt will demonstrate compliance of maintaining lymphedema precautions to reduce the risks of infection and further exacerbations by the 3rd visit. 2. (Pt/Family) will demonstrate proper technique with bandaging and understand the principles and theory behind bandaging technique in order to assist with decreasing swelling up to ( 5+ ) cm.     3. Pt will demonstrate independence with decongestive exercise program in order to expedite fluid rerouting. 4. Pt will demonstrate competence with (traditional) lymphatic drainage massage in order to reroute lymphatic pathways for decreased swelling.     5. Pt will demonstrate competence with kinesiotape technique to affected area(s) to assist with decreasing swelling. 6. Pt will demonstrate independence donning the device with safe and effective use of pneumatic pump in order for possible home use. 7. Pt will demonstrate safe and effective use of compression garment to maintain decreased size upon discharge. 8. Pt will demonstrate safe and effective use of alternative bandaging/ velcro devices to simplify and reduce size upon discharge. Plan:   Pt will be seen 1-3 times per week for 10 visits and reduce down as appropriate within 90 days, with focus on short and long term goals listed above.    Continue to address:  [x]Bandaging  [] Self Bandaging/Family Assist  [x]MLD  [x]Self Massage  [x]Exercise  [x]Education  [x]Obtain referral for garments  []Medical Hold  []Discharge to Home Program    Functional Assessment Used: Lymphedema Life Impact Scale (LLIS)  Functional Impact Score: [x] Eval _43%___ [] 10th visit____  [] 20th visit ____ [] 30th visit____  [] D/C ____        Charges   Minutes   Units   Evaluation (00844)            Low            Moderate            High     Manual Therapy (07804): pre,intra or post hands-on MLD service to modulate pain, increase ROM, reduce swelling, inflammation, or restriction; improve contractile/noncontractile tissue extensibility 30    Therapeutic activities (19618): dynamic activities to improve functional performance with or w/o bandages with direct pt contact using multiple perameters e.g. balance, strength, ROM & multiple outcomes     Therapeutic procedure (47879)-exercise for strength,endurance, ROM, flexibility (active, active-assist or passive)     Self care/home mgmt (23777)-pt educ re: self MLD for home program, self bandaging, do's/don'ts, activity guidelines 45    Vasopneumatic Pump:  15    Total Treatment Time          Today's total: $134.63  Last total: $ 987.90  Total: $1122.53    Time In: 1:00  PM  Time Out: 2:30

## 2020-10-29 ENCOUNTER — HOSPITAL ENCOUNTER (OUTPATIENT)
Dept: OCCUPATIONAL THERAPY | Age: 75
Setting detail: THERAPIES SERIES
Discharge: HOME | End: 2020-10-29
Payer: MEDICARE

## 2020-10-29 PROCEDURE — 97535 SELF CARE MNGMENT TRAINING: CPT

## 2020-10-29 PROCEDURE — 97016 VASOPNEUMATIC DEVICE THERAPY: CPT

## 2020-10-29 PROCEDURE — 97140 MANUAL THERAPY 1/> REGIONS: CPT

## 2020-10-29 NOTE — FLOWSHEET NOTE
daily use of a pump to help control the edema. Minimal wound care needed completed today. OT then follows up with bandages pt from the lake of the foot to the base of the knee with approx 50% pull to encourage lymphatic flow production and maintaining a reduced size. OT applies Eucerine lotion to the L LE only to include the foot and leg. Stockinette is applied over top of the leg as a protective barrier from the lotion. Artiflex fluff is applied to the L foot as the protective barrier from the foot and Rosidal foam is place on the leg from the ankle to just below the knee. Comprilan bandage is then placed from the lake of the foot to the base of the knee with approx 50% pull. Herringbone technique is used to progress up the leg and assist with venous return, and lymphatic flow production/ stimulating a suctioning effect. Pain:  [] YES    [x] NO  Location:  B FEET - pt states he will still get a burning sensation in his feet at night time.    Pain Rating: ( 0-10 scale) : 0  Pain Description:  [x] Achy,Worse at end of day  [] Distention, Discomfort  [] Painful with palpation  [] Guarding, Tingling, Numbness      [] Hypersensitivity       Restrictions/Precautions:   [x] None              [] No blood pressure/blood draw in: [] LUE [] RUE     Fall Risk   [x] No    [] Yes        If yes, intervention:     Self MLD suspended d/t:  [] Acute cellulitis  [] recent thrombosis [] inflammation w/ infection [] untreated CHF []Other:    Lymphedema Contraindication Assessment:  [x] Age 60+-No MLD to neck         [x] Inflammatory intestine (Chrohns- had surgery and intenstines removed, diverticulitis, ulcerative colitis) -No abdominal MLD  [] Cellulitis (warm, tender/pain, swelling, rash, fever, less defined erythematous borders) - No MLD until 72 hours of antibiotics     History of Cancer:  []Yes [x]No    Skin Integrity/Appearance: location   [] Normal                          [x] Dry              [] Warmth/Red/Itching/ Rash  [] Recent 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                    [x] Brawny/hardened/ Fibrosis     [] [place] hump-dorsum         [] Fatty lobules                                                  [] Loose, lobular                     []  Soft, doughy       [x] Hyperplasia                        [x]Moist/weeping/blisters with lymphorrhea     [x] Hyperkeratosis                    [] Congestive Dermatitis     [x] Hyperpigmentation/hemosiderin staining/gaitor distribution                                                               Symptom Onset:   [x] Slow   [] Rapid     [] Acute  Swelling Location:   [x] Truncal/abdominal swelling   [] Genital swelling  [] Fungal rash in skin folds   Stemmer sign:       [] Absent         [x] Positive    Wounds Location:   [x] None  [] Being addressed by Reva Carpenter  [] Erythema ( Superficial Reddening)     [] Excoriation (Linear erosion marks ex:cat scratch)      [] Exudate (fluids expelled)    [] Purulent (Pus or discharge present in drainage)  [] Maceration (soften/breaking down of skin due to prolonged exposure to moisture)  [] Denuded (loss of epidermis due to moisture or friction)    [] Shallow, painful venous                    [] Radiation burns/ulcers              [] Eschar (hard crust/scab)                Pitting Edema:  [x] R LE  [x]  L LE   [] R UE    [] L UE  [] 1+ Edema [x] 2+ Edema  [] 3+ Edema [] 4+ Edema    Education Included:  [x] General Lymphedema Information  [] Self MLD [] Self Bandaging [] Kinesiotaping    [] Nutrition   [] Home Exercises   [] Skin/nail care   [] NLN Risk Reduction Practice handout    Learner: [x] Patient [] Spouse  []Other [] Family  Method: [x] Verbal [] Demonstration [] Handouts [] Exercise booklet  Response:  [x] Verbalized understanding [] Demonstrated understanding      [] Needs assist            [] No understanding    Physical Status:  Range of motion: Deficits [x] Yes [] No       Comment: Some difficulty noted, however does not let it stop him. Strength:       Deficits [x] Yes [] No        Comment: Some weakness noted in the B LE. Sensation:       Deficits [] Yes [x] No        Comment:  Transfer:       Deficits [] Yes [x] No        Comment:  Ambulation:       Deficits [x] Yes [] No        Comment: Difficulty walking longer distances d/t easily fatigues and the legs starting to burn. Functional Status:  Self Care:   [x] Independent [] Needs Assist [] Dependent   Home Maintenance:    [x] Independent [] Needs Assist [] Dependent. Measurements Lower Extremity  Measurements taken from nail base of D2 digit. They are circumferential.   Measurements in Centimeters Right Left   MTS          Dorsum      10 24.0 26.0   Inframalleolar  16       34.5 35.7   Supramalleolar    20  25.0 29.0   10   27 25.5 31.2   20   37 31.0 34.5   30  47 37.4 39.6   Knee   52 38.5 40.5   10 cm above knee     Thigh-widest     Hip-widest     Total 215.6    220.6    220.8    226.8    232.4    221.9    245.5    247.4 236.5    239.8    246.9    250.0    263    251.5    275.8    276.2     Assessment Patient would benefit from skilled occupational therapy services in order to: To reduce the size of the leg and improve overall quality of life. Knowledge of home program:  [x] Good [] Fair  [] Poor  Family can assist with programming: [x] Yes  [] No  Instructions for patient:   [x] Verbal [x] Demonstration [x] Written    [] Education reviewed   [] Risk Reduction Practices I-VI handout issued    Rehabilitation Potential/ Commitment: [x] Good [] Fair     [] Poor  Post-treatment symptom assessment for swelling, heaviness, tightness to the affected limb, chest or truncal area: Continued decreased in edema noted as well as minimal to no seepage remaining. Therapy Goals:                 LOWER EXTREMITY GOALS:     1. Pt will demonstrate compliance of maintaining lymphedema precautions to reduce the risks of infection and further exacerbations by the 3rd visit.     2. (Pt/Family) will demonstrate proper technique with bandaging and understand the principles and theory behind bandaging technique in order to assist with decreasing swelling up to ( 5+ ) cm.     3. Pt will demonstrate independence with decongestive exercise program in order to expedite fluid rerouting. 4. Pt will demonstrate competence with (traditional) lymphatic drainage massage in order to reroute lymphatic pathways for decreased swelling. 5. Pt will demonstrate competence with kinesiotape technique to affected area(s) to assist with decreasing swelling. 6. Pt will demonstrate independence donning the device with safe and effective use of pneumatic pump in order for possible home use. 7. Pt will demonstrate safe and effective use of compression garment to maintain decreased size upon discharge. 8. Pt will demonstrate safe and effective use of alternative bandaging/ velcro devices to simplify and reduce size upon discharge. Plan:   Pt will be seen 1-3 times per week for 10 visits and reduce down as appropriate within 90 days, with focus on short and long term goals listed above.    Continue to address:  [x]Bandaging  [] Self Bandaging/Family Assist  [x]MLD  [x]Self Massage  [x]Exercise  [x]Education  [x]Obtain referral for garments  []Medical Hold  []Discharge to Home Program    Functional Assessment Used: Lymphedema Life Impact Scale (LLIS)  Functional Impact Score: [x] Eval _43%___ [] 10th visit____  [] 20th visit ____ [] 30th visit____  [] D/C ____        Charges   Minutes   Units   Evaluation (61084)            Low            Moderate            High     Manual Therapy (75945): pre,intra or post hands-on MLD service to modulate pain, increase ROM, reduce swelling, inflammation, or restriction; improve contractile/noncontractile tissue extensibility 30 2   Therapeutic activities (93132): dynamic activities to improve functional performance with or w/o bandages with direct pt contact using multiple perameters e.g. balance, strength, ROM & multiple outcomes     Therapeutic procedure (23366)-exercise for strength,endurance, ROM, flexibility (active, active-assist or passive)     Self care/home mgmt (96764)-pt educ re: self MLD for home program, self bandaging, do's/don'ts, activity guidelines 45 3   Vasopneumatic Pump:  15 1   Total Treatment Time    84 6     Today's total: $135.43  Last total: $ 1122.53  Total: $1257.96    Time In: 1:00  PM  Time Out: 2:24 PM      Electronically signed by Neri Knapp OT on 10/29/2020 at 1:10 PM

## 2020-11-02 RX ORDER — CLOPIDOGREL BISULFATE 75 MG/1
TABLET ORAL
COMMUNITY
End: 2021-11-01

## 2020-11-02 RX ORDER — ASPIRIN 81 MG/1
TABLET ORAL
COMMUNITY
End: 2020-11-02

## 2020-11-02 RX ORDER — TAMSULOSIN HYDROCHLORIDE 0.4 MG/1
CAPSULE ORAL
COMMUNITY
End: 2021-11-01

## 2020-11-02 RX ORDER — CEPHALEXIN 500 MG/1
CAPSULE ORAL
COMMUNITY
Start: 2020-01-20 | End: 2021-11-01

## 2020-11-02 RX ORDER — CARVEDILOL 3.12 MG/1
TABLET ORAL
COMMUNITY
End: 2021-11-01

## 2020-11-02 RX ORDER — CIPROFLOXACIN 500 MG/1
TABLET, FILM COATED ORAL
COMMUNITY
Start: 2019-11-11 | End: 2021-11-01

## 2020-11-02 RX ORDER — HYDROCODONE BITARTRATE AND ACETAMINOPHEN 5; 325 MG/1; MG/1
TABLET ORAL
COMMUNITY
Start: 2020-01-20 | End: 2021-11-01

## 2020-11-03 ENCOUNTER — OFFICE VISIT (OUTPATIENT)
Dept: INTERNAL MEDICINE CLINIC | Age: 75
End: 2020-11-03
Payer: MEDICARE

## 2020-11-03 ENCOUNTER — HOSPITAL ENCOUNTER (OUTPATIENT)
Age: 75
Setting detail: SPECIMEN
Discharge: HOME | End: 2020-11-03
Payer: MEDICARE

## 2020-11-03 VITALS
SYSTOLIC BLOOD PRESSURE: 155 MMHG | DIASTOLIC BLOOD PRESSURE: 88 MMHG | OXYGEN SATURATION: 98 % | WEIGHT: 225 LBS | BODY MASS INDEX: 35.31 KG/M2 | HEIGHT: 67 IN | TEMPERATURE: 98.1 F | HEART RATE: 86 BPM

## 2020-11-03 PROBLEM — I25.10 CAD (CORONARY ARTERY DISEASE): Status: RESOLVED | Noted: 2020-11-03 | Resolved: 2020-11-03

## 2020-11-03 LAB
ANION GAP SERPL CALCULATED.3IONS-SCNC: 12 MMOL/L (ref 9–17)
BUN BLDV-MCNC: 19 MG/DL (ref 8–23)
BUN/CREAT BLD: ABNORMAL (ref 9–20)
CALCIUM SERPL-MCNC: 9.2 MG/DL (ref 8.6–10.4)
CHLORIDE BLD-SCNC: 104 MMOL/L (ref 98–107)
CO2: 27 MMOL/L (ref 20–31)
CREAT SERPL-MCNC: 1.33 MG/DL (ref 0.7–1.2)
GFR AFRICAN AMERICAN: >60 ML/MIN
GFR NON-AFRICAN AMERICAN: 53 ML/MIN
GFR SERPL CREATININE-BSD FRML MDRD: ABNORMAL ML/MIN/{1.73_M2}
GFR SERPL CREATININE-BSD FRML MDRD: ABNORMAL ML/MIN/{1.73_M2}
GLUCOSE BLD-MCNC: 88 MG/DL (ref 70–99)
POTASSIUM SERPL-SCNC: 4.4 MMOL/L (ref 3.7–5.3)
SODIUM BLD-SCNC: 143 MMOL/L (ref 135–144)

## 2020-11-03 PROCEDURE — 1123F ACP DISCUSS/DSCN MKR DOCD: CPT | Performed by: INTERNAL MEDICINE

## 2020-11-03 PROCEDURE — G8427 DOCREV CUR MEDS BY ELIG CLIN: HCPCS | Performed by: INTERNAL MEDICINE

## 2020-11-03 PROCEDURE — 3017F COLORECTAL CA SCREEN DOC REV: CPT | Performed by: INTERNAL MEDICINE

## 2020-11-03 PROCEDURE — G8417 CALC BMI ABV UP PARAM F/U: HCPCS | Performed by: INTERNAL MEDICINE

## 2020-11-03 PROCEDURE — 4040F PNEUMOC VAC/ADMIN/RCVD: CPT | Performed by: INTERNAL MEDICINE

## 2020-11-03 PROCEDURE — G8484 FLU IMMUNIZE NO ADMIN: HCPCS | Performed by: INTERNAL MEDICINE

## 2020-11-03 PROCEDURE — 1036F TOBACCO NON-USER: CPT | Performed by: INTERNAL MEDICINE

## 2020-11-03 PROCEDURE — 99214 OFFICE O/P EST MOD 30 MIN: CPT | Performed by: INTERNAL MEDICINE

## 2020-11-03 RX ORDER — LISINOPRIL 20 MG/1
20 TABLET ORAL DAILY
Qty: 30 TABLET | Refills: 3 | Status: SHIPPED | OUTPATIENT
Start: 2020-11-03 | End: 2020-12-29 | Stop reason: SDUPTHER

## 2020-11-03 RX ORDER — LOPERAMIDE HYDROCHLORIDE 2 MG/1
2 CAPSULE ORAL PRN
Qty: 180 CAPSULE | Refills: 3 | Status: SHIPPED | OUTPATIENT
Start: 2020-11-03 | End: 2022-01-03

## 2020-11-03 ASSESSMENT — ENCOUNTER SYMPTOMS
TROUBLE SWALLOWING: 0
EYE DISCHARGE: 0
WHEEZING: 0
SHORTNESS OF BREATH: 0
BLOOD IN STOOL: 0
DIARRHEA: 0
COLOR CHANGE: 0
COUGH: 0
EYE PAIN: 0
ABDOMINAL DISTENTION: 0

## 2020-11-03 NOTE — PROGRESS NOTES
History:   Procedure Laterality Date    ABDOMEN SURGERY  2016    explor.  lap., ileostomy    ABDOMEN SURGERY  2017    colostomy/ileostomy takedown with wound vac application hernia peristomal repair    CARDIAC CATHETERIZATION      NO STENTS    COLONOSCOPY  2005  2007  11/10/2009    COLONOSCOPY  16    extreme spasmatic colon; extensive diverticulosis; ventral herniation; flat polyp; large internal hemorrhoids    COLONOSCOPY  2016    through ileostomy    ILEOSTOMY OR JEJUNOSTOMY      POLYPECTOMY      CO REPAIR INCISIONAL HERNIA,REDUCIBLE N/A 2017    HERNIA PERISTOMAL REPAIR RIGHT performed by Tyson Garza MD at [facility] N/A 2017    COLOSTOMY ILEOSTOMY TAKEDOWN WITH WOUND VAC APPLICATION performed by Tyson Garza MD at [address] History   Problem Relation Age of Onset    Cancer Father     Heart Disease Father     Diabetes Mother     Heart Disease Mother     High Blood Pressure Mother        Social History     Tobacco Use    Smoking status: Former Smoker     Packs/day: 0.50     Years: 25.00     Pack years: 12.50     Types: Cigarettes     Last attempt to quit: 2016     Years since quittin.2    Smokeless tobacco: Never Used   Substance Use Topics    Alcohol use: No     Alcohol/week: 0.0 standard drinks      Current Outpatient Medications   Medication Sig Dispense Refill    loperamide (IMODIUM) 2 MG capsule Take 1 capsule by mouth as needed for Diarrhea 180 capsule 3    lisinopril (PRINIVIL;ZESTRIL) 20 MG tablet Take 1 tablet by mouth daily 30 tablet 3    albuterol (PROVENTIL) (5 MG/ML) 0.5% nebulizer solution 0.5 ml      carvedilol (COREG) 3.125 MG tablet       ciprofloxacin (CIPRO) 500 MG tablet 1 tablet      tamsulosin (FLOMAX) 0.4 MG capsule       HYDROcodone-acetaminophen (NORCO) 5-325 MG per tablet 1 tablet as needed      cephALEXin (KEFLEX) 500 MG capsule 1 capsule      clopidogrel (PLAVIX) 75 MG tablet 1 tablet      Cholecalciferol (VITAMIN D3) 125 MCG (5000 UT) TABS Take by mouth      furosemide (LASIX) 20 MG tablet Take 1 tablet by mouth daily 90 tablet 3    aspirin 81 MG EC tablet Take 1 tablet by mouth every morning 30 tablet 3     No current facility-administered medications for this visit. No Known Allergies    Health Maintenance   Topic Date Due    DTaP/Tdap/Td vaccine (1 - Tdap) 11/25/1964    Shingles Vaccine (1 of 2) 11/25/1995    Pneumococcal 65+ years Vaccine (2 of 2 - PPSV23) 10/15/2019    Flu vaccine (1) 09/01/2020    PSA counseling  09/18/2020    A1C test (Diabetic or Prediabetic)  07/21/2021    Annual Wellness Visit (AWV)  07/21/2021    Potassium monitoring  07/21/2021    Creatinine monitoring  07/21/2021    Colon cancer screen colonoscopy  12/06/2021    Lipid screen  11/26/2023    AAA screen  Completed    Hepatitis C screen  Completed    Hepatitis A vaccine  Aged Out    Hepatitis B vaccine  Aged Out    Hib vaccine  Aged Out    Meningococcal (ACWY) vaccine  Aged Out       Subjective:     Review of Systems   Constitutional: Negative for appetite change, diaphoresis and fatigue. HENT: Negative for ear discharge and trouble swallowing. Eyes: Negative for pain and discharge. Respiratory: Negative for cough, shortness of breath and wheezing. Cardiovascular: Positive for leg swelling. Negative for chest pain and palpitations. Gastrointestinal: Negative for abdominal distention, blood in stool and diarrhea. Endocrine: Negative for polydipsia and polyphagia. Genitourinary: Negative for difficulty urinating and frequency. Musculoskeletal: Positive for arthralgias. Negative for gait problem, myalgias and neck pain. Skin: Negative for color change and rash. Allergic/Immunologic: Negative for environmental allergies and food allergies. Neurological: Negative for dizziness and headaches. Hematological: Negative for adenopathy.  Does not bruise/bleed easily. Psychiatric/Behavioral: Negative for behavioral problems and sleep disturbance. Objective:     Physical Exam  Constitutional:       Appearance: He is well-developed. He is not diaphoretic. HENT:      Head: Normocephalic and atraumatic. Eyes:      General:         Right eye: No discharge. Left eye: No discharge. Extraocular Movements:      Right eye: Normal extraocular motion. Left eye: Normal extraocular motion. Conjunctiva/sclera: Conjunctivae normal.      Right eye: Right conjunctiva is not injected. Left eye: Left conjunctiva is not injected. Neck:      Musculoskeletal: Normal range of motion and neck supple. No edema or erythema. Thyroid: No thyroid mass or thyromegaly. Vascular: No JVD. Cardiovascular:      Rate and Rhythm: Normal rate and regular rhythm. Heart sounds: No murmur. No friction rub. Pulmonary:      Effort: Pulmonary effort is normal. No tachypnea, bradypnea, accessory muscle usage or respiratory distress. Breath sounds: Normal breath sounds. No wheezing or rales. Abdominal:      General: Bowel sounds are normal. There is no distension. Palpations: Abdomen is soft. Tenderness: There is no abdominal tenderness. There is no rebound. Musculoskeletal: Normal range of motion. General: No tenderness. Right lower leg: Edema present. Left lower leg: Edema present. Lymphadenopathy:      Head:      Right side of head: No submental or submandibular adenopathy. Left side of head: No submental or submandibular adenopathy. Cervical: No cervical adenopathy. Skin:     General: Skin is warm. Coloration: Skin is not pale. Findings: No bruising, ecchymosis or rash. Neurological:      Mental Status: He is alert and oriented to person, place, and time. Cranial Nerves: No cranial nerve deficit. Sensory: No sensory deficit. Motor: No atrophy or abnormal muscle tone.

## 2020-11-03 NOTE — PROGRESS NOTES
Visit Information    Have you changed or started any medications since your last visit including any over-the-counter medicines, vitamins, or herbal medicines? no   Are you having any side effects from any of your medications? -  no  Have you stopped taking any of your medications? Is so, why? -  no    Have you seen any other physician or provider since your last visit? No  Have you had any other diagnostic tests since your last visit? No  Have you been seen in the emergency room and/or had an admission to a hospital since we last saw you? No  Have you had your routine dental cleaning in the past 6 months? no    Have you activated your [employer] account? If not, what are your barriers?  No:      Patient Care Team:  Jaxson Lo MD as PCP - General (Internal Medicine)  Jaxson Lo MD as PCP - [facility] Provider  Luanna Hamman, MD as Consulting Physician (Gastroenterology)    Medical History Review  Past Medical, Family, and Social History reviewed and does not contribute to the patient presenting condition    Health Maintenance   Topic Date Due    DTaP/Tdap/Td vaccine (1 - Tdap) 11/25/1964    Shingles Vaccine (1 of 2) 11/25/1995    Pneumococcal 65+ years Vaccine (2 of 2 - PPSV23) 10/15/2019    Flu vaccine (1) 09/01/2020    PSA counseling  09/18/2020    A1C test (Diabetic or Prediabetic)  07/21/2021    Annual Wellness Visit (AWV)  07/21/2021    Potassium monitoring  07/21/2021    Creatinine monitoring  07/21/2021    Colon cancer screen colonoscopy  12/06/2021    Lipid screen  11/26/2023    AAA screen  Completed    Hepatitis C screen  Completed    Hepatitis A vaccine  Aged Out    Hepatitis B vaccine  Aged Out    Hib vaccine  Aged Out    Meningococcal (ACWY) vaccine  Aged Out

## 2020-11-05 ENCOUNTER — TELEPHONE (OUTPATIENT)
Dept: INTERNAL MEDICINE CLINIC | Age: 75
End: 2020-11-05

## 2020-12-04 ENCOUNTER — TELEPHONE (OUTPATIENT)
Dept: INTERNAL MEDICINE CLINIC | Age: 75
End: 2020-12-04

## 2020-12-09 ENCOUNTER — OFFICE VISIT (OUTPATIENT)
Dept: INTERNAL MEDICINE CLINIC | Age: 75
End: 2020-12-09
Payer: MEDICARE

## 2020-12-09 VITALS
SYSTOLIC BLOOD PRESSURE: 138 MMHG | BODY MASS INDEX: 36.88 KG/M2 | HEIGHT: 67 IN | DIASTOLIC BLOOD PRESSURE: 88 MMHG | TEMPERATURE: 97.2 F | WEIGHT: 235 LBS | OXYGEN SATURATION: 82 % | HEART RATE: 97 BPM

## 2020-12-09 PROCEDURE — 99214 OFFICE O/P EST MOD 30 MIN: CPT | Performed by: INTERNAL MEDICINE

## 2020-12-09 PROCEDURE — 3017F COLORECTAL CA SCREEN DOC REV: CPT | Performed by: INTERNAL MEDICINE

## 2020-12-09 PROCEDURE — G8427 DOCREV CUR MEDS BY ELIG CLIN: HCPCS | Performed by: INTERNAL MEDICINE

## 2020-12-09 PROCEDURE — 1036F TOBACCO NON-USER: CPT | Performed by: INTERNAL MEDICINE

## 2020-12-09 PROCEDURE — 1123F ACP DISCUSS/DSCN MKR DOCD: CPT | Performed by: INTERNAL MEDICINE

## 2020-12-09 PROCEDURE — G8417 CALC BMI ABV UP PARAM F/U: HCPCS | Performed by: INTERNAL MEDICINE

## 2020-12-09 PROCEDURE — G8484 FLU IMMUNIZE NO ADMIN: HCPCS | Performed by: INTERNAL MEDICINE

## 2020-12-09 PROCEDURE — 4040F PNEUMOC VAC/ADMIN/RCVD: CPT | Performed by: INTERNAL MEDICINE

## 2020-12-09 ASSESSMENT — ENCOUNTER SYMPTOMS
SHORTNESS OF BREATH: 0
TROUBLE SWALLOWING: 0
COUGH: 0
EYE DISCHARGE: 0
ABDOMINAL DISTENTION: 0
WHEEZING: 0
DIARRHEA: 0
COLOR CHANGE: 0
BLOOD IN STOOL: 0
EYE PAIN: 0

## 2020-12-09 NOTE — PROGRESS NOTES
[address]  [place] [address]  Dept: [phone]  Dept Fax: [phone]    Ajay Da Silva is a 76 y.o. male who presents today for his medical conditions/complaintsas noted below. Ajay Da Silva is c/o of   Chief Complaint   Patient presents with    Follow-up         HPI:     HTN  Onset more than 2 years ago  rosario mild to mod  Controlled with current po meds  Not associated with headaches or blurry vision  No chest pain  Diabetes   Duration more than 7 years  Modifying factors on Glucophage and other med  Severity uncontrolled sever  Associated signs and symtoms neuropathy/ckd/ CAD. aggravated with sugar diet and better with low sugar diet           Hemoglobin A1C (%)   Date Value   07/21/2020 5.9   11/26/2018 6.1 (H)   08/17/2017 5.5             ( goal A1Cis < 7)   Microalb/Crt.  Ratio (mcg/mg creat)   Date Value   02/28/2017 11     LDL Cholesterol (mg/dL)   Date Value   11/26/2018 81   04/18/2013 86   04/18/2012 74       (goal LDL is <100)   AST (U/L)   Date Value   03/19/2018 26     ALT (U/L)   Date Value   03/19/2018 25     BUN (mg/dL)   Date Value   11/03/2020 19     BP Readings from Last 3 Encounters:   12/09/20 138/88   11/03/20 (!) 155/88   09/10/20 138/86          (goal 120/80)    Past Medical History:   Diagnosis Date    Abdominal hernia     Atrophy of muscle of multiple sites 8/19/2016    CAD (coronary artery disease)     CKD (chronic kidney disease) stage 3, GFR 30-59 ml/min     Colon polyp     Crohn disease ([address]     Crohn's colitis ([address]     Decubitus ulcer of coccyx, stage 2 ([address] 8/8/2016    Diarrhea     Diverticulosis     Hemorrhoids     History of atrial fibrillation     Hypertension     Ileostomy in place ([address] 8/18/2016    Internal hemorrhoids     NSVT (nonsustained ventricular tachycardia) ([address] 08/18/2016    Other and unspecified hyperlipidemia     HISTORY OF HIGH CHOLESTEROL    Past heart attack     Tobacco use disorder     Tubular adenoma       Past Surgical History:   Procedure Laterality Date    ABDOMEN SURGERY  2016    explor.  lap., ileostomy    ABDOMEN SURGERY  2017    colostomy/ileostomy takedown with wound vac application hernia peristomal repair    CARDIAC CATHETERIZATION      NO STENTS    COLONOSCOPY  2005  2007  11/10/2009    COLONOSCOPY  16    extreme spasmatic colon; extensive diverticulosis; ventral herniation; flat polyp; large internal hemorrhoids    COLONOSCOPY  2016    through ileostomy    ILEOSTOMY OR JEJUNOSTOMY      POLYPECTOMY      RI REPAIR INCISIONAL HERNIA,REDUCIBLE N/A 2017    HERNIA PERISTOMAL REPAIR RIGHT performed by Harman Phalen, MD at [address] [place] [address] N/A 2017    COLOSTOMY ILEOSTOMY TAKEDOWN WITH WOUND VAC APPLICATION performed by Harman Phalen, MD at [facility] History   Problem Relation Age of Onset    Cancer Father     Heart Disease Father     Diabetes Mother     Heart Disease Mother     High Blood Pressure Mother        Social History     Tobacco Use    Smoking status: Former Smoker     Packs/day: 0.50     Years: 25.00     Pack years: 12.50     Types: Cigarettes     Last attempt to quit: 2016     Years since quittin.3    Smokeless tobacco: Never Used   Substance Use Topics    Alcohol use: No     Alcohol/week: 0.0 standard drinks      Current Outpatient Medications   Medication Sig Dispense Refill    loperamide (IMODIUM) 2 MG capsule Take 1 capsule by mouth as needed for Diarrhea 180 capsule 3    albuterol (PROVENTIL) (5 MG/ML) 0.5% nebulizer solution 0.5 ml      carvedilol (COREG) 3.125 MG tablet       ciprofloxacin (CIPRO) 500 MG tablet 1 tablet      tamsulosin (FLOMAX) 0.4 MG capsule       HYDROcodone-acetaminophen (NORCO) 5-325 MG per tablet 1 tablet as needed      cephALEXin (KEFLEX) 500 MG capsule 1 capsule      clopidogrel (PLAVIX) 75 MG tablet 1 tablet      Cholecalciferol (VITAMIN D3) 125 MCG (5000 UT) TABS Take by mouth      furosemide (LASIX) 20 MG tablet Take 1 tablet by mouth daily 90 tablet 3    aspirin 81 MG EC tablet Take 1 tablet by mouth every morning 30 tablet 3    lisinopril (PRINIVIL;ZESTRIL) 20 MG tablet Take 1 tablet by mouth daily 30 tablet 3     No current facility-administered medications for this visit. No Known Allergies    Health Maintenance   Topic Date Due    DTaP/Tdap/Td vaccine (1 - Tdap) 11/25/1964    Shingles Vaccine (1 of 2) 11/25/1995    Pneumococcal 65+ years Vaccine (2 of 2 - PPSV23) 10/15/2019    Flu vaccine (1) 09/01/2020    PSA counseling  09/18/2020    A1C test (Diabetic or Prediabetic)  07/21/2021    Annual Wellness Visit (AWV)  07/21/2021    Potassium monitoring  11/03/2021    Creatinine monitoring  11/03/2021    Colon cancer screen colonoscopy  12/06/2021    Lipid screen  11/26/2023    AAA screen  Completed    Hepatitis C screen  Completed    Hepatitis A vaccine  Aged Out    Hepatitis B vaccine  Aged Out    Hib vaccine  Aged Out    Meningococcal (ACWY) vaccine  Aged Out       Subjective:     Review of Systems   Constitutional: Negative for appetite change, diaphoresis and fatigue. HENT: Negative for ear discharge and trouble swallowing. Eyes: Negative for pain and discharge. Respiratory: Negative for cough, shortness of breath and wheezing. Cardiovascular: Negative for chest pain and palpitations. Gastrointestinal: Negative for abdominal distention, blood in stool and diarrhea. Endocrine: Negative for polydipsia and polyphagia. Genitourinary: Negative for difficulty urinating and frequency. Musculoskeletal: Positive for arthralgias. Negative for gait problem, myalgias and neck pain. Skin: Negative for color change and rash. Allergic/Immunologic: Negative for environmental allergies and food allergies. Neurological: Negative for dizziness and headaches. Hematological: Negative for adenopathy.  Does not bruise/bleed easily. Psychiatric/Behavioral: Negative for behavioral problems and sleep disturbance. Objective:     Physical Exam  Constitutional:       Appearance: He is well-developed. He is not diaphoretic. HENT:      Head: Normocephalic and atraumatic. Eyes:      General:         Right eye: No discharge. Left eye: No discharge. Extraocular Movements:      Right eye: Normal extraocular motion. Left eye: Normal extraocular motion. Conjunctiva/sclera: Conjunctivae normal.      Right eye: Right conjunctiva is not injected. Left eye: Left conjunctiva is not injected. Neck:      Musculoskeletal: Normal range of motion and neck supple. No edema or erythema. Thyroid: No thyroid mass or thyromegaly. Vascular: No JVD. Cardiovascular:      Rate and Rhythm: Normal rate and regular rhythm. Heart sounds: No murmur. No friction rub. Pulmonary:      Effort: Pulmonary effort is normal. No tachypnea, bradypnea, accessory muscle usage or respiratory distress. Breath sounds: Normal breath sounds. No wheezing or rales. Abdominal:      General: Bowel sounds are normal. There is no distension. Palpations: Abdomen is soft. Tenderness: There is no abdominal tenderness. There is no rebound. Musculoskeletal: Normal range of motion. General: No tenderness. Right lower leg: Edema present. Left lower leg: Edema present. Lymphadenopathy:      Head:      Right side of head: No submental or submandibular adenopathy. Left side of head: No submental or submandibular adenopathy. Cervical: No cervical adenopathy. Skin:     General: Skin is warm. Coloration: Skin is not pale. Findings: No bruising, ecchymosis or rash. Neurological:      Mental Status: He is alert and oriented to person, place, and time. Cranial Nerves: No cranial nerve deficit. Sensory: No sensory deficit.       Motor: No atrophy or abnormal muscle tone.      Coordination: Coordination normal.   Psychiatric:         Mood and Affect: Mood is not anxious. Affect is not angry. Speech: Speech is not slurred. Behavior: Behavior normal. Behavior is not aggressive. Thought Content: Thought content does not include homicidal ideation. Cognition and Memory: Memory is not impaired. /88   Pulse 97   Temp 97.2 °F (36.2 °C)   Ht 5' 7\" (1.702 m)   Wt 235 lb (106.6 kg)   SpO2 (!) 82%   BMI 36.81 kg/m²     Assessment:       Diagnosis Orders   1. Chronic systolic (congestive) heart failure ([address]     2. Crohn's disease of small intestine with complication ([address]     3. Essential hypertension     4. Morbidly obese ([address]     5. Peripheral arterial occlusive disease (HCC)     6. Stage 3b chronic kidney disease               Plan:      Return in about 3 months (around 3/9/2021). No orders of the defined types were placed in this encounter. No orders of the defined types were placed in this encounter. sgned for pneumtic pumps legs  Chronic lymphdema  Lasix daily  No norvasc  rvsp not calcugtated  Ef if  45  Seen vascualr before  dont want to see anymore  previsous dvt scan in 2017 no dvt  Wt loss advseid  Ace wrps and or yolanda stocking  Declined flu shot     Patient given educational materials - see patient instructions. Discussed use, benefit, and side effects of prescribed medications. All patientquestions answered. Pt voiced understanding. Reviewed health maintenance. Instructedto continue current medications, diet and exercise. Patient agreed with treatmentplan. Follow up as directed.      Electronically signed by Michelle Funk MD on 12/9/2020 at 1:10 PM

## 2020-12-29 RX ORDER — LISINOPRIL 20 MG/1
20 TABLET ORAL DAILY
Qty: 30 TABLET | Refills: 3 | Status: SHIPPED | OUTPATIENT
Start: 2020-12-29 | End: 2021-04-12 | Stop reason: SDUPTHER

## 2021-01-12 ENCOUNTER — HOSPITAL ENCOUNTER (OUTPATIENT)
Dept: OCCUPATIONAL THERAPY | Age: 76
Setting detail: THERAPIES SERIES
Discharge: HOME | End: 2021-01-12
Payer: MEDICARE

## 2021-01-12 PROCEDURE — 97016 VASOPNEUMATIC DEVICE THERAPY: CPT

## 2021-01-12 PROCEDURE — 97535 SELF CARE MNGMENT TRAINING: CPT

## 2021-01-12 NOTE — PROGRESS NOTES
[facility]  Lymphedema Services  Progress Note      Date:  2021  Patient: Julissa Lee  : 1945             MRN: [id number]  Referring Physician: Joy Crane MD  Phone Number: [phone]  Fax Number: [phone]  Insurance: MEDICARE   Medical Diagnosis: Lymphedema of both lower extremities     Rehab Codes: I89.0  Onset Date:     Visit# / total visits: 10; Progress note for Medicare patient due at visit 10        SUBJECTIVE:  \" I have tried all the combinations I possibly could, but I can not tolerate it on my feet. It is hurting so bad. I wish it would squeeze more in my legs. Observation/ Assessment:   Pt arrives with no compression in place, he states it is because he was coming here for his apt. He states that he will wear them daily, everyday is different, but occasionally he will need to take them off around 2 am so his legs will stop burning. He has found that since  he has become more sedentary and wants to get back to moving more often. Pt also states around mid December his legs started leaking again, more on the L LE vs the R LE. Pt states he has stopped walking and riding his bike at this time as he does not feel mentally there. However, he states he would like to re-initiate this soon and will consider doing it this week. Pt's BLE has increased drastically in size with measurements noted below. Lymphorrhea is present in the L LE as he leaves a puddle of fluids under the LE and minimal lymphorrhea is present on the R LE. Pt trails the adavnce pump in clinic today. During the pump trail, pt discusses/ re-educates on exercises     Patient presents with stage II lymphedema tarda in B lower extremities.  Despite compliance with regular diet and conservative treatments: compression wrapping/20-30 mmhg compressions garments, elevation and exercise for at least the past 4 weeks, the patient continues to present with hyperkeratosis, hyperpigmentation, papillomatosis, elephantiasis and lymphorrhea. In addition, the entre basic pump has been used daily (30mmhg) over the past 4 weeks, which has now caused swelling and fluid to pool into the truncal region. The patient is performing deep breathing and self MLD to improve lymphatic flow. In order to decrease swelling and fibrosis, decrease infection and improve quality of life, the Flexitouch pneumatic compression pump is being recommended. This pump will safely and comfortably improve lymphatic flow in both the trunk and bilateral lower extremities. The basic pump should be discontinued at this time due to ineffectiveness. Pt did not bring his velcro devices or bandages with him today. He states he will put them on when he gets on. OT strongly encourages pt to do so. Pt states, \" I have accepted this is how my legs are, I will try though. \" OT offered and encouraged pt to set patient up for additional apts, pt states that he does not want to be at this time. He would like to trail getting his LE down in size at home with the use of his pump and velcro devices. Pt is placed on a 60 day hold and encouraged to call with additional questions or if he is unsuccessful. Pt is agreeable. Pain:  [] YES    [x] NO   Location:  B FEET - pt states he will still get a burning sensation in his feet at night time.    Pain Rating: ( 0-10 scale) : 0  Pain Description:  [x] Achy,Worse at end of day  [] Distention, Discomfort  [] Painful with palpation   [] Guarding, Tingling, Numbness      [] Hypersensitivity       Restrictions/Precautions:   [x] None              [] No blood pressure/blood draw in: [] LUE [] RUE     Fall Risk   [x] No    [] Yes        If yes, intervention:     Lymphedema Contraindication Assessment:  [x] Age 60+-No MLD to neck         [x] Inflammatory intestine (Chrohns- had surgery and intenstines removed, diverticulitis, ulcerative colitis) -No abdominal MLD  [] Cellulitis (warm, tender/pain, swelling, rash, fever, less defined erythematous borders) - No MLD until 72 hours of antibiotics     History of Cancer:  []Yes [x]No    Skin Integrity/Appearance: location   [] Normal                          [x] Dry              [] Warmth/Red/Itching/ Rash  [] Recent 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                    [x] Brawny/hardened/ Fibrosis     [] [place] hump-dorsum         [] Fatty lobules                                                  [] Loose, lobular                     []  Soft, doughy       [x] Hyperplasia                        [x]Moist/weeping/blisters with lymphorrhea     [x] Hyperkeratosis                    [] Congestive Dermatitis     [x] Hyperpigmentation/hemosiderin staining/gaitor distribution                                                               Symptom Onset:   [x] Slow   [] Rapid     [] Acute  Swelling Location:   [x] Truncal/abdominal swelling   [] Genital swelling  [] Fungal rash in skin folds   Stemmer sign:       [] Absent         [x] Positive    Wounds Location:   [] None  [] Being addressed by Reva Carpenter  [x] Erythema ( Superficial Reddening)     [] Excoriation (Linear erosion marks ex:cat scratch)      [x] Exudate (fluids expelled)    [] Purulent (Pus or discharge present in drainage)  [x] Maceration (soften/breaking down of skin due to prolonged exposure to moisture)  [] Denuded (loss of epidermis due to moisture or friction)    [] Shallow, painful venous                    [] Radiation burns/ulcers              [] Eschar (hard crust/scab)                Pitting Edema:  [x] R LE  [x]  L LE   [] R UE    [] L UE  [] 1+ Edema [x] 2+ Edema  [] 3+ Edema [] 4+ Edema    Education Included:  [x] General Lymphedema Information  [] Self MLD [] Self Bandaging [] Kinesiotaping    [] Nutrition   [] Home Exercises   [] Skin/nail care   [] NLN Risk Reduction Practice handout    Learner: [x] Patient [] Spouse  []Other [] Family  Method: [x] Verbal [] Demonstration [] Handouts [] Exercise booklet  Response:  [x] Verbalized understanding [] Demonstrated understanding      [] Needs assist            [] No understanding    Physical Status:  Range of motion: Deficits [x] Yes [] No       Comment: Some difficulty noted, however does not let it stop him. Strength:       Deficits [x] Yes [] No        Comment: Some weakness noted in the B LE. Sensation:       Deficits [] Yes [x] No        Comment:  Transfer:       Deficits [] Yes [x] No        Comment:  Ambulation:       Deficits [x] Yes [] No        Comment: Difficulty walking longer distances d/t easily fatigues and the legs starting to burn. Functional Status:  Self Care:   [x] Independent [] Needs Assist [] Dependent   Home Maintenance:    [x] Independent [] Needs Assist [] Dependent. Measurements Lower Extremity  Measurements taken from nail base of D2 digit. They are circumferential.   Measurements in Centimeters Right Left   MTS          D[place]um      10 26.5 29.5   Inframalleolar  16       36.5 38.0   Supramalleolar    20  28.0 31.5   10   27 34.0 38.7   20   37 40.5 47.5   30  47 37.5 48.0   Knee   52 38.5 41.0   10 cm above knee     Thigh-widest     Hip-widest  ( 121.6 on 10/29/2020)  124.0    Total 241.5    215.6    220.6    220.8    226.8    232.4    221.9    245.5    247.4 274.2    236.5    239.8    246.9    250.0    263    251.5    275.8    276.2     Assessment Patient would benefit from skilled occupational therapy services in order to: To reduce the size of the leg and improve overall quality of life.      Knowledge of home program:  [x] Good [] Fair  [] Poor  Family can assist with programming: [x] Yes  [] No  Instructions for patient:   [x] Verbal [x] Demonstration [x] Written    [] Education reviewed   [] Risk Reduction Practices I-VI handout issued    Rehabilitation Potential/ Commitment: [x] Good [] Fair     [] Poor  Post-treatment symptom assessment for swelling, heaviness, tightness to the affected limb, increase ROM, reduce swelling, inflammation, or restriction; improve contractile/noncontractile tissue extensibility     Therapeutic activities (61166): dynamic activities to improve functional performance with or w/o bandages with direct pt contact using multiple perameters e.g. balance, strength, ROM & multiple outcomes     Therapeutic procedure (88783)-exercise for strength,endurance, ROM, flexibility (active, active-assist or passive)     Self care/home mgmt (48045)-pt educ re: self MLD for home program, self bandaging, do's/don'ts, activity guidelines 75 5   Vasopneumatic Pump:  15 1   Total Treatment Time    75 6     Today's total: $ 141.73  Last total: $   Total:     Time In: 2:00  PM  Time Out: 3:15 PM      Electronically signed by hSarmila Rey OT on 1/12/2021 at 2:05 PM

## 2021-03-28 DIAGNOSIS — R60.0 PEDAL EDEMA: ICD-10-CM

## 2021-03-28 DIAGNOSIS — I10 ESSENTIAL HYPERTENSION: Chronic | ICD-10-CM

## 2021-03-29 RX ORDER — FUROSEMIDE 20 MG/1
20 TABLET ORAL DAILY
Qty: 90 TABLET | Refills: 3 | Status: SHIPPED | OUTPATIENT
Start: 2021-03-29 | End: 2021-04-12 | Stop reason: SDUPTHER

## 2021-04-12 ENCOUNTER — OFFICE VISIT (OUTPATIENT)
Dept: INTERNAL MEDICINE CLINIC | Age: 76
End: 2021-04-12
Payer: MEDICARE

## 2021-04-12 VITALS
OXYGEN SATURATION: 96 % | HEIGHT: 67 IN | DIASTOLIC BLOOD PRESSURE: 82 MMHG | TEMPERATURE: 98.1 F | HEART RATE: 62 BPM | SYSTOLIC BLOOD PRESSURE: 138 MMHG | WEIGHT: 232 LBS | BODY MASS INDEX: 36.41 KG/M2

## 2021-04-12 DIAGNOSIS — R60.0 PEDAL EDEMA: ICD-10-CM

## 2021-04-12 DIAGNOSIS — I10 ESSENTIAL HYPERTENSION: Chronic | ICD-10-CM

## 2021-04-12 DIAGNOSIS — E66.01 MORBIDLY OBESE (HCC): ICD-10-CM

## 2021-04-12 DIAGNOSIS — I50.22 CHRONIC SYSTOLIC (CONGESTIVE) HEART FAILURE (HCC): Primary | ICD-10-CM

## 2021-04-12 PROCEDURE — G8417 CALC BMI ABV UP PARAM F/U: HCPCS | Performed by: INTERNAL MEDICINE

## 2021-04-12 PROCEDURE — 1123F ACP DISCUSS/DSCN MKR DOCD: CPT | Performed by: INTERNAL MEDICINE

## 2021-04-12 PROCEDURE — 3017F COLORECTAL CA SCREEN DOC REV: CPT | Performed by: INTERNAL MEDICINE

## 2021-04-12 PROCEDURE — 4040F PNEUMOC VAC/ADMIN/RCVD: CPT | Performed by: INTERNAL MEDICINE

## 2021-04-12 PROCEDURE — 1036F TOBACCO NON-USER: CPT | Performed by: INTERNAL MEDICINE

## 2021-04-12 PROCEDURE — G8427 DOCREV CUR MEDS BY ELIG CLIN: HCPCS | Performed by: INTERNAL MEDICINE

## 2021-04-12 PROCEDURE — 99214 OFFICE O/P EST MOD 30 MIN: CPT | Performed by: INTERNAL MEDICINE

## 2021-04-12 RX ORDER — LISINOPRIL 20 MG/1
20 TABLET ORAL DAILY
Qty: 90 TABLET | Refills: 3 | Status: SHIPPED | OUTPATIENT
Start: 2021-04-12 | End: 2022-03-07 | Stop reason: SDUPTHER

## 2021-04-12 RX ORDER — FUROSEMIDE 40 MG/1
40 TABLET ORAL DAILY
Qty: 90 TABLET | Refills: 3 | Status: SHIPPED | OUTPATIENT
Start: 2021-04-12 | End: 2022-03-07 | Stop reason: SDUPTHER

## 2021-04-12 ASSESSMENT — ENCOUNTER SYMPTOMS
COLOR CHANGE: 0
DIARRHEA: 0
EYE DISCHARGE: 0
COUGH: 0
WHEEZING: 0
BLOOD IN STOOL: 0
SHORTNESS OF BREATH: 0
EYE PAIN: 0
ABDOMINAL DISTENTION: 0
TROUBLE SWALLOWING: 0

## 2021-04-12 ASSESSMENT — PATIENT HEALTH QUESTIONNAIRE - PHQ9
SUM OF ALL RESPONSES TO PHQ QUESTIONS 1-9: 0
SUM OF ALL RESPONSES TO PHQ QUESTIONS 1-9: 0

## 2021-04-12 NOTE — PROGRESS NOTES
Visit Information    Have you changed or started any medications since your last visit including any over-the-counter medicines, vitamins, or herbal medicines? no   Are you having any side effects from any of your medications? -  no  Have you stopped taking any of your medications? Is so, why? -  no    Have you seen any other physician or provider since your last visit? No  Have you had any other diagnostic tests since your last visit? No  Have you been seen in the emergency room and/or had an admission to a hospital since we last saw you? No  Have you had your routine dental cleaning in the past 6 months? no    Have you activated your [employer] account? If not, what are your barriers?  No:      Patient Care Team:  Jonel Pate MD as PCP - General (Internal Medicine)  Jonel Pate MD as PCP - [facility] Provider  Rajendra Fagan MD as Consulting Physician (Gastroenterology)    Medical History Review  Past Medical, Family, and Social History reviewed and does not contribute to the patient presenting condition    Health Maintenance   Topic Date Due    COVID-19 Vaccine (1) Never done    DTaP/Tdap/Td vaccine (1 - Tdap) Never done    Shingles Vaccine (1 of 2) Never done    Pneumococcal 65+ years Vaccine (2 of 2 - PPSV23) 10/15/2019    PSA counseling  09/18/2020    A1C test (Diabetic or Prediabetic)  07/21/2021    Annual Wellness Visit (AWV)  07/21/2021    Flu vaccine (Season Ended) 09/01/2021    Potassium monitoring  11/03/2021    Creatinine monitoring  11/03/2021    Colon cancer screen colonoscopy  12/06/2021    Lipid screen  11/26/2023    AAA screen  Completed    Hepatitis C screen  Completed    Hepatitis A vaccine  Aged Out    Hepatitis B vaccine  Aged Out    Hib vaccine  Aged Out    Meningococcal (ACWY) vaccine  Aged Out

## 2021-04-12 NOTE — PROGRESS NOTES
2007  11/10/2009    COLONOSCOPY  16    extreme spasmatic colon; extensive diverticulosis; ventral herniation; flat polyp; large internal hemorrhoids    COLONOSCOPY  2016    through ileostomy    ILEOSTOMY OR JEJUNOSTOMY      POLYPECTOMY      ME REPAIR INCISIONAL HERNIA,REDUCIBLE N/A 2017    HERNIA PERISTOMAL REPAIR RIGHT performed by Mahnaz Guerra MD at [address] N/A 2017    COLOSTOMY ILEOSTOMY TAKEDOWN WITH WOUND VAC APPLICATION performed by Mahnaz Guerra MD at [address]  History   Problem Relation Age of Onset    Cancer Father     Heart Disease Father     Diabetes Mother     Heart Disease Mother     High Blood Pressure Mother        Social History     Tobacco Use    Smoking status: Former Smoker     Packs/day: 0.50     Years: 25.00     Pack years: 12.50     Types: Cigarettes     Quit date: 2016     Years since quittin.6    Smokeless tobacco: Never Used   Substance Use Topics    Alcohol use: No     Alcohol/week: 0.0 standard drinks      Current Outpatient Medications   Medication Sig Dispense Refill    furosemide (LASIX) 40 MG tablet Take 1 tablet by mouth daily 90 tablet 3    lisinopril (PRINIVIL;ZESTRIL) 20 MG tablet Take 1 tablet by mouth daily 90 tablet 3    loperamide (IMODIUM) 2 MG capsule Take 1 capsule by mouth as needed for Diarrhea 180 capsule 3    Cholecalciferol (VITAMIN D3) 125 MCG (5000 UT) TABS Take by mouth      aspirin 81 MG EC tablet Take 1 tablet by mouth every morning 30 tablet 3    albuterol (PROVENTIL) (5 MG/ML) 0.5% nebulizer solution 0.5 ml      carvedilol (COREG) 3.125 MG tablet       ciprofloxacin (CIPRO) 500 MG tablet 1 tablet      tamsulosin (FLOMAX) 0.4 MG capsule       HYDROcodone-acetaminophen (NORCO) 5-325 MG per tablet 1 tablet as needed      cephALEXin (KEFLEX) 500 MG capsule 1 capsule      clopidogrel (PLAVIX) 75 MG tablet 1 tablet       No current facility-administered medications for this Right eye: No discharge. Left eye: No discharge. Extraocular Movements:      Right eye: Normal extraocular motion. Left eye: Normal extraocular motion. Conjunctiva/sclera: Conjunctivae normal.      Right eye: Right conjunctiva is not injected. Left eye: Left conjunctiva is not injected. Neck:      Musculoskeletal: Normal range of motion and neck supple. No edema or erythema. Thyroid: No thyroid mass or thyromegaly. Vascular: No JVD. Cardiovascular:      Rate and Rhythm: Normal rate and regular rhythm. Heart sounds: No murmur. No friction rub. Pulmonary:      Effort: Pulmonary effort is normal. No tachypnea, bradypnea, accessory muscle usage or respiratory distress. Breath sounds: Normal breath sounds. No wheezing or rales. Abdominal:      General: Bowel sounds are normal. There is no distension. Palpations: Abdomen is soft. Tenderness: There is no abdominal tenderness. There is no rebound. Musculoskeletal: Normal range of motion. General: No tenderness. Right lower leg: Edema present. Left lower leg: Edema present. Lymphadenopathy:      Head:      Right side of head: No submental or submandibular adenopathy. Left side of head: No submental or submandibular adenopathy. Cervical: No cervical adenopathy. Skin:     General: Skin is warm. Coloration: Skin is not pale. Findings: No bruising, ecchymosis or rash. Neurological:      Mental Status: He is alert and oriented to person, place, and time. Cranial Nerves: No cranial nerve deficit. Sensory: No sensory deficit. Motor: No atrophy or abnormal muscle tone. Coordination: Coordination normal.   Psychiatric:         Mood and Affect: Mood is not anxious. Affect is not angry. Speech: Speech is not slurred. Behavior: Behavior normal. Behavior is not aggressive. Thought Content:  Thought content does not include homicidal

## 2021-09-02 ENCOUNTER — CLINICAL DOCUMENTATION (OUTPATIENT)
Dept: OCCUPATIONAL THERAPY | Age: 76
End: 2021-09-02

## 2021-09-02 NOTE — DISCHARGE SUMMARY
TREATMENT LOCATION:   [] [facility] [address] [place] [address] [address]: [phone]  F: [phone] [x] [address]: [phone]  F: [phone]     Lymphedema Therapy Discharge Note    Date: 2021      Patient: Yoshi Pan  : 1945  MRN: [id number]    Referring Physician: Rahel Miramontes MD  Phone Number: [phone]       Fax Number: [phone]  Insurance: [address] Diagnosis: Lymphedema of both lower extremities                         Rehab Codes: I89.0  Onset Date:                      Total visits attended: 10        Cancels/No shows: 0  Date of initial visit: 2020                Date of final visit: 21      Subjective:  Refer to initial evaluation/ treatment notes. Objective:  Refer to initial evaluation/treatment notes. Assessment:  Refer to initial evaluation/ treatment notes. Treatment to Date:  [x] Therapeutic Exercise           [] Instruction in Home Exercise Program                       [x] Manual Therapy  [x] Self-Care/Home Management  [] Vasopneumatic Pump             [] Other:    Discharge Status:   [] Treatment goals were met. [x] Pt received maximum benefit. No further therapy indicated at this time. [x] Pt to continue exercise/home instructions independently  [x] Pt has not called or returned to clinic in over 90 days with additional needs. Electronically signed by Josey Pate OT on 2021 at 2:10 PM    Physician Signature: _________________________        Date: _______________  By signing above or cosigning this note, I have reviewed this plan of care and certify a need to continue medically necessary rehabilitation services. The patient is being discharged due to the status listed above. If patient would like to return to the clinic for additional therapy a new referral will be necessary.  Thank you again for your referral and allowing us to assist in the care of this patient! For any questions or concerns, please don't hesitate to call us at [phone].

## 2021-11-01 ENCOUNTER — OFFICE VISIT (OUTPATIENT)
Dept: INTERNAL MEDICINE CLINIC | Age: 76
End: 2021-11-01
Payer: MEDICARE

## 2021-11-01 VITALS
HEART RATE: 83 BPM | BODY MASS INDEX: 35.63 KG/M2 | WEIGHT: 227 LBS | SYSTOLIC BLOOD PRESSURE: 132 MMHG | DIASTOLIC BLOOD PRESSURE: 80 MMHG | HEIGHT: 67 IN | OXYGEN SATURATION: 98 %

## 2021-11-01 DIAGNOSIS — Z23 FLU VACCINE NEED: ICD-10-CM

## 2021-11-01 DIAGNOSIS — I77.9 PERIPHERAL ARTERIAL OCCLUSIVE DISEASE (HCC): ICD-10-CM

## 2021-11-01 DIAGNOSIS — K50.019 CROHN'S DISEASE OF SMALL INTESTINE WITH COMPLICATION (HCC): ICD-10-CM

## 2021-11-01 DIAGNOSIS — N18.32 STAGE 3B CHRONIC KIDNEY DISEASE (HCC): Primary | Chronic | ICD-10-CM

## 2021-11-01 DIAGNOSIS — I10 ESSENTIAL HYPERTENSION: Chronic | ICD-10-CM

## 2021-11-01 DIAGNOSIS — Z90.49 H/O RESECTION OF SMALL BOWEL: Chronic | ICD-10-CM

## 2021-11-01 DIAGNOSIS — I50.22 CHRONIC SYSTOLIC (CONGESTIVE) HEART FAILURE (HCC): ICD-10-CM

## 2021-11-01 DIAGNOSIS — R79.9 ABNORMAL FINDING OF BLOOD CHEMISTRY, UNSPECIFIED: ICD-10-CM

## 2021-11-01 PROBLEM — E66.01 MORBIDLY OBESE (HCC): Status: RESOLVED | Noted: 2020-09-10 | Resolved: 2021-11-01

## 2021-11-01 PROCEDURE — G8427 DOCREV CUR MEDS BY ELIG CLIN: HCPCS | Performed by: INTERNAL MEDICINE

## 2021-11-01 PROCEDURE — G8417 CALC BMI ABV UP PARAM F/U: HCPCS | Performed by: INTERNAL MEDICINE

## 2021-11-01 PROCEDURE — 1036F TOBACCO NON-USER: CPT | Performed by: INTERNAL MEDICINE

## 2021-11-01 PROCEDURE — G8484 FLU IMMUNIZE NO ADMIN: HCPCS | Performed by: INTERNAL MEDICINE

## 2021-11-01 PROCEDURE — 3017F COLORECTAL CA SCREEN DOC REV: CPT | Performed by: INTERNAL MEDICINE

## 2021-11-01 PROCEDURE — 99214 OFFICE O/P EST MOD 30 MIN: CPT | Performed by: INTERNAL MEDICINE

## 2021-11-01 PROCEDURE — G0008 ADMIN INFLUENZA VIRUS VAC: HCPCS | Performed by: INTERNAL MEDICINE

## 2021-11-01 PROCEDURE — 4040F PNEUMOC VAC/ADMIN/RCVD: CPT | Performed by: INTERNAL MEDICINE

## 2021-11-01 PROCEDURE — 90694 VACC AIIV4 NO PRSRV 0.5ML IM: CPT | Performed by: INTERNAL MEDICINE

## 2021-11-01 PROCEDURE — 1123F ACP DISCUSS/DSCN MKR DOCD: CPT | Performed by: INTERNAL MEDICINE

## 2021-11-01 ASSESSMENT — ENCOUNTER SYMPTOMS
EYE PAIN: 0
TROUBLE SWALLOWING: 0
DIARRHEA: 0
COUGH: 0
ABDOMINAL DISTENTION: 0
BLOOD IN STOOL: 0
EYE DISCHARGE: 0
COLOR CHANGE: 0
SHORTNESS OF BREATH: 0
WHEEZING: 0

## 2021-11-01 NOTE — PROGRESS NOTES
[address]  [place] [address]  Dept: [phone]  Dept Fax: [phone]    Shea Martinez is a 76 y.o. male who presents today for his medical conditions/complaintsas noted below. Shea Martinez is c/o of   Chief Complaint   Patient presents with    Hypertension         HPI:     HTN  Onset more than 2 years ago  rosario mild to mod  Controlled with current po meds  Not associated with headaches or blurry vision  No chest pain  HLD  Onset more than 5 years ago  Severity is mild, not getting worse  Not associated with pancreatitis  Tolerating statin well no muscle pain        Hemoglobin A1C (%)   Date Value   07/21/2020 5.9   11/26/2018 6.1 (H)   08/17/2017 5.5             ( goal A1Cis < 7)   Microalb/Crt.  Ratio (mcg/mg creat)   Date Value   02/28/2017 11     LDL Cholesterol (mg/dL)   Date Value   11/26/2018 81   04/18/2013 86   04/18/2012 74       (goal LDL is <100)   AST (U/L)   Date Value   03/19/2018 26     ALT (U/L)   Date Value   03/19/2018 25     BUN (mg/dL)   Date Value   11/03/2020 19     BP Readings from Last 3 Encounters:   11/01/21 132/80   04/12/21 138/82   12/09/20 138/88          (goal 120/80)    Past Medical History:   Diagnosis Date    Abdominal hernia     Atrophy of muscle of multiple sites 8/19/2016    CAD (coronary artery disease)     CKD (chronic kidney disease) stage 3, GFR 30-59 ml/min ([facility])     Colon polyp     Crohn disease ([address]     [facility]     Decubitus ulcer of coccyx, stage 2 ([address] 8/8/2016    Diarrhea     Diverticulosis     Hemorrhoids     History of atrial fibrillation     Hypertension     [facility] in [facility]) 8/18/2016    Internal hemorrhoids     NSVT (nonsustained ventricular tachycardia) ([facility] 08/18/2016    Other and unspecified hyperlipidemia     HISTORY OF HIGH CHOLESTEROL    Past heart attack     Tobacco use disorder     Tubular adenoma       Past Surgical History:   Procedure Laterality Date    ABDOMEN SURGERY  2016    explor. lap., ileostomy    ABDOMEN SURGERY  2017    colostomy/ileostomy takedown with wound vac application hernia peristomal repair    CARDIAC CATHETERIZATION      NO STENTS    COLONOSCOPY  2005  2007  11/10/2009    COLONOSCOPY  16    extreme spasmatic colon; extensive diverticulosis; ventral herniation; flat polyp; large internal hemorrhoids    COLONOSCOPY  2016    through ileostomy    ILEOSTOMY OR JEJUNOSTOMY      POLYPECTOMY      MO REPAIR INCISIONAL HERNIA,REDUCIBLE N/A 2017    HERNIA PERISTOMAL REPAIR RIGHT performed by Jose Luis Andrade MD at [address] [place] [address] N/A 2017    COLOSTOMY ILEOSTOMY TAKEDOWN WITH WOUND VAC APPLICATION performed by Jose Luis Andrade MD at [place] History   Problem Relation Age of Onset    Cancer Father     Heart Disease Father     Diabetes Mother     Heart Disease Mother     High Blood Pressure Mother        Social History     Tobacco Use    Smoking status: Former Smoker     Packs/day: 0.50     Years: 25.00     Pack years: 12.50     Types: Cigarettes     Quit date: 2016     Years since quittin.2    Smokeless tobacco: Never Used   Substance Use Topics    Alcohol use: No     Alcohol/week: 0.0 standard drinks      Current Outpatient Medications   Medication Sig Dispense Refill    furosemide (LASIX) 40 MG tablet Take 1 tablet by mouth daily 90 tablet 3    lisinopril (PRINIVIL;ZESTRIL) 20 MG tablet Take 1 tablet by mouth daily 90 tablet 3    loperamide (IMODIUM) 2 MG capsule Take 1 capsule by mouth as needed for Diarrhea 180 capsule 3    Cholecalciferol (VITAMIN D3) 125 MCG (5000 UT) TABS Take by mouth      aspirin 81 MG EC tablet Take 1 tablet by mouth every morning 30 tablet 3    albuterol (PROVENTIL) (5 MG/ML) 0.5% nebulizer solution 0.5 ml (Patient not taking: Reported on 2021)       No current facility-administered medications for this visit.      No Extraocular Movements:      Right eye: Normal extraocular motion. Left eye: Normal extraocular motion. Conjunctiva/sclera: Conjunctivae normal.      Right eye: Right conjunctiva is not injected. Left eye: Left conjunctiva is not injected. Neck:      Thyroid: No thyroid mass or thyromegaly. Vascular: No JVD. Cardiovascular:      Rate and Rhythm: Normal rate and regular rhythm. Heart sounds: No murmur heard. No friction rub. Pulmonary:      Effort: Pulmonary effort is normal. No tachypnea, bradypnea, accessory muscle usage or respiratory distress. Breath sounds: Normal breath sounds. No wheezing or rales. Abdominal:      General: Bowel sounds are normal. There is no distension. Palpations: Abdomen is soft. Tenderness: There is no abdominal tenderness. There is no rebound. Musculoskeletal:         General: No tenderness. Normal range of motion. Cervical back: Normal range of motion and neck supple. No edema or erythema. Right lower leg: Edema present. Left lower leg: Edema present. Lymphadenopathy:      Head:      Right side of head: No submental or submandibular adenopathy. Left side of head: No submental or submandibular adenopathy. Cervical: No cervical adenopathy. Skin:     General: Skin is warm. Coloration: Skin is not pale. Findings: No bruising, ecchymosis or rash. Neurological:      Mental Status: He is alert and oriented to person, place, and time. Cranial Nerves: No cranial nerve deficit. Sensory: No sensory deficit. Motor: No atrophy or abnormal muscle tone. Coordination: Coordination normal.   Psychiatric:         Mood and Affect: Mood is not anxious. Affect is not angry. Speech: Speech is not slurred. Behavior: Behavior normal. Behavior is not aggressive. Thought Content: Thought content does not include homicidal ideation.          Cognition and Memory: Memory is not impaired. /80   Pulse 83   Ht 5' 7\" (1.702 m)   Wt 227 lb (103 kg)   SpO2 98%   BMI 35.55 kg/m²     Assessment:       Diagnosis Orders   1. Stage 3b chronic kidney disease ([facility]     2. Crohn's disease of small intestine with complication ([facility]     3. Flu vaccine need  INFLUENZA, QUADV, ADJUVANTED, 65 YRS =, IM, PF, PREFILL SYR, 0.5ML (FLUAD)   4. H/O resection of small bowel     5. Peripheral arterial occlusive disease ([facility]     6. Chronic systolic (congestive) heart failure (HCC)     7. Essential hypertension  Hemoglobin A1C    Comprehensive Metabolic Panel    Hemoglobin A1C    Lipid Panel    CBC Auto Differential   8. Abnormal finding of blood chemistry, unspecified   Hemoglobin A1C             Plan:      No follow-ups on file. Orders Placed This Encounter   Procedures    INFLUENZA, QUADV, ADJUVANTED, 65 YRS =, IM, PF, PREFILL SYR, 0.5ML (FLUAD)    Hemoglobin A1C     Standing Status:   Future     Standing Expiration Date:   2/1/2022    Comprehensive Metabolic Panel     Standing Status:   Future     Standing Expiration Date:   11/1/2022    Hemoglobin A1C     Standing Status:   Future     Standing Expiration Date:   11/1/2022    Lipid Panel     Standing Status:   Future     Standing Expiration Date:   11/1/2022     Order Specific Question:   Is Patient Fasting?/# of Hours     Answer:   10-12    CBC Auto Differential     Standing Status:   Future     Standing Expiration Date:   1/30/2022     No orders of the defined types were placed in this encounter. see back in four month  Lymphedema  Pt seen vascualr before     Patient given educational materials - see patient instructions. Discussed use, benefit, and side effects of prescribed medications. All patientquestions answered. Pt voiced understanding. Reviewed health maintenance. Instructedto continue current medications, diet and exercise. Patient agreed with treatmentplan. Follow up as directed.      Electronically signed by Amado Vila She Colin MD on 11/1/2021 at 2:19 PM

## 2021-11-01 NOTE — PROGRESS NOTES
Visit Information    Have you changed or started any medications since your last visit including any over-the-counter medicines, vitamins, or herbal medicines? no   Are you having any side effects from any of your medications? -  no  Have you stopped taking any of your medications? Is so, why? -  no    Have you seen any other physician or provider since your last visit? No  Have you had any other diagnostic tests since your last visit? No  Have you been seen in the emergency room and/or had an admission to a hospital since we last saw you? No  Have you had your routine dental cleaning in the past 6 months? no    Have you activated your [employer] account? If not, what are your barriers?  No:      Patient Care Team:  Antoinette Spring MD as PCP - General (Internal Medicine)  Antoinette Spring MD as PCP - [address] [place] [address]  [facility] Provider  Neal Wright MD as Consulting Physician (Gastroenterology)    Medical History Review  Past Medical, Family, and Social History reviewed and does contribute to the patient presenting condition    Health Maintenance   Topic Date Due    DTaP/Tdap/Td vaccine (1 - Tdap) Never done    Shingles Vaccine (1 of 2) Never done    Pneumococcal 65+ years Vaccine (2 of 2 - PPSV23) 10/15/2019    PSA counseling  09/18/2020    A1C test (Diabetic or Prediabetic)  07/21/2021    Flu vaccine (1) 09/01/2021    Potassium monitoring  11/03/2021    Creatinine monitoring  11/03/2021    Colon cancer screen colonoscopy  12/06/2021    COVID-19 Vaccine (3 - Pfizer booster) 02/23/2022    Lipid screen  11/26/2023    AAA screen  Completed    Hepatitis C screen  Completed    Hepatitis A vaccine  Aged Out    Hepatitis B vaccine  Aged Out    Hib vaccine  Aged Out    Meningococcal (ACWY) vaccine  Aged Out

## 2022-01-03 RX ORDER — LOPERAMIDE HYDROCHLORIDE 2 MG/1
CAPSULE ORAL
Qty: 180 CAPSULE | Refills: 3 | Status: SHIPPED | OUTPATIENT
Start: 2022-01-03 | End: 2022-09-12

## 2022-02-28 ENCOUNTER — HOSPITAL ENCOUNTER (OUTPATIENT)
Age: 77
Setting detail: SPECIMEN
Discharge: HOME | End: 2022-02-28

## 2022-02-28 DIAGNOSIS — I10 ESSENTIAL HYPERTENSION: Chronic | ICD-10-CM

## 2022-02-28 LAB
ALBUMIN SERPL-MCNC: 4 G/DL (ref 3.5–5.2)
ALBUMIN/GLOBULIN RATIO: 1.1 (ref 1–2.5)
ALP BLD-CCNC: 96 U/L (ref 40–129)
ALT SERPL-CCNC: 29 U/L (ref 5–41)
ANION GAP SERPL CALCULATED.3IONS-SCNC: 10 MMOL/L (ref 9–17)
AST SERPL-CCNC: 36 U/L
BILIRUB SERPL-MCNC: 1.43 MG/DL (ref 0.3–1.2)
BUN BLDV-MCNC: 24 MG/DL (ref 8–23)
CALCIUM SERPL-MCNC: 9 MG/DL (ref 8.6–10.4)
CHLORIDE BLD-SCNC: 105 MMOL/L (ref 98–107)
CHOLESTEROL/HDL RATIO: 3.1
CHOLESTEROL: 138 MG/DL
CO2: 25 MMOL/L (ref 20–31)
CREAT SERPL-MCNC: 1.4 MG/DL (ref 0.7–1.2)
ESTIMATED AVERAGE GLUCOSE: 134 MG/DL
GFR AFRICAN AMERICAN: 60 ML/MIN
GFR NON-AFRICAN AMERICAN: 49 ML/MIN
GFR SERPL CREATININE-BSD FRML MDRD: ABNORMAL ML/MIN/{1.73_M2}
GLUCOSE BLD-MCNC: 88 MG/DL (ref 70–99)
HBA1C MFR BLD: 6.3 % (ref 4–6)
HDLC SERPL-MCNC: 44 MG/DL
LDL CHOLESTEROL: 73 MG/DL (ref 0–130)
POTASSIUM SERPL-SCNC: 5.2 MMOL/L (ref 3.7–5.3)
SODIUM BLD-SCNC: 140 MMOL/L (ref 135–144)
TOTAL PROTEIN: 7.6 G/DL (ref 6.4–8.3)
TRIGL SERPL-MCNC: 104 MG/DL

## 2022-03-06 DIAGNOSIS — R60.0 PEDAL EDEMA: ICD-10-CM

## 2022-03-06 DIAGNOSIS — I10 ESSENTIAL HYPERTENSION: Chronic | ICD-10-CM

## 2022-03-07 ENCOUNTER — OFFICE VISIT (OUTPATIENT)
Dept: INTERNAL MEDICINE CLINIC | Age: 77
End: 2022-03-07
Payer: MEDICARE

## 2022-03-07 VITALS
DIASTOLIC BLOOD PRESSURE: 80 MMHG | BODY MASS INDEX: 36.41 KG/M2 | HEIGHT: 67 IN | OXYGEN SATURATION: 100 % | HEART RATE: 72 BPM | SYSTOLIC BLOOD PRESSURE: 138 MMHG | WEIGHT: 232 LBS

## 2022-03-07 DIAGNOSIS — I50.22 CHRONIC SYSTOLIC (CONGESTIVE) HEART FAILURE (HCC): Primary | ICD-10-CM

## 2022-03-07 DIAGNOSIS — I77.9 PERIPHERAL ARTERIAL OCCLUSIVE DISEASE (HCC): ICD-10-CM

## 2022-03-07 DIAGNOSIS — K50.019 CROHN'S DISEASE OF SMALL INTESTINE WITH COMPLICATION (HCC): Chronic | ICD-10-CM

## 2022-03-07 DIAGNOSIS — R60.0 PEDAL EDEMA: ICD-10-CM

## 2022-03-07 DIAGNOSIS — R73.03 PREDIABETES: ICD-10-CM

## 2022-03-07 DIAGNOSIS — E66.01 SEVERE OBESITY (BMI 35.0-39.9) WITH COMORBIDITY (HCC): ICD-10-CM

## 2022-03-07 DIAGNOSIS — N18.32 STAGE 3B CHRONIC KIDNEY DISEASE (HCC): Chronic | ICD-10-CM

## 2022-03-07 DIAGNOSIS — I10 ESSENTIAL HYPERTENSION: Chronic | ICD-10-CM

## 2022-03-07 PROCEDURE — 1036F TOBACCO NON-USER: CPT | Performed by: INTERNAL MEDICINE

## 2022-03-07 PROCEDURE — 99214 OFFICE O/P EST MOD 30 MIN: CPT | Performed by: INTERNAL MEDICINE

## 2022-03-07 PROCEDURE — G8417 CALC BMI ABV UP PARAM F/U: HCPCS | Performed by: INTERNAL MEDICINE

## 2022-03-07 PROCEDURE — G8484 FLU IMMUNIZE NO ADMIN: HCPCS | Performed by: INTERNAL MEDICINE

## 2022-03-07 PROCEDURE — G8427 DOCREV CUR MEDS BY ELIG CLIN: HCPCS | Performed by: INTERNAL MEDICINE

## 2022-03-07 PROCEDURE — 1123F ACP DISCUSS/DSCN MKR DOCD: CPT | Performed by: INTERNAL MEDICINE

## 2022-03-07 PROCEDURE — 4040F PNEUMOC VAC/ADMIN/RCVD: CPT | Performed by: INTERNAL MEDICINE

## 2022-03-07 RX ORDER — LISINOPRIL 20 MG/1
20 TABLET ORAL DAILY
Qty: 90 TABLET | Refills: 3 | Status: SHIPPED | OUTPATIENT
Start: 2022-03-07 | End: 2022-09-12

## 2022-03-07 RX ORDER — LISINOPRIL 20 MG/1
20 TABLET ORAL DAILY
Qty: 90 TABLET | Refills: 3 | OUTPATIENT
Start: 2022-03-07 | End: 2022-06-05

## 2022-03-07 RX ORDER — FUROSEMIDE 40 MG/1
40 TABLET ORAL DAILY
Qty: 90 TABLET | Refills: 3 | OUTPATIENT
Start: 2022-03-07 | End: 2022-06-05

## 2022-03-07 RX ORDER — FUROSEMIDE 40 MG/1
40 TABLET ORAL DAILY
Qty: 90 TABLET | Refills: 3 | Status: SHIPPED | OUTPATIENT
Start: 2022-03-07 | End: 2022-09-12

## 2022-03-07 SDOH — ECONOMIC STABILITY: FOOD INSECURITY: WITHIN THE PAST 12 MONTHS, YOU WORRIED THAT YOUR FOOD WOULD RUN OUT BEFORE YOU GOT MONEY TO BUY MORE.: NEVER TRUE

## 2022-03-07 SDOH — ECONOMIC STABILITY: FOOD INSECURITY: WITHIN THE PAST 12 MONTHS, THE FOOD YOU BOUGHT JUST DIDN'T LAST AND YOU DIDN'T HAVE MONEY TO GET MORE.: NEVER TRUE

## 2022-03-07 ASSESSMENT — ENCOUNTER SYMPTOMS
ABDOMINAL DISTENTION: 0
COLOR CHANGE: 0
WHEEZING: 0
BLOOD IN STOOL: 0
TROUBLE SWALLOWING: 0
SHORTNESS OF BREATH: 0
EYE PAIN: 0
COUGH: 0
EYE DISCHARGE: 0
DIARRHEA: 0

## 2022-03-07 ASSESSMENT — SOCIAL DETERMINANTS OF HEALTH (SDOH): HOW HARD IS IT FOR YOU TO PAY FOR THE VERY BASICS LIKE FOOD, HOUSING, MEDICAL CARE, AND HEATING?: NOT HARD AT ALL

## 2022-03-07 NOTE — PROGRESS NOTES
[address]  [place] [address]  Dept: [phone]  Dept Fax: [phone]    Oswaldo Castellanos is a 68 y.o. male who presents today for his medical conditions/complaintsas noted below. Oswaldo Castellanos is c/o of   Chief Complaint   Patient presents with    Congestive Heart Failure         HPI:     HTN  Onset more than 2 years ago  rosario mild to mod  Controlled with current po meds  Not associated with headaches or blurry vision  No chest pain  dyspen is better  Leg edema is unchanged        Hemoglobin A1C (%)   Date Value   02/28/2022 6.3 (H)   07/21/2020 5.9   11/26/2018 6.1 (H)             ( goal A1Cis < 7)   Microalb/Crt. Ratio (mcg/mg creat)   Date Value   02/28/2017 11     LDL Cholesterol (mg/dL)   Date Value   02/28/2022 73   11/26/2018 81   04/18/2013 86       (goal LDL is <100)   AST (U/L)   Date Value   02/28/2022 36     ALT (U/L)   Date Value   02/28/2022 29     BUN (mg/dL)   Date Value   02/28/2022 24 (H)     BP Readings from Last 3 Encounters:   03/07/22 138/80   11/01/21 132/80   04/12/21 138/82          (goal 120/80)    Past Medical History:   Diagnosis Date    Abdominal hernia     Atrophy of muscle of multiple sites 8/19/2016    CAD (coronary artery disease)     CKD (chronic kidney disease) stage 3, GFR 30-59 ml/min ([facility])     Colon polyp     Crohn disease ([address]     Cro[facility]'s colitis ([facility]     Decubitus ulcer of coccyx, stage 2 ([address] 8/8/2016    Diarrhea     Diverticulosis     Hemorrhoids     History of atrial fibrillation     Hypertension     Ileostomy in place [facility]) 8/18/2016    Internal hemorrhoids     NSVT (nonsustained ventricular tachycardia) ([facility] 08/18/2016    Other and unspecified hyperlipidemia     HISTORY OF HIGH CHOLESTEROL    Past heart attack     Tobacco use disorder     Tubular adenoma       Past Surgical History:   Procedure Laterality Date    ABDOMEN SURGERY  08/02/2016    explor.  lap., ileostomy    ABDOMEN SURGERY 2017    colostomy/ileostomy takedown with wound vac application hernia peristomal repair    CARDIAC CATHETERIZATION      NO STENTS    COLONOSCOPY  2005  2007  11/10/2009    COLONOSCOPY  16    extreme spasmatic colon; extensive diverticulosis; ventral herniation; flat polyp; large internal hemorrhoids    COLONOSCOPY  2016    through ileostomy    ILEOSTOMY OR JEJUNOSTOMY      POLYPECTOMY      [place] REPAIR INCISIONAL HERNIA,REDUCIBLE N/A 2017    HERNIA PERISTOMAL REPAIR RIGHT performed by Kerry Jj MD at [address] [facility] [address] N/A 2017    COLOSTOMY ILEOSTOMY TAKEDOWN WITH WOUND VAC APPLICATION performed by Kerry Jj MD at [address] History   Problem Relation Age of Onset    Cancer Father     Heart Disease Father     Diabetes Mother     Heart Disease Mother     High Blood Pressure Mother        Social History     Tobacco Use    Smoking status: Former Smoker     Packs/day: 0.50     Years: 25.00     Pack years: 12.50     Types: Cigarettes     Quit date: 2016     Years since quittin.6    Smokeless tobacco: Never Used   Substance Use Topics    Alcohol use: No     Alcohol/week: 0.0 standard drinks      Current Outpatient Medications   Medication Sig Dispense Refill    lisinopril (PRINIVIL;ZESTRIL) 20 MG tablet Take 1 tablet by mouth daily 90 tablet 3    furosemide (LASIX) 40 MG tablet Take 1 tablet by mouth daily 90 tablet 3    loperamide (IMODIUM) 2 MG capsule TAKE 1 CAPSULE BY MOUTH 3  TIMES DAILY AS NEEDED FOR  DIARRHEA 180 capsule 3    albuterol (PROVENTIL) (5 MG/ML) 0.5% nebulizer solution 0.5 ml      Cholecalciferol (VITAMIN D3) 125 MCG (5000 UT) TABS Take by mouth      aspirin 81 MG EC tablet Take 1 tablet by mouth every morning 30 tablet 3     No current facility-administered medications for this visit.      No Known Allergies    Health Maintenance   Topic Date Due    DTaP/Tdap/Td vaccine (1 - Tdap) Never done    Shingles Vaccine (1 of 2) Never done    Pneumococcal 65+ years Vaccine (2 of 2 - PPSV23) 10/15/2019    PSA counseling  09/18/2020    Depression Screen  04/12/2022    Potassium monitoring  02/28/2023    Creatinine monitoring  02/28/2023    Flu vaccine  Completed    COVID-19 Vaccine  Completed    Hepatitis C screen  Completed    Hepatitis A vaccine  Aged Out    Hepatitis B vaccine  Aged Out    Hib vaccine  Aged Out    Meningococcal (ACWY) vaccine  Aged Out       Subjective:     Review of Systems   Constitutional: Negative for appetite change, diaphoresis and fatigue. HENT: Negative for ear discharge and trouble swallowing. Eyes: Negative for pain and discharge. Respiratory: Negative for cough, shortness of breath and wheezing. Cardiovascular: Positive for leg swelling. Negative for chest pain and palpitations. Gastrointestinal: Negative for abdominal distention, blood in stool and diarrhea. Endocrine: Negative for polydipsia and polyphagia. Genitourinary: Negative for difficulty urinating and frequency. Musculoskeletal: Negative for gait problem, myalgias and neck pain. Skin: Negative for color change and rash. Allergic/Immunologic: Negative for environmental allergies and food allergies. Neurological: Negative for dizziness and headaches. Hematological: Negative for adenopathy. Does not bruise/bleed easily. Psychiatric/Behavioral: Negative for behavioral problems and sleep disturbance. Objective:     Physical Exam  Constitutional:       Appearance: He is well-developed. He is not diaphoretic. HENT:      Head: Normocephalic and atraumatic. Eyes:      General:         Right eye: No discharge. Left eye: No discharge. Extraocular Movements:      Right eye: Normal extraocular motion. Left eye: Normal extraocular motion. Conjunctiva/sclera: Conjunctivae normal.      Right eye: Right conjunctiva is not injected.       Left eye: Left conjunctiva is not injected. Neck:      Thyroid: No thyroid mass or thyromegaly. Vascular: No JVD. Cardiovascular:      Rate and Rhythm: Normal rate and regular rhythm. Heart sounds: No murmur heard. No friction rub. Pulmonary:      Effort: Pulmonary effort is normal. No tachypnea, bradypnea, accessory muscle usage or respiratory distress. Breath sounds: Normal breath sounds. No wheezing or rales. Abdominal:      General: Bowel sounds are normal. There is no distension. Palpations: Abdomen is soft. Tenderness: There is no abdominal tenderness. There is no rebound. Musculoskeletal:         General: No tenderness. Normal range of motion. Cervical back: Normal range of motion and neck supple. No edema or erythema. Lymphadenopathy:      Head:      Right side of head: No submental or submandibular adenopathy. Left side of head: No submental or submandibular adenopathy. Cervical: No cervical adenopathy. Skin:     General: Skin is warm. Coloration: Skin is not pale. Findings: No bruising, ecchymosis or rash. Neurological:      Mental Status: He is alert and oriented to person, place, and time. Cranial Nerves: No cranial nerve deficit. Sensory: No sensory deficit. Motor: No atrophy or abnormal muscle tone. Coordination: Coordination normal.   Psychiatric:         Mood and Affect: Mood is not anxious. Affect is not angry. Speech: Speech is not slurred. Behavior: Behavior normal. Behavior is not aggressive. Thought Content: Thought content does not include homicidal ideation. Cognition and Memory: Memory is not impaired. /80   Pulse 72   Ht 5' 7\" (1.702 m)   Wt 232 lb (105.2 kg)   SpO2 100%   BMI 36.34 kg/m²     Assessment:       Diagnosis Orders   1. Chronic systolic (congestive) heart failure (HCC)  Basic Metabolic Panel   2.  Crohn's disease of small intestine with complication (HCC)     3. Stage 3b chronic kidney disease ([facility]     4. Essential hypertension  furosemide (LASIX) 40 MG tablet   5. Pedal edema  furosemide (LASIX) 40 MG tablet   6. Severe obesity (BMI 35.0-39. 9) with comorbidity ([facility]     7. Prediabetes     8. Peripheral arterial occlusive disease ([facility]               Plan:      Return in about 6 months (around 9/7/2022). Orders Placed This Encounter   Procedures    Basic Metabolic Panel     Standing Status:   Future     Standing Expiration Date:   3/7/2023     Orders Placed This Encounter   Medications    lisinopril (PRINIVIL;ZESTRIL) 20 MG tablet     Sig: Take 1 tablet by mouth daily     Dispense:  90 tablet     Refill:  3    furosemide (LASIX) 40 MG tablet     Sig: Take 1 tablet by mouth daily     Dispense:  90 tablet     Refill:  3     Requesting 1 year supply    leg edema unchanged  Seen vascualr   Not interested seeing them again  Leg elevatrtion  meds refilled  Labs rev  prediab a1c 6.3   Patient given educational materials - see patient instructions. Discussed use, benefit, and side effects of prescribed medications. All patientquestions answered. Pt voiced understanding. Reviewed health maintenance. Instructedto continue current medications, diet and exercise. Patient agreed with treatmentplan. Follow up as directed.      Electronically signed by Gertrudis Kamara MD on 3/7/2022 at 11:56 AM

## 2022-09-06 ENCOUNTER — HOSPITAL ENCOUNTER (OUTPATIENT)
Age: 77
Setting detail: SPECIMEN
Discharge: HOME | End: 2022-09-06

## 2022-09-06 DIAGNOSIS — I50.22 CHRONIC SYSTOLIC (CONGESTIVE) HEART FAILURE (HCC): ICD-10-CM

## 2022-09-06 LAB
ANION GAP SERPL CALCULATED.3IONS-SCNC: 9 MMOL/L (ref 9–17)
BUN BLDV-MCNC: 27 MG/DL (ref 8–23)
CALCIUM SERPL-MCNC: 8.6 MG/DL (ref 8.6–10.4)
CHLORIDE BLD-SCNC: 102 MMOL/L (ref 98–107)
CO2: 24 MMOL/L (ref 20–31)
CREAT SERPL-MCNC: 1.76 MG/DL (ref 0.7–1.2)
GFR AFRICAN AMERICAN: 46 ML/MIN
GFR NON-AFRICAN AMERICAN: 38 ML/MIN
GFR SERPL CREATININE-BSD FRML MDRD: ABNORMAL ML/MIN/{1.73_M2}
GLUCOSE BLD-MCNC: 110 MG/DL (ref 70–99)
POTASSIUM SERPL-SCNC: 5.5 MMOL/L (ref 3.7–5.3)
SODIUM BLD-SCNC: 135 MMOL/L (ref 135–144)

## 2022-09-12 ENCOUNTER — OFFICE VISIT (OUTPATIENT)
Dept: INTERNAL MEDICINE CLINIC | Age: 77
End: 2022-09-12
Payer: MEDICARE

## 2022-09-12 VITALS
SYSTOLIC BLOOD PRESSURE: 126 MMHG | HEART RATE: 83 BPM | DIASTOLIC BLOOD PRESSURE: 84 MMHG | HEIGHT: 67 IN | OXYGEN SATURATION: 97 % | WEIGHT: 231 LBS | BODY MASS INDEX: 36.26 KG/M2

## 2022-09-12 DIAGNOSIS — R60.0 PEDAL EDEMA: ICD-10-CM

## 2022-09-12 DIAGNOSIS — N18.32 STAGE 3B CHRONIC KIDNEY DISEASE (HCC): Chronic | ICD-10-CM

## 2022-09-12 DIAGNOSIS — E66.09 CLASS 2 OBESITY DUE TO EXCESS CALORIES WITHOUT SERIOUS COMORBIDITY WITH BODY MASS INDEX (BMI) OF 35.0 TO 35.9 IN ADULT: ICD-10-CM

## 2022-09-12 DIAGNOSIS — I50.22 CHRONIC SYSTOLIC (CONGESTIVE) HEART FAILURE (HCC): ICD-10-CM

## 2022-09-12 DIAGNOSIS — Z12.5 ENCOUNTER FOR SCREENING FOR MALIGNANT NEOPLASM OF PROSTATE: ICD-10-CM

## 2022-09-12 DIAGNOSIS — K50.019 CROHN'S DISEASE OF SMALL INTESTINE WITH COMPLICATION (HCC): Chronic | ICD-10-CM

## 2022-09-12 DIAGNOSIS — I77.9 PERIPHERAL ARTERIAL OCCLUSIVE DISEASE (HCC): Chronic | ICD-10-CM

## 2022-09-12 DIAGNOSIS — I10 ESSENTIAL HYPERTENSION: Primary | Chronic | ICD-10-CM

## 2022-09-12 PROBLEM — E66.812 CLASS 2 OBESITY DUE TO EXCESS CALORIES WITHOUT SERIOUS COMORBIDITY WITH BODY MASS INDEX (BMI) OF 35.0 TO 35.9 IN ADULT: Status: ACTIVE | Noted: 2020-09-10

## 2022-09-12 PROCEDURE — 93000 ELECTROCARDIOGRAM COMPLETE: CPT | Performed by: INTERNAL MEDICINE

## 2022-09-12 PROCEDURE — G8427 DOCREV CUR MEDS BY ELIG CLIN: HCPCS | Performed by: INTERNAL MEDICINE

## 2022-09-12 PROCEDURE — 99214 OFFICE O/P EST MOD 30 MIN: CPT | Performed by: INTERNAL MEDICINE

## 2022-09-12 PROCEDURE — G8417 CALC BMI ABV UP PARAM F/U: HCPCS | Performed by: INTERNAL MEDICINE

## 2022-09-12 PROCEDURE — 1036F TOBACCO NON-USER: CPT | Performed by: INTERNAL MEDICINE

## 2022-09-12 PROCEDURE — G0444 DEPRESSION SCREEN ANNUAL: HCPCS | Performed by: INTERNAL MEDICINE

## 2022-09-12 PROCEDURE — 1123F ACP DISCUSS/DSCN MKR DOCD: CPT | Performed by: INTERNAL MEDICINE

## 2022-09-12 RX ORDER — ZOSTER VACCINE RECOMBINANT, ADJUVANTED 50 MCG/0.5
0.5 KIT INTRAMUSCULAR SEE ADMIN INSTRUCTIONS
Qty: 0.5 ML | Refills: 0 | Status: SHIPPED | OUTPATIENT
Start: 2022-09-12 | End: 2023-03-11

## 2022-09-12 RX ORDER — FUROSEMIDE 40 MG/1
40 TABLET ORAL DAILY
Qty: 90 TABLET | Refills: 3 | Status: SHIPPED | OUTPATIENT
Start: 2022-09-12 | End: 2022-12-11

## 2022-09-12 ASSESSMENT — ENCOUNTER SYMPTOMS
TROUBLE SWALLOWING: 0
COUGH: 0
BLOOD IN STOOL: 0
COLOR CHANGE: 0
DIARRHEA: 0
EYE DISCHARGE: 0
EYE PAIN: 0
WHEEZING: 0
ABDOMINAL DISTENTION: 0
SHORTNESS OF BREATH: 0

## 2022-09-12 ASSESSMENT — PATIENT HEALTH QUESTIONNAIRE - PHQ9
2. FEELING DOWN, DEPRESSED OR HOPELESS: 0
SUM OF ALL RESPONSES TO PHQ9 QUESTIONS 1 & 2: 0
SUM OF ALL RESPONSES TO PHQ QUESTIONS 1-9: 0
1. LITTLE INTEREST OR PLEASURE IN DOING THINGS: 0

## 2022-09-12 NOTE — PROGRESS NOTES
[address]  [place] [id number]  Dept: [phone]  Dept Fax: [phone]    Conner Ag is a 68 y.o. male who presents today for his medical conditions/complaintsas noted below. Conner Ag is c/o of   Chief Complaint   Patient presents with    Annual Exam         HPI:     HTN  Onset more than 2 years ago  rosario mild to mod  Controlled with current po meds  Not associated with headaches or blurry vision  No chest pain    Chf  Leg edema    Interested in wt loss   Wants wt loss beds        Hemoglobin A1C (%)   Date Value   02/28/2022 6.3 (H)   07/21/2020 5.9   11/26/2018 6.1 (H)             ( goal A1Cis < 7)   Microalb/Crt. Ratio (mcg/mg creat)   Date Value   02/28/2017 11     LDL Cholesterol (mg/dL)   Date Value   02/28/2022 73   11/26/2018 81   04/18/2013 86       (goal LDL is <100)   AST (U/L)   Date Value   02/28/2022 36     ALT (U/L)   Date Value   02/28/2022 29     BUN (mg/dL)   Date Value   09/06/2022 27 (H)     BP Readings from Last 3 Encounters:   09/12/22 126/84   03/07/22 138/80   11/01/21 132/80          (goal 120/80)    Past Medical History:   Diagnosis Date    Abdominal hernia     Atrophy of muscle of multiple sites 8/19/2016    CAD (coronary artery disease)     CKD (chronic kidney disease) stage 3, GFR 30-59 ml/min ([facility])     Colon polyp     Crohn disease ([address]     Crohn's colitis ([address]     Decubitus ulcer of coccyx, stage 2 ([address] 8/8/2016    Diarrhea     Diverticulosis     Hemorrhoids     History of atrial fibrillation     Hypertension     Ileostomy in place ([address] 8/18/2016    Internal hemorrhoids     NSVT (nonsustained ventricular tachycardia) ([address] 08/18/2016    Other and unspecified hyperlipidemia     HISTORY OF HIGH CHOLESTEROL    Past heart attack     Tobacco use disorder     Tubular adenoma       Past Surgical History:   Procedure Laterality Date    ABDOMEN SURGERY  08/02/2016    explor.  lap., ileostomy    ABDOMEN SURGERY  02/19/2017 colostomy/ileostomy takedown with wound vac application hernia peristomal repair    CARDIAC CATHETERIZATION      NO STENTS    COLONOSCOPY  2005  2007  11/10/2009    COLONOSCOPY  16    extreme spasmatic colon; extensive diverticulosis; ventral herniation; flat polyp; large internal hemorrhoids    COLONOSCOPY  2016    through ileostomy    [address] N/A 2017    HERNIA PERISTOMAL REPAIR RIGHT performed by Aicha Moses MD at [address] [facility] N/A 2017    COLOSTOMY ILEOSTOMY TAKEDOWN WITH WOUND VAC APPLICATION performed by Aicha Moses MD at [address] History   Problem Relation Age of Onset    Cancer Father     Heart Disease Father     Diabetes Mother     Heart Disease Mother     High Blood Pressure Mother        Social History     Tobacco Use    Smoking status: Former     Packs/day: 0.50     Years: 25.00     Pack years: 12.50     Types: Cigarettes     Quit date: 2016     Years since quittin.1    Smokeless tobacco: Never   Substance Use Topics    Alcohol use: No     Alcohol/week: 0.0 standard drinks      Current Outpatient Medications   Medication Sig Dispense Refill    liraglutide-weight management 18 MG/3ML SOPN 0.6 mg dose for 7 days, then 1.2 mg dose for 1 week,  Then 1.8 mg dose for 1 week, then 2.4 mg dose 5 Adjustable Dose Pre-filled Pen Syringe 1    zoster recombinant adjuvanted vaccine (SHINGRIX) 50 MCG/0.5ML SUSR injection Inject 0.5 mLs into the muscle See Admin Instructions 1 dose now and repeat in 2-6 months 0.5 mL 0    metoprolol tartrate (LOPRESSOR) 25 MG tablet Take 1 tablet by mouth 2 times daily 180 tablet 1    furosemide (LASIX) 40 MG tablet Take 1 tablet by mouth daily 90 tablet 3    lisinopril (PRINIVIL;ZESTRIL) 20 MG tablet Take 1 tablet by mouth daily 90 tablet 3     No current facility-administered medications for this visit.      No Known Allergies    Health Maintenance   Topic Date Due    DTaP/Tdap/Td vaccine (1 - Tdap) Never done    Shingles vaccine (1 of 2) Never done    Pneumococcal 65+ years Vaccine (2 - PPSV23 or PCV20) 10/15/2019    Prostate Specific Antigen (PSA) Screening or Monitoring  03/03/2021    Depression Screen  04/12/2022    COVID-19 Vaccine (4 - Booster for Pfizer series) 06/15/2022    Flu vaccine (1) 09/01/2022    Hepatitis C screen  Completed    Hepatitis A vaccine  Aged Out    Hepatitis B vaccine  Aged Out    Hib vaccine  Aged Out    Meningococcal (ACWY) vaccine  Aged Out       Subjective:     Review of Systems   Constitutional:  Negative for appetite change, diaphoresis and fatigue. HENT:  Negative for ear discharge and trouble swallowing. Eyes:  Negative for pain and discharge. Respiratory:  Negative for cough, shortness of breath and wheezing. Cardiovascular:  Positive for leg swelling. Negative for chest pain and palpitations. Gastrointestinal:  Negative for abdominal distention, blood in stool and diarrhea. Endocrine: Negative for polydipsia and polyphagia. Genitourinary:  Negative for difficulty urinating and frequency. Musculoskeletal:  Negative for gait problem, myalgias and neck pain. Skin:  Negative for color change and rash. Allergic/Immunologic: Negative for environmental allergies and food allergies. Neurological:  Negative for dizziness and headaches. Hematological:  Negative for adenopathy. Does not bruise/bleed easily. Psychiatric/Behavioral:  Negative for behavioral problems and sleep disturbance. Objective:     Physical Exam  Constitutional:       Appearance: He is well-developed. He is not diaphoretic. Comments: Obese  Chace leg edmea  With stasis dermatis     HENT:      Head: Normocephalic and atraumatic. Eyes:      General:         Right eye: No discharge. Left eye: No discharge. Extraocular Movements:      Right eye: Normal extraocular motion.       Left eye: Normal extraocular motion. Conjunctiva/sclera: Conjunctivae normal.      Right eye: Right conjunctiva is not injected. Left eye: Left conjunctiva is not injected. Neck:      Thyroid: No thyroid mass or thyromegaly. Vascular: No JVD. Cardiovascular:      Rate and Rhythm: Normal rate and regular rhythm. Heart sounds: No murmur heard. No friction rub. Pulmonary:      Effort: Pulmonary effort is normal. No tachypnea, bradypnea, accessory muscle usage or respiratory distress. Breath sounds: Normal breath sounds. No wheezing or rales. Abdominal:      General: Bowel sounds are normal. There is no distension. Palpations: Abdomen is soft. Tenderness: There is no abdominal tenderness. There is no rebound. Comments: Incisional hernia noted     Musculoskeletal:         General: No tenderness. Normal range of motion. Cervical back: Normal range of motion and neck supple. No edema or erythema. Lymphadenopathy:      Head:      Right side of head: No submental or submandibular adenopathy. Left side of head: No submental or submandibular adenopathy. Cervical: No cervical adenopathy. Skin:     General: Skin is warm. Coloration: Skin is not pale. Findings: No bruising, ecchymosis or rash. Neurological:      Mental Status: He is alert and oriented to person, place, and time. Cranial Nerves: No cranial nerve deficit. Sensory: No sensory deficit. Motor: No atrophy or abnormal muscle tone. Coordination: Coordination normal.   Psychiatric:         Mood and Affect: Mood is not anxious. Affect is not angry. Speech: Speech is not slurred. Behavior: Behavior normal. Behavior is not aggressive. Thought Content: Thought content does not include homicidal ideation. Cognition and Memory: Memory is not impaired.      /84   Pulse 83   Ht 5' 7\" (1.702 m)   Wt 231 lb (104.8 kg)   SpO2 97%   BMI 36.18 kg/m² Assessment:       Diagnosis Orders   1. Essential hypertension  furosemide (LASIX) 40 MG tablet      2. Cro[facility]'s disease of small intestine with complication ([facility]        3. Peripheral arterial occlusive disease (HCC)  EKG 12 lead    EKG 12 lead      4. Chronic systolic (congestive) heart failure (HCC)  EKG 12 lead    EKG 12 lead    Echocardiogram complete      5. Stage 3b chronic kidney disease (HCC)        6. Class 2 obesity due to excess calories without serious comorbidity with body mass index (BMI) of 35.0 to 35.9 in adult  NC DEPRESSION SCREEN ANNUAL    PSA Screening    Echocardiogram complete      7. Encounter for screening for malignant neoplasm of prostate   PSA Screening      8. Pedal edema  furosemide (LASIX) 40 MG tablet    Echocardiogram complete                Plan:      No follow-ups on file.     Orders Placed This Encounter   Procedures    PSA Screening     Standing Status:   Future     Standing Expiration Date:   2023    EKG 12 lead     Standing Status:   Future     Number of Occurrences:   1     Standing Expiration Date:   2022     Order Specific Question:   Reason for Exam?     Answer:   Angina    Echocardiogram complete     Standing Status:   Future     Standing Expiration Date:   2022     Order Specific Question:   Reason for exam:     Answer:   chf    NC DEPRESSION SCREEN ANNUAL     Orders Placed This Encounter   Medications    liraglutide-weight management 18 MG/3ML SOPN     Si.6 mg dose for 7 days, then 1.2 mg dose for 1 week,  Then 1.8 mg dose for 1 week, then 2.4 mg dose     Dispense:  5 Adjustable Dose Pre-filled Pen Syringe     Refill:  1    zoster recombinant adjuvanted vaccine (SHINGRIX) 50 MCG/0.5ML SUSR injection     Sig: Inject 0.5 mLs into the muscle See Admin Instructions 1 dose now and repeat in 2-6 months     Dispense:  0.5 mL     Refill:  0    metoprolol tartrate (LOPRESSOR) 25 MG tablet     Sig: Take 1 tablet by mouth 2 times daily     Dispense:  180 tablet     Refill:  1    furosemide (LASIX) 40 MG tablet     Sig: Take 1 tablet by mouth daily     Dispense:  90 tablet     Refill:  3     Requesting 1 year supply    Pt wants to loose wt  With chf  Cant do adipex  Will try above  Counseled  for diet and wt management  Ekg sinus tach  No cp  No palpation  No dizziness    Pt refused further workup  Add  bb  Ckd  Clinically no dvt           Patient given educational materials - see patient instructions. Discussed use, benefit, and side effects of prescribed medications. All patientquestions answered. Pt voiced understanding. Reviewed health maintenance. Instructedto continue current medications, diet and exercise. Patient agreed with treatmentplan. Follow up as directed. Please note that this chart was generated using voice recognition Dragon dictation software. Although every effort was made to ensure the accuracy of this automated transcription, some errors in transcription may have occurred.      Electronically signed by Gayla Kirk MD on 9/12/2022 at 2:18 PM

## 2022-09-12 NOTE — PROGRESS NOTES
Visit Information    Have you changed or started any medications since your last visit including any over-the-counter medicines, vitamins, or herbal medicines? no   Are you having any side effects from any of your medications? -  no  Have you stopped taking any of your medications? Is so, why? -  no    Have you seen any other physician or provider since your last visit? No  Have you had any other diagnostic tests since your last visit? No  Have you been seen in the emergency room and/or had an admission to a hospital since we last saw you? No  Have you had your routine dental cleaning in the past 6 months? no    Have you activated your [employer] account? If not, what are your barriers?  No:      Patient Care Team:  Stephanie Merino MD as PCP - General (Internal Medicine)  Stephanie Merino MD as PCP [facility]  Jassi De La Garza MD as Consulting Physician (Gastroenterology)    Medical History Review  Past Medical, Family, and Social History reviewed and does contribute to the patient presenting condition    Health Maintenance   Topic Date Due    DTaP/Tdap/Td vaccine (1 - Tdap) Never done    Shingles vaccine (1 of 2) Never done    Pneumococcal 65+ years Vaccine (2 - PPSV23 or PCV20) 10/15/2019    Prostate Specific Antigen (PSA) Screening or Monitoring  03/03/2021    Depression Screen  04/12/2022    COVID-19 Vaccine (4 - Booster for Samuel Peter series) 06/15/2022    Flu vaccine (1) 09/01/2022    Hepatitis C screen  Completed    Hepatitis A vaccine  Aged Out    Hepatitis B vaccine  Aged Out    Hib vaccine  Aged Out    Meningococcal (ACWY) vaccine  Aged Out

## 2022-09-20 ENCOUNTER — HOSPITAL ENCOUNTER (OUTPATIENT)
Age: 77
Discharge: HOME | End: 2022-09-22
Payer: MEDICARE

## 2022-09-20 LAB
EKG ATRIAL RATE: 267 BPM
EKG P AXIS: 73 DEGREES
EKG Q-T INTERVAL: 394 MS
EKG QRS DURATION: 116 MS
EKG QTC CALCULATION (BAZETT): 479 MS
EKG R AXIS: 21 DEGREES
EKG T AXIS: 81 DEGREES
EKG VENTRICULAR RATE: 89 BPM

## 2022-09-20 PROCEDURE — 93010 ELECTROCARDIOGRAM REPORT: CPT | Performed by: INTERNAL MEDICINE

## 2022-09-20 PROCEDURE — 93005 ELECTROCARDIOGRAM TRACING: CPT | Performed by: INTERNAL MEDICINE

## 2022-09-21 ENCOUNTER — HOSPITAL ENCOUNTER (OUTPATIENT)
Dept: NON INVASIVE DIAGNOSTICS | Age: 77
Discharge: HOME | End: 2022-09-21
Payer: MEDICARE

## 2022-09-21 DIAGNOSIS — E66.09 CLASS 2 OBESITY DUE TO EXCESS CALORIES WITHOUT SERIOUS COMORBIDITY WITH BODY MASS INDEX (BMI) OF 35.0 TO 35.9 IN ADULT: ICD-10-CM

## 2022-09-21 DIAGNOSIS — I50.22 CHRONIC SYSTOLIC (CONGESTIVE) HEART FAILURE (HCC): ICD-10-CM

## 2022-09-21 DIAGNOSIS — R60.0 PEDAL EDEMA: ICD-10-CM

## 2022-09-21 LAB
LV EF: 38 %
LVEF MODALITY: NORMAL

## 2022-09-21 PROCEDURE — C8929 TTE W OR WO FOL WCON,DOPPLER: HCPCS

## 2022-09-21 PROCEDURE — 6360000004 HC RX CONTRAST MEDICATION: Performed by: INTERNAL MEDICINE

## 2022-09-21 RX ADMIN — PERFLUTREN 2 ML: 6.52 INJECTION, SUSPENSION INTRAVENOUS at 11:16

## 2022-09-21 NOTE — PROGRESS NOTES
Outpatient echo completed. Atrial flutter noted, Pt had OP ekg day prior, attempt made to contact Dr. Prabhakar Jean Baptiste, unable to reach. Today, message sent in [employer] to Dr. Prabhakar Jean Baptiste re ekg changes, no reply as of 1134. Pt stable, HR 70s-80s, no complaint of fast HR. Pt released.

## 2022-09-28 ENCOUNTER — HOSPITAL ENCOUNTER (OUTPATIENT)
Age: 77
Setting detail: SPECIMEN
Discharge: HOME | End: 2022-09-28

## 2022-09-28 DIAGNOSIS — E66.09 CLASS 2 OBESITY DUE TO EXCESS CALORIES WITHOUT SERIOUS COMORBIDITY WITH BODY MASS INDEX (BMI) OF 35.0 TO 35.9 IN ADULT: ICD-10-CM

## 2022-09-28 DIAGNOSIS — Z12.5 ENCOUNTER FOR SCREENING FOR MALIGNANT NEOPLASM OF PROSTATE: ICD-10-CM

## 2022-09-29 LAB — PROSTATE SPECIFIC ANTIGEN: 1.66 NG/ML

## 2022-10-25 ENCOUNTER — OFFICE VISIT (OUTPATIENT)
Dept: INTERNAL MEDICINE CLINIC | Age: 77
End: 2022-10-25
Payer: MEDICARE

## 2022-10-25 VITALS — WEIGHT: 225 LBS | BODY MASS INDEX: 35.24 KG/M2 | SYSTOLIC BLOOD PRESSURE: 138 MMHG | DIASTOLIC BLOOD PRESSURE: 86 MMHG

## 2022-10-25 DIAGNOSIS — N18.32 STAGE 3B CHRONIC KIDNEY DISEASE (HCC): Chronic | ICD-10-CM

## 2022-10-25 DIAGNOSIS — R73.03 PREDIABETES: ICD-10-CM

## 2022-10-25 DIAGNOSIS — I50.22 CHRONIC SYSTOLIC (CONGESTIVE) HEART FAILURE (HCC): Primary | ICD-10-CM

## 2022-10-25 DIAGNOSIS — I77.9 PERIPHERAL ARTERIAL OCCLUSIVE DISEASE (HCC): Chronic | ICD-10-CM

## 2022-10-25 DIAGNOSIS — K50.019 CROHN'S DISEASE OF SMALL INTESTINE WITH COMPLICATION (HCC): Chronic | ICD-10-CM

## 2022-10-25 DIAGNOSIS — E66.09 CLASS 2 OBESITY DUE TO EXCESS CALORIES WITHOUT SERIOUS COMORBIDITY WITH BODY MASS INDEX (BMI) OF 35.0 TO 35.9 IN ADULT: ICD-10-CM

## 2022-10-25 PROCEDURE — 1036F TOBACCO NON-USER: CPT | Performed by: INTERNAL MEDICINE

## 2022-10-25 PROCEDURE — 3078F DIAST BP <80 MM HG: CPT | Performed by: INTERNAL MEDICINE

## 2022-10-25 PROCEDURE — 3074F SYST BP LT 130 MM HG: CPT | Performed by: INTERNAL MEDICINE

## 2022-10-25 PROCEDURE — 99214 OFFICE O/P EST MOD 30 MIN: CPT | Performed by: INTERNAL MEDICINE

## 2022-10-25 PROCEDURE — G8484 FLU IMMUNIZE NO ADMIN: HCPCS | Performed by: INTERNAL MEDICINE

## 2022-10-25 PROCEDURE — G8428 CUR MEDS NOT DOCUMENT: HCPCS | Performed by: INTERNAL MEDICINE

## 2022-10-25 PROCEDURE — G8417 CALC BMI ABV UP PARAM F/U: HCPCS | Performed by: INTERNAL MEDICINE

## 2022-10-25 PROCEDURE — 1123F ACP DISCUSS/DSCN MKR DOCD: CPT | Performed by: INTERNAL MEDICINE

## 2022-10-25 ASSESSMENT — ENCOUNTER SYMPTOMS
SHORTNESS OF BREATH: 0
EYE PAIN: 0
COUGH: 0
DIARRHEA: 0
TROUBLE SWALLOWING: 0
COLOR CHANGE: 0
WHEEZING: 0
EYE DISCHARGE: 0
BLOOD IN STOOL: 0
ABDOMINAL DISTENTION: 0

## 2022-10-25 NOTE — PROGRESS NOTES
[address]  [place] [address]  Dept: [phone]  Dept Fax: [phone]    Tiana Velez is a 68 y.o. male who presents today for his medical conditions/complaintsas noted below. Tiana Velez is c/o of   Chief Complaint   Patient presents with    Weight Loss         HPI:     HTN  Onset more than 2 years ago  rosario mild to mod  Controlled with current po meds  Not associated with headaches or blurry vision  No chest pain    Chace lymphedema  Pt not interested in seeing vascular        Hemoglobin A1C (%)   Date Value   02/28/2022 6.3 (H)   07/21/2020 5.9   11/26/2018 6.1 (H)             ( goal A1Cis < 7)   Microalb/Crt. Ratio (mcg/mg creat)   Date Value   02/28/2017 11     LDL Cholesterol (mg/dL)   Date Value   02/28/2022 73   11/26/2018 81   04/18/2013 86       (goal LDL is <100)   AST (U/L)   Date Value   02/28/2022 36     ALT (U/L)   Date Value   02/28/2022 29     BUN (mg/dL)   Date Value   09/06/2022 27 (H)     BP Readings from Last 3 Encounters:   10/25/22 138/86   09/12/22 126/84   03/07/22 138/80          (goal 120/80)    Past Medical History:   Diagnosis Date    Abdominal hernia     Atrophy of muscle of multiple sites 8/19/2016    CAD (coronary artery disease)     CKD (chronic kidney disease) stage 3, GFR 30-59 ml/min ([facility])     Colon polyp     Crohn disease ([address]     Crohn's colitis ([address]     Decubitus ulcer of coccyx, stage 2 ([address] 8/8/2016    Diarrhea     Diverticulosis     Hemorrhoids     History of atrial fibrillation     Hypertension     Ileostomy in place ([address] 8/18/2016    Internal hemorrhoids     NSVT (nonsustained ventricular tachycardia) ([address] 08/18/2016    Other and unspecified hyperlipidemia     HISTORY OF HIGH CHOLESTEROL    Past heart attack     Tobacco use disorder     Tubular adenoma       Past Surgical History:   Procedure Laterality Date    ABDOMEN SURGERY  08/02/2016    explor.  lap., ileostomy    ABDOMEN SURGERY  02/19/2017 colostomy/ileostomy takedown with wound vac application hernia peristomal repair    CARDIAC CATHETERIZATION      NO STENTS    COLONOSCOPY  2005  2007  11/10/2009    COLONOSCOPY  16    extreme spasmatic colon; extensive diverticulosis; ventral herniation; flat polyp; large internal hemorrhoids    COLONOSCOPY  2016    through ileostomy    [address] N/A 2017    HERNIA PERISTOMAL REPAIR RIGHT performed by Moshe Parikh MD at [address] N/A 2017    COLOSTOMY ILEOSTOMY TAKEDOWN WITH WOUND VAC APPLICATION performed by Moshe Parikh MD at [address] History   Problem Relation Age of Onset    Cancer Father     Heart Disease Father     Diabetes Mother     Heart Disease Mother     High Blood Pressure Mother        Social History     Tobacco Use    Smoking status: Former     Packs/day: 0.50     Years: 25.00     Pack years: 12.50     Types: Cigarettes     Quit date: 2016     Years since quittin.2    Smokeless tobacco: Never   Substance Use Topics    Alcohol use: No     Alcohol/week: 0.0 standard drinks      Current Outpatient Medications   Medication Sig Dispense Refill    zoster recombinant adjuvanted vaccine (SHINGRIX) 50 MCG/0.5ML SUSR injection Inject 0.5 mLs into the muscle See Admin Instructions 1 dose now and repeat in 2-6 months 0.5 mL 0    metoprolol tartrate (LOPRESSOR) 25 MG tablet Take 1 tablet by mouth 2 times daily 180 tablet 1    furosemide (LASIX) 40 MG tablet Take 1 tablet by mouth daily 90 tablet 3    lisinopril (PRINIVIL;ZESTRIL) 20 MG tablet Take 1 tablet by mouth daily 90 tablet 3     No current facility-administered medications for this visit.      No Known Allergies    Health Maintenance   Topic Date Due    DTaP/Tdap/Td vaccine (1 - Tdap) Never done    Shingles vaccine (1 of 2) Never done    Pneumococcal 65+ years Vaccine (2 - PPSV23 if available, else PCV20) 10/15/2019    COVID-19 Vaccine (4 - Booster for Pfizer series) 04/12/2022    Flu vaccine (1) 08/01/2022    Annual Wellness Visit (AWV)  09/20/2022    Depression Screen  09/12/2023    Hepatitis C screen  Completed    Hepatitis A vaccine  Aged Out    Hib vaccine  Aged Out    Meningococcal (ACWY) vaccine  Aged Out       Subjective:     Review of Systems   Constitutional:  Negative for appetite change, diaphoresis and fatigue. HENT:  Negative for ear discharge and trouble swallowing. Eyes:  Negative for pain and discharge. Respiratory:  Negative for cough, shortness of breath and wheezing. Cardiovascular:  Positive for leg swelling. Negative for chest pain and palpitations. Gastrointestinal:  Negative for abdominal distention, blood in stool and diarrhea. Endocrine: Negative for polydipsia and polyphagia. Genitourinary:  Negative for difficulty urinating and frequency. Musculoskeletal:  Negative for gait problem, myalgias and neck pain. Skin:  Negative for color change and rash. Allergic/Immunologic: Negative for environmental allergies and food allergies. Neurological:  Negative for dizziness and headaches. Hematological:  Negative for adenopathy. Does not bruise/bleed easily. Psychiatric/Behavioral:  Negative for behavioral problems and sleep disturbance. Objective:     Physical Exam  Constitutional:       Appearance: He is well-developed. He is not diaphoretic. HENT:      Head: Normocephalic and atraumatic. Eyes:      General:         Right eye: No discharge. Left eye: No discharge. Extraocular Movements:      Right eye: Normal extraocular motion. Left eye: Normal extraocular motion. Conjunctiva/sclera: Conjunctivae normal.      Right eye: Right conjunctiva is not injected. Left eye: Left conjunctiva is not injected. Neck:      Thyroid: No thyroid mass or thyromegaly. Vascular: No JVD.    Cardiovascular:      Rate and Rhythm: Normal rate and regular rhythm. Heart sounds: No murmur heard. No friction rub. Pulmonary:      Effort: Pulmonary effort is normal. No tachypnea, bradypnea, accessory muscle usage or respiratory distress. Breath sounds: Normal breath sounds. No wheezing or rales. Abdominal:      General: Bowel sounds are normal. There is no distension. Palpations: Abdomen is soft. Tenderness: There is no abdominal tenderness. There is no rebound. Musculoskeletal:         General: No tenderness. Normal range of motion. Cervical back: Normal range of motion and neck supple. No edema or erythema. Right lower leg: Edema present. Left lower leg: Edema present. Lymphadenopathy:      Head:      Right side of head: No submental or submandibular adenopathy. Left side of head: No submental or submandibular adenopathy. Cervical: No cervical adenopathy. Skin:     General: Skin is warm. Coloration: Skin is not pale. Findings: No bruising, ecchymosis or rash. Neurological:      Mental Status: He is alert and oriented to person, place, and time. Cranial Nerves: No cranial nerve deficit. Sensory: No sensory deficit. Motor: No atrophy or abnormal muscle tone. Coordination: Coordination normal.   Psychiatric:         Mood and Affect: Mood is not anxious. Affect is not angry. Speech: Speech is not slurred. Behavior: Behavior normal. Behavior is not aggressive. Thought Content: Thought content does not include homicidal ideation. Cognition and Memory: Memory is not impaired. /86 (Site: Left Upper Arm)   Wt 225 lb (102.1 kg)   BMI 35.24 kg/m²     Assessment:       Diagnosis Orders   1. Chronic systolic (congestive) heart failure ([address]        2. Peripheral arterial occlusive disease ([address]        3.  Crohn's disease of small intestine with complication ([address]        4. Stage 3b chronic kidney disease (HCC)        5. Class 2 obesity due to excess calories without serious comorbidity with body mass index (BMI) of 35.0 to 35.9 in adult        6. Prediabetes  Hemoglobin A1C                Plan:      No follow-ups on file. Orders Placed This Encounter   Procedures    Hemoglobin A1C     Standing Status:   Future     Standing Expiration Date:   1/23/2023    DNR comfort care - arrest     No orders of the defined types were placed in this encounter. Chace lymphedema  Pt not interested in seeing vascular  Pt would benfit form lymphopress boots  Keep legs elevated  Withdiet alone lost 10lbs  Saxenda not covered    Echo noted 35-40  Pt not interesed in cardio consult  No cath  No further workup  Wants symtomatic tt only  [facility] arrest     Patient given educational materials - see patient instructions. Discussed use, benefit, and side effects of prescribed medications. All patientquestions answered. Pt voiced understanding. Reviewed health maintenance. Instructedto continue current medications, diet and exercise. Patient agreed with treatmentplan. Follow up as directed. Please note that this chart was generated using voice recognition Dragon dictation software. Although every effort was made to ensure the accuracy of this automated transcription, some errors in transcription may have occurred.      Electronically signed by Cris Hatchet, MD on 10/25/2022 at 3:55 PM

## 2023-02-23 RX ORDER — LISINOPRIL 20 MG/1
20 TABLET ORAL DAILY
Qty: 90 TABLET | Refills: 3 | Status: SHIPPED | OUTPATIENT
Start: 2023-02-23 | End: 2024-02-18

## 2023-04-19 ENCOUNTER — TELEPHONE (OUTPATIENT)
Dept: VASCULAR SURGERY | Age: 78
End: 2023-04-19

## 2023-04-19 ENCOUNTER — OFFICE VISIT (OUTPATIENT)
Dept: INTERNAL MEDICINE CLINIC | Age: 78
End: 2023-04-19

## 2023-04-19 VITALS
SYSTOLIC BLOOD PRESSURE: 126 MMHG | BODY MASS INDEX: 31.39 KG/M2 | OXYGEN SATURATION: 100 % | WEIGHT: 200 LBS | HEIGHT: 67 IN | DIASTOLIC BLOOD PRESSURE: 76 MMHG | HEART RATE: 143 BPM

## 2023-04-19 DIAGNOSIS — I89.0 LYMPHEDEMA OF BOTH LOWER EXTREMITIES: ICD-10-CM

## 2023-04-19 DIAGNOSIS — I10 ESSENTIAL HYPERTENSION: Primary | Chronic | ICD-10-CM

## 2023-04-19 DIAGNOSIS — I77.9 PERIPHERAL ARTERIAL OCCLUSIVE DISEASE (HCC): Chronic | ICD-10-CM

## 2023-04-19 DIAGNOSIS — I50.22 CHRONIC SYSTOLIC CONGESTIVE HEART FAILURE, NYHA CLASS 2 (HCC): ICD-10-CM

## 2023-04-19 DIAGNOSIS — N18.32 STAGE 3B CHRONIC KIDNEY DISEASE (HCC): Chronic | ICD-10-CM

## 2023-04-19 DIAGNOSIS — Z00.00 MEDICARE ANNUAL WELLNESS VISIT, SUBSEQUENT: ICD-10-CM

## 2023-04-19 DIAGNOSIS — K50.019 CROHN'S DISEASE OF SMALL INTESTINE WITH COMPLICATION (HCC): Chronic | ICD-10-CM

## 2023-04-19 RX ORDER — ZOSTER VACCINE RECOMBINANT, ADJUVANTED 50 MCG/0.5
0.5 KIT INTRAMUSCULAR SEE ADMIN INSTRUCTIONS
Qty: 0.5 ML | Refills: 0 | Status: SHIPPED | OUTPATIENT
Start: 2023-04-19 | End: 2023-10-16

## 2023-04-19 SDOH — ECONOMIC STABILITY: INCOME INSECURITY: HOW HARD IS IT FOR YOU TO PAY FOR THE VERY BASICS LIKE FOOD, HOUSING, MEDICAL CARE, AND HEATING?: NOT HARD AT ALL

## 2023-04-19 SDOH — ECONOMIC STABILITY: FOOD INSECURITY: WITHIN THE PAST 12 MONTHS, THE FOOD YOU BOUGHT JUST DIDN'T LAST AND YOU DIDN'T HAVE MONEY TO GET MORE.: NEVER TRUE

## 2023-04-19 SDOH — ECONOMIC STABILITY: HOUSING INSECURITY
IN THE LAST 12 MONTHS, WAS THERE A TIME WHEN YOU DID NOT HAVE A STEADY PLACE TO SLEEP OR SLEPT IN A SHELTER (INCLUDING NOW)?: NO

## 2023-04-19 SDOH — ECONOMIC STABILITY: FOOD INSECURITY: WITHIN THE PAST 12 MONTHS, YOU WORRIED THAT YOUR FOOD WOULD RUN OUT BEFORE YOU GOT MONEY TO BUY MORE.: NEVER TRUE

## 2023-04-19 ASSESSMENT — LIFESTYLE VARIABLES
HOW OFTEN DO YOU HAVE A DRINK CONTAINING ALCOHOL: NEVER
HOW MANY STANDARD DRINKS CONTAINING ALCOHOL DO YOU HAVE ON A TYPICAL DAY: PATIENT DOES NOT DRINK

## 2023-04-19 ASSESSMENT — PATIENT HEALTH QUESTIONNAIRE - PHQ9
SUM OF ALL RESPONSES TO PHQ9 QUESTIONS 1 & 2: 0
1. LITTLE INTEREST OR PLEASURE IN DOING THINGS: 0
SUM OF ALL RESPONSES TO PHQ QUESTIONS 1-9: 0
2. FEELING DOWN, DEPRESSED OR HOPELESS: 0

## 2023-04-19 NOTE — PROGRESS NOTES
Visit Information    Have you changed or started any medications since your last visit including any over-the-counter medicines, vitamins, or herbal medicines? no   Are you having any side effects from any of your medications? -  no  Have you stopped taking any of your medications? Is so, why? -  no    Have you seen any other physician or provider since your last visit? No  Have you had any other diagnostic tests since your last visit? No  Have you been seen in the emergency room and/or had an admission to a hospital since we last saw you? No  Have you had your routine dental cleaning in the past 6 months? no    Have you activated your [employer] account? If not, what are your barriers?  No:      Patient Care Team:  Fredo Rae MD as PCP - General (Internal Medicine)  Fredo Rae MD as PCP - Empaneled Provider  Medina Siddiqui MD as Consulting Physician (Gastroenterology)    Medical History Review  Past Medical, Family, and Social History reviewed and does contribute to the patient presenting condition    Health Maintenance   Topic Date Due    DTaP/Tdap/Td vaccine (1 - Tdap) Never done    Shingles vaccine (1 of 2) Never done    Pneumococcal 65+ years Vaccine (2 - PPSV23 if available, else PCV20) 12/10/2018    COVID-19 Vaccine (4 - Booster for Samuel Peter series) 04/12/2022    Annual Wellness Visit (AWV)  09/20/2022    Flu vaccine (Season Ended) 08/01/2023    Depression Screen  09/12/2023    Hepatitis C screen  Completed    Hepatitis A vaccine  Aged Out    Hib vaccine  Aged Out    Meningococcal (ACWY) vaccine  Aged Out    A1C test (Diabetic or Prediabetic)  Discontinued    Lipids  Discontinued    AAA screen  Discontinued    Colonoscopy  Discontinued    Prostate Specific Antigen (PSA) Screening or Monitoring  Discontinued
this chart was generated using voice recognition Dragon dictation software. Although every effort was made to ensure the accuracy of this automated transcription, some errors in transcription may have occurred. Electronically signed by Julia Valenzuela MD on 4/19/2023 at 2:26 [address]  S Annual Wellness Visit    Tena Santana is here for Medicare AWV    Assessment & Plan   Essential hypertension  Peripheral arterial occlusive disease ([facility] [address]  Chronic systolic congestive heart failure, NYHA class 2 (HCC)  Crohn's disease of small intestine with complication ([facility] [address]  Stage 3b chronic kidney disease ([facility] [address]  Medicare annual wellness visit, subsequent  Lymphedema of both lower extremities  -     Eddie Godwin MD, Vascular Surgery, [place]    Bp is low less than 100   Due to wt loss  27 lbs  Will stop lisinopril  Labs rev  Vital rev     Recommendations for Preventive Services Due: see orders and patient instructions/AVS.  Recommended screening schedule for the next 5-10 years is provided to the patient in written form: see Patient Instructions/AVS.     Return in about 4 months (around 8/19/2023). Subjective       Patient's complete Health Risk Assessment and screening values have been reviewed and are found in Flowsheets. The following problems were reviewed today and where indicated follow up appointments were made and/or referrals ordered.     Positive Risk Factor Screenings with Interventions:                 Weight and Activity:  Physical Activity: Inactive    Days of Exercise per Week: 0 days    Minutes of Exercise per Session: 0 min     On average, how many days per week do you engage in moderate to strenuous exercise (like a brisk walk)?: 0 days  Have you lost any weight without trying in the past 3 months?: No  Body Mass Index: (!) 31.32      Inactivity Interventions:  Patient declined any further interventions or treatment  Obesity Interventions:  Patient comments: lost 27 lbs          Dentist

## 2023-05-01 ENCOUNTER — INITIAL CONSULT (OUTPATIENT)
Dept: VASCULAR SURGERY | Age: 78
End: 2023-05-01
Payer: MEDICARE

## 2023-05-01 VITALS
OXYGEN SATURATION: 96 % | WEIGHT: 200 LBS | RESPIRATION RATE: 20 BRPM | TEMPERATURE: 98 F | HEART RATE: 76 BPM | SYSTOLIC BLOOD PRESSURE: 138 MMHG | HEIGHT: 67 IN | DIASTOLIC BLOOD PRESSURE: 72 MMHG | BODY MASS INDEX: 31.39 KG/M2

## 2023-05-01 DIAGNOSIS — I89.0 LYMPHEDEMA: Primary | ICD-10-CM

## 2023-05-01 PROCEDURE — 1036F TOBACCO NON-USER: CPT | Performed by: SURGERY

## 2023-05-01 PROCEDURE — G8427 DOCREV CUR MEDS BY ELIG CLIN: HCPCS | Performed by: SURGERY

## 2023-05-01 PROCEDURE — 3075F SYST BP GE 130 - 139MM HG: CPT | Performed by: SURGERY

## 2023-05-01 PROCEDURE — 1123F ACP DISCUSS/DSCN MKR DOCD: CPT | Performed by: SURGERY

## 2023-05-01 PROCEDURE — G8417 CALC BMI ABV UP PARAM F/U: HCPCS | Performed by: SURGERY

## 2023-05-01 PROCEDURE — 99204 OFFICE O/P NEW MOD 45 MIN: CPT | Performed by: SURGERY

## 2023-05-01 PROCEDURE — 3078F DIAST BP <80 MM HG: CPT | Performed by: SURGERY

## 2023-05-01 ASSESSMENT — ENCOUNTER SYMPTOMS
TROUBLE SWALLOWING: 0
VOICE CHANGE: 0
EYE PAIN: 0
SHORTNESS OF BREATH: 0
ABDOMINAL PAIN: 0
ABDOMINAL DISTENTION: 0
CHEST TIGHTNESS: 0
COUGH: 0
COLOR CHANGE: 0
VOMITING: 0

## 2023-05-01 NOTE — PROGRESS NOTES
[address] AND VASCULAR  [address] [facility] [address]  Dept: [phone]     Patient: Gregory Pacheco  : 1945  MRN: [id number]  DOS: 2023    Referring provider:  Shannan Ferraro MD         HPI:  Gregory Pacheco is a 68 y.o. male who comes to the office for the first time regarding his bilateral lower extremity lymphatic edema. He has had lymphedema for many years. He now has very irritated and eroded skin with weeping skin and ulceration bilaterally. He has been dealing with this for many years. He does use compression devices but I do not think that he gets too much of an effect from those devices. He has stage III kidney disease and coronary artery disease. He had a history of Crohn's disease requiring bowel resection and ostomy. He does not smoke cigarettes. He does have high blood pressure but not diabetes or high cholesterol. Currently he takes metoprolol and Lasix.   Past Medical History:   Diagnosis Date    Abdominal hernia     Atrophy of muscle of multiple sites 2016    CAD (coronary artery disease)     CKD (chronic kidney disease) stage 3, GFR 30-59 ml/min ([facility])     Colon polyp     Crohn disease ([address]     Crohn's colitis ([address]     Decubitus ulcer of coccyx, stage 2 ([address] 2016    Diarrhea     Diverticulosis     Hemorrhoids     History of atrial fibrillation     Hypertension     Ileostomy in place ([address] 2016    Internal hemorrhoids     NSVT (nonsustained ventricular tachycardia) ([address] 2016    Other and unspecified hyperlipidemia     HISTORY OF HIGH CHOLESTEROL    Past heart attack     Tobacco use disorder     Tubular adenoma      Family History   Problem Relation Age of Onset    Cancer Father     Heart Disease Father     Diabetes Mother     Heart Disease Mother     High Blood Pressure Mother       Social History     Socioeconomic History    Marital status: Single     Spouse name: Not

## 2023-05-08 ENCOUNTER — OFFICE VISIT (OUTPATIENT)
Dept: VASCULAR SURGERY | Age: 78
End: 2023-05-08

## 2023-05-08 ENCOUNTER — TELEPHONE (OUTPATIENT)
Dept: WOUND CARE | Age: 78
End: 2023-05-08

## 2023-05-08 VITALS
RESPIRATION RATE: 20 BRPM | BODY MASS INDEX: 31.39 KG/M2 | OXYGEN SATURATION: 99 % | TEMPERATURE: 98.2 F | HEART RATE: 98 BPM | DIASTOLIC BLOOD PRESSURE: 70 MMHG | SYSTOLIC BLOOD PRESSURE: 132 MMHG | WEIGHT: 200 LBS | HEIGHT: 67 IN

## 2023-05-08 DIAGNOSIS — I89.0 LYMPHEDEMA OF BOTH LOWER EXTREMITIES: ICD-10-CM

## 2023-05-08 DIAGNOSIS — I87.333 VENOUS HYPERTENSION, CHRONIC, WITH ULCER AND INFLAMMATION, BILATERAL (HCC): Primary | ICD-10-CM

## 2023-05-08 NOTE — PROGRESS NOTES
[address] AND VASCULAR  [address] [place] [address]  Dept: [phone]     Patient: Lawrence Valle  : 1945  MRN: [id number]  DOS: 2023            HPI:  Lawrence Valle is a 68 y.o. male who returns to the office regarding lymphedema and ulceration. He has his first Unna boot change today. His inflammation is improving and his edema is improving with the Unna boots. He still has a significant amount of erythema in both lower extremities but it is much better today than it has been in the past.  He is not in a significant amount of pain. Review of Systems    Vitals:    23 1027   BP: 132/70   Site: Left Upper Arm   Position: Sitting   Cuff Size: Medium Adult   Pulse: 98   Resp: 20   Temp: 98.2 °F (36.8 °C)   TempSrc: Temporal   SpO2: 99%   Weight: 200 lb (90.7 kg)   Height: 5' 7\" (1.702 m)          Physical Exam  His examination is as stated above. He still has weeping skin around the medial and lateral malleoli of both lower extremities. He still has significant foot edema with crusted skin and proteinaceous material around the toes. The toes are still weeping. He has no evidence of cellulitis. Assessment:  1. Venous hypertension, chronic, with ulcer and inflammation, bilateral (HCC)    2. Lymphedema of both lower extremities          Plan:  The Unna boots were reapplied today and we will continue to do so until his ulcers are dried up and heal.  At that time we can change to a more traditional compression therapy to prevent future ulceration. He understands and agrees we will see him next week.     Electronically signed by:  Jimbo Brunner MD

## 2023-05-08 NOTE — DISCHARGE INSTRUCTIONS
[address] [place] and HYPERBARIC TREATMENT  CENTER      Visit  Discharge Instructions / Physician Orders  DATE:5/10/23     Home Care:     SUPPLIES ORDERED THRU:                     DATE LAST SUPPLIED     Wound Location:       Cleanse with: Liquid antibacterial soap and water, rinse well      Dressing Orders:  Primary dressing                       Secondary dressing                           secure with           x 30days     Frequency:       Additional Orders: Increase protein to diet (meat, cheese, eggs, fish, peanut butter, nuts and beans)  Multivitamin daily    OFFLOADING [] YES  TYPE:                  [] NA    Weekly wound care visits until determined otherwise. Antibiotic therapy-wound care related YES [] NO [] NA[]    MY CHART []     Smart Device  []     HYPERBARIC TREATMENT-                TREATMENT #                          Your next appointment with the [address] [place] [address] is in 1 week                                                                                                   (Please note your next appointment above and if you are unable to keep, kindly give a 24 hour notice. Thank you.)  If more than 15 min late we cannot guarantee you will be seen due to clinician schedule  Per Policy, Excessive cancellation will call for dismissal from program.  If you experience any of the following, please call the [address] [place] [address] during business hours:  [phone]     * Increase in Pain  * Temperature over 101  * Increase in drainage from your wound  * Drainage with a foul odor  * Bleeding  * Increase in swelling  * Need for compression bandage changes due to slippage, breakthrough drainage. If you need medical attention outside of the business hours of the [address] [place] [address] please contact your PCP or go to the nearest emergency room. The information contained in the After Visit Summary has been reviewed with me, the patient and/or responsible adult, by my health care provider(s).  I had the

## 2023-05-08 NOTE — TELEPHONE ENCOUNTER
Patient's [facility] wound care appointment canceled for 5-10-23 due to Dr Merchant's office now taking care of the patient, per their office staff, he was not rescheduled.

## 2023-05-10 ENCOUNTER — HOSPITAL ENCOUNTER (OUTPATIENT)
Dept: WOUND CARE | Age: 78
Discharge: HOME | End: 2023-05-10

## 2023-05-16 ENCOUNTER — OFFICE VISIT (OUTPATIENT)
Dept: VASCULAR SURGERY | Age: 78
End: 2023-05-16
Payer: MEDICARE

## 2023-05-16 VITALS
HEART RATE: 88 BPM | HEIGHT: 67 IN | OXYGEN SATURATION: 98 % | BODY MASS INDEX: 31.39 KG/M2 | RESPIRATION RATE: 18 BRPM | WEIGHT: 200 LBS | TEMPERATURE: 97.1 F | SYSTOLIC BLOOD PRESSURE: 164 MMHG | DIASTOLIC BLOOD PRESSURE: 95 MMHG

## 2023-05-16 DIAGNOSIS — I89.0 LYMPHEDEMA OF BOTH LOWER EXTREMITIES: Primary | ICD-10-CM

## 2023-05-16 DIAGNOSIS — I87.333 VENOUS HYPERTENSION, CHRONIC, WITH ULCER AND INFLAMMATION, BILATERAL (HCC): ICD-10-CM

## 2023-05-16 PROCEDURE — 3077F SYST BP >= 140 MM HG: CPT | Performed by: STUDENT IN AN ORGANIZED HEALTH CARE EDUCATION/TRAINING PROGRAM

## 2023-05-16 PROCEDURE — G8427 DOCREV CUR MEDS BY ELIG CLIN: HCPCS | Performed by: STUDENT IN AN ORGANIZED HEALTH CARE EDUCATION/TRAINING PROGRAM

## 2023-05-16 PROCEDURE — G8417 CALC BMI ABV UP PARAM F/U: HCPCS | Performed by: STUDENT IN AN ORGANIZED HEALTH CARE EDUCATION/TRAINING PROGRAM

## 2023-05-16 PROCEDURE — 3080F DIAST BP >= 90 MM HG: CPT | Performed by: STUDENT IN AN ORGANIZED HEALTH CARE EDUCATION/TRAINING PROGRAM

## 2023-05-16 PROCEDURE — 1123F ACP DISCUSS/DSCN MKR DOCD: CPT | Performed by: STUDENT IN AN ORGANIZED HEALTH CARE EDUCATION/TRAINING PROGRAM

## 2023-05-16 PROCEDURE — 1036F TOBACCO NON-USER: CPT | Performed by: STUDENT IN AN ORGANIZED HEALTH CARE EDUCATION/TRAINING PROGRAM

## 2023-05-16 PROCEDURE — 99212 OFFICE O/P EST SF 10 MIN: CPT | Performed by: STUDENT IN AN ORGANIZED HEALTH CARE EDUCATION/TRAINING PROGRAM

## 2023-05-16 ASSESSMENT — ENCOUNTER SYMPTOMS
TROUBLE SWALLOWING: 0
ABDOMINAL PAIN: 0
VOMITING: 0
CHEST TIGHTNESS: 0
COLOR CHANGE: 0
COUGH: 0
EYE PAIN: 0
VOICE CHANGE: 0
ABDOMINAL DISTENTION: 0

## 2023-05-16 NOTE — PROGRESS NOTES
[facility]  [address] [facility]  [address] [place] [address]  Dept: [phone]     Patient: Marino Claude  : 1945  MRN: [id number]  DOS: 2023    HPI:  Marino Claude is a 68 y.o. male who comes to the office regarding unna boot change. He is a patient of Dr. Jose Dillon. He has bilateral unna boots. No issues since last week.     Past Medical History:   Diagnosis Date    Abdominal hernia     Atrophy of muscle of multiple sites 2016    CAD (coronary artery disease)     CKD (chronic kidney disease) stage 3, GFR 30-59 ml/min ([facility])     Colon polyp     Crohn disease ([facility] [address]     Crohn's colitis ([facility] [address]     Decubitus ulcer of coccyx, stage 2 ([facility] [address] 2016    Diarrhea     Diverticulosis     Hemorrhoids     History of atrial fibrillation     Hypertension     Ileostomy in place ([facility] [address] 2016    Internal hemorrhoids     NSVT (nonsustained ventricular tachycardia) ([facility] [address] 2016    Other and unspecified hyperlipidemia     HISTORY OF HIGH CHOLESTEROL    Past heart attack     Tobacco use disorder     Tubular adenoma      Family History   Problem Relation Age of Onset    Cancer Father     Heart Disease Father     Diabetes Mother     Heart Disease Mother     High Blood Pressure Mother       Social History     Socioeconomic History    Marital status: Single     Spouse name: Not on file    Number of children: Not on file    Years of education: Not on file    Highest education level: Not on file   Occupational History    Not on file   Tobacco Use    Smoking status: Former     Packs/day: 0.50     Years: 25.00     Pack years: 12.50     Types: Cigarettes     Quit date: 2016     Years since quittin.7    Smokeless tobacco: Never   Substance and Sexual Activity    Alcohol use: No     Alcohol/week: 0.0 standard drinks    Drug use: No    Sexual activity: Not on file   Other Topics Concern    Not on file   Social History

## 2023-05-22 ENCOUNTER — OFFICE VISIT (OUTPATIENT)
Dept: VASCULAR SURGERY | Age: 78
End: 2023-05-22

## 2023-05-22 VITALS
OXYGEN SATURATION: 98 % | HEART RATE: 90 BPM | DIASTOLIC BLOOD PRESSURE: 84 MMHG | WEIGHT: 200 LBS | RESPIRATION RATE: 20 BRPM | BODY MASS INDEX: 31.39 KG/M2 | SYSTOLIC BLOOD PRESSURE: 150 MMHG | TEMPERATURE: 97.6 F | HEIGHT: 67 IN

## 2023-05-22 DIAGNOSIS — I89.0 LYMPHEDEMA: Primary | ICD-10-CM

## 2023-05-30 ENCOUNTER — OFFICE VISIT (OUTPATIENT)
Dept: VASCULAR SURGERY | Age: 78
End: 2023-05-30

## 2023-05-30 VITALS
TEMPERATURE: 98.1 F | HEIGHT: 67 IN | BODY MASS INDEX: 31.39 KG/M2 | WEIGHT: 200 LBS | HEART RATE: 143 BPM | DIASTOLIC BLOOD PRESSURE: 104 MMHG | OXYGEN SATURATION: 95 % | SYSTOLIC BLOOD PRESSURE: 179 MMHG

## 2023-05-30 DIAGNOSIS — I89.0 LYMPHEDEMA: Primary | ICD-10-CM

## 2023-05-31 NOTE — PROGRESS NOTES
[facility]  [address] [facility] [address] [place] [id number]  Dept: [phone]     Patient: Soo Clarke  : 1945  MRN: [id number]  DOS: 2023            HPI:  Soo Clarke is a 68 y.o. male who returns to the office regarding his Unna boot changes for lymphedema. Recall that his lymphedema has caused significant skin ulceration and erosions. We have made significant headway in the calves and the feet and pretibial areas. He still has toe ulcerations which are very difficult to control. Review of Systems    Vitals:    23 1143 23 1146   BP: (!) 180/112 (!) 179/104   Site: Left Upper Arm Right Upper Arm   Position: Sitting Sitting   Cuff Size: Medium Adult Medium Adult   Pulse: (!) 143    Temp: 98.1 °F (36.7 °C)    TempSrc: Temporal    SpO2: 95%    Weight: 200 lb (90.7 kg)    Height: 5' 7\" (1.702 m)           Physical Exam  The toe ulcerations are still present. He has some minimal erosions on the calves and pretibial areas. His edema is improved significantly. He continues to use the Flexitouch pumps. Assessment:  1. Lymphedema          Plan: At this point I would continue with Unna boot therapy and we did rewrap him with zinc paste primer followed by Kerlix followed by Coban on both lower extremities. He tolerated these wraps well. It is my opinion that the Flexitouch pumps are more fraught with use or air and I would be more of a component of a more substantial Lymphapress type pump including the bio tab or the traditional Lymphapress pumps. We are trying to work with him to get him into those compression devices. He has used a compression device for many many years. Is very frustrated with the situation and accepts that his lower extremities are problematic. He feels that he is confined to his home because of his lower extremity problems.   I want him to go back to the

## 2023-06-05 ENCOUNTER — OFFICE VISIT (OUTPATIENT)
Dept: VASCULAR SURGERY | Age: 78
End: 2023-06-05
Payer: MEDICARE

## 2023-06-05 ENCOUNTER — TELEPHONE (OUTPATIENT)
Dept: INTERNAL MEDICINE CLINIC | Age: 78
End: 2023-06-05

## 2023-06-05 VITALS
RESPIRATION RATE: 15 BRPM | DIASTOLIC BLOOD PRESSURE: 69 MMHG | OXYGEN SATURATION: 98 % | SYSTOLIC BLOOD PRESSURE: 105 MMHG | WEIGHT: 200 LBS | TEMPERATURE: 97.8 F | BODY MASS INDEX: 31.39 KG/M2 | HEART RATE: 70 BPM | HEIGHT: 67 IN

## 2023-06-05 DIAGNOSIS — I89.0 LYMPHEDEMA: Primary | ICD-10-CM

## 2023-06-05 PROCEDURE — 1123F ACP DISCUSS/DSCN MKR DOCD: CPT | Performed by: SURGERY

## 2023-06-05 PROCEDURE — G8427 DOCREV CUR MEDS BY ELIG CLIN: HCPCS | Performed by: SURGERY

## 2023-06-05 PROCEDURE — 29580 STRAPPING UNNA BOOT: CPT | Performed by: SURGERY

## 2023-06-05 PROCEDURE — 3078F DIAST BP <80 MM HG: CPT | Performed by: SURGERY

## 2023-06-05 PROCEDURE — 1036F TOBACCO NON-USER: CPT | Performed by: SURGERY

## 2023-06-05 PROCEDURE — G8417 CALC BMI ABV UP PARAM F/U: HCPCS | Performed by: SURGERY

## 2023-06-05 PROCEDURE — 99213 OFFICE O/P EST LOW 20 MIN: CPT | Performed by: SURGERY

## 2023-06-05 PROCEDURE — 3074F SYST BP LT 130 MM HG: CPT | Performed by: SURGERY

## 2023-06-05 NOTE — PROGRESS NOTES
[address] AND VASCULAR  [address] [place] [address]  Dept: [phone]     Patient: Kecia Bernstein  : 1945  MRN: [id number]  DOS: 2023            HPI:  Kecia Bernstein is a 68 y.o. male who returns to the office regarding his lymphedema. His Unna boots were changed today. He still has significant edema with weeping skin on both lower extremities. The feet still have the majority of the weeping skin. Review of Systems    Vitals:    23 1257   BP: 105/69   Pulse: 70   Resp: 15   Temp: 97.8 °F (36.6 °C)   SpO2: 98%   Weight: 200 lb (90.7 kg)   Height: 5' 7\" (1.702 m)          Physical Exam  His exam is as stated above. His edema overall is improving. He has no significant ulcerations on the lower extremities. His ulcerations are on the feet and the toes. Assessment:  No diagnosis found. Plan:  The Unna boot which was previously in place was removed. This was done by cutting with trauma rekha the existing wraps. The ulcer was examined and the zinc paste Unna boot was reapplied followed by a Kerlix wrap and a Coban wrap from the base of the toes up to the tibial tuberosity. The patient tolerated this well and there were no problems. This was done bilaterally. We will see him in another week for Unna boot change once again.     Electronically signed by:  Silvestre Ball MD
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
## 2023-07-14 DIAGNOSIS — R60.0 PEDAL EDEMA: ICD-10-CM

## 2023-07-14 DIAGNOSIS — I10 ESSENTIAL HYPERTENSION: Chronic | ICD-10-CM

## 2023-07-17 RX ORDER — FUROSEMIDE 40 MG/1
40 TABLET ORAL DAILY
Qty: 90 TABLET | Refills: 3 | Status: SHIPPED | OUTPATIENT
Start: 2023-07-17 | End: 2023-10-15

## 2023-09-07 ENCOUNTER — APPOINTMENT (OUTPATIENT)
Dept: GENERAL RADIOLOGY | Age: 78
DRG: 224 | End: 2023-09-07
Payer: MEDICARE

## 2023-09-07 ENCOUNTER — HOSPITAL ENCOUNTER (INPATIENT)
Age: 78
LOS: 9 days | Discharge: NURSING, REHAB OR LONG-TERM CARE | DRG: 224 | End: 2023-09-16
Attending: EMERGENCY MEDICINE | Admitting: INTERNAL MEDICINE
Payer: MEDICARE

## 2023-09-07 DIAGNOSIS — I47.20 VENTRICULAR TACHYCARDIA (HCC): ICD-10-CM

## 2023-09-07 DIAGNOSIS — I21.4 NSTEMI (NON-ST ELEVATED MYOCARDIAL INFARCTION) (HCC): ICD-10-CM

## 2023-09-07 DIAGNOSIS — I48.92 ATRIAL FLUTTER (HCC): ICD-10-CM

## 2023-09-07 DIAGNOSIS — I47.20 VT (VENTRICULAR TACHYCARDIA) (HCC): ICD-10-CM

## 2023-09-07 DIAGNOSIS — R07.9 CHEST PAIN, UNSPECIFIED TYPE: Primary | ICD-10-CM

## 2023-09-07 DIAGNOSIS — N18.4 CKD (CHRONIC KIDNEY DISEASE) STAGE 4, GFR 15-29 ML/MIN (HCC): ICD-10-CM

## 2023-09-07 DIAGNOSIS — R00.0 TACHYCARDIA: ICD-10-CM

## 2023-09-07 LAB
ALBUMIN SERPL-MCNC: 3.3 G/DL (ref 3.5–5.2)
ALBUMIN/GLOB SERPL: 0.8 {RATIO} (ref 1–2.5)
ALP SERPL-CCNC: 80 U/L (ref 40–129)
ALT SERPL-CCNC: 6 U/L (ref 5–41)
ANION GAP SERPL CALCULATED.3IONS-SCNC: 15 MMOL/L (ref 9–17)
ANTI-XA UNFRAC HEPARIN: 0.28 IU/L
AST SERPL-CCNC: 12 U/L
BASOPHILS # BLD: 0 K/UL (ref 0–0.2)
BASOPHILS NFR BLD: 0 % (ref 0–2)
BILIRUB SERPL-MCNC: 0.5 MG/DL (ref 0.3–1.2)
BNP SERPL-MCNC: ABNORMAL PG/ML
BUN SERPL-MCNC: 42 MG/DL (ref 8–23)
CA-I BLD-SCNC: 0.93 MMOL/L (ref 1.13–1.33)
CALCIUM SERPL-MCNC: 8.4 MG/DL (ref 8.6–10.4)
CHLORIDE SERPL-SCNC: 103 MMOL/L (ref 98–107)
CO2 SERPL-SCNC: 23 MMOL/L (ref 20–31)
CREAT SERPL-MCNC: 2.1 MG/DL (ref 0.7–1.2)
EOSINOPHIL # BLD: 0 K/UL (ref 0–0.44)
EOSINOPHILS RELATIVE PERCENT: 0 % (ref 1–4)
ERYTHROCYTE [DISTWIDTH] IN BLOOD BY AUTOMATED COUNT: 17.5 % (ref 11.8–14.4)
ERYTHROCYTE [DISTWIDTH] IN BLOOD BY AUTOMATED COUNT: 19.1 % (ref 11.8–14.4)
GFR SERPL CREATININE-BSD FRML MDRD: 32 ML/MIN/1.73M2
GLUCOSE BLD-MCNC: 123 MG/DL (ref 75–110)
GLUCOSE SERPL-MCNC: 201 MG/DL (ref 70–99)
HCT VFR BLD AUTO: 39.9 % (ref 40.7–50.3)
HCT VFR BLD AUTO: 40.9 % (ref 40.7–50.3)
HGB BLD-MCNC: 11.7 G/DL (ref 13–17)
HGB BLD-MCNC: 12.1 G/DL (ref 13–17)
IMM GRANULOCYTES # BLD AUTO: 0 K/UL (ref 0–0.3)
IMM GRANULOCYTES NFR BLD: 0 %
INR PPP: 1.3
LACTIC ACID, SEPSIS WHOLE BLOOD: 2.2 MMOL/L (ref 0.5–1.9)
LACTIC ACID, SEPSIS WHOLE BLOOD: 2.6 MMOL/L (ref 0.5–1.9)
LYMPHOCYTES NFR BLD: 0.65 K/UL (ref 1.1–3.7)
LYMPHOCYTES RELATIVE PERCENT: 8 % (ref 24–43)
MAGNESIUM SERPL-MCNC: 2 MG/DL (ref 1.6–2.6)
MCH RBC QN AUTO: 26.1 PG (ref 25.2–33.5)
MCH RBC QN AUTO: 26.1 PG (ref 25.2–33.5)
MCHC RBC AUTO-ENTMCNC: 29.3 G/DL (ref 28.4–34.8)
MCHC RBC AUTO-ENTMCNC: 29.6 G/DL (ref 28.4–34.8)
MCV RBC AUTO: 88.3 FL (ref 82.6–102.9)
MCV RBC AUTO: 88.9 FL (ref 82.6–102.9)
MONOCYTES NFR BLD: 0.49 K/UL (ref 0.1–1.2)
MONOCYTES NFR BLD: 6 % (ref 3–12)
MORPHOLOGY: ABNORMAL
NEUTROPHILS NFR BLD: 86 % (ref 36–65)
NEUTS SEG NFR BLD: 6.96 K/UL (ref 1.5–8.1)
NRBC BLD-RTO: 0 PER 100 WBC
NRBC BLD-RTO: 0 PER 100 WBC
PARTIAL THROMBOPLASTIN TIME: 33.1 SEC (ref 23–36.5)
PHOSPHATE SERPL-MCNC: 2.5 MG/DL (ref 2.5–4.5)
PLATELET # BLD AUTO: 172 K/UL (ref 138–453)
PLATELET # BLD AUTO: 245 K/UL (ref 138–453)
PMV BLD AUTO: 10.7 FL (ref 8.1–13.5)
PMV BLD AUTO: 12.6 FL (ref 8.1–13.5)
POTASSIUM SERPL-SCNC: 4.1 MMOL/L (ref 3.7–5.3)
PROT SERPL-MCNC: 7.3 G/DL (ref 6.4–8.3)
PROTHROMBIN TIME: 15.8 SEC (ref 11.7–14.9)
RBC # BLD AUTO: 4.49 M/UL (ref 4.21–5.77)
RBC # BLD AUTO: 4.63 M/UL (ref 4.21–5.77)
REASON FOR REJECTION: NORMAL
SARS-COV-2 RDRP RESP QL NAA+PROBE: NOT DETECTED
SODIUM SERPL-SCNC: 141 MMOL/L (ref 135–144)
SPECIMEN DESCRIPTION: NORMAL
SPECIMEN SOURCE: NORMAL
TROPONIN I SERPL HS-MCNC: 124 NG/L (ref 0–22)
TROPONIN I SERPL HS-MCNC: 153 NG/L (ref 0–22)
TSH SERPL DL<=0.05 MIU/L-ACNC: 2.12 UIU/ML (ref 0.3–5)
WBC OTHER # BLD: 10.4 K/UL (ref 3.5–11.3)
WBC OTHER # BLD: 8.1 K/UL (ref 3.5–11.3)
ZZ NTE CLEAN UP: ORDERED TEST: NORMAL

## 2023-09-07 PROCEDURE — 2580000003 HC RX 258: Performed by: PEDIATRICS

## 2023-09-07 PROCEDURE — 82947 ASSAY GLUCOSE BLOOD QUANT: CPT

## 2023-09-07 PROCEDURE — 82330 ASSAY OF CALCIUM: CPT

## 2023-09-07 PROCEDURE — 85025 COMPLETE CBC W/AUTO DIFF WBC: CPT

## 2023-09-07 PROCEDURE — 85520 HEPARIN ASSAY: CPT

## 2023-09-07 PROCEDURE — 84484 ASSAY OF TROPONIN QUANT: CPT

## 2023-09-07 PROCEDURE — 87635 SARS-COV-2 COVID-19 AMP PRB: CPT

## 2023-09-07 PROCEDURE — 99285 EMERGENCY DEPT VISIT HI MDM: CPT

## 2023-09-07 PROCEDURE — 87641 MR-STAPH DNA AMP PROBE: CPT

## 2023-09-07 PROCEDURE — 83880 ASSAY OF NATRIURETIC PEPTIDE: CPT

## 2023-09-07 PROCEDURE — 86225 DNA ANTIBODY NATIVE: CPT

## 2023-09-07 PROCEDURE — 2000000000 HC ICU R&B

## 2023-09-07 PROCEDURE — 84100 ASSAY OF PHOSPHORUS: CPT

## 2023-09-07 PROCEDURE — 80053 COMPREHEN METABOLIC PANEL: CPT

## 2023-09-07 PROCEDURE — 85730 THROMBOPLASTIN TIME PARTIAL: CPT

## 2023-09-07 PROCEDURE — 6360000002 HC RX W HCPCS

## 2023-09-07 PROCEDURE — 96375 TX/PRO/DX INJ NEW DRUG ADDON: CPT

## 2023-09-07 PROCEDURE — 83735 ASSAY OF MAGNESIUM: CPT

## 2023-09-07 PROCEDURE — 86038 ANTINUCLEAR ANTIBODIES: CPT

## 2023-09-07 PROCEDURE — 71045 X-RAY EXAM CHEST 1 VIEW: CPT

## 2023-09-07 PROCEDURE — 83516 IMMUNOASSAY NONANTIBODY: CPT

## 2023-09-07 PROCEDURE — 6360000002 HC RX W HCPCS: Performed by: PEDIATRICS

## 2023-09-07 PROCEDURE — 96374 THER/PROPH/DIAG INJ IV PUSH: CPT

## 2023-09-07 PROCEDURE — 36415 COLL VENOUS BLD VENIPUNCTURE: CPT

## 2023-09-07 PROCEDURE — 85027 COMPLETE CBC AUTOMATED: CPT

## 2023-09-07 PROCEDURE — 93005 ELECTROCARDIOGRAM TRACING: CPT | Performed by: INTERNAL MEDICINE

## 2023-09-07 PROCEDURE — 84443 ASSAY THYROID STIM HORMONE: CPT

## 2023-09-07 PROCEDURE — 85610 PROTHROMBIN TIME: CPT

## 2023-09-07 PROCEDURE — 2580000003 HC RX 258

## 2023-09-07 PROCEDURE — 83605 ASSAY OF LACTIC ACID: CPT

## 2023-09-07 RX ORDER — ACETAMINOPHEN 650 MG/1
650 SUPPOSITORY RECTAL EVERY 6 HOURS PRN
Status: DISCONTINUED | OUTPATIENT
Start: 2023-09-07 | End: 2023-09-16 | Stop reason: HOSPADM

## 2023-09-07 RX ORDER — SODIUM CHLORIDE 0.9 % (FLUSH) 0.9 %
5-40 SYRINGE (ML) INJECTION EVERY 12 HOURS SCHEDULED
Status: DISCONTINUED | OUTPATIENT
Start: 2023-09-08 | End: 2023-09-16 | Stop reason: HOSPADM

## 2023-09-07 RX ORDER — HEPARIN SODIUM 10000 [USP'U]/100ML
5-30 INJECTION, SOLUTION INTRAVENOUS CONTINUOUS
Status: DISCONTINUED | OUTPATIENT
Start: 2023-09-07 | End: 2023-09-11

## 2023-09-07 RX ORDER — MIDAZOLAM HYDROCHLORIDE 2 MG/2ML
2 INJECTION, SOLUTION INTRAMUSCULAR; INTRAVENOUS ONCE
Status: DISCONTINUED | OUTPATIENT
Start: 2023-09-07 | End: 2023-09-12 | Stop reason: CLARIF

## 2023-09-07 RX ORDER — FENTANYL CITRATE 50 UG/ML
INJECTION, SOLUTION INTRAMUSCULAR; INTRAVENOUS
Status: COMPLETED
Start: 2023-09-07 | End: 2023-09-07

## 2023-09-07 RX ORDER — ONDANSETRON 2 MG/ML
4 INJECTION INTRAMUSCULAR; INTRAVENOUS EVERY 6 HOURS PRN
Status: DISCONTINUED | OUTPATIENT
Start: 2023-09-07 | End: 2023-09-16 | Stop reason: HOSPADM

## 2023-09-07 RX ORDER — INSULIN LISPRO 100 [IU]/ML
0-4 INJECTION, SOLUTION INTRAVENOUS; SUBCUTANEOUS NIGHTLY
Status: DISCONTINUED | OUTPATIENT
Start: 2023-09-08 | End: 2023-09-12 | Stop reason: CLARIF

## 2023-09-07 RX ORDER — FENTANYL CITRATE 50 UG/ML
50 INJECTION, SOLUTION INTRAMUSCULAR; INTRAVENOUS ONCE
Status: COMPLETED | OUTPATIENT
Start: 2023-09-07 | End: 2023-09-07

## 2023-09-07 RX ORDER — AMIODARONE HYDROCHLORIDE 50 MG/ML
INJECTION, SOLUTION INTRAVENOUS
Status: DISPENSED
Start: 2023-09-07 | End: 2023-09-08

## 2023-09-07 RX ORDER — SODIUM CHLORIDE 0.9 % (FLUSH) 0.9 %
5-40 SYRINGE (ML) INJECTION PRN
Status: DISCONTINUED | OUTPATIENT
Start: 2023-09-07 | End: 2023-09-16 | Stop reason: HOSPADM

## 2023-09-07 RX ORDER — SODIUM CHLORIDE 9 MG/ML
INJECTION, SOLUTION INTRAVENOUS
Status: DISPENSED
Start: 2023-09-07 | End: 2023-09-08

## 2023-09-07 RX ORDER — POLYETHYLENE GLYCOL 3350 17 G/17G
17 POWDER, FOR SOLUTION ORAL DAILY PRN
Status: DISCONTINUED | OUTPATIENT
Start: 2023-09-07 | End: 2023-09-16 | Stop reason: HOSPADM

## 2023-09-07 RX ORDER — SODIUM CHLORIDE 9 MG/ML
INJECTION, SOLUTION INTRAVENOUS PRN
Status: DISCONTINUED | OUTPATIENT
Start: 2023-09-07 | End: 2023-09-16 | Stop reason: HOSPADM

## 2023-09-07 RX ORDER — HEPARIN SODIUM 1000 [USP'U]/ML
4000 INJECTION, SOLUTION INTRAVENOUS; SUBCUTANEOUS ONCE
Status: COMPLETED | OUTPATIENT
Start: 2023-09-07 | End: 2023-09-07

## 2023-09-07 RX ORDER — ACETAMINOPHEN 325 MG/1
650 TABLET ORAL EVERY 6 HOURS PRN
Status: DISCONTINUED | OUTPATIENT
Start: 2023-09-07 | End: 2023-09-14 | Stop reason: DRUGHIGH

## 2023-09-07 RX ORDER — ASPIRIN 81 MG/1
81 TABLET, CHEWABLE ORAL DAILY
Status: DISCONTINUED | OUTPATIENT
Start: 2023-09-08 | End: 2023-09-16 | Stop reason: HOSPADM

## 2023-09-07 RX ORDER — ONDANSETRON 4 MG/1
4 TABLET, ORALLY DISINTEGRATING ORAL EVERY 8 HOURS PRN
Status: DISCONTINUED | OUTPATIENT
Start: 2023-09-07 | End: 2023-09-16 | Stop reason: HOSPADM

## 2023-09-07 RX ORDER — HEPARIN SODIUM 1000 [USP'U]/ML
2000 INJECTION, SOLUTION INTRAVENOUS; SUBCUTANEOUS PRN
Status: DISCONTINUED | OUTPATIENT
Start: 2023-09-07 | End: 2023-09-11

## 2023-09-07 RX ORDER — DEXTROSE MONOHYDRATE 100 MG/ML
INJECTION, SOLUTION INTRAVENOUS CONTINUOUS PRN
Status: DISCONTINUED | OUTPATIENT
Start: 2023-09-07 | End: 2023-09-16 | Stop reason: HOSPADM

## 2023-09-07 RX ORDER — INSULIN LISPRO 100 [IU]/ML
0-4 INJECTION, SOLUTION INTRAVENOUS; SUBCUTANEOUS
Status: DISCONTINUED | OUTPATIENT
Start: 2023-09-08 | End: 2023-09-12 | Stop reason: CLARIF

## 2023-09-07 RX ORDER — HEPARIN SODIUM 1000 [USP'U]/ML
4000 INJECTION, SOLUTION INTRAVENOUS; SUBCUTANEOUS PRN
Status: DISCONTINUED | OUTPATIENT
Start: 2023-09-07 | End: 2023-09-11

## 2023-09-07 RX ADMIN — FENTANYL CITRATE 50 MCG: 50 INJECTION, SOLUTION INTRAMUSCULAR; INTRAVENOUS at 16:59

## 2023-09-07 RX ADMIN — AMIODARONE HYDROCHLORIDE 1 MG/MIN: 150 INJECTION, SOLUTION INTRAVENOUS at 17:07

## 2023-09-07 RX ADMIN — HEPARIN SODIUM 4000 UNITS: 1000 INJECTION INTRAVENOUS; SUBCUTANEOUS at 20:53

## 2023-09-07 RX ADMIN — DEXTROSE MONOHYDRATE 150 MG: 50 INJECTION, SOLUTION INTRAVENOUS at 17:01

## 2023-09-07 RX ADMIN — HEPARIN SODIUM 11 UNITS/KG/HR: 10000 INJECTION, SOLUTION INTRAVENOUS at 20:57

## 2023-09-07 RX ADMIN — AMIODARONE HYDROCHLORIDE 0.5 MG/MIN: 150 INJECTION, SOLUTION INTRAVENOUS at 23:42

## 2023-09-07 ASSESSMENT — ENCOUNTER SYMPTOMS
SORE THROAT: 0
VOMITING: 0
ABDOMINAL PAIN: 0
FACIAL SWELLING: 0
NAUSEA: 0
WHEEZING: 0
COUGH: 0
DIARRHEA: 0
ALLERGIC/IMMUNOLOGIC NEGATIVE: 1
BACK PAIN: 0
CONSTIPATION: 0
SINUS PAIN: 0
COLOR CHANGE: 0
SHORTNESS OF BREATH: 0
CHEST TIGHTNESS: 0

## 2023-09-07 ASSESSMENT — PAIN SCALES - GENERAL
PAINLEVEL_OUTOF10: 10
PAINLEVEL_OUTOF10: 0

## 2023-09-07 ASSESSMENT — PAIN DESCRIPTION - LOCATION: LOCATION: CHEST

## 2023-09-07 NOTE — ED NOTES
Dr. Pedro Lyles and Dr. Denzel Aceves at bedside     [facility], [address] [place] [address]  09/07/23 7266

## 2023-09-07 NOTE — ED TRIAGE NOTES
Pt presents to ED via [facility] on stretcher c/o chest pain that radiates to bilateral arms. Pt describes pain as burning. Upon triage pt has edema and redness to bilateral lower legs. Pt is tachycardic upon triage. When in route to ED, EMS reported V tach which then converted to normal sinus rhythm. Pt placed on full cardiac monitor, EKG completed, IV access established.  Pt placed on life klaus

## 2023-09-07 NOTE — ED PROVIDER NOTES
[facility]- MICU  Emergency Department Encounter  Emergency Medicine Resident     Pt Name:Jona Muñoz  MRN: [id number]  [address] 1945  Date of evaluation: 9/7/23  PCP:  Aliyah Celaya MD    5:11 PM EDT     CHIEF COMPLAINT       Chief Complaint   Patient presents with    Chest Pain       HISTORY OF PRESENT ILLNESS  (Location/Symptom, Timing/Onset, Context/Setting, Quality, Duration, Modifying Factors, Severity.)      Antionette Salmon is a 68 y.o. male who presents with patient here for chest pain. Called out for EMS as he was having some chest pain on monitor by life squad he was in V. tach elevated heart rate he did spontaneously convert. He was provided normal saline IV fluids IV was established there was no antiarrhythmics provided. He was hooked up to [employer] and brought into the emergency department    Reports only med is lasix and takes this every other day  He has metoprolol on his med list but does not take this med      PAST MEDICAL / SURGICAL / SOCIAL / FAMILY HISTORY      has a past medical history of Abdominal hernia, Atrophy of muscle of multiple sites, CAD (coronary artery disease), CKD (chronic kidney disease) stage 3, GFR 30-59 ml/min ([address]), Colon polyp, Crohn disease ([address]), Crohn's colitis ([address]), Decubitus ulcer of coccyx, stage 2 ([address]), Diarrhea, Diverticulosis, Hemorrhoids, History of atrial fibrillation, Hypertension, Ileostomy in place [facility]), Internal hemorrhoids, NSVT (nonsustained ventricular tachycardia) ([address]), Other and unspecified hyperlipidemia, Past heart attack, Tobacco use disorder, and Tubular adenoma. has a past surgical history that includes polypectomy; Abdomen surgery (08/02/2016); Jejunostomy; Colonoscopy (6/20/2005  4/2/2007  11/10/2009); Colonoscopy (4/19/16); Colonoscopy (12/06/2016); Cardiac catheterization (2010);  Abdomen surgery (02/19/2017); pr repair first abdominal wall hernia (N/A, 2/19/2017); and Small intestine surgery (N/A, with a pulse, his pain subsided - we then gave 150mg bolus of amio over 10 mins followed by 1mg/min of amio gtt  Patient had a few further episodes but none were painful  Remained on life pack  Elevated troponin  CMP with stable electrolytes - called cardiology relayed course advised heparin gtt    Admit to MICU  Bedside sign out provided to MICU resident  ED Course as of 09/08/23 0015   Thu Sep 07, 2023   1734 Lactic Acid, Sepsis, Whole Blood(!): 2.6 [LL]   1734 INR: 1.3 [LL]   1734 WBC: 8.1 [LL]   1734 Hemoglobin Quant(!): 11.7 [LL]   1828 Creatinine(!): 2.1 [LL]   1839 Page out to cardiology at this time [LL]   1839 Sodium: 141 [LL]   1839 Potassium: 4.1 [LL]   1839 Chloride: 103 [LL]   1839 CO2: 23 [LL]   1900 Cardio consulted - advised starting heparin [LL]   2033 Troponin, High Sensitivity(!!): 124 [LL]   2033 Calcium, Ionized(!): 0.93 [LL]      ED Course User Index  [LL] Theodis Gaucher, MD         CONSULTS:  IP CONSULT TO CARDIOLOGY  IP CONSULT TO CRITICAL CARE  IP CONSULT TO CASE MANAGEMENT        FINAL IMPRESSION      1. Chest pain, unspecified type    2. Ventricular tachycardia ([address])          DISPOSITION / PLAN     DISPOSITION Admitted 09/07/2023 08:00:52 PM      PATIENT REFERRED TO:  No follow-up provider specified.     DISCHARGE MEDICATIONS:  Current Discharge Medication List          Theodis Gaucher, MD  Emergency Medicine Resident    (Please note that portions of thisnote were completed with a voice recognition program.  Efforts were made to edit the dictations but occasionally words are mis-transcribed.)       Theodis Gaucher, MD  Resident  09/08/23 3301

## 2023-09-07 NOTE — ED NOTES
Amiodarone 150 mg bolus IV started by Zahra KIMBLE via verbal order by Dr. Deana Oliveira.        Melinda Greenwood RN  09/07/23 9992

## 2023-09-08 ENCOUNTER — APPOINTMENT (OUTPATIENT)
Dept: ULTRASOUND IMAGING | Age: 78
DRG: 224 | End: 2023-09-08
Payer: MEDICARE

## 2023-09-08 PROBLEM — I21.4 NSTEMI (NON-ST ELEVATED MYOCARDIAL INFARCTION) (HCC): Status: ACTIVE | Noted: 2023-09-07

## 2023-09-08 PROBLEM — R07.9 CHEST PAIN: Status: ACTIVE | Noted: 2023-09-08

## 2023-09-08 LAB
ALBUMIN SERPL-MCNC: 3.1 G/DL (ref 3.5–5.2)
ALBUMIN/GLOB SERPL: 0.9 {RATIO} (ref 1–2.5)
ALP SERPL-CCNC: 73 U/L (ref 40–129)
ALT SERPL-CCNC: 6 U/L (ref 5–41)
ANION GAP SERPL CALCULATED.3IONS-SCNC: 12 MMOL/L (ref 9–17)
ANTI-XA UNFRAC HEPARIN: 0.26 IU/L
ANTI-XA UNFRAC HEPARIN: 0.3 IU/L
ANTI-XA UNFRAC HEPARIN: 0.31 IU/L
ANTI-XA UNFRAC HEPARIN: 0.36 IU/L
AST SERPL-CCNC: 11 U/L
BASOPHILS # BLD: 0.04 K/UL (ref 0–0.2)
BASOPHILS NFR BLD: 1 % (ref 0–2)
BILIRUB SERPL-MCNC: 0.7 MG/DL (ref 0.3–1.2)
BUN SERPL-MCNC: 45 MG/DL (ref 8–23)
CALCIUM SERPL-MCNC: 8.1 MG/DL (ref 8.6–10.4)
CHLORIDE SERPL-SCNC: 103 MMOL/L (ref 98–107)
CO2 SERPL-SCNC: 23 MMOL/L (ref 20–31)
CREAT SERPL-MCNC: 2.3 MG/DL (ref 0.7–1.2)
EKG ATRIAL RATE: 115 BPM
EKG ATRIAL RATE: 119 BPM
EKG ATRIAL RATE: 119 BPM
EKG ATRIAL RATE: 125 BPM
EKG ATRIAL RATE: 208 BPM
EKG P AXIS: -102 DEGREES
EKG P AXIS: -102 DEGREES
EKG P AXIS: 66 DEGREES
EKG P-R INTERVAL: 192 MS
EKG P-R INTERVAL: 196 MS
EKG P-R INTERVAL: 208 MS
EKG Q-T INTERVAL: 282 MS
EKG Q-T INTERVAL: 296 MS
EKG Q-T INTERVAL: 304 MS
EKG Q-T INTERVAL: 328 MS
EKG Q-T INTERVAL: 336 MS
EKG QRS DURATION: 112 MS
EKG QRS DURATION: 120 MS
EKG QRS DURATION: 146 MS
EKG QRS DURATION: 194 MS
EKG QRS DURATION: 86 MS
EKG QTC CALCULATION (BAZETT): 398 MS
EKG QTC CALCULATION (BAZETT): 416 MS
EKG QTC CALCULATION (BAZETT): 455 MS
EKG QTC CALCULATION (BAZETT): 464 MS
EKG QTC CALCULATION (BAZETT): 568 MS
EKG R AXIS: -16 DEGREES
EKG R AXIS: -27 DEGREES
EKG R AXIS: -79 DEGREES
EKG R AXIS: -8 DEGREES
EKG R AXIS: 16 DEGREES
EKG T AXIS: -19 DEGREES
EKG T AXIS: -26 DEGREES
EKG T AXIS: 150 DEGREES
EKG T AXIS: 25 DEGREES
EKG T AXIS: 70 DEGREES
EKG VENTRICULAR RATE: 115 BPM
EKG VENTRICULAR RATE: 116 BPM
EKG VENTRICULAR RATE: 119 BPM
EKG VENTRICULAR RATE: 120 BPM
EKG VENTRICULAR RATE: 210 BPM
EOSINOPHIL # BLD: 0.07 K/UL (ref 0–0.44)
EOSINOPHILS RELATIVE PERCENT: 1 % (ref 1–4)
ERYTHROCYTE [DISTWIDTH] IN BLOOD BY AUTOMATED COUNT: 17.4 % (ref 11.8–14.4)
GFR SERPL CREATININE-BSD FRML MDRD: 29 ML/MIN/1.73M2
GLUCOSE BLD-MCNC: 109 MG/DL (ref 75–110)
GLUCOSE BLD-MCNC: 126 MG/DL (ref 75–110)
GLUCOSE BLD-MCNC: 126 MG/DL (ref 75–110)
GLUCOSE BLD-MCNC: 144 MG/DL (ref 75–110)
GLUCOSE SERPL-MCNC: 118 MG/DL (ref 70–99)
HCT VFR BLD AUTO: 40.3 % (ref 40.7–50.3)
HGB BLD-MCNC: 11.8 G/DL (ref 13–17)
IMM GRANULOCYTES # BLD AUTO: <0.03 K/UL (ref 0–0.3)
IMM GRANULOCYTES NFR BLD: 0 %
LACTIC ACID, WHOLE BLOOD: 1.9 MMOL/L (ref 0.7–2.1)
LYMPHOCYTES NFR BLD: 0.89 K/UL (ref 1.1–3.7)
LYMPHOCYTES RELATIVE PERCENT: 11 % (ref 24–43)
MAGNESIUM SERPL-MCNC: 2.1 MG/DL (ref 1.6–2.6)
MCH RBC QN AUTO: 26 PG (ref 25.2–33.5)
MCHC RBC AUTO-ENTMCNC: 29.3 G/DL (ref 28.4–34.8)
MCV RBC AUTO: 89 FL (ref 82.6–102.9)
MONOCYTES NFR BLD: 0.7 K/UL (ref 0.1–1.2)
MONOCYTES NFR BLD: 8 % (ref 3–12)
NEUTROPHILS NFR BLD: 79 % (ref 36–65)
NEUTS SEG NFR BLD: 6.75 K/UL (ref 1.5–8.1)
NRBC BLD-RTO: 0 PER 100 WBC
PLATELET # BLD AUTO: 216 K/UL (ref 138–453)
PMV BLD AUTO: 10.7 FL (ref 8.1–13.5)
POTASSIUM SERPL-SCNC: 4.1 MMOL/L (ref 3.7–5.3)
PROT SERPL-MCNC: 6.7 G/DL (ref 6.4–8.3)
RBC # BLD AUTO: 4.53 M/UL (ref 4.21–5.77)
RBC # BLD: ABNORMAL 10*6/UL
SODIUM SERPL-SCNC: 138 MMOL/L (ref 135–144)
TROPONIN I SERPL HS-MCNC: 227 NG/L (ref 0–22)
TROPONIN I SERPL HS-MCNC: 256 NG/L (ref 0–22)
WBC OTHER # BLD: 8.5 K/UL (ref 3.5–11.3)

## 2023-09-08 PROCEDURE — 83735 ASSAY OF MAGNESIUM: CPT

## 2023-09-08 PROCEDURE — C9113 INJ PANTOPRAZOLE SODIUM, VIA: HCPCS

## 2023-09-08 PROCEDURE — 36415 COLL VENOUS BLD VENIPUNCTURE: CPT

## 2023-09-08 PROCEDURE — A4216 STERILE WATER/SALINE, 10 ML: HCPCS

## 2023-09-08 PROCEDURE — 2580000003 HC RX 258

## 2023-09-08 PROCEDURE — 94660 CPAP INITIATION&MGMT: CPT

## 2023-09-08 PROCEDURE — 5A09357 ASSISTANCE WITH RESPIRATORY VENTILATION, LESS THAN 24 CONSECUTIVE HOURS, CONTINUOUS POSITIVE AIRWAY PRESSURE: ICD-10-PCS | Performed by: INTERNAL MEDICINE

## 2023-09-08 PROCEDURE — 6370000000 HC RX 637 (ALT 250 FOR IP)

## 2023-09-08 PROCEDURE — 6370000000 HC RX 637 (ALT 250 FOR IP): Performed by: STUDENT IN AN ORGANIZED HEALTH CARE EDUCATION/TRAINING PROGRAM

## 2023-09-08 PROCEDURE — 80053 COMPREHEN METABOLIC PANEL: CPT

## 2023-09-08 PROCEDURE — 99222 1ST HOSP IP/OBS MODERATE 55: CPT | Performed by: INTERNAL MEDICINE

## 2023-09-08 PROCEDURE — 6360000002 HC RX W HCPCS

## 2023-09-08 PROCEDURE — 84484 ASSAY OF TROPONIN QUANT: CPT

## 2023-09-08 PROCEDURE — 29580 STRAPPING UNNA BOOT: CPT

## 2023-09-08 PROCEDURE — 83605 ASSAY OF LACTIC ACID: CPT

## 2023-09-08 PROCEDURE — 85025 COMPLETE CBC W/AUTO DIFF WBC: CPT

## 2023-09-08 PROCEDURE — 94761 N-INVAS EAR/PLS OXIMETRY MLT: CPT

## 2023-09-08 PROCEDURE — 97112 NEUROMUSCULAR REEDUCATION: CPT

## 2023-09-08 PROCEDURE — 99222 1ST HOSP IP/OBS MODERATE 55: CPT | Performed by: SPECIALIST

## 2023-09-08 PROCEDURE — 85520 HEPARIN ASSAY: CPT

## 2023-09-08 PROCEDURE — 97162 PT EVAL MOD COMPLEX 30 MIN: CPT

## 2023-09-08 PROCEDURE — 82947 ASSAY GLUCOSE BLOOD QUANT: CPT

## 2023-09-08 PROCEDURE — 6370000000 HC RX 637 (ALT 250 FOR IP): Performed by: INTERNAL MEDICINE

## 2023-09-08 PROCEDURE — 87040 BLOOD CULTURE FOR BACTERIA: CPT

## 2023-09-08 PROCEDURE — 99291 CRITICAL CARE FIRST HOUR: CPT | Performed by: INTERNAL MEDICINE

## 2023-09-08 PROCEDURE — 2000000000 HC ICU R&B

## 2023-09-08 PROCEDURE — 2700000000 HC OXYGEN THERAPY PER DAY

## 2023-09-08 PROCEDURE — 76770 US EXAM ABDO BACK WALL COMP: CPT

## 2023-09-08 PROCEDURE — 97530 THERAPEUTIC ACTIVITIES: CPT

## 2023-09-08 PROCEDURE — 97116 GAIT TRAINING THERAPY: CPT

## 2023-09-08 RX ORDER — MIDODRINE HYDROCHLORIDE 5 MG/1
10 TABLET ORAL
Status: DISCONTINUED | OUTPATIENT
Start: 2023-09-08 | End: 2023-09-08

## 2023-09-08 RX ORDER — MIDODRINE HYDROCHLORIDE 5 MG/1
10 TABLET ORAL
Status: DISCONTINUED | OUTPATIENT
Start: 2023-09-08 | End: 2023-09-14

## 2023-09-08 RX ORDER — PHENYLEPHRINE HCL IN 0.9% NACL 50MG/250ML
10-300 PLASTIC BAG, INJECTION (ML) INTRAVENOUS CONTINUOUS
Status: DISCONTINUED | OUTPATIENT
Start: 2023-09-08 | End: 2023-09-13

## 2023-09-08 RX ORDER — ATORVASTATIN CALCIUM 40 MG/1
40 TABLET, FILM COATED ORAL NIGHTLY
Status: DISCONTINUED | OUTPATIENT
Start: 2023-09-08 | End: 2023-09-16 | Stop reason: HOSPADM

## 2023-09-08 RX ORDER — CALCIUM GLUCONATE 20 MG/ML
2000 INJECTION, SOLUTION INTRAVENOUS ONCE
Status: COMPLETED | OUTPATIENT
Start: 2023-09-08 | End: 2023-09-08

## 2023-09-08 RX ORDER — SODIUM CHLORIDE 9 MG/ML
INJECTION, SOLUTION INTRAVENOUS CONTINUOUS
Status: ACTIVE | OUTPATIENT
Start: 2023-09-08 | End: 2023-09-08

## 2023-09-08 RX ADMIN — MIDODRINE HYDROCHLORIDE 10 MG: 5 TABLET ORAL at 20:51

## 2023-09-08 RX ADMIN — METOPROLOL TARTRATE 25 MG: 25 TABLET, FILM COATED ORAL at 20:49

## 2023-09-08 RX ADMIN — HEPARIN SODIUM 15 UNITS/KG/HR: 10000 INJECTION, SOLUTION INTRAVENOUS at 17:17

## 2023-09-08 RX ADMIN — SODIUM CHLORIDE, PRESERVATIVE FREE 10 ML: 5 INJECTION INTRAVENOUS at 20:49

## 2023-09-08 RX ADMIN — SODIUM CHLORIDE: 9 INJECTION, SOLUTION INTRAVENOUS at 02:56

## 2023-09-08 RX ADMIN — AMIODARONE HYDROCHLORIDE 0.5 MG/MIN: 150 INJECTION, SOLUTION INTRAVENOUS at 16:50

## 2023-09-08 RX ADMIN — SODIUM CHLORIDE: 9 INJECTION, SOLUTION INTRAVENOUS at 03:15

## 2023-09-08 RX ADMIN — MIDODRINE HYDROCHLORIDE 10 MG: 5 TABLET ORAL at 14:18

## 2023-09-08 RX ADMIN — CALCIUM GLUCONATE 2000 MG: 20 INJECTION, SOLUTION INTRAVENOUS at 03:18

## 2023-09-08 RX ADMIN — SODIUM CHLORIDE, PRESERVATIVE FREE 40 MG: 5 INJECTION INTRAVENOUS at 08:26

## 2023-09-08 RX ADMIN — ASPIRIN 81 MG 81 MG: 81 TABLET ORAL at 08:12

## 2023-09-08 RX ADMIN — HEPARIN SODIUM 2000 UNITS: 1000 INJECTION INTRAVENOUS; SUBCUTANEOUS at 00:31

## 2023-09-08 RX ADMIN — ATORVASTATIN CALCIUM 40 MG: 40 TABLET, FILM COATED ORAL at 20:49

## 2023-09-08 RX ADMIN — METOPROLOL TARTRATE 25 MG: 25 TABLET, FILM COATED ORAL at 08:12

## 2023-09-08 RX ADMIN — HEPARIN SODIUM 2000 UNITS: 1000 INJECTION INTRAVENOUS; SUBCUTANEOUS at 06:53

## 2023-09-08 ASSESSMENT — PULMONARY FUNCTION TESTS
PIF_VALUE: 10
PIF_VALUE: 19
PIF_VALUE: 15

## 2023-09-08 ASSESSMENT — PAIN SCALES - GENERAL
PAINLEVEL_OUTOF10: 0

## 2023-09-08 NOTE — PROGRESS NOTES
[place] [id number] [place] [address]  PROGRESS NOTE    Shift date: 09/07/23  Shift day: Thursday   Shift # 2    Room # 3025/3025-01   Name: Keith Berman                [facility]:    [facility]:     Referral: Routine Visit    Admit Date & Time: 9/7/2023  4:45 PM    Assessment:  Keith Berman is a 68 y.o. male in the hospital       Intervention:  Writer introduced self and title as . Patient did not appear to mind  presence and engaged in conversation. Patient appeared receptive to  presence and engaged in conversation. Patient appeared calm and coping and had spousal support in room.  provided a supportive presence through active listening and words of affirmation. Outcome:  Patient appeared receptive to  visit. Plan:  Chaplains will remain available to offer spiritual and emotional support as needed.       Electronically signed by Riavs Laughlin on 9/8/2023 at 12:11 AM.  [address] [place] [address]  [phone]

## 2023-09-08 NOTE — PLAN OF CARE
Problem: Pain  Goal: Verbalizes/displays adequate comfort level or baseline comfort level  Outcome: Progressing  Flowsheets (Taken 9/8/2023 0000)  Verbalizes/displays adequate comfort level or baseline comfort level: Encourage patient to monitor pain and request assistance     Problem: Skin/Tissue Integrity  Goal: Absence of new skin breakdown  Description: 1. Monitor for areas of redness and/or skin breakdown  2. Assess vascular access sites hourly  3. Every 4-6 hours minimum:  Change oxygen saturation probe site  4. Every 4-6 hours:  If on nasal continuous positive airway pressure, respiratory therapy assess nares and determine need for appliance change or resting period.   Outcome: Progressing     Problem: ABCDS Injury Assessment  Goal: Absence of physical injury  Outcome: Progressing

## 2023-09-08 NOTE — CARE COORDINATION
Case Management Assessment  Initial Evaluation    Date/Time of Evaluation: 9/8/2023 5:11 PM  Assessment Completed by: Mae Camarillo RN    If patient is discharged prior to next notation, then this note serves as note for discharge by case management. Patient Name: Ariel Rubalcava                   YOB: 1945  Diagnosis: Ventricular tachycardia [facility]) [I47.20]  Chest pain, unspecified type [R07.9]                   Date / Time: 9/7/2023  4:45 PM    Patient Admission Status: Inpatient   Readmission Risk (Low < 19, Mod (19-27), High > 27): Readmission Risk Score: 15.7    Current PCP: Lisa Jade MD  PCP verified by CM? Yes    Chart Reviewed: Yes      History Provided by: Patient  Patient Orientation: Alert and Oriented    Patient Cognition: Alert    Hospitalization in the last 30 days (Readmission):  No    If yes, Readmission Assessment in CM Navigator will be completed. Advance Directives:      Code Status: DNR-CCA   Patient's Primary Decision Maker is:      Primary Decision Maker: Dameon Heredia - Brother/Sister - [phone]    Discharge Planning:    Patient lives with: Alone Type of Home: House  Primary Care Giver: Self  Patient Support Systems include: Family Members   Current Financial resources: Medicare, Other (Comment) ([employer])  Current community resources:    Current services prior to admission: Durable Medical Equipment            Current DME: Rhonda Pelayo            Type of Home Care services:  OT, PT, Nursing Services    ADLS  Prior functional level: Independent in ADLs/IADLs  Current functional level: Independent in ADLs/IADLs    PT AM-PAC: 19 /24  OT AM-PAC: /24    Family can provide assistance at DC: No  Would you like Case Management to discuss the discharge plan with any other family members/significant others, and if so, who?     Plans to Return to Present Housing: Yes  Other Identified Issues/Barriers to RETURNING to current housing: none  Potential Assistance needed

## 2023-09-08 NOTE — PROGRESS NOTES
Occupational [address]  Occupational Therapy Not Seen Note    DATE: 2023    NAME: Fer Freed  MRN: [id number]   : 1945      Patient not seen this date for Occupational Therapy due to:    Patient is not appropriate for active participation in OT evaluation/treatment at this time d/t hypotension.  Spoke with RN this PM.      Electronically signed by Katey Trent OT on 2023 at 1:35 PM

## 2023-09-08 NOTE — CONSULTS
68years old gentleman lives by himself. Yesterday suddenly in the morning he started feeling palpitation dizziness lightheadedness with a chest disc comfort lasted for a while more than 5 to 10 minutes then terminated spontaneously. He called his sisters and discussed the situation. They decided to wait. Later in the afternoon another similar event with fast rate of faster than 170 bpm happen again that was worse than before and they decided finally to call the squad. No syncope no presyncope. This kind of tachycardia was the very first time ever to happen. He has never had symptoms of palpitation definitely not this way. He checks his blood pressure and pulse rate frequently and it is never been faster than 100 bpm.  The squad when they arrived they were concerned according to the patient they were ready to shock him and then it terminated spontaneously apparently happened again. He was a started on IV amiodarone yesterday. Since been in the hospital has been no documented episodes of tachycardia. The tachycardia according to the EKG is wide QRS tachycardia of complete right bundle branch block and superior axis. There is suggestion at least of dissociation in the atrium. The QRS duration of 194 ms. Disease after that shows what looks to be flutter with controlled ventricular response or fast ventricular response of 119 bpm.  With the frequent premature complexes in the ventricle whether there aberrancies or PVCs not clear. The morphology is not necessarily that is similar to the presenting tachycardia. It is not to the patient's knowledge that he has atrial fibrillation in the past.    Syncope no presyncope in the recent past.  There is no remembered syncope at all. The gentleman lives by himself but he is able to take care of himself and his place. He drives. Sister and brother della lives close by.     The gentleman is known to have obstructive coronary artery disease in the past but managed.

## 2023-09-08 NOTE — PROGRESS NOTES
INTENSIVE CARE UNIT  Resident Physician Progress Note    Patient - Gilford Gun [facility]  Date of Admission -  9/7/2023  4:45 PM  Date of Evaluation -  9/8/2023  Room and Bed Number -  3025/3025-01   Hospital Day - 1      SUBJECTIVE:       HOSPITAL COURSE:   History was obtained from chart review and the patient. Adali Rojas is a 68 y.o. M with a past medical history of hypertension, Crohn's colitis s/p small bowel resection, stage III CKD, Hx of CAD with 100% occluded RCA and prior inferior MI,PAD, chronic lymphedema presented today after patient started having chest pain, palpitations dizziness, burning pain sensation in the arms and diaphoresis, initial episode was this morning which resolved on its own, after which he had another episode in the afternoon, he called his sister and 46. EMS arrived which showed sustained V. tach, patient converted into normal sinus rhythm even before giving any medications. On arrival to the ED patient went into V. tach again after initial evaluation by the ER physician, patient converted back to normal sinus rhythm before electrical cardioversion. After discussion with cardiology patient was given amio bolus and started on heparin drip in a.m. infusion. After the amio bolus, patient developed hypotension which responded to fluid bolus. Pertinent labs ordered, showed an elevated creatinine from his baseline of around 1.4-1.6, no electrolyte abnormalities. Lactic acid was elevated at around 2.6 down trended to 2.2. Troponin [id number]. proBNP 14,000. Critical care was consulted for admission for sustained V. tach on Amio infusion. On my evaluation, patient was resting comfortably on the bed, denies any chest pain, shortness of breath, fever, chills, sick contacts, changes in bowel or urinary habits. Patient states that this has never happened before. Patient follows up with Dr. Natalie Kohli for chronic lymphedema. Patient just takes Lasix on and off but.   Further Component Value Date/Time    APTT 33.1 09/07/2023 05:09 PM       Comprehensive Metabolic Profile:   Recent Labs     09/07/23  1709 09/08/23  0407    138   K 4.1 4.1    103   CO2 23 23   BUN 42* 45*   CREATININE 2.1* 2.3*   GLUCOSE 201* 118*   CALCIUM 8.4* 8.1*   PROT 7.3 6.7   LABALBU 3.3* 3.1*   BILITOT 0.5 0.7   ALKPHOS 80 73   AST 12 11   ALT 6 6      Magnesium:   Lab Results   Component Value Date/Time    MG 2.1 09/08/2023 04:07 AM    MG 2.0 09/07/2023 05:09 PM    MG 2.1 03/19/2018 11:05 AM     Phosphorus:   Lab Results   Component Value Date/Time    PHOS 2.5 09/07/2023 05:09 PM    PHOS 2.5 03/19/2018 11:05 AM    PHOS 2.1 09/27/2017 10:40 AM     Ionized Calcium:   Lab Results   Component Value Date/Time    CAION 0.93 09/07/2023 07:05 PM    CAION 1.19 08/04/2016 04:45 AM    CAION 1.11 08/03/2016 03:40 PM        Urinalysis:   Lab Results   Component Value Date/Time    NITRU Positive 10/04/2017 03:33 PM    COLORU Yellow 10/04/2017 03:33 PM    PHUR 6.5 10/04/2017 03:33 PM    WBCUA 10 TO 20 02/26/2017 07:30 AM    RBCUA 2 TO 5 02/26/2017 07:30 AM    MUCUS NOT REPORTED 02/26/2017 07:30 AM    TRICHOMONAS NOT REPORTED 02/26/2017 07:30 AM    YEAST NOT REPORTED 02/26/2017 07:30 AM    BACTERIA FEW 02/26/2017 07:30 AM    CLARITYU Clear 10/04/2017 03:33 PM    SPECGRAV 1.020 10/04/2017 03:33 PM    LEUKOCYTESUR Trace 10/04/2017 03:33 PM    LEUKOCYTESUR MOD 02/26/2017 07:30 AM    UROBILINOGEN Normal 10/04/2017 03:33 PM    BILIRUBINUR 0 10/04/2017 03:33 PM    BILIRUBINUR NEGATIVE 04/18/2012 10:16 AM    BLOODU Negative 10/04/2017 03:33 PM    GLUCOSEU Negative 10/04/2017 03:33 PM    GLUCOSEU NEGATIVE 04/18/2012 10:16 AM    KETUA Negative 10/04/2017 03:33 PM    AMORPHOUS NOT REPORTED 02/26/2017 07:30 AM       HgBA1c:    Lab Results   Component Value Date/Time    LABA1C 6.3 02/28/2022 10:42 AM     TSH:    Lab Results   Component Value Date/Time    TSH 2.12 09/07/2023 05:09 PM     Lactic Acid:   Lab Results   Component

## 2023-09-08 NOTE — PLAN OF CARE
Problem: Discharge Planning  Goal: Discharge to home or other facility with appropriate resources  Outcome: Progressing  Flowsheets (Taken 9/8/2023 0000 by Daniel Benitez RN)  Discharge to home or other facility with appropriate resources: Identify barriers to discharge with patient and caregiver     Problem: Pain  Goal: Verbalizes/displays adequate comfort level or baseline comfort level  9/8/2023 0931 by Hitesh Jones RN  Outcome: Progressing  Flowsheets (Taken 9/8/2023 0400 by Daniel Benitez RN)  Verbalizes/displays adequate comfort level or baseline comfort level: Encourage patient to monitor pain and request assistance  9/8/2023 0056 by Daniel Benitez RN  Outcome: Progressing  Flowsheets (Taken 9/8/2023 0000)  Verbalizes/displays adequate comfort level or baseline comfort level: Encourage patient to monitor pain and request assistance     Problem: Skin/Tissue Integrity  Goal: Absence of new skin breakdown  Description: 1. Monitor for areas of redness and/or skin breakdown  2. Assess vascular access sites hourly  3. Every 4-6 hours minimum:  Change oxygen saturation probe site  4. Every 4-6 hours:  If on nasal continuous positive airway pressure, respiratory therapy assess nares and determine need for appliance change or resting period.   9/8/2023 0931 by Hitesh Jones RN  Outcome: Progressing  9/8/2023 0056 by Daniel Benitez RN  Outcome: Progressing     Problem: ABCDS Injury Assessment  Goal: Absence of physical injury  9/8/2023 0931 by Hitesh Jones RN  Outcome: Progressing  9/8/2023 0056 by Daniel Benitez RN  Outcome: Progressing     Problem: Safety - Adult  Goal: Free from fall injury  Outcome: Progressing     Problem: Chronic Conditions and Co-morbidities  Goal: Patient's chronic conditions and co-morbidity symptoms are monitored and maintained or improved  Outcome: Progressing

## 2023-09-08 NOTE — H&P
Critical Care - History and Physical Examination    Patient's name:  Edin Angel  Medical Record Number: [id number]  Patient's account/billing number: [de-identified]  Patient's YOB: 1945  Age: 68 y.o. Date of Admission: 9/7/2023  4:45 PM  Reason of ICU admission: V Tach  Date of History and Physical Examination: 9/7/2023      Primary Care Physician: Thai Ghotra MD  Attending Physician: Dr. Josh Santiago    Code Status: Prior    Chief complaint: Chest pain, SOB, diaphoresis    Reason for ICU admission: V tach      History Of Present Illness:   History was obtained from chart review and the patient. Edin Angel is a 68 y.o. M with a past medical history of hypertension, Crohn's colitis s/p small bowel resection, stage III CKD, PAD, chronic lymphedema presented today after patient started having chest pain, palpitations dizziness, burning pain sensation in the arms and diaphoresis, initial episode was this morning which resolved on its own, after which he had another episode in the afternoon, he called his sister and 911. EMS arrived which showed sustained V. tach, patient converted into normal sinus rhythm even before giving any medications. On arrival to the ED patient went into V. tach again after initial evaluation by the ER physician, patient converted back to normal sinus rhythm before electrical cardioversion. After discussion with cardiology patient was given amio bolus and started on heparin drip in a.m. infusion. After the amio bolus, patient developed hypotension which responded to fluid bolus. Pertinent labs ordered, showed an elevated creatinine from his baseline of around 1.4-1.6, no electrolyte abnormalities. Lactic acid was elevated at around 2.6 down trended to 2.2. Troponin [id number]. proBNP 14,000. Critical care was consulted for admission for sustained V. tach on Amio infusion.     On my evaluation, patient was resting comfortably on the bed, denies any performed by Rita Ruelas MD at [address] [facility] N/A 2/19/2017    COLOSTOMY ILEOSTOMY TAKEDOWN WITH WOUND VAC APPLICATION performed by Rita Ruelas MD at [address]       Allergies:    No Known Allergies      Home Medications:   Prior to Admission medications    Medication Sig Start Date End Date Taking? Authorizing Provider   furosemide (LASIX) 40 MG tablet TAKE 1 TABLET BY MOUTH  DAILY 7/17/23 10/15/23  Jeni Neal MD   zoster recombinant adjuvanted vaccine [facility]) 50 MCG/0.5ML SUSR injection Inject 0.5 mLs into the muscle See Admin Instructions 1 dose now and repeat in 2-6 months 4/19/23 10/16/23  Jeni Neal MD   metoprolol tartrate (LOPRESSOR) 25 MG tablet Take 1 tablet by mouth 2 times daily  Patient not taking: Reported on 9/7/2023 9/12/22   Jeni Neal MD       Social History:   TOBACCO:   reports that he quit smoking about 7 years ago. His smoking use included cigarettes. He has a 12.50 pack-year smoking history. He has never used smokeless tobacco.  ETOH:   reports no history of alcohol use. DRUGS:  reports no history of drug use. OCCUPATION:      Family History:       Problem Relation Age of Onset    Cancer Father     Heart Disease Father     Diabetes Mother     Heart Disease Mother     High Blood Pressure Mother            REVIEW OF SYSTEMS (ROS):  Review of Systems   Constitutional:  Negative for diaphoresis, fatigue and fever. HENT:  Negative for facial swelling, hearing loss, sinus pain, sore throat and tinnitus. Respiratory:  Negative for cough, chest tightness, shortness of breath and wheezing. Cardiovascular:  Negative for chest pain, palpitations and leg swelling. Gastrointestinal:  Negative for abdominal pain, constipation, diarrhea, nausea and vomiting. Endocrine: Negative. Genitourinary:  Negative for difficulty urinating, dysuria, flank pain and hematuria. Musculoskeletal:  Negative for back pain.    Skin:  Positive for wound (chronic skin

## 2023-09-08 NOTE — ED NOTES
Report called to [facility] RN with all questions answered.       Kerry Hills RN  09/07/23 1860

## 2023-09-08 NOTE — CONSULTS
[facility] Cardiology Cardiology    Consult / H&P               Today's Date: 9/8/2023  Patient Name: Fer Freed  Date of admission: 9/7/2023  4:45 PM  Patient's age: 68 y. o., 1945  Admission Dx: Ventricular tachycardia (HCC) [I47.20]  Chest pain, unspecified type [R07.9]    Requesting Physician: Maggie Clark MD    Cardiac Evaluation Reason: Ventricular tachycardia    History Obtained From: patient and chart review     History of Present Illness: This patient 68y.o. years old with past medical history given below. Patient presented yesterday with chest pain, palpitations and dizziness. The chest pain was associated with burning sensations in the arm and diaphoresis. Initial episode was yesterday morning which resolved itself. Then another episode started in the afternoon and EMS was called. On arrival of EMS patient was found to be in ventricular tachycardia and converted to normal sinus rhythm without any medications. On arrival to the emergency department patient again went into ventricular tachycardia and patient was started on amnio bolus and converted to normal sinus rhythm. Patient became mildly hypotensive after amiodarone bolus. EKG showed sinus rhythm ventricular tachycardia and subsequent EKG of the chemical cardioversion did not show any acute changes. Troponins are elevated and cardiology consulted for elevated troponins and ventricular tachycardia.     Past Medical History:   has a past medical history of Abdominal hernia, Atrophy of muscle of multiple sites, CAD (coronary artery disease), CKD (chronic kidney disease) stage 3, GFR 30-59 ml/min ([facility]), Colon polyp, Crohn disease ([address]), Crohn's colitis ([address]), Decubitus ulcer of coccyx, stage 2 ([address]), Diarrhea, Diverticulosis, Hemorrhoids, History of atrial fibrillation, Hypertension, Ileostomy in place [facility]), Internal hemorrhoids, NSVT (nonsustained ventricular tachycardia) ([address]), Other and unspecified hyperlipidemia, Past heart

## 2023-09-08 NOTE — ED NOTES
Sydni from lab called stating that antiXa is clotted and needs redrawn      Torito Snyder, LAMIN  09/07/23 3144

## 2023-09-08 NOTE — ED NOTES
Heparin bolus and gtt started per orders and protocol pt is a/ox4 rishabh Saavedra, LAMIN  09/07/23 3927

## 2023-09-08 NOTE — CONSULTS
Nephrology Consult Note    Reason for Consult:  acute kidney injury superimposed on chronic kidney disease  Requesting Physician:  Gabriela Castañeda    Chief Complaint:  chest pain  History Obtained From:  patient, electronic medical record    History of Present Illness: This is a 68 y.o. male who presents with chest pain from home. He noted that about 10 AM he was sitting in a chair, got pain across his chest and down his arms. He sat there for quite a few minutes and eventually went away. He then developed the same sort of pain but more intense that did not relieve about 2:30 to 3 in the afternoon. He called his sister and told her to call EMS. He says he did not lose consciousness. He did not fall. EMS brought the patient initially to [facility] and told the patient he did have a fast heart rhythm at times. He said the cirilo and Natacha Borges but then realized [facility] did not take heart patient's O he then was taken to [facility] emergency room. His rhythm was ventricular tachycardia. He is on an amiodarone drip. It is going to atrial fibrillation at times. His heart rate currently is in the 80s. The patient came in with a creatinine of 2.1 that was up to 2.3 this morning. Electrolytes are within normal limits. Baseline creatinine appears to be 1.4-1.7 MG/DL with a last creatinine of 1.7 back in September of 2022. He has seen Dr. Hugo Mcintyre of nephrology in the more distant past.    The patient has a known history of apparently non-critical CAD as he has had 2 cardiac catheterizations in the past. He suffered a myocardial infarction in the remote past.  He had a cardiac echo in September of 2022 that showed LVEF of 35-40% with mild to moderate mitral valve regurgitation.      Other medical history includes Crohn's disease, diverticulosis, hemorrhoids, atrial fibrillation, lymphedema for which she uses a lot of her breast pump in addition to oral Lasix, acute myocardial infarction, chronic kidney necrosis in the setting of angina episode associated with ventricular tachycardia. Baseline creatinine 1.4-1.7 with creatinine currently at 2.3  2. 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 Evidence of a kidney stone in the left side on ultrasound   4. Degree of hypotension  5. Nonsustained ventricular tachycardia and also atrial fibrillation patient currently on amiodarone drip  6. History of depressed LVEF and acute myocardial infarction with apparently 2 cardiac catheterizations in the past performed but no stents placed. Patient is scheduled for cardiac catheterization early next week  7. Lymphedema, chronic, treated with Lymphapress pump and Lasix orally       Plan:  1. Discontinue maintenance IV fluids   2. Check C3, C4, YULIYA, ANCA, serum free light chain with ratio along with serum protein immunofixation  3. Serial lab data  4. Check a urinalysis along with random urine for protein creatinine and sodium  5. Strict intake and output  6. The patient is planned for cardiac catheterization on 9/11/2023, and nephrology will likely clear the patient for the procedure, pending serial lab data and remainder of nephrology workup    Thank you for the consultation. Please do not hesitate to call with questions.     Electronically signed by Marcia Williamson MD on 9/8/2023 at 4:34 PM

## 2023-09-08 NOTE — PROGRESS NOTES
unsteadiness with initial stand from sitting  Gait Deviations: Slow Alyssa; Increased BRONWYN  Distance: ~25 ft at side of bed, left/right side step, forward and retro propulsion with RW CGA  Comments: Pt report feeling weakness and slight decline in functional mobility and dynamic balance with PT recommendation to ambulate short term with rolling walker peding further balance training     Balance  Posture: Good  Sitting - Static: Good  Sitting - Dynamic: Good  Standing - Static: Good  Standing - Dynamic: Good  Comments: dann assesse with and without rolling walker demonstrating improved balance with rolling walker  Functional Reach Test  Fall Risk (Score of <10 inches is fall risk): Yes  Exercise Treatment: Pt instructed in bilateal LE ROM for circulation, positioned for comfort and pressure relief, educated on safety and use of AD with recommendation for use with gait and transfers                                                        AM-PAC Score  AM-PAC Inpatient Mobility Raw Score : 19 (09/08/23 1402)  -PAC Inpatient T-Scale Score : 45.44 (09/08/23 1402)  Mobility Inpatient CMS 0-100% Score: 41.77 (09/08/23 1402)  Mobility Inpatient CMS G-Code Modifier : CK (09/08/23 1402)               Goals  Short Term Goals  Time Frame for Short Term Goals: 14 visits  Short Term Goal 1: Pt to ambulate 100 ft mod I with rolling walker  Short Term Goal 2: Pt to ascend/descend 2 steps with rigth rail mod I  Short Term Goal 3: Pt to tolerate 30 min ther act needed to improve endurance without abnormal vitals  Short Term Goal 4: Pt to transfer from alternate surface heigth surfaces demonstrating safety with use of AD  Patient Goals   Patient Goals : To improve strength and return home       Education  Patient Education  Education Given To: Patient  Education Provided: Plan of Care;Role of Therapy;Home Exercise Program;Precautions; Family Education; Fall Prevention Strategies  Education Provided Comments: educated on safety and use of AD with recommendation for use with gait and transfers  Education Method: Demonstration;Verbal;Teach Back  Education Outcome: Demonstrated understanding;Continued education needed      Therapy Time   Individual Concurrent Group Co-treatment   Time In 0910         Time Out 1011         Minutes 61         Timed Code Treatment Minutes: [address], PT, DPT

## 2023-09-08 NOTE — ED NOTES
Critical care into see patient and discussed code status with him pt decided to be a DNRCC-ARREST with NO INTUBATION.  Paper work signed by pt and turned in to be scanned     Aylin Taveras RN  09/07/23 2007

## 2023-09-09 ENCOUNTER — APPOINTMENT (OUTPATIENT)
Dept: GENERAL RADIOLOGY | Age: 78
DRG: 224 | End: 2023-09-09
Payer: MEDICARE

## 2023-09-09 LAB
ALBUMIN SERPL-MCNC: 2.9 G/DL (ref 3.5–5.2)
ALBUMIN/GLOB SERPL: 0.8 {RATIO} (ref 1–2.5)
ALP SERPL-CCNC: 75 U/L (ref 40–129)
ALT SERPL-CCNC: 196 U/L (ref 5–41)
ANION GAP SERPL CALCULATED.3IONS-SCNC: 16 MMOL/L (ref 9–17)
ANTI-XA UNFRAC HEPARIN: 0.31 IU/L
AST SERPL-CCNC: 255 U/L
BASOPHILS # BLD: 0.04 K/UL (ref 0–0.2)
BASOPHILS NFR BLD: 0 % (ref 0–2)
BILIRUB SERPL-MCNC: 1.3 MG/DL (ref 0.3–1.2)
BILIRUB UR QL STRIP: NEGATIVE
BUN SERPL-MCNC: 55 MG/DL (ref 8–23)
C3 SERPL-MCNC: 83 MG/DL (ref 90–180)
C4 SERPL-MCNC: 13 MG/DL (ref 10–40)
CALCIUM SERPL-MCNC: 8.6 MG/DL (ref 8.6–10.4)
CASTS #/AREA URNS LPF: NORMAL /LPF (ref 0–8)
CHLORIDE SERPL-SCNC: 102 MMOL/L (ref 98–107)
CLARITY UR: ABNORMAL
CO2 SERPL-SCNC: 20 MMOL/L (ref 20–31)
COLOR UR: ABNORMAL
CREAT SERPL-MCNC: 3.2 MG/DL (ref 0.7–1.2)
CREAT UR-MCNC: 173.5 MG/DL (ref 39–259)
EOSINOPHIL # BLD: <0.03 K/UL (ref 0–0.44)
EOSINOPHILS RELATIVE PERCENT: 0 % (ref 1–4)
EPI CELLS #/AREA URNS HPF: NORMAL /HPF (ref 0–5)
ERYTHROCYTE [DISTWIDTH] IN BLOOD BY AUTOMATED COUNT: 17.6 % (ref 11.8–14.4)
FIO2: 60
FREE KAPPA/LAMBDA RATIO: 0.21 (ref 0.26–1.65)
GFR SERPL CREATININE-BSD FRML MDRD: 19 ML/MIN/1.73M2
GLUCOSE BLD-MCNC: 102 MG/DL (ref 75–110)
GLUCOSE BLD-MCNC: 103 MG/DL (ref 74–100)
GLUCOSE BLD-MCNC: 106 MG/DL (ref 75–110)
GLUCOSE BLD-MCNC: 109 MG/DL (ref 75–110)
GLUCOSE BLD-MCNC: 92 MG/DL (ref 75–110)
GLUCOSE SERPL-MCNC: 112 MG/DL (ref 70–99)
GLUCOSE UR STRIP-MCNC: NEGATIVE MG/DL
HCO3 VENOUS: 22.6 MMOL/L (ref 22–29)
HCT VFR BLD AUTO: 42.9 % (ref 40.7–50.3)
HGB BLD-MCNC: 12.7 G/DL (ref 13–17)
HGB UR QL STRIP.AUTO: ABNORMAL
IMM GRANULOCYTES # BLD AUTO: 0.05 K/UL (ref 0–0.3)
IMM GRANULOCYTES NFR BLD: 0 %
KAPPA LC FREE SER-MCNC: 115.5 MG/L (ref 3.7–19.4)
KETONES UR STRIP-MCNC: NEGATIVE MG/DL
LAMBDA LC FREE SERPL-MCNC: 551.9 MG/L (ref 5.7–26.3)
LEUKOCYTE ESTERASE UR QL STRIP: ABNORMAL
LYMPHOCYTES NFR BLD: 0.9 K/UL (ref 1.1–3.7)
LYMPHOCYTES RELATIVE PERCENT: 8 % (ref 24–43)
MCH RBC QN AUTO: 26.2 PG (ref 25.2–33.5)
MCHC RBC AUTO-ENTMCNC: 29.6 G/DL (ref 28.4–34.8)
MCV RBC AUTO: 88.5 FL (ref 82.6–102.9)
MONOCYTES NFR BLD: 1.04 K/UL (ref 0.1–1.2)
MONOCYTES NFR BLD: 9 % (ref 3–12)
MRSA, DNA, NASAL: NEGATIVE
NEGATIVE BASE EXCESS, VEN: 3.2 MMOL/L (ref 0–2)
NEUTROPHILS NFR BLD: 83 % (ref 36–65)
NEUTS SEG NFR BLD: 9.64 K/UL (ref 1.5–8.1)
NITRITE UR QL STRIP: NEGATIVE
NRBC BLD-RTO: 0 PER 100 WBC
O2 DELIVERY DEVICE: ABNORMAL
O2 SAT, VEN: 21.3 % (ref 60–85)
PCO2, VEN: 42.1 MM HG (ref 41–51)
PH UR STRIP: 5.5 [PH] (ref 5–8)
PH VENOUS: 7.34 (ref 7.32–7.43)
PHOSPHATE SERPL-MCNC: 5 MG/DL (ref 2.5–4.5)
PLATELET # BLD AUTO: 214 K/UL (ref 138–453)
PMV BLD AUTO: 11.6 FL (ref 8.1–13.5)
PO2, VEN: 16.8 MM HG (ref 30–50)
POTASSIUM SERPL-SCNC: 4.9 MMOL/L (ref 3.7–5.3)
PROT SERPL-MCNC: 6.6 G/DL (ref 6.4–8.3)
PROT UR STRIP-MCNC: ABNORMAL MG/DL
RBC # BLD AUTO: 4.85 M/UL (ref 4.21–5.77)
RBC # BLD: ABNORMAL 10*6/UL
RBC #/AREA URNS HPF: NORMAL /HPF (ref 0–4)
REASON FOR REJECTION: NORMAL
SODIUM SERPL-SCNC: 138 MMOL/L (ref 135–144)
SODIUM UR-SCNC: <20 MMOL/L
SP GR UR STRIP: 1.02 (ref 1–1.03)
SPECIMEN DESCRIPTION: NORMAL
SPECIMEN SOURCE: NORMAL
TOTAL PROTEIN, URINE: 87 MG/DL
URINE TOTAL PROTEIN CREATININE RATIO: 0.5 (ref 0–0.2)
UROBILINOGEN UR STRIP-ACNC: NORMAL EU/DL (ref 0–1)
WBC #/AREA URNS HPF: NORMAL /HPF (ref 0–5)
WBC OTHER # BLD: 11.7 K/UL (ref 3.5–11.3)
ZZ NTE CLEAN UP: ORDERED TEST: NORMAL

## 2023-09-09 PROCEDURE — 2700000000 HC OXYGEN THERAPY PER DAY

## 2023-09-09 PROCEDURE — 36415 COLL VENOUS BLD VENIPUNCTURE: CPT

## 2023-09-09 PROCEDURE — 2000000000 HC ICU R&B

## 2023-09-09 PROCEDURE — 6360000002 HC RX W HCPCS

## 2023-09-09 PROCEDURE — 6370000000 HC RX 637 (ALT 250 FOR IP)

## 2023-09-09 PROCEDURE — 84155 ASSAY OF PROTEIN SERUM: CPT

## 2023-09-09 PROCEDURE — 82947 ASSAY GLUCOSE BLOOD QUANT: CPT

## 2023-09-09 PROCEDURE — 99291 CRITICAL CARE FIRST HOUR: CPT | Performed by: INTERNAL MEDICINE

## 2023-09-09 PROCEDURE — 82570 ASSAY OF URINE CREATININE: CPT

## 2023-09-09 PROCEDURE — 84156 ASSAY OF PROTEIN URINE: CPT

## 2023-09-09 PROCEDURE — 51798 US URINE CAPACITY MEASURE: CPT

## 2023-09-09 PROCEDURE — 6370000000 HC RX 637 (ALT 250 FOR IP): Performed by: INTERNAL MEDICINE

## 2023-09-09 PROCEDURE — A4216 STERILE WATER/SALINE, 10 ML: HCPCS

## 2023-09-09 PROCEDURE — 84300 ASSAY OF URINE SODIUM: CPT

## 2023-09-09 PROCEDURE — 86160 COMPLEMENT ANTIGEN: CPT

## 2023-09-09 PROCEDURE — 6370000000 HC RX 637 (ALT 250 FOR IP): Performed by: STUDENT IN AN ORGANIZED HEALTH CARE EDUCATION/TRAINING PROGRAM

## 2023-09-09 PROCEDURE — 81001 URINALYSIS AUTO W/SCOPE: CPT

## 2023-09-09 PROCEDURE — 86334 IMMUNOFIX E-PHORESIS SERUM: CPT

## 2023-09-09 PROCEDURE — 85520 HEPARIN ASSAY: CPT

## 2023-09-09 PROCEDURE — 6360000002 HC RX W HCPCS: Performed by: NURSE PRACTITIONER

## 2023-09-09 PROCEDURE — 99233 SBSQ HOSP IP/OBS HIGH 50: CPT | Performed by: INTERNAL MEDICINE

## 2023-09-09 PROCEDURE — 83521 IG LIGHT CHAINS FREE EACH: CPT

## 2023-09-09 PROCEDURE — 71045 X-RAY EXAM CHEST 1 VIEW: CPT

## 2023-09-09 PROCEDURE — C9113 INJ PANTOPRAZOLE SODIUM, VIA: HCPCS

## 2023-09-09 PROCEDURE — 6360000002 HC RX W HCPCS: Performed by: INTERNAL MEDICINE

## 2023-09-09 PROCEDURE — 84165 PROTEIN E-PHORESIS SERUM: CPT

## 2023-09-09 PROCEDURE — 87086 URINE CULTURE/COLONY COUNT: CPT

## 2023-09-09 PROCEDURE — 80053 COMPREHEN METABOLIC PANEL: CPT

## 2023-09-09 PROCEDURE — 2580000003 HC RX 258

## 2023-09-09 PROCEDURE — 84100 ASSAY OF PHOSPHORUS: CPT

## 2023-09-09 PROCEDURE — 85025 COMPLETE CBC W/AUTO DIFF WBC: CPT

## 2023-09-09 PROCEDURE — 94761 N-INVAS EAR/PLS OXIMETRY MLT: CPT

## 2023-09-09 PROCEDURE — 82803 BLOOD GASES ANY COMBINATION: CPT

## 2023-09-09 RX ORDER — FUROSEMIDE 10 MG/ML
20 INJECTION INTRAMUSCULAR; INTRAVENOUS ONCE
Status: COMPLETED | OUTPATIENT
Start: 2023-09-09 | End: 2023-09-09

## 2023-09-09 RX ORDER — AMIODARONE HYDROCHLORIDE 200 MG/1
200 TABLET ORAL 2 TIMES DAILY
Status: DISCONTINUED | OUTPATIENT
Start: 2023-09-09 | End: 2023-09-11

## 2023-09-09 RX ORDER — FUROSEMIDE 10 MG/ML
80 INJECTION INTRAMUSCULAR; INTRAVENOUS ONCE
Status: COMPLETED | OUTPATIENT
Start: 2023-09-09 | End: 2023-09-09

## 2023-09-09 RX ORDER — LANOLIN ALCOHOL/MO/W.PET/CERES
3 CREAM (GRAM) TOPICAL ONCE
Status: DISCONTINUED | OUTPATIENT
Start: 2023-09-09 | End: 2023-09-16 | Stop reason: HOSPADM

## 2023-09-09 RX ORDER — ALPRAZOLAM 0.5 MG/1
0.5 TABLET ORAL ONCE
Status: COMPLETED | OUTPATIENT
Start: 2023-09-09 | End: 2023-09-09

## 2023-09-09 RX ADMIN — MIDODRINE HYDROCHLORIDE 10 MG: 5 TABLET ORAL at 12:05

## 2023-09-09 RX ADMIN — SODIUM CHLORIDE, PRESERVATIVE FREE 10 ML: 5 INJECTION INTRAVENOUS at 07:52

## 2023-09-09 RX ADMIN — AMIODARONE HYDROCHLORIDE 200 MG: 200 TABLET ORAL at 19:44

## 2023-09-09 RX ADMIN — FUROSEMIDE 80 MG: 10 INJECTION, SOLUTION INTRAMUSCULAR; INTRAVENOUS at 16:13

## 2023-09-09 RX ADMIN — ALPRAZOLAM 0.5 MG: 0.5 TABLET ORAL at 03:00

## 2023-09-09 RX ADMIN — AMIODARONE HYDROCHLORIDE 200 MG: 200 TABLET ORAL at 07:51

## 2023-09-09 RX ADMIN — MIDODRINE HYDROCHLORIDE 10 MG: 5 TABLET ORAL at 07:52

## 2023-09-09 RX ADMIN — MIDODRINE HYDROCHLORIDE 10 MG: 5 TABLET ORAL at 16:13

## 2023-09-09 RX ADMIN — METOPROLOL TARTRATE 25 MG: 25 TABLET, FILM COATED ORAL at 19:44

## 2023-09-09 RX ADMIN — SODIUM CHLORIDE, PRESERVATIVE FREE 10 ML: 5 INJECTION INTRAVENOUS at 19:45

## 2023-09-09 RX ADMIN — SODIUM CHLORIDE, PRESERVATIVE FREE 40 MG: 5 INJECTION INTRAVENOUS at 07:51

## 2023-09-09 RX ADMIN — ASPIRIN 81 MG 81 MG: 81 TABLET ORAL at 07:51

## 2023-09-09 RX ADMIN — ATORVASTATIN CALCIUM 40 MG: 40 TABLET, FILM COATED ORAL at 19:44

## 2023-09-09 RX ADMIN — HEPARIN SODIUM 15 UNITS/KG/HR: 10000 INJECTION, SOLUTION INTRAVENOUS at 11:15

## 2023-09-09 RX ADMIN — FUROSEMIDE 20 MG: 10 INJECTION, SOLUTION INTRAMUSCULAR; INTRAVENOUS at 09:46

## 2023-09-09 RX ADMIN — FUROSEMIDE 20 MG: 10 INJECTION, SOLUTION INTRAMUSCULAR; INTRAVENOUS at 05:43

## 2023-09-09 ASSESSMENT — PAIN SCALES - GENERAL
PAINLEVEL_OUTOF10: 0

## 2023-09-09 NOTE — PLAN OF CARE
Problem: Discharge Planning  Goal: Discharge to home or other facility with appropriate resources  Recent Flowsheet Documentation  Taken 9/8/2023 2000 by Mecca Anderson RN  Discharge to home or other facility with appropriate resources: Identify barriers to discharge with patient and caregiver  9/8/2023 0931 by Heidi Arredondo RN  Outcome: Progressing  Flowsheets (Taken 9/8/2023 0000 by Mecca Anderson RN)  Discharge to home or other facility with appropriate resources: Identify barriers to discharge with patient and caregiver     Problem: Pain  Goal: Verbalizes/displays adequate comfort level or baseline comfort level  9/8/2023 2307 by Mecca Anderson RN  Outcome: Progressing  Flowsheets (Taken 9/8/2023 2000)  Verbalizes/displays adequate comfort level or baseline comfort level: Encourage patient to monitor pain and request assistance  9/8/2023 0931 by Heidi Arredondo RN  Outcome: Progressing  Flowsheets (Taken 9/8/2023 0400 by Mecca Anderson RN)  Verbalizes/displays adequate comfort level or baseline comfort level: Encourage patient to monitor pain and request assistance     Problem: Skin/Tissue Integrity  Goal: Absence of new skin breakdown  Description: 1. Monitor for areas of redness and/or skin breakdown  2. Assess vascular access sites hourly  3. Every 4-6 hours minimum:  Change oxygen saturation probe site  4. Every 4-6 hours:  If on nasal continuous positive airway pressure, respiratory therapy assess nares and determine need for appliance change or resting period.   9/8/2023 2307 by Mecca Anderson RN  Outcome: Progressing  9/8/2023 0931 by Heidi Arredondo RN  Outcome: Progressing     Problem: ABCDS Injury Assessment  Goal: Absence of physical injury  9/8/2023 2307 by Mecca Anderson RN  Outcome: Progressing  Flowsheets (Taken 9/8/2023 2100)  Absence of Physical Injury: Implement safety measures based on patient assessment  9/8/2023 0931 by Heidi Arredondo RN  Outcome: Progressing     Problem: Safety - Adult  Goal: Free from fall injury  9/8/2023 2307 by Janine Saeed RN  Outcome: Progressing  Flowsheets (Taken 9/8/2023 2100)  Free From Fall Injury: Instruct family/caregiver on patient safety  9/8/2023 0931 by Gianna Hanson RN  Outcome: Progressing     Problem: Chronic Conditions and Co-morbidities  Goal: Patient's chronic conditions and co-morbidity symptoms are monitored and maintained or improved  9/8/2023 2307 by Janine Saeed RN  Outcome: Brittni Chaudhary (Taken 9/8/2023 2000)  Care Plan - Patient's Chronic Conditions and Co-Morbidity Symptoms are Monitored and Maintained or Improved: Monitor and assess patient's chronic conditions and comorbid symptoms for stability, deterioration, or improvement  9/8/2023 0931 by Gianna Hanson RN  Outcome: Progressing     Problem: Respiratory - Adult  Goal: Achieves optimal ventilation and oxygenation  9/8/2023 2307 by Janine Saeed RN  Outcome: Progressing  9/8/2023 2041 by Selene Norton RCP  Outcome: Progressing

## 2023-09-09 NOTE — PROGRESS NOTES
10/04/2017 03:33 PM    BILIRUBINUR NEGATIVE 04/18/2012 10:16 AM    BLOODU Negative 10/04/2017 03:33 PM    GLUCOSEU Negative 10/04/2017 03:33 PM    GLUCOSEU NEGATIVE 04/18/2012 10:16 AM    KETUA Negative 10/04/2017 03:33 PM    AMORPHOUS NOT REPORTED 02/26/2017 07:30 AM       HgBA1c:    Lab Results   Component Value Date/Time    LABA1C 6.3 02/28/2022 10:42 AM     TSH:    Lab Results   Component Value Date/Time    TSH 2.12 09/07/2023 05:09 PM     Lactic Acid:   Lab Results   Component Value Date/Time    LACTA 1.3 08/17/2016 07:27 PM    LACTA 0.8 08/04/2016 07:42 AM    LACTA 0.7 08/04/2016 12:06 AM      Troponin: No results for input(s): \"TROPONINI\" in the last 72 hours.           ASSESSMENT:     Patient Active Problem List    Diagnosis Date Noted    Chest pain 09/08/2023    Ventricular tachycardia ([address]) 09/07/2023    NSTEMI (non-ST elevated myocardial infarction) ([address]) 09/07/2023    Prediabetes 03/07/2022    Class 2 obesity due to excess calories without serious comorbidity with body mass index (BMI) of 35.0 to 35.9 in adult 09/10/2020    Lymphedema of both lower extremities 08/03/2020    Chronic systolic congestive heart failure, NYHA class 2 ([address]) 09/29/2019    Crohn's disease of small intestine with complication ([address]) 66/53/8508    Peripheral arterial occlusive disease ([address]) 03/07/2017    Delayed surgical wound healing 03/07/2017    History of atrial fibrillation     Diarrhea     Past heart attack     Stage 3 chronic kidney disease ([address])     Open wound anterior abdominal wall 09/07/2016    Acute kidney injury ([address]) 08/18/2016    H/O resection of small bowel 08/18/2016    Internal hemorrhoids     Refused pneumococcal vaccine 03/22/2016    Crohn's disease of small intestine ([address])     Crohn's disease ([address]) 06/22/2015    Diverticulosis 05/21/2015    Essential hypertension     Coronary artery disease involving native coronary artery of native heart without angina pectoris     Hemorrhoids           PLAN:       #Sustained ventricular tachycardia s/p amio bolus, converted back to sinus rhythm with frequent PVCs. #Elevated troponins  #MARK on CKD stage III(baseline Cr 1.4-1.6)  #Lactic acidosis, resolving  #Hypertension  #Chronic lymphedema  #H/o Crohns Colitis s/p small bowel resection  #DNR CCA with no intubation     Neurologic:   Neuro checks per protocol  Sedation and Analgesia:No Sedation/Analgesia  Cardiovascular:  Continue amio infusion   Continue heparin drip  ECHO ordered. Trend trop  Cardiology planning Cath on Monday. Appreciate cardio recs  Maintain MAP >65  Pulmonary:  Continue nasal cannula, wean as tolerated  Maintain oxygen sats >92%  Continue pulmonary toilet  GI/Nutrition  Ulcer Prophylaxis: Protonix  Diet: NPO for now for possible intervention by cardiology  Renal/Fluid/Electrolyte  MARK on CKD stage III  Renal US  Monitor lactic acid every 4 hours, if continues to trend up will start on gentle IV fluid hydration. Monitor electrolytes, replace PRN   Avoid nephrotoxic medications  Nephrology on board. ID  Fu on blood cultures, covid, ua. Monitor for fever and leucocytosis  Antimicrobials: none  Hematology:  Transfuse if Hb drops below 7 or as clinically indicated. Endocrine:   LDSSI, hypoglycemia protocol in place  Monitor glucose levels  Musculoskeletal/Skin:  Wound care  DVT Prophylaxis  EPC Cuffs  Heparin drip     CONSULT SERVICES: CARDIOLOGY  NEPHROLOGY     DISPOSITION:  Monitor in ICU.    DNR CCA- no intubation         Coretta Lara MD  IM Resident,[facility] Y1  Intensive Care Unit  9/9/2023 7:45 AM

## 2023-09-09 NOTE — PLAN OF CARE
Problem: Discharge Planning  Goal: Discharge to home or other facility with appropriate resources  Outcome: Progressing     Problem: Pain  Goal: Verbalizes/displays adequate comfort level or baseline comfort level  9/9/2023 1002 by Bailee Chi RN  Outcome: Progressing  Flowsheets  Taken 9/9/2023 0400 by Elpidio Gonzalez RN  Verbalizes/displays adequate comfort level or baseline comfort level: Encourage patient to monitor pain and request assistance  Taken 9/9/2023 0000 by Elpidio Gonzalez RN  Verbalizes/displays adequate comfort level or baseline comfort level: Encourage patient to monitor pain and request assistance  9/8/2023 2307 by Elpidio Gonzalez RN  Outcome: Progressing  Flowsheets (Taken 9/8/2023 2000)  Verbalizes/displays adequate comfort level or baseline comfort level: Encourage patient to monitor pain and request assistance     Problem: Skin/Tissue Integrity  Goal: Absence of new skin breakdown  Description: 1. Monitor for areas of redness and/or skin breakdown  2. Assess vascular access sites hourly  3. Every 4-6 hours minimum:  Change oxygen saturation probe site  4. Every 4-6 hours:  If on nasal continuous positive airway pressure, respiratory therapy assess nares and determine need for appliance change or resting period.   9/9/2023 1002 by Bailee Chi RN  Outcome: Progressing  9/8/2023 2307 by Elpidio Gonzalez RN  Outcome: Progressing     Problem: ABCDS Injury Assessment  Goal: Absence of physical injury  9/9/2023 1002 by Bailee Chi RN  Outcome: Progressing  9/8/2023 2307 by Elpidio Gonzalez RN  Outcome: Progressing  Flowsheets (Taken 9/8/2023 2100)  Absence of Physical Injury: Implement safety measures based on patient assessment     Problem: Safety - Adult  Goal: Free from fall injury  9/9/2023 1002 by Bailee Chi RN  Outcome: Progressing  9/8/2023 2307 by Elpidio Gonzalez RN  Outcome: Progressing  Flowsheets (Taken 9/8/2023 2100)  Free From Fall Injury: Instruct family/caregiver on

## 2023-09-10 ENCOUNTER — APPOINTMENT (OUTPATIENT)
Dept: GENERAL RADIOLOGY | Age: 78
DRG: 224 | End: 2023-09-10
Payer: MEDICARE

## 2023-09-10 LAB
ALBUMIN SERPL-MCNC: 2.9 G/DL (ref 3.5–5.2)
ALBUMIN/GLOB SERPL: 0.8 {RATIO} (ref 1–2.5)
ALP SERPL-CCNC: 84 U/L (ref 40–129)
ALT SERPL-CCNC: 533 U/L (ref 5–41)
ANION GAP SERPL CALCULATED.3IONS-SCNC: 17 MMOL/L (ref 9–17)
ANTI-XA UNFRAC HEPARIN: 0.15 IU/L
AST SERPL-CCNC: 343 U/L
BASOPHILS # BLD: 0.06 K/UL (ref 0–0.2)
BASOPHILS NFR BLD: 1 % (ref 0–2)
BILIRUB SERPL-MCNC: 1 MG/DL (ref 0.3–1.2)
BUN SERPL-MCNC: 76 MG/DL (ref 8–23)
CALCIUM SERPL-MCNC: 8 MG/DL (ref 8.6–10.4)
CHLORIDE SERPL-SCNC: 100 MMOL/L (ref 98–107)
CO2 SERPL-SCNC: 17 MMOL/L (ref 20–31)
CREAT SERPL-MCNC: 4 MG/DL (ref 0.7–1.2)
EOSINOPHIL # BLD: 0.08 K/UL (ref 0–0.44)
EOSINOPHILS RELATIVE PERCENT: 1 % (ref 1–4)
ERYTHROCYTE [DISTWIDTH] IN BLOOD BY AUTOMATED COUNT: 17.9 % (ref 11.8–14.4)
GFR SERPL CREATININE-BSD FRML MDRD: 15 ML/MIN/1.73M2
GLUCOSE SERPL-MCNC: 95 MG/DL (ref 70–99)
HCT VFR BLD AUTO: 42.6 % (ref 40.7–50.3)
HGB BLD-MCNC: 12.2 G/DL (ref 13–17)
IMM GRANULOCYTES # BLD AUTO: 0.09 K/UL (ref 0–0.3)
IMM GRANULOCYTES NFR BLD: 1 %
LYMPHOCYTES NFR BLD: 1.01 K/UL (ref 1.1–3.7)
LYMPHOCYTES RELATIVE PERCENT: 8 % (ref 24–43)
MCH RBC QN AUTO: 26 PG (ref 25.2–33.5)
MCHC RBC AUTO-ENTMCNC: 28.6 G/DL (ref 28.4–34.8)
MCV RBC AUTO: 90.6 FL (ref 82.6–102.9)
MICROORGANISM SPEC CULT: NO GROWTH
MONOCYTES NFR BLD: 1.04 K/UL (ref 0.1–1.2)
MONOCYTES NFR BLD: 9 % (ref 3–12)
NEUTROPHILS NFR BLD: 80 % (ref 36–65)
NEUTS SEG NFR BLD: 9.97 K/UL (ref 1.5–8.1)
NRBC BLD-RTO: 0 PER 100 WBC
PLATELET # BLD AUTO: 248 K/UL (ref 138–453)
PMV BLD AUTO: 11.9 FL (ref 8.1–13.5)
POTASSIUM SERPL-SCNC: 5.2 MMOL/L (ref 3.7–5.3)
PROT SERPL-MCNC: 6.6 G/DL (ref 6.4–8.3)
RBC # BLD AUTO: 4.7 M/UL (ref 4.21–5.77)
RBC # BLD: ABNORMAL 10*6/UL
REASON FOR REJECTION: NORMAL
SODIUM SERPL-SCNC: 134 MMOL/L (ref 135–144)
SPECIMEN DESCRIPTION: NORMAL
SPECIMEN SOURCE: NORMAL
WBC OTHER # BLD: 12.3 K/UL (ref 3.5–11.3)
ZZ NTE CLEAN UP: ORDERED TEST: NORMAL

## 2023-09-10 PROCEDURE — 94660 CPAP INITIATION&MGMT: CPT

## 2023-09-10 PROCEDURE — 2700000000 HC OXYGEN THERAPY PER DAY

## 2023-09-10 PROCEDURE — 85520 HEPARIN ASSAY: CPT

## 2023-09-10 PROCEDURE — 99291 CRITICAL CARE FIRST HOUR: CPT | Performed by: INTERNAL MEDICINE

## 2023-09-10 PROCEDURE — 6360000002 HC RX W HCPCS

## 2023-09-10 PROCEDURE — C9113 INJ PANTOPRAZOLE SODIUM, VIA: HCPCS

## 2023-09-10 PROCEDURE — 02HV33Z INSERTION OF INFUSION DEVICE INTO SUPERIOR VENA CAVA, PERCUTANEOUS APPROACH: ICD-10-PCS | Performed by: INTERNAL MEDICINE

## 2023-09-10 PROCEDURE — A4216 STERILE WATER/SALINE, 10 ML: HCPCS

## 2023-09-10 PROCEDURE — 6370000000 HC RX 637 (ALT 250 FOR IP): Performed by: INTERNAL MEDICINE

## 2023-09-10 PROCEDURE — 6370000000 HC RX 637 (ALT 250 FOR IP)

## 2023-09-10 PROCEDURE — 99233 SBSQ HOSP IP/OBS HIGH 50: CPT | Performed by: INTERNAL MEDICINE

## 2023-09-10 PROCEDURE — 2000000000 HC ICU R&B

## 2023-09-10 PROCEDURE — 80053 COMPREHEN METABOLIC PANEL: CPT

## 2023-09-10 PROCEDURE — 36415 COLL VENOUS BLD VENIPUNCTURE: CPT

## 2023-09-10 PROCEDURE — 94761 N-INVAS EAR/PLS OXIMETRY MLT: CPT

## 2023-09-10 PROCEDURE — 71045 X-RAY EXAM CHEST 1 VIEW: CPT

## 2023-09-10 PROCEDURE — 2580000003 HC RX 258

## 2023-09-10 PROCEDURE — 6370000000 HC RX 637 (ALT 250 FOR IP): Performed by: STUDENT IN AN ORGANIZED HEALTH CARE EDUCATION/TRAINING PROGRAM

## 2023-09-10 PROCEDURE — 85025 COMPLETE CBC W/AUTO DIFF WBC: CPT

## 2023-09-10 RX ORDER — FUROSEMIDE 10 MG/ML
40 INJECTION INTRAMUSCULAR; INTRAVENOUS EVERY 12 HOURS
Status: DISCONTINUED | OUTPATIENT
Start: 2023-09-11 | End: 2023-09-14

## 2023-09-10 RX ORDER — HEPARIN SODIUM 1000 [USP'U]/ML
1900 INJECTION, SOLUTION INTRAVENOUS; SUBCUTANEOUS PRN
Status: DISCONTINUED | OUTPATIENT
Start: 2023-09-10 | End: 2023-09-16 | Stop reason: HOSPADM

## 2023-09-10 RX ORDER — HEPARIN SODIUM 1000 [USP'U]/ML
1600 INJECTION, SOLUTION INTRAVENOUS; SUBCUTANEOUS PRN
Status: DISCONTINUED | OUTPATIENT
Start: 2023-09-10 | End: 2023-09-16 | Stop reason: HOSPADM

## 2023-09-10 RX ADMIN — MIDODRINE HYDROCHLORIDE 10 MG: 5 TABLET ORAL at 11:46

## 2023-09-10 RX ADMIN — HEPARIN SODIUM 17 UNITS/KG/HR: 10000 INJECTION, SOLUTION INTRAVENOUS at 23:37

## 2023-09-10 RX ADMIN — MIDODRINE HYDROCHLORIDE 10 MG: 5 TABLET ORAL at 08:05

## 2023-09-10 RX ADMIN — HEPARIN SODIUM 2000 UNITS: 1000 INJECTION INTRAVENOUS; SUBCUTANEOUS at 08:16

## 2023-09-10 RX ADMIN — HEPARIN SODIUM 1900 UNITS: 1000 INJECTION INTRAVENOUS; SUBCUTANEOUS at 16:56

## 2023-09-10 RX ADMIN — AMIODARONE HYDROCHLORIDE 200 MG: 200 TABLET ORAL at 20:01

## 2023-09-10 RX ADMIN — HEPARIN SODIUM 1600 UNITS: 1000 INJECTION INTRAVENOUS; SUBCUTANEOUS at 16:56

## 2023-09-10 RX ADMIN — HEPARIN SODIUM 15 UNITS/KG/HR: 10000 INJECTION, SOLUTION INTRAVENOUS at 06:30

## 2023-09-10 RX ADMIN — ASPIRIN 81 MG 81 MG: 81 TABLET ORAL at 08:06

## 2023-09-10 RX ADMIN — SODIUM CHLORIDE, PRESERVATIVE FREE 10 ML: 5 INJECTION INTRAVENOUS at 08:07

## 2023-09-10 RX ADMIN — SODIUM CHLORIDE, PRESERVATIVE FREE 10 ML: 5 INJECTION INTRAVENOUS at 20:01

## 2023-09-10 RX ADMIN — ATORVASTATIN CALCIUM 40 MG: 40 TABLET, FILM COATED ORAL at 20:01

## 2023-09-10 RX ADMIN — SODIUM CHLORIDE, PRESERVATIVE FREE 40 MG: 5 INJECTION INTRAVENOUS at 08:06

## 2023-09-10 RX ADMIN — MIDODRINE HYDROCHLORIDE 10 MG: 5 TABLET ORAL at 17:01

## 2023-09-10 RX ADMIN — AMIODARONE HYDROCHLORIDE 200 MG: 200 TABLET ORAL at 08:05

## 2023-09-10 ASSESSMENT — PULMONARY FUNCTION TESTS: PIF_VALUE: 21

## 2023-09-10 NOTE — PLAN OF CARE
Problem: Discharge Planning  Goal: Discharge to home or other facility with appropriate resources  9/9/2023 2143 by Batsheva Linares RN  Outcome: Progressing  9/9/2023 1002 by Mellisa Jeong RN  Outcome: Progressing     Problem: Pain  Goal: Verbalizes/displays adequate comfort level or baseline comfort level  9/9/2023 2143 by Batsheva Linares RN  Outcome: Progressing  9/9/2023 1002 by Mellisa Jeong RN  Outcome: Progressing  Flowsheets  Taken 9/9/2023 0400 by Cielo Peñaloza RN  Verbalizes/displays adequate comfort level or baseline comfort level: Encourage patient to monitor pain and request assistance  Taken 9/9/2023 0000 by Cielo Peñaloza RN  Verbalizes/displays adequate comfort level or baseline comfort level: Encourage patient to monitor pain and request assistance     Problem: Skin/Tissue Integrity  Goal: Absence of new skin breakdown  Description: 1. Monitor for areas of redness and/or skin breakdown  2. Assess vascular access sites hourly  3. Every 4-6 hours minimum:  Change oxygen saturation probe site  4. Every 4-6 hours:  If on nasal continuous positive airway pressure, respiratory therapy assess nares and determine need for appliance change or resting period.   9/9/2023 2143 by Batsheva Linares RN  Outcome: Progressing  9/9/2023 1002 by Mellisa Jeong RN  Outcome: Progressing     Problem: ABCDS Injury Assessment  Goal: Absence of physical injury  9/9/2023 2143 by Batsheva Linares RN  Outcome: Progressing  9/9/2023 1002 by Mellisa Jeong RN  Outcome: Progressing     Problem: Safety - Adult  Goal: Free from fall injury  9/9/2023 2143 by Batsheva Linares RN  Outcome: Progressing  9/9/2023 1002 by Mellisa Jeong RN  Outcome: Progressing     Problem: Chronic Conditions and Co-morbidities  Goal: Patient's chronic conditions and co-morbidity symptoms are monitored and maintained or improved  9/9/2023 2143 by Batsheva Linares RN  Outcome: Progressing  9/9/2023 1002 by Mellisa Jeong RN  Outcome:

## 2023-09-10 NOTE — PLAN OF CARE
Problem: Respiratory - Adult  Goal: Achieves optimal ventilation and oxygenation  9/9/2023 2050 by Ziggy French RCP  Outcome: Progressing

## 2023-09-10 NOTE — PLAN OF CARE
Problem: Discharge Planning  Goal: Discharge to home or other facility with appropriate resources  9/10/2023 0722 by Jojo Gutierrez RN  Outcome: Progressing  9/9/2023 2143 by Army Taz RN  Outcome: Progressing     Problem: Pain  Goal: Verbalizes/displays adequate comfort level or baseline comfort level  9/10/2023 0722 by Jojo Gutierrez RN  Outcome: Progressing  9/9/2023 2143 by Army Taz RN  Outcome: Progressing     Problem: Skin/Tissue Integrity  Goal: Absence of new skin breakdown  Description: 1. Monitor for areas of redness and/or skin breakdown  2. Assess vascular access sites hourly  3. Every 4-6 hours minimum:  Change oxygen saturation probe site  4. Every 4-6 hours:  If on nasal continuous positive airway pressure, respiratory therapy assess nares and determine need for appliance change or resting period.   9/10/2023 0722 by Jojo Gutierrez RN  Outcome: Progressing  9/9/2023 2143 by Army Taz RN  Outcome: Progressing     Problem: ABCDS Injury Assessment  Goal: Absence of physical injury  9/10/2023 0722 by Jojo Gutierrez RN  Outcome: Progressing  9/9/2023 2143 by Army Taz RN  Outcome: Progressing     Problem: Safety - Adult  Goal: Free from fall injury  9/10/2023 0722 by Jojo Gutierrez RN  Outcome: Progressing  9/9/2023 2143 by Army Taz RN  Outcome: Progressing     Problem: Chronic Conditions and Co-morbidities  Goal: Patient's chronic conditions and co-morbidity symptoms are monitored and maintained or improved  9/10/2023 0722 by Jojo Gutierrez RN  Outcome: Progressing  9/9/2023 2143 by Army Taz RN  Outcome: Progressing     Problem: Respiratory - Adult  Goal: Achieves optimal ventilation and oxygenation  9/10/2023 0722 by Jojo Gutierrez RN  Outcome: Progressing  9/9/2023 2143 by Army Taz RN  Outcome: Progressing  9/9/2023 2050 by Frank Velasco RCP  Outcome: Progressing

## 2023-09-10 NOTE — PROGRESS NOTES
Physical Therapy        Physical Therapy Cancel Note      DATE: 9/10/2023    NAME: Fer Freed  MRN: [id number]   : 1945      Patient not seen this date for Physical Therapy due to:    Patient Declined: Pt has family in room upon arrival.  Pt stating, \"I can't walk. I am dying. \"  RN notified and also attempted to encourage pt to ambulate with PT, but pt continued to refuse. Plan to check back as able.       Electronically signed by Jihan Gonzalez PTA on 9/10/2023 at 2:20 PM

## 2023-09-10 NOTE — PROGRESS NOTES
Assessment: Patient was awake and alert when  visited. Family was not present at the time. Patient remained calm but seemed to be having a rough time. When asked how he was feeling, patient responded; \"not good. \" Patient shared some of his life experiences with . Patient said he was raised Jehovah's witness and a member of [employer] of [id number]. Patient received sacrament of anointing of the sick. Intervention:  provided ministry of presence, offered support and prayed with patient. Outcome: Patient was very grateful and thankful for the anointing and blessing he received. Plan: Follow up visits recommended for ongoing assessment of patient's condition and for more prayers and support.

## 2023-09-10 NOTE — PROCEDURES
PROCEDURE NOTE - CENTRAL VENOUS LINE PLACEMENT    PATIENT NAME: [address] [facility] RECORD NO. [id number]  DATE: 9/10/2023  ATTENDING PHYSICIAN: Dr. Cris Sierra DIAGNOSIS:  Need for IV access for dialysis  POSTOPERATIVE DIAGNOSIS:  Same  PROCEDURE PERFORMED:  Left Internal Jugular Vein Central Line Insertion, Branden catheter  PERFORMING PHYSICIAN: Indio Melvin MD  ANESTHESIA:  Local utilizing 1% lidocaine  ESTIMATED BLOOD LOSS:  Less than 25 ml  COMPLICATIONS:  None immediately appreciated. DISCUSSION:  Antionette Salmon is a 68y.o.-year-old male who requires central IV access. The history and physical examination were reviewed and confirmed. The diagnoses, proposed procedure, risks, possible complications, benefits and alternatives were discussed with the patient or family. He was given the opportunity to ask questions, and once answered, informed consent was obtained. The patient was then prepared for the procedure. PROCEDURE:  A timeout was initiated by the bedside nurse and was confirmed by those present. The patient was placed in a supine position. The skin overlying the Left Internal Jugular Vein was prepped with chlorhexadine and draped in sterile fashion. The skin was infiltrated with local anesthetic. The vessel and surrounding anatomy was visualized using ultrasound. Through the anesthetized region, the introducer needle was inserted into the internal jugular vein returning dark red non pulsatile blood. A guidewire was placed through the center of the needle with no resistance. Ultrasound confirmed presence of wire in the vein. A small incision made in the skin with a #11 scalpel blade. The dilator was inserted into the skin and vein over guidewire using Seldinger technique. The dilator was then removed and the catheter was placed in the vein over the guidewire using Seldinger technique. The guidewire was then removed and all ports aspirated and flushed appropriately.  The catheter then secured using silk suture and a temporary sterile dressing was applied. No immediate complication was evident. All sponge, instrument and needle counts were correct at the completion of the procedure. Postprocedural chest x-ray showed good position of the catheter with no evidence of hemothorax or pneumothorax. The patient tolerated the procedure well with no immediate complication evident.         Cheryle Ewings, MD  6:56 PM, 9/10/23   MD PGY3 Internal medicine resident  Formerly [facility], [place], [place]

## 2023-09-10 NOTE — PROGRESS NOTES
[facility]   Progress Note                   Date:   9/10/2023  Patient name: Anabella [facility]  Date of admission:  9/7/2023  4:45 PM  MRN:   [id number]  YOB: 1945  PCP: Silviano Arriaga MD    Reason for Admission:     Subjective:   Patient seen and examined at bedside: No acute issues overnight, hemodynamically stable, patient will likely need dialysis catheter for cardiac cath as per nephrology team.    Medications:   Scheduled Meds:   melatonin  3 mg Oral Once    amiodarone  200 mg Oral BID    metoprolol tartrate  25 mg Oral BID    atorvastatin  40 mg Oral Nightly    midodrine  10 mg Oral TID WC    midazolam  2 mg IntraVENous Once    sodium chloride flush  5-40 mL IntraVENous 2 times per day    insulin lispro  0-4 Units SubCUTAneous TID WC    insulin lispro  0-4 Units SubCUTAneous Nightly    pantoprazole (PROTONIX) 40 mg in sodium chloride (PF) 0.9 % 10 mL injection  40 mg IntraVENous Daily    aspirin  81 mg Oral Daily     Continuous Infusions:   Phenylephrine      heparin (PORCINE) Infusion 15 Units/kg/hr (09/10/23 0630)    sodium chloride Stopped (09/08/23 1635)    dextrose       CBC:   Recent Labs     09/07/23 2048 09/08/23  0407 09/09/23  0413   WBC 10.4 8.5 11.7*   HGB 12.1* 11.8* 12.7*    216 214       BMP:    Recent Labs     09/07/23  1709 09/08/23  0407 09/09/23  0413    138 138   K 4.1 4.1 4.9    103 102   CO2 23 23 20   BUN 42* 45* 55*   CREATININE 2.1* 2.3* 3.2*   GLUCOSE 201* 118* 112*       Hepatic:   Recent Labs     09/07/23  1709 09/08/23  0407 09/09/23  0413   AST 12 11 255*   ALT 6 6 196*   BILITOT 0.5 0.7 1.3*   ALKPHOS 80 73 75       Troponin: No results for input(s): \"TROPONINI\" in the last 72 hours. BNP: No results for input(s): \"BNP\" in the last 72 hours. Lipids: No results for input(s): \"CHOL\", \"HDL\" in the last 72 hours.     Invalid input(s): \"LDLCALCU\"  INR:   Recent Labs     09/07/23  1709   INR 1.3         Objective:   Vitals: BP

## 2023-09-11 PROBLEM — I50.23 ACUTE ON CHRONIC SYSTOLIC CONGESTIVE HEART FAILURE (HCC): Status: ACTIVE | Noted: 2019-09-29

## 2023-09-11 PROBLEM — I48.92 ATRIAL FLUTTER (HCC): Status: ACTIVE | Noted: 2023-09-11

## 2023-09-11 PROBLEM — R00.0 TACHYCARDIA: Status: ACTIVE | Noted: 2023-09-07

## 2023-09-11 PROBLEM — E87.5 HYPERKALEMIA: Status: ACTIVE | Noted: 2023-09-11

## 2023-09-11 LAB
ACT BLD: 164 SEC (ref 79–149)
ALBUMIN SERPL-MCNC: 2.8 G/DL (ref 3.5–5.2)
ALBUMIN/GLOB SERPL: 0.8 {RATIO} (ref 1–2.5)
ALP SERPL-CCNC: 82 U/L (ref 40–129)
ALT SERPL-CCNC: 413 U/L (ref 5–41)
ANA SER QL IA: NEGATIVE
ANCA MYELOPEROXIDASE: 0.3 AU/ML (ref 0–3.5)
ANCA PROTEINASE 3: <0.7 AU/ML (ref 0–2)
ANION GAP SERPL CALCULATED.3IONS-SCNC: 12 MMOL/L (ref 9–17)
ANTI-XA UNFRAC HEPARIN: 0.25 IU/L
ANTI-XA UNFRAC HEPARIN: 0.33 IU/L
AST SERPL-CCNC: 162 U/L
BASOPHILS # BLD: 0.11 K/UL (ref 0–0.2)
BASOPHILS NFR BLD: 1 % (ref 0–2)
BILIRUB SERPL-MCNC: 1 MG/DL (ref 0.3–1.2)
BUN SERPL-MCNC: 82 MG/DL (ref 8–23)
CALCIUM SERPL-MCNC: 7.8 MG/DL (ref 8.6–10.4)
CHLORIDE SERPL-SCNC: 99 MMOL/L (ref 98–107)
CO2 SERPL-SCNC: 25 MMOL/L (ref 20–31)
CREAT SERPL-MCNC: 4.3 MG/DL (ref 0.7–1.2)
DSDNA IGG SER QL IA: 1.3 IU/ML
ECHO BSA: 2.07 M2
EOSINOPHIL # BLD: 0 K/UL (ref 0–0.4)
EOSINOPHILS RELATIVE PERCENT: 0 % (ref 1–4)
ERYTHROCYTE [DISTWIDTH] IN BLOOD BY AUTOMATED COUNT: 17.6 % (ref 11.8–14.4)
FREE KAPPA/LAMBDA RATIO: 0.23 (ref 0.26–1.65)
GFR SERPL CREATININE-BSD FRML MDRD: 13 ML/MIN/1.73M2
GLUCOSE BLD-MCNC: 121 MG/DL (ref 75–110)
GLUCOSE BLD-MCNC: 129 MG/DL (ref 75–110)
GLUCOSE BLD-MCNC: 143 MG/DL (ref 75–110)
GLUCOSE BLD-MCNC: 91 MG/DL (ref 75–110)
GLUCOSE SERPL-MCNC: 106 MG/DL (ref 70–99)
HBV CORE AB SER QL: NONREACTIVE
HBV SURFACE AB SERPL IA-ACNC: <3.5 MIU/ML
HBV SURFACE AG SERPL QL IA: NONREACTIVE
HCT VFR BLD AUTO: 40.3 % (ref 40.7–50.3)
HCV AB SERPL QL IA: NONREACTIVE
HGB BLD-MCNC: 12.1 G/DL (ref 13–17)
IMM GRANULOCYTES # BLD AUTO: 0 K/UL (ref 0–0.3)
IMM GRANULOCYTES NFR BLD: 0 %
KAPPA LC FREE SER-MCNC: 115 MG/L (ref 3.7–19.4)
LAMBDA LC FREE SERPL-MCNC: 508.8 MG/L (ref 5.7–26.3)
LYMPHOCYTES NFR BLD: 0.78 K/UL (ref 1–4.8)
LYMPHOCYTES RELATIVE PERCENT: 7 % (ref 24–44)
MCH RBC QN AUTO: 26.5 PG (ref 25.2–33.5)
MCHC RBC AUTO-ENTMCNC: 30 G/DL (ref 28.4–34.8)
MCV RBC AUTO: 88.2 FL (ref 82.6–102.9)
MONOCYTES NFR BLD: 0.45 K/UL (ref 0.1–0.8)
MONOCYTES NFR BLD: 4 % (ref 1–7)
MORPHOLOGY: ABNORMAL
NEUTROPHILS NFR BLD: 88 % (ref 36–66)
NEUTS SEG NFR BLD: 9.86 K/UL (ref 1.8–7.7)
NRBC BLD-RTO: 0 PER 100 WBC
NUCLEAR IGG SER IA-RTO: 0.5 U/ML
PLATELET # BLD AUTO: 163 K/UL (ref 138–453)
PMV BLD AUTO: 11.4 FL (ref 8.1–13.5)
POTASSIUM SERPL-SCNC: 5.4 MMOL/L (ref 3.7–5.3)
PROT SERPL-MCNC: 6.4 G/DL (ref 6.4–8.3)
RBC # BLD AUTO: 4.57 M/UL (ref 4.21–5.77)
SODIUM SERPL-SCNC: 136 MMOL/L (ref 135–144)
WBC OTHER # BLD: 11.2 K/UL (ref 3.5–11.3)

## 2023-09-11 PROCEDURE — 6360000002 HC RX W HCPCS: Performed by: INTERNAL MEDICINE

## 2023-09-11 PROCEDURE — 86803 HEPATITIS C AB TEST: CPT

## 2023-09-11 PROCEDURE — 90935 HEMODIALYSIS ONE EVALUATION: CPT

## 2023-09-11 PROCEDURE — 87340 HEPATITIS B SURFACE AG IA: CPT

## 2023-09-11 PROCEDURE — 86038 ANTINUCLEAR ANTIBODIES: CPT

## 2023-09-11 PROCEDURE — 86704 HEP B CORE ANTIBODY TOTAL: CPT

## 2023-09-11 PROCEDURE — B2151ZZ FLUOROSCOPY OF LEFT HEART USING LOW OSMOLAR CONTRAST: ICD-10-PCS | Performed by: INTERNAL MEDICINE

## 2023-09-11 PROCEDURE — 2000000000 HC ICU R&B

## 2023-09-11 PROCEDURE — 2580000003 HC RX 258

## 2023-09-11 PROCEDURE — 85347 COAGULATION TIME ACTIVATED: CPT

## 2023-09-11 PROCEDURE — C9113 INJ PANTOPRAZOLE SODIUM, VIA: HCPCS

## 2023-09-11 PROCEDURE — 82947 ASSAY GLUCOSE BLOOD QUANT: CPT

## 2023-09-11 PROCEDURE — 36415 COLL VENOUS BLD VENIPUNCTURE: CPT

## 2023-09-11 PROCEDURE — 4A023N7 MEASUREMENT OF CARDIAC SAMPLING AND PRESSURE, LEFT HEART, PERCUTANEOUS APPROACH: ICD-10-PCS | Performed by: INTERNAL MEDICINE

## 2023-09-11 PROCEDURE — 80053 COMPREHEN METABOLIC PANEL: CPT

## 2023-09-11 PROCEDURE — 6370000000 HC RX 637 (ALT 250 FOR IP)

## 2023-09-11 PROCEDURE — 93454 CORONARY ARTERY ANGIO S&I: CPT | Performed by: INTERNAL MEDICINE

## 2023-09-11 PROCEDURE — C1769 GUIDE WIRE: HCPCS | Performed by: INTERNAL MEDICINE

## 2023-09-11 PROCEDURE — 2500000003 HC RX 250 WO HCPCS: Performed by: INTERNAL MEDICINE

## 2023-09-11 PROCEDURE — 6370000000 HC RX 637 (ALT 250 FOR IP): Performed by: STUDENT IN AN ORGANIZED HEALTH CARE EDUCATION/TRAINING PROGRAM

## 2023-09-11 PROCEDURE — 2709999900 HC NON-CHARGEABLE SUPPLY: Performed by: INTERNAL MEDICINE

## 2023-09-11 PROCEDURE — 86334 IMMUNOFIX E-PHORESIS SERUM: CPT

## 2023-09-11 PROCEDURE — 86317 IMMUNOASSAY INFECTIOUS AGENT: CPT

## 2023-09-11 PROCEDURE — 99232 SBSQ HOSP IP/OBS MODERATE 35: CPT | Performed by: INTERNAL MEDICINE

## 2023-09-11 PROCEDURE — 99233 SBSQ HOSP IP/OBS HIGH 50: CPT | Performed by: INTERNAL MEDICINE

## 2023-09-11 PROCEDURE — 85520 HEPARIN ASSAY: CPT

## 2023-09-11 PROCEDURE — 84155 ASSAY OF PROTEIN SERUM: CPT

## 2023-09-11 PROCEDURE — 6360000002 HC RX W HCPCS

## 2023-09-11 PROCEDURE — 84165 PROTEIN E-PHORESIS SERUM: CPT

## 2023-09-11 PROCEDURE — 6360000004 HC RX CONTRAST MEDICATION: Performed by: INTERNAL MEDICINE

## 2023-09-11 PROCEDURE — C1892 INTRO/SHEATH,FIXED,PEEL-AWAY: HCPCS | Performed by: INTERNAL MEDICINE

## 2023-09-11 PROCEDURE — 6370000000 HC RX 637 (ALT 250 FOR IP): Performed by: INTERNAL MEDICINE

## 2023-09-11 PROCEDURE — 85025 COMPLETE CBC W/AUTO DIFF WBC: CPT

## 2023-09-11 PROCEDURE — B2111ZZ FLUOROSCOPY OF MULTIPLE CORONARY ARTERIES USING LOW OSMOLAR CONTRAST: ICD-10-PCS | Performed by: INTERNAL MEDICINE

## 2023-09-11 PROCEDURE — 83516 IMMUNOASSAY NONANTIBODY: CPT

## 2023-09-11 PROCEDURE — 99291 CRITICAL CARE FIRST HOUR: CPT | Performed by: INTERNAL MEDICINE

## 2023-09-11 PROCEDURE — 99153 MOD SED SAME PHYS/QHP EA: CPT | Performed by: INTERNAL MEDICINE

## 2023-09-11 PROCEDURE — 86225 DNA ANTIBODY NATIVE: CPT

## 2023-09-11 PROCEDURE — 83521 IG LIGHT CHAINS FREE EACH: CPT

## 2023-09-11 PROCEDURE — 99212 OFFICE O/P EST SF 10 MIN: CPT

## 2023-09-11 PROCEDURE — 99152 MOD SED SAME PHYS/QHP 5/>YRS: CPT | Performed by: INTERNAL MEDICINE

## 2023-09-11 PROCEDURE — 5A1D70Z PERFORMANCE OF URINARY FILTRATION, INTERMITTENT, LESS THAN 6 HOURS PER DAY: ICD-10-PCS | Performed by: INTERNAL MEDICINE

## 2023-09-11 RX ORDER — CHOLECALCIFEROL (VITAMIN D3) 125 MCG
5 CAPSULE ORAL NIGHTLY PRN
Status: DISCONTINUED | OUTPATIENT
Start: 2023-09-11 | End: 2023-09-16 | Stop reason: HOSPADM

## 2023-09-11 RX ORDER — LIDOCAINE HYDROCHLORIDE 10 MG/ML
INJECTION, SOLUTION INFILTRATION; PERINEURAL PRN
Status: DISCONTINUED | OUTPATIENT
Start: 2023-09-11 | End: 2023-09-11 | Stop reason: HOSPADM

## 2023-09-11 RX ORDER — MIDAZOLAM HYDROCHLORIDE 1 MG/ML
INJECTION INTRAMUSCULAR; INTRAVENOUS PRN
Status: DISCONTINUED | OUTPATIENT
Start: 2023-09-11 | End: 2023-09-11 | Stop reason: HOSPADM

## 2023-09-11 RX ORDER — SODIUM CHLORIDE 0.9 % (FLUSH) 0.9 %
5-40 SYRINGE (ML) INJECTION PRN
Status: CANCELLED | OUTPATIENT
Start: 2023-09-11

## 2023-09-11 RX ORDER — SODIUM CHLORIDE 0.9 % (FLUSH) 0.9 %
5-40 SYRINGE (ML) INJECTION EVERY 12 HOURS SCHEDULED
Status: CANCELLED | OUTPATIENT
Start: 2023-09-11

## 2023-09-11 RX ORDER — ACETAMINOPHEN 325 MG/1
650 TABLET ORAL EVERY 4 HOURS PRN
Status: CANCELLED | OUTPATIENT
Start: 2023-09-11

## 2023-09-11 RX ORDER — SODIUM CHLORIDE 9 MG/ML
INJECTION, SOLUTION INTRAVENOUS PRN
Status: CANCELLED | OUTPATIENT
Start: 2023-09-11

## 2023-09-11 RX ADMIN — HEPARIN SODIUM 1600 UNITS: 1000 INJECTION INTRAVENOUS; SUBCUTANEOUS at 18:26

## 2023-09-11 RX ADMIN — SODIUM CHLORIDE, PRESERVATIVE FREE 10 ML: 5 INJECTION INTRAVENOUS at 20:12

## 2023-09-11 RX ADMIN — FUROSEMIDE 40 MG: 10 INJECTION, SOLUTION INTRAMUSCULAR; INTRAVENOUS at 12:23

## 2023-09-11 RX ADMIN — SODIUM CHLORIDE, PRESERVATIVE FREE 40 MG: 5 INJECTION INTRAVENOUS at 09:31

## 2023-09-11 RX ADMIN — SODIUM CHLORIDE, PRESERVATIVE FREE 10 ML: 5 INJECTION INTRAVENOUS at 08:33

## 2023-09-11 RX ADMIN — MIDODRINE HYDROCHLORIDE 10 MG: 5 TABLET ORAL at 12:23

## 2023-09-11 RX ADMIN — HEPARIN SODIUM 2000 UNITS: 1000 INJECTION INTRAVENOUS; SUBCUTANEOUS at 01:11

## 2023-09-11 RX ADMIN — HEPARIN SODIUM 1900 UNITS: 1000 INJECTION INTRAVENOUS; SUBCUTANEOUS at 18:28

## 2023-09-11 RX ADMIN — METOPROLOL TARTRATE 25 MG: 25 TABLET, FILM COATED ORAL at 09:30

## 2023-09-11 RX ADMIN — Medication 5 MG: at 22:37

## 2023-09-11 RX ADMIN — ASPIRIN 81 MG 81 MG: 81 TABLET ORAL at 09:30

## 2023-09-11 RX ADMIN — FUROSEMIDE 40 MG: 10 INJECTION, SOLUTION INTRAMUSCULAR; INTRAVENOUS at 00:27

## 2023-09-11 RX ADMIN — MIDODRINE HYDROCHLORIDE 10 MG: 5 TABLET ORAL at 17:10

## 2023-09-11 RX ADMIN — ATORVASTATIN CALCIUM 40 MG: 40 TABLET, FILM COATED ORAL at 21:25

## 2023-09-11 ASSESSMENT — PAIN SCALES - GENERAL
PAINLEVEL_OUTOF10: 0

## 2023-09-11 NOTE — PROGRESS NOTES
[facility]   Progress Note                   Date:   9/11/2023  Patient name: Danny Syed  Date of admission:  9/7/2023  4:45 PM  MRN:   [id number]  YOB: 1945  PCP: Shalom Mendoza MD    Reason for Admission:     Subjective:   Patient seen and examined at bedside: No acute issues over night, hemodynamically stable, got heart cath today which shows  RCA with collaterals, have elevated liver enzymes. Medications:   Scheduled Meds:   furosemide  40 mg IntraVENous Q12H    melatonin  3 mg Oral Once    metoprolol tartrate  25 mg Oral BID    atorvastatin  40 mg Oral Nightly    midodrine  10 mg Oral TID WC    midazolam  2 mg IntraVENous Once    sodium chloride flush  5-40 mL IntraVENous 2 times per day    insulin lispro  0-4 Units SubCUTAneous TID WC    insulin lispro  0-4 Units SubCUTAneous Nightly    pantoprazole (PROTONIX) 40 mg in sodium chloride (PF) 0.9 % 10 mL injection  40 mg IntraVENous Daily    aspirin  81 mg Oral Daily     Continuous Infusions:   Phenylephrine      sodium chloride Stopped (09/08/23 1635)    dextrose       CBC:   Recent Labs     09/09/23  0413 09/10/23  0702 09/11/23  0823   WBC 11.7* 12.3* 11.2   HGB 12.7* 12.2* 12.1*    248 163       BMP:    Recent Labs     09/09/23  0413 09/10/23  0702 09/11/23  0823    134* 136   K 4.9 5.2 5.4*    100 99   CO2 20 17* 25   BUN 55* 76* 82*   CREATININE 3.2* 4.0* 4.3*   GLUCOSE 112* 95 106*       Hepatic:   Recent Labs     09/09/23  0413 09/10/23  0702 09/11/23  0823   * 343* 162*   * 533* 413*   BILITOT 1.3* 1.0 1.0   ALKPHOS 75 84 82       Troponin: No results for input(s): \"TROPONINI\" in the last 72 hours. BNP: No results for input(s): \"BNP\" in the last 72 hours. Lipids: No results for input(s): \"CHOL\", \"HDL\" in the last 72 hours. Invalid input(s): \"LDLCALCU\"  INR:   No results for input(s): \"INR\" in the last 72 hours.       Objective:   Vitals: /89   Pulse 68   Temp history, physical, subjective, objective, assessment, and plan with the resident/fellow/NP and agree with the note. I performed the history and physical personally. I have made changes to the note above as needed. Thank you for allowing me to participate in the care of this patient, please do not hesitate to call if you have any questions. Caroline Luis, [address] [facility]  [facility]. [facility]  [phone]

## 2023-09-11 NOTE — PROGRESS NOTES
Dr. Mel Foster at bedside to discuss with patient on changing code status to [address] [place] [address] for heart cath. Patient verbalizes understanding of explanation of changing code status by Dr. Mel Foster. Writer at bedside for discussion.

## 2023-09-11 NOTE — PROGRESS NOTES
Pt has no more VT since admission, remains in A Flutter with moderate v response. Amiodarone was DC because of Liver enzymes. Renal Functions also were getting worse and Lt IJ was inserted with plans for dialysis. Cath was done: reported to show no critical obstructive CAD, no plans for intervention. Pt is doing well, no complaints. He is agreeable for the ICD, we agreed he will get the ICD before he is discharged home,    I suspect that his Liver and renal Fx were abnormal at least partly because of the significant Hypotension during his tachycardia events on two successive days. Liver enzymes are trending toward improvement. Pt will need the ICD , the procedure has been in discussion with him, he understands and do agree. I do not think tomorrow will be a good day, let us see how he tolerates dialysis and how often he will need the dialysis. Exam has not changed,  No evidence of infection.     Will discuss with the team.

## 2023-09-11 NOTE — PROGRESS NOTES
INTENSIVE CARE UNIT  Resident Physician Progress Note    Patient - Elisabet [facility]  Date of Admission -  9/7/2023  4:45 PM  Date of Evaluation -  9/11/2023  Room and Bed Number -  3025/3025-01   Hospital Day - 4      SUBJECTIVE:       HOSPITAL COURSE:   History was obtained from chart review and the patient. Juliette Kidd is a 68 y.o. M with a past medical history of hypertension, Crohn's colitis s/p small bowel resection, stage III CKD, Hx of CAD with 100% occluded RCA and prior inferior MI,PAD, chronic lymphedema presented today after patient started having chest pain, palpitations dizziness, burning pain sensation in the arms and diaphoresis, initial episode was this morning which resolved on its own, after which he had another episode in the afternoon, he called his sister and 46. EMS arrived which showed sustained V. tach, patient converted into normal sinus rhythm even before giving any medications. On arrival to the ED patient went into V. tach again after initial evaluation by the ER physician, patient converted back to normal sinus rhythm before electrical cardioversion. After discussion with cardiology patient was given amio bolus and started on heparin drip in a.m. infusion. After the amio bolus, patient developed hypotension which responded to fluid bolus. Pertinent labs ordered, showed an elevated creatinine from his baseline of around 1.4-1.6, no electrolyte abnormalities. Lactic acid was elevated at around 2.6 down trended to 2.2. Troponin [id number]. proBNP 14,000. Critical care was consulted for admission for sustained V. tach on Amio infusion. On my evaluation, patient was resting comfortably on the bed, denies any chest pain, shortness of breath, fever, chills, sick contacts, changes in bowel or urinary habits. Patient states that this has never happened before. Patient follows up with Dr. Noelia Boxer for chronic lymphedema. Patient just takes Lasix on and off but.   Further Lorri Heredia, including pertinent history and exam findings with the resident. I have reviewed the key elements of all parts of the encounter with the resident. I have seen and examined the patient with the resident. I agree with the assessment and plan and status of the problem list as documented. The patient during the rounds, have reviewed the chart, cardiac catheterization results seen. Nephrology notes seen. Patient has ventricular tachycardia and atrial flutter with intermittent rapid ventricular rate currently rate is less than 100. Patient has MARK and has been getting Lasix. BUN is 82 this morning creatinine is 4.3 potassium is 5.4 bicarbonate 25. AST ALT is improving he was initially on amiodarone which was stopped because of elevated liver function test and liver enzymes is improving currently off amiodarone. Cardiac catheterization done today shows occluded RCA with collaterals. Electrophysiology was consulted for ICD. Patient is currently off Levophed. On heparin drip. Urine output is 1.3 L in last 24 hours and he is on Lasix 40 mg twice daily he is on low-dose beta-blocker 25 twice daily and he is on midodrine. Patient has not use BiPAP at night currently on nasal cannula maintaining saturation. We will continue to follow liver function test.  Continue with aspirin and heparin drip and beta-blocker. Likely hemodialysis today. Nephrology follow-up. Discussed with nursing staff, treatment and plan discussed. Total critical care time caring for this patient with life threatening, unstable organ failure, including direct patient contact, management of life support systems, review of data including imaging and labs, discussions with other team members and physicians at least 28  Min so far today, excluding procedures. Please note that this chart was generated using voice recognition Dragon dictation software.  Although every effort was made to ensure the accuracy of

## 2023-09-11 NOTE — PLAN OF CARE
Problem: Discharge Planning  Goal: Discharge to home or other facility with appropriate resources  Outcome: Progressing     Problem: Pain  Goal: Verbalizes/displays adequate comfort level or baseline comfort level  Outcome: Progressing     Problem: Skin/Tissue Integrity  Goal: Absence of new skin breakdown  Description: 1. Monitor for areas of redness and/or skin breakdown  2. Assess vascular access sites hourly  3. Every 4-6 hours minimum:  Change oxygen saturation probe site  4. Every 4-6 hours:  If on nasal continuous positive airway pressure, respiratory therapy assess nares and determine need for appliance change or resting period.   Outcome: Progressing     Problem: ABCDS Injury Assessment  Goal: Absence of physical injury  Outcome: Progressing     Problem: Safety - Adult  Goal: Free from fall injury  Outcome: Progressing     Problem: Chronic Conditions and Co-morbidities  Goal: Patient's chronic conditions and co-morbidity symptoms are monitored and maintained or improved  Outcome: Progressing     Problem: Respiratory - Adult  Goal: Achieves optimal ventilation and oxygenation  Outcome: Progressing

## 2023-09-11 NOTE — PLAN OF CARE
Problem: Discharge Planning  Goal: Discharge to home or other facility with appropriate resources  9/11/2023 1239 by Ny Collazo RN  Outcome: Progressing  9/10/2023 2248 by Alisia Riedel, RN  Outcome: Progressing     Problem: Pain  Goal: Verbalizes/displays adequate comfort level or baseline comfort level  9/11/2023 1239 by Ny Collazo RN  Outcome: Progressing  9/10/2023 2248 by Alisia Riedel, RN  Outcome: Progressing     Problem: Skin/Tissue Integrity  Goal: Absence of new skin breakdown  Description: 1. Monitor for areas of redness and/or skin breakdown  2. Assess vascular access sites hourly  3. Every 4-6 hours minimum:  Change oxygen saturation probe site  4. Every 4-6 hours:  If on nasal continuous positive airway pressure, respiratory therapy assess nares and determine need for appliance change or resting period.   9/11/2023 1239 by Ny Collazo RN  Outcome: Progressing  9/10/2023 2248 by Alisia Riedel, RN  Outcome: Progressing     Problem: ABCDS Injury Assessment  Goal: Absence of physical injury  9/11/2023 1239 by Ny Collazo RN  Outcome: Progressing  Flowsheets (Taken 9/11/2023 1238)  Absence of Physical Injury: Implement safety measures based on patient assessment  9/10/2023 2248 by Alisia Riedel, RN  Outcome: Progressing     Problem: Safety - Adult  Goal: Free from fall injury  9/11/2023 1239 by Ny Collazo RN  Outcome: Progressing  Flowsheets (Taken 9/11/2023 1238)  Free From Fall Injury: Instruct family/caregiver on patient safety  9/10/2023 2248 by Alisia Riedel, RN  Outcome: Progressing     Problem: Chronic Conditions and Co-morbidities  Goal: Patient's chronic conditions and co-morbidity symptoms are monitored and maintained or improved  9/11/2023 1239 by Ny Collazo RN  Outcome: Progressing  9/10/2023 2248 by Alisia Riedel, RN  Outcome: Progressing     Problem: Respiratory - Adult  Goal: Achieves optimal ventilation and oxygenation  9/11/2023 1239 by Julia Macias RN  Outcome: Progressing  9/10/2023 2248 by Figueroa Ng, LAMIN  Outcome: Progressing

## 2023-09-11 NOTE — PROGRESS NOTES
Renal Progress Note    Patient :  Gita Fan; 68 y.o. MRN# [id number]  Location:  3025/3025-01  Attending:  Francisco Smith MD  Admit Date:  9/7/2023   Hospital Day: 4      Subjective:     PO intake good, hemodynamically stable. Arredondo catheter in place. I got a catheterization done today no intervention done. Chronic occlusion of RCA. Unfortunately renal function is worsening creatinine of 4.3 hyperkalemia noted. Labs today showed a sodium 136 potassium 5.4 chloride 99 bicarb 25 BUN 82 creatinine 4.3 calcium 7.8 hemoglobin 12.1  Ultrasound shows right kidney to be 11.6 left 10.2 cm. Echocardiogram showed ejection fraction of 25 to 30%. UPC less than 0.5. Work-up so far shows K/L free light chain ratio of 0.2 C3 slightly low 83. History reviewed known history of chronic kidney disease stage IIIb-4 baseline creatinine in 2022 was in the 1.8-2.0 range has seen Dr. dEuardo Roldan in 2018 has not followed up with nephrology. Known history of coronary artery disease previous history of Crohn's disease and bowel resection, ischemic cardiomyopathy. Came into the hospital after he felt dizzy and lightheaded was found to be hypotensive, developed V. tach when seen by EMS at home, converted to sinus rhythm had another episode of V. tach which was prolonged in the ER. Put on amiodarone infusion. Had a bump in troponin had cardiac catheterization done today no intervention done. Creatinine unfortunately has gone up from 3 on admission to 4.3. Nephrology consulted for acute kidney injury.     Outpatient Medications:     Medications Prior to Admission: furosemide (LASIX) 40 MG tablet, TAKE 1 TABLET BY MOUTH  DAILY  zoster recombinant adjuvanted vaccine (SHINGRIX) 50 MCG/0.5ML SUSR injection, Inject 0.5 mLs into the muscle See Admin Instructions 1 dose now and repeat in 2-6 months  metoprolol tartrate (LOPRESSOR) 25 MG tablet, Take 1 tablet by mouth 2 times daily (Patient not taking: Reported on 9/7/2023)    Current

## 2023-09-11 NOTE — PROGRESS NOTES
obesity due to excess calories without serious comorbidity with body mass index (BMI) of 35.0 to 35.9 in adult 09/10/2020    Lymphedema of both lower extremities 08/03/2020    Acute on chronic systolic congestive heart failure ([address]) 09/29/2019    Crohn's disease of small intestine with complication ([address]) 16/12/4752    Peripheral arterial occlusive disease ([address]) 03/07/2017    Delayed surgical wound healing 03/07/2017    History of atrial fibrillation     Diarrhea     Past heart attack     Stage 3 chronic kidney disease ([address])     Open wound anterior abdominal wall 09/07/2016    Acute kidney injury ([address]) 08/18/2016    H/O resection of small bowel 08/18/2016    Internal hemorrhoids     Refused pneumococcal vaccine 03/22/2016    Crohn's disease of small intestine ([address])     Crohn's disease ([address]) 06/22/2015    Diverticulosis 05/21/2015    Essential hypertension     Coronary artery disease involving native coronary artery of native heart without angina pectoris     Hemorrhoids        Additional assessment:    (1)Sustained Vtach s/p chemical cardioversion with amio    -Amio D/C due to raised transaminases  -On Metoprolol 25 mg BID    (2)MARK likely ischemic ATN post V. Tach,hypotension- Resolving   -CKD stage 3 ( [id number])  -Cr raised,low UO  -Nephro on board  -On Lasix  40 mg BID  -s/p  one Dialysis    (3)Atherosclerotic heart disease history of chronic RCA occlusion and cardiomyopathy ejection fraction of 35 to 40%  -AICD planned  -Cardio and EP on board      Recommended Follow-up:     -Monitor Rhythm (aflutter HR 60's). Cardiology following  -Monitor BP. On Midodrine.  - Nephrology following for need of dialysis   - Plan for ICD tomorrow , hold heparin as appropriate       Above mentioned assessment and plan was discussed by me with the admitting medicine resident. The medicine team assigned to the patient by medicine admitting resident will be following up the patient from now onwards on the floor.      Shin Varela

## 2023-09-11 NOTE — PROGRESS NOTES
Dialysis Time Out  To be done by RN and tech or 2 RNs  Staff Names Silviano Evans RN and Kristopher Aiken RN    [x]  Identity of the patient using 2 patient identifiers  [x]  Consent for treatment  [x]  Equipment-proper machine and dialyzer  [x]  B-Hep B status  [x]  Orders- to include bath, blood flow, dialyzer, time and fluid removal  [x]  Access-Correct site and in working order  [x]  Time for patient to ask questions.

## 2023-09-11 NOTE — PROGRESS NOTES
Physical Therapy        Physical Therapy Cancel Note      DATE: 2023    NAME: Luda Correia  MRN: [id number]   : 1945      Patient not seen this date for Physical Therapy due to:    Surgery/Procedure: pt just returned from cath lab and needs to remain at bedrest for 2 hrs per RN. Plan to check back as able.        Electronically signed by Sanjuanita Schaffer PTA on 2023 at 12:21 PM

## 2023-09-11 NOTE — PROGRESS NOTES
Vasc band removed from Right radial artery, no hematoma noted, site is clean/dry/intact/ no bleeding. Pressure dressing applied.

## 2023-09-12 ENCOUNTER — APPOINTMENT (OUTPATIENT)
Age: 78
DRG: 224 | End: 2023-09-12
Payer: MEDICARE

## 2023-09-12 LAB
ALBUMIN PERCENT: ABNORMAL % (ref 45–65)
ALBUMIN SERPL-MCNC: 2.9 G/DL (ref 3.5–5.2)
ALBUMIN SERPL-MCNC: ABNORMAL G/DL (ref 3.2–5.2)
ALBUMIN/GLOB SERPL: 0.9 {RATIO} (ref 1–2.5)
ALP SERPL-CCNC: 89 U/L (ref 40–129)
ALPHA 2 PERCENT: ABNORMAL % (ref 6–13)
ALPHA1 GLOB SERPL ELPH-MCNC: ABNORMAL % (ref 3–6)
ALPHA1 GLOB SERPL ELPH-MCNC: ABNORMAL G/DL (ref 0.1–0.4)
ALPHA2 GLOB SERPL ELPH-MCNC: ABNORMAL G/DL (ref 0.5–0.9)
ALT SERPL-CCNC: 360 U/L (ref 5–41)
ANION GAP SERPL CALCULATED.3IONS-SCNC: 10 MMOL/L (ref 9–17)
ANTI-XA UNFRAC HEPARIN: <0.1 IU/L
ANTI-XA UNFRAC HEPARIN: <0.1 IU/L
AST SERPL-CCNC: 137 U/L
B-GLOBULIN SERPL ELPH-MCNC: ABNORMAL % (ref 11–19)
B-GLOBULIN SERPL ELPH-MCNC: ABNORMAL G/DL (ref 0.7–1.4)
BASOPHILS # BLD: 0 K/UL (ref 0–0.2)
BASOPHILS NFR BLD: 0 % (ref 0–2)
BILIRUB SERPL-MCNC: 0.9 MG/DL (ref 0.3–1.2)
BUN SERPL-MCNC: 70 MG/DL (ref 8–23)
CA-I BLD-SCNC: 1.15 MMOL/L (ref 1.13–1.33)
CALCIUM SERPL-MCNC: 8.1 MG/DL (ref 8.6–10.4)
CHLORIDE SERPL-SCNC: 102 MMOL/L (ref 98–107)
CO2 SERPL-SCNC: 25 MMOL/L (ref 20–31)
CREAT SERPL-MCNC: 3.5 MG/DL (ref 0.7–1.2)
ECHO AO ROOT DIAM: 3.7 CM
ECHO AO ROOT INDEX: 1.78 CM/M2
ECHO AV AREA PEAK VELOCITY: 4.1 CM2
ECHO AV AREA VTI: 3.9 CM2
ECHO AV AREA/BSA PEAK VELOCITY: 2 CM2/M2
ECHO AV AREA/BSA VTI: 1.9 CM2/M2
ECHO AV MEAN GRADIENT: 3 MMHG
ECHO AV MEAN VELOCITY: 0.8 M/S
ECHO AV PEAK GRADIENT: 6 MMHG
ECHO AV PEAK VELOCITY: 1.3 M/S
ECHO AV VELOCITY RATIO: 0.85
ECHO AV VTI: 21.6 CM
ECHO BSA: 2.07 M2
ECHO EST RA PRESSURE: 5 MMHG
ECHO LA AREA 2C: 26.1 CM2
ECHO LA AREA 4C: 26.5 CM2
ECHO LA DIAMETER INDEX: 2.6 CM/M2
ECHO LA DIAMETER: 5.4 CM
ECHO LA MAJOR AXIS: 7.2 CM
ECHO LA MINOR AXIS: 6.4 CM
ECHO LA TO AORTIC ROOT RATIO: 1.46
ECHO LA VOL 2C: 88 ML (ref 18–58)
ECHO LA VOL 4C: 78 ML (ref 18–58)
ECHO LA VOL BP: 87 ML (ref 18–58)
ECHO LA VOL/BSA BIPLANE: 42 ML/M2 (ref 16–34)
ECHO LA VOLUME INDEX A2C: 42 ML/M2 (ref 16–34)
ECHO LA VOLUME INDEX A4C: 38 ML/M2 (ref 16–34)
ECHO LV E' LATERAL VELOCITY: 11 CM/S
ECHO LV E' SEPTAL VELOCITY: 6 CM/S
ECHO LV EDV 3D: 240 ML
ECHO LV EDV A2C: 204 ML
ECHO LV EDV A4C: 181 ML
ECHO LV EDV INDEX 3D: 115 ML/M2
ECHO LV EDV INDEX A4C: 87 ML/M2
ECHO LV EDV NDEX A2C: 98 ML/M2
ECHO LV EJECTION FRACTION 3D: 32 %
ECHO LV EJECTION FRACTION A2C: 21 %
ECHO LV EJECTION FRACTION A4C: 27 %
ECHO LV EJECTION FRACTION BIPLANE: 25 % (ref 55–100)
ECHO LV ESV 3D: 163 ML
ECHO LV ESV A2C: 163 ML
ECHO LV ESV A4C: 133 ML
ECHO LV ESV INDEX 3D: 78 ML/M2
ECHO LV ESV INDEX A2C: 78 ML/M2
ECHO LV ESV INDEX A4C: 64 ML/M2
ECHO LV FRACTIONAL SHORTENING: 13 % (ref 28–44)
ECHO LV INTERNAL DIMENSION DIASTOLE INDEX: 2.93 CM/M2
ECHO LV INTERNAL DIMENSION DIASTOLIC: 6.1 CM (ref 4.2–5.9)
ECHO LV INTERNAL DIMENSION SYSTOLIC INDEX: 2.55 CM/M2
ECHO LV INTERNAL DIMENSION SYSTOLIC: 5.3 CM
ECHO LV IVSD: 1.1 CM (ref 0.6–1)
ECHO LV MASS 2D: 270.5 G (ref 88–224)
ECHO LV MASS 3D INDEX: 126.4 G/M2
ECHO LV MASS 3D: 263 G
ECHO LV MASS INDEX 2D: 130 G/M2 (ref 49–115)
ECHO LV POSTERIOR WALL DIASTOLIC: 1 CM (ref 0.6–1)
ECHO LV RELATIVE WALL THICKNESS RATIO: 0.33
ECHO LVOT AREA: 4.5 CM2
ECHO LVOT AV VTI INDEX: 0.87
ECHO LVOT DIAM: 2.4 CM
ECHO LVOT MEAN GRADIENT: 2 MMHG
ECHO LVOT PEAK GRADIENT: 5 MMHG
ECHO LVOT PEAK VELOCITY: 1.1 M/S
ECHO LVOT STROKE VOLUME INDEX: 40.9 ML/M2
ECHO LVOT SV: 85 ML
ECHO LVOT VTI: 18.8 CM
ECHO MV AREA VTI: 2.2 CM2
ECHO MV E DECELERATION TIME (DT): 99 MS
ECHO MV E VELOCITY: 1.56 M/S
ECHO MV E/E' LATERAL: 14.18
ECHO MV E/E' RATIO (AVERAGED): 20.09
ECHO MV E/E' SEPTAL: 26
ECHO MV EROA PISA: 0.6 CM2
ECHO MV LVOT VTI INDEX: 2.09
ECHO MV MAX VELOCITY: 1.5 M/S
ECHO MV MEAN GRADIENT: 3 MMHG
ECHO MV MEAN VELOCITY: 0.8 M/S
ECHO MV PEAK GRADIENT: 9 MMHG
ECHO MV REGURGITANT ALIASING (NYQUIST) VELOCITY: 36 CM/S
ECHO MV REGURGITANT PEAK GRADIENT: 77 MMHG
ECHO MV REGURGITANT PEAK VELOCITY: 4.4 M/S
ECHO MV REGURGITANT RADIUS PISA: 1.1 CM
ECHO MV REGURGITANT VOLUME PISA: 77.72 ML
ECHO MV REGURGITANT VTIA: 125 CM
ECHO MV VTI: 39.2 CM
ECHO PV MAX VELOCITY: 0.6 M/S
ECHO PV PEAK GRADIENT: 1 MMHG
ECHO RIGHT VENTRICULAR SYSTOLIC PRESSURE (RVSP): 28 MMHG
ECHO RV FREE WALL PEAK S': 8 CM/S
ECHO RV INTERNAL DIMENSION: 4.8 CM
ECHO RV TAPSE: 1.8 CM (ref 1.7–?)
ECHO TV REGURGITANT MAX VELOCITY: 2.41 M/S
ECHO TV REGURGITANT PEAK GRADIENT: 23 MMHG
EOSINOPHIL # BLD: 0.17 K/UL (ref 0–0.4)
EOSINOPHILS RELATIVE PERCENT: 2 % (ref 1–4)
ERYTHROCYTE [DISTWIDTH] IN BLOOD BY AUTOMATED COUNT: 17.6 % (ref 11.8–14.4)
ERYTHROCYTE [DISTWIDTH] IN BLOOD BY AUTOMATED COUNT: 18 % (ref 11.8–14.4)
GAMMA GLOB SERPL ELPH-MCNC: ABNORMAL G/DL (ref 0.5–1.5)
GAMMA GLOBULIN %: ABNORMAL % (ref 9–20)
GFR SERPL CREATININE-BSD FRML MDRD: 17 ML/MIN/1.73M2
GLUCOSE BLD-MCNC: 135 MG/DL (ref 75–110)
GLUCOSE SERPL-MCNC: 103 MG/DL (ref 70–99)
HCT VFR BLD AUTO: 39.4 % (ref 40.7–50.3)
HCT VFR BLD AUTO: 40.4 % (ref 40.7–50.3)
HGB BLD-MCNC: 11.6 G/DL (ref 13–17)
HGB BLD-MCNC: 11.7 G/DL (ref 13–17)
IMM GRANULOCYTES # BLD AUTO: 0 K/UL (ref 0–0.3)
IMM GRANULOCYTES NFR BLD: 0 %
INR PPP: 1.5
LYMPHOCYTES NFR BLD: 0.59 K/UL (ref 1–4.8)
LYMPHOCYTES RELATIVE PERCENT: 7 % (ref 24–44)
MAGNESIUM SERPL-MCNC: 2.1 MG/DL (ref 1.6–2.6)
MCH RBC QN AUTO: 25.9 PG (ref 25.2–33.5)
MCH RBC QN AUTO: 26.4 PG (ref 25.2–33.5)
MCHC RBC AUTO-ENTMCNC: 29 G/DL (ref 28.4–34.8)
MCHC RBC AUTO-ENTMCNC: 29.4 G/DL (ref 28.4–34.8)
MCV RBC AUTO: 89.4 FL (ref 82.6–102.9)
MCV RBC AUTO: 89.5 FL (ref 82.6–102.9)
MONOCYTES NFR BLD: 0.42 K/UL (ref 0.1–0.8)
MONOCYTES NFR BLD: 5 % (ref 1–7)
MORPHOLOGY: ABNORMAL
MORPHOLOGY: ABNORMAL
NEUTROPHILS NFR BLD: 86 % (ref 36–66)
NEUTS SEG NFR BLD: 7.22 K/UL (ref 1.8–7.7)
NRBC BLD-RTO: 0 PER 100 WBC
NRBC BLD-RTO: 0 PER 100 WBC
PARTIAL THROMBOPLASTIN TIME: 35 SEC (ref 23–36.5)
PLATELET # BLD AUTO: 124 K/UL (ref 138–453)
PLATELET # BLD AUTO: 137 K/UL (ref 138–453)
PMV BLD AUTO: 10.6 FL (ref 8.1–13.5)
PMV BLD AUTO: 11 FL (ref 8.1–13.5)
POTASSIUM SERPL-SCNC: 4.1 MMOL/L (ref 3.7–5.3)
PROT PATTERN SERPL ELPH-IMP: ABNORMAL
PROT PATTERN SERPL ELPH-IMP: ABNORMAL
PROT SERPL-MCNC: 6.2 G/DL (ref 6.4–8.3)
PROT SERPL-MCNC: 6.3 G/DL (ref 6.4–8.3)
PROTHROMBIN TIME: 17.4 SEC (ref 11.7–14.9)
RBC # BLD AUTO: 4.4 M/UL (ref 4.21–5.77)
RBC # BLD AUTO: 4.52 M/UL (ref 4.21–5.77)
RBC # BLD: ABNORMAL 10*6/UL
SODIUM SERPL-SCNC: 137 MMOL/L (ref 135–144)
TOTAL PROT. SUM,%: ABNORMAL %
WBC OTHER # BLD: 8.4 K/UL (ref 3.5–11.3)
WBC OTHER # BLD: 8.6 K/UL (ref 3.5–11.3)

## 2023-09-12 PROCEDURE — 6360000002 HC RX W HCPCS

## 2023-09-12 PROCEDURE — 93306 TTE W/DOPPLER COMPLETE: CPT

## 2023-09-12 PROCEDURE — 97166 OT EVAL MOD COMPLEX 45 MIN: CPT

## 2023-09-12 PROCEDURE — 6360000002 HC RX W HCPCS: Performed by: STUDENT IN AN ORGANIZED HEALTH CARE EDUCATION/TRAINING PROGRAM

## 2023-09-12 PROCEDURE — 99232 SBSQ HOSP IP/OBS MODERATE 35: CPT | Performed by: INTERNAL MEDICINE

## 2023-09-12 PROCEDURE — 97110 THERAPEUTIC EXERCISES: CPT

## 2023-09-12 PROCEDURE — 83735 ASSAY OF MAGNESIUM: CPT

## 2023-09-12 PROCEDURE — 85520 HEPARIN ASSAY: CPT

## 2023-09-12 PROCEDURE — 99291 CRITICAL CARE FIRST HOUR: CPT | Performed by: INTERNAL MEDICINE

## 2023-09-12 PROCEDURE — A4216 STERILE WATER/SALINE, 10 ML: HCPCS

## 2023-09-12 PROCEDURE — 6370000000 HC RX 637 (ALT 250 FOR IP): Performed by: STUDENT IN AN ORGANIZED HEALTH CARE EDUCATION/TRAINING PROGRAM

## 2023-09-12 PROCEDURE — 97530 THERAPEUTIC ACTIVITIES: CPT

## 2023-09-12 PROCEDURE — 80053 COMPREHEN METABOLIC PANEL: CPT

## 2023-09-12 PROCEDURE — 82330 ASSAY OF CALCIUM: CPT

## 2023-09-12 PROCEDURE — 93005 ELECTROCARDIOGRAM TRACING: CPT

## 2023-09-12 PROCEDURE — 99233 SBSQ HOSP IP/OBS HIGH 50: CPT | Performed by: INTERNAL MEDICINE

## 2023-09-12 PROCEDURE — 85730 THROMBOPLASTIN TIME PARTIAL: CPT

## 2023-09-12 PROCEDURE — 6360000002 HC RX W HCPCS: Performed by: INTERNAL MEDICINE

## 2023-09-12 PROCEDURE — 2060000000 HC ICU INTERMEDIATE R&B

## 2023-09-12 PROCEDURE — 36415 COLL VENOUS BLD VENIPUNCTURE: CPT

## 2023-09-12 PROCEDURE — 6370000000 HC RX 637 (ALT 250 FOR IP): Performed by: INTERNAL MEDICINE

## 2023-09-12 PROCEDURE — 93306 TTE W/DOPPLER COMPLETE: CPT | Performed by: INTERNAL MEDICINE

## 2023-09-12 PROCEDURE — 2580000003 HC RX 258

## 2023-09-12 PROCEDURE — 85027 COMPLETE CBC AUTOMATED: CPT

## 2023-09-12 PROCEDURE — 85025 COMPLETE CBC W/AUTO DIFF WBC: CPT

## 2023-09-12 PROCEDURE — NBSRV NON-BILLABLE SERVICE: Performed by: INTERNAL MEDICINE

## 2023-09-12 PROCEDURE — C9113 INJ PANTOPRAZOLE SODIUM, VIA: HCPCS

## 2023-09-12 PROCEDURE — 6370000000 HC RX 637 (ALT 250 FOR IP)

## 2023-09-12 PROCEDURE — 85610 PROTHROMBIN TIME: CPT

## 2023-09-12 PROCEDURE — 82947 ASSAY GLUCOSE BLOOD QUANT: CPT

## 2023-09-12 PROCEDURE — 97535 SELF CARE MNGMENT TRAINING: CPT

## 2023-09-12 RX ORDER — HEPARIN SODIUM 10000 [USP'U]/100ML
5-30 INJECTION, SOLUTION INTRAVENOUS CONTINUOUS
Status: DISCONTINUED | OUTPATIENT
Start: 2023-09-12 | End: 2023-09-14

## 2023-09-12 RX ORDER — HEPARIN SODIUM 1000 [USP'U]/ML
4000 INJECTION, SOLUTION INTRAVENOUS; SUBCUTANEOUS PRN
Status: DISCONTINUED | OUTPATIENT
Start: 2023-09-12 | End: 2023-09-16 | Stop reason: HOSPADM

## 2023-09-12 RX ORDER — HEPARIN SODIUM 1000 [USP'U]/ML
4000 INJECTION, SOLUTION INTRAVENOUS; SUBCUTANEOUS ONCE
Status: COMPLETED | OUTPATIENT
Start: 2023-09-12 | End: 2023-09-12

## 2023-09-12 RX ORDER — HEPARIN SODIUM 1000 [USP'U]/ML
2000 INJECTION, SOLUTION INTRAVENOUS; SUBCUTANEOUS PRN
Status: DISCONTINUED | OUTPATIENT
Start: 2023-09-12 | End: 2023-09-16 | Stop reason: HOSPADM

## 2023-09-12 RX ORDER — HEPARIN SODIUM 5000 [USP'U]/ML
5000 INJECTION, SOLUTION INTRAVENOUS; SUBCUTANEOUS EVERY 8 HOURS SCHEDULED
Status: DISCONTINUED | OUTPATIENT
Start: 2023-09-12 | End: 2023-09-12 | Stop reason: CLARIF

## 2023-09-12 RX ADMIN — HEPARIN SODIUM 4000 UNITS: 1000 INJECTION INTRAVENOUS; SUBCUTANEOUS at 22:12

## 2023-09-12 RX ADMIN — ASPIRIN 81 MG 81 MG: 81 TABLET ORAL at 09:08

## 2023-09-12 RX ADMIN — HEPARIN SODIUM 4000 UNITS: 1000 INJECTION INTRAVENOUS; SUBCUTANEOUS at 14:50

## 2023-09-12 RX ADMIN — ATORVASTATIN CALCIUM 40 MG: 40 TABLET, FILM COATED ORAL at 20:11

## 2023-09-12 RX ADMIN — HEPARIN SODIUM 10 UNITS/KG/HR: 10000 INJECTION, SOLUTION INTRAVENOUS at 14:50

## 2023-09-12 RX ADMIN — MIDODRINE HYDROCHLORIDE 10 MG: 5 TABLET ORAL at 08:41

## 2023-09-12 RX ADMIN — METOPROLOL TARTRATE 12.5 MG: 25 TABLET, FILM COATED ORAL at 20:10

## 2023-09-12 RX ADMIN — MIDODRINE HYDROCHLORIDE 10 MG: 5 TABLET ORAL at 17:12

## 2023-09-12 RX ADMIN — SODIUM CHLORIDE, PRESERVATIVE FREE 10 ML: 5 INJECTION INTRAVENOUS at 09:20

## 2023-09-12 RX ADMIN — SODIUM CHLORIDE, PRESERVATIVE FREE 40 MG: 5 INJECTION INTRAVENOUS at 09:08

## 2023-09-12 RX ADMIN — FUROSEMIDE 40 MG: 10 INJECTION, SOLUTION INTRAMUSCULAR; INTRAVENOUS at 12:16

## 2023-09-12 RX ADMIN — METOPROLOL TARTRATE 25 MG: 25 TABLET, FILM COATED ORAL at 10:30

## 2023-09-12 RX ADMIN — SODIUM CHLORIDE, PRESERVATIVE FREE 10 ML: 5 INJECTION INTRAVENOUS at 20:11

## 2023-09-12 RX ADMIN — FUROSEMIDE 40 MG: 10 INJECTION, SOLUTION INTRAMUSCULAR; INTRAVENOUS at 23:16

## 2023-09-12 RX ADMIN — MIDODRINE HYDROCHLORIDE 10 MG: 5 TABLET ORAL at 12:15

## 2023-09-12 NOTE — PLAN OF CARE
Problem: Discharge Planning  Goal: Discharge to home or other facility with appropriate resources  9/12/2023 1056 by Eric Hassan RN  Outcome: Progressing  Flowsheets (Taken 9/12/2023 0800)  Discharge to home or other facility with appropriate resources: Identify barriers to discharge with patient and caregiver     Problem: Pain  Goal: Verbalizes/displays adequate comfort level or baseline comfort level  9/12/2023 1056 by Eric Hassan RN  Outcome: Progressing     Problem: Skin/Tissue Integrity  Goal: Absence of new skin breakdown  9/12/2023 1056 by Eric Hassan RN  Outcome: Progressing     Problem: ABCDS Injury Assessment  Goal: Absence of physical injury  9/12/2023 1056 by Eric Hassan RN  Outcome: Progressing     Problem: Safety - Adult  Goal: Free from fall injury  9/12/2023 1056 by Eric Hassan RN  Outcome: Progressing     Problem: Chronic Conditions and Co-morbidities  Goal: Patient's chronic conditions and co-morbidity symptoms are monitored and maintained or improved  9/12/2023 1056 by Eric Hassan RN  Outcome: Progressing  Flowsheets (Taken 9/12/2023 0800)  Care Plan - Patient's Chronic Conditions and Co-Morbidity Symptoms are Monitored and Maintained or Improved: Monitor and assess patient's chronic conditions and comorbid symptoms for stability, deterioration, or improvement     Problem: Respiratory - Adult  Goal: Achieves optimal ventilation and oxygenation  9/11/2023 2123 by Shanna Garcia RN  Outcome: Progressing     Problem: Respiratory - Adult  Goal: Achieves optimal ventilation and oxygenation  9/12/2023 1056 by Eric Hassan RN  Outcome: Progressing

## 2023-09-12 NOTE — PROGRESS NOTES
INTENSIVE CARE UNIT  Resident Physician Progress Note    Patient - [facility]  Date of Admission -  9/7/2023  4:45 PM  Date of Evaluation -  9/12/2023  Room and Bed Number -  3025/3025-01   Hospital Day - 5    Saadia Gan is a 68 y.o. M with a past medical history of hypertension, Crohn's colitis s/p small bowel resection, stage III CKD, Hx of CAD with 100% occluded RCA and prior inferior MI,PAD, chronic lymphedema presented today after patient started having chest pain, palpitations dizziness, burning pain sensation in the arms and diaphoresis, initial episode was this morning which resolved on its own, after which he had another episode in the afternoon, he called his sister and 911. EMS arrived which showed sustained V. tach, patient converted into normal sinus rhythm even before giving any medications. On arrival to the ED patient went into V. tach again after initial evaluation by the ER physician, patient converted back to normal sinus rhythm before electrical cardioversion. After discussion with cardiology patient was given amio bolus and started on heparin drip in a.m. infusion. After the amio bolus, patient developed hypotension which responded to fluid bolus. Pertinent labs ordered, showed an elevated creatinine from his baseline of around 1.4-1.6, no electrolyte abnormalities. Lactic acid was elevated at around 2.6 down trended to 2.2. Troponin [id number]. proBNP 14,000. Critical care was consulted for admission for sustained V. tach on Amio infusion. ICU course:   09/08: Cardiology consulted, cont. IV amiodarone , EEP consulted due to Hx of RCA occulusion     09/09: Remained hemodynamically stable , nephrology consulted for MAKR on CKD , wound care consulted for lower limbs lymphedema . Remains on Heparin infusion . On BiPAP . Transaminitis     09/10: Cental access for hemodialyses per nephrology due to worsening renal function.       09/11:Amiodarone was held due to worsening of

## 2023-09-12 NOTE — PROGRESS NOTES
Tubular stockings brought to bedside for use beneath patient's Juxtalite compression garments. Only the foot portion of the garments are available at bedside. Patient is planning for the bilateral calf portion of the garments to be at bedside tomorrow.  Will return tomorrow to assist.

## 2023-09-12 NOTE — PLAN OF CARE
Problem: Discharge Planning  Goal: Discharge to home or other facility with appropriate resources  9/11/2023 2123 by George Guaman RN  Outcome: Progressing  9/11/2023 1239 by Rivera Arnold RN  Outcome: Progressing     Problem: Pain  Goal: Verbalizes/displays adequate comfort level or baseline comfort level  9/11/2023 2123 by George Guaman RN  Outcome: Progressing  9/11/2023 1239 by Rivera Arnold RN  Outcome: Progressing     Problem: Skin/Tissue Integrity  Goal: Absence of new skin breakdown  Description: 1. Monitor for areas of redness and/or skin breakdown  2. Assess vascular access sites hourly  3. Every 4-6 hours minimum:  Change oxygen saturation probe site  4. Every 4-6 hours:  If on nasal continuous positive airway pressure, respiratory therapy assess nares and determine need for appliance change or resting period.   9/11/2023 2123 by George Guaman RN  Outcome: Progressing  9/11/2023 1239 by Rivera Arnold RN  Outcome: Progressing     Problem: ABCDS Injury Assessment  Goal: Absence of physical injury  9/11/2023 2123 by George Guaman RN  Outcome: Progressing  9/11/2023 1239 by Rivera Arnold RN  Outcome: Progressing  Flowsheets (Taken 9/11/2023 1238)  Absence of Physical Injury: Implement safety measures based on patient assessment     Problem: Safety - Adult  Goal: Free from fall injury  9/11/2023 2123 by George Guaman RN  Outcome: Progressing  9/11/2023 1239 by Rivera Arnold RN  Outcome: Progressing  Flowsheets (Taken 9/11/2023 1238)  Free From Fall Injury: Instruct family/caregiver on patient safety     Problem: Chronic Conditions and Co-morbidities  Goal: Patient's chronic conditions and co-morbidity symptoms are monitored and maintained or improved  9/11/2023 2123 by George Guaman RN  Outcome: Progressing  9/11/2023 1239 by Rivera Arnold RN  Outcome: Progressing     Problem: Respiratory - Adult  Goal: Achieves optimal

## 2023-09-12 NOTE — CARE COORDINATION
Hemodialysis Initial Assessment    Information provided by: Patient    New or Current with HD: New, MARK    Unit: Requests referral to [facility]     Schedule: TBD    Physician: [facility]    Transportation: Self     Insurance: Medicare A&B, [employer]    DME: Megan Age    -Met with patient to discuss potential need for OP HD and referral process. Patient lives in [place], prefers referral to [facility] as that is close to his home. Referral submitted to [facility].

## 2023-09-12 NOTE — PROGRESS NOTES
Occupational Therapy  Facility/Department: [facility] 3- [facility]  Occupational Therapy Initial Assessment    Name: Dee Monge  : 1945  MRN: [id number]  Date of Service: 2023  Chief Complaint   Patient presents with    Chest Pain       Discharge Recommendations:  Patient would benefit from continued therapy after discharge          Patient Diagnosis(es): The primary encounter diagnosis was Chest pain, unspecified type. Diagnoses of Ventricular tachycardia ([address]), NSTEMI (non-ST elevated myocardial infarction) [facility]), NSTEMI (non-ST elevated myocardial infarction) ([address]), and Tachycardia were also pertinent to this visit. Past Medical History:  has a past medical history of Abdominal hernia, Atrophy of muscle of multiple sites, CAD (coronary artery disease), CKD (chronic kidney disease) stage 3, GFR 30-59 ml/min ([facility]), Colon polyp, Crohn disease ([address]), Crohn's colitis ([address]), Decubitus ulcer of coccyx, stage 2 ([address]), Diarrhea, Diverticulosis, Hemorrhoids, History of atrial fibrillation, Hypertension, Ileostomy in place [facility]), Internal hemorrhoids, NSVT (nonsustained ventricular tachycardia) ([address]), Other and unspecified hyperlipidemia, Past heart attack, Tobacco use disorder, and Tubular adenoma. Past Surgical History:  has a past surgical history that includes polypectomy; Abdomen surgery (2016); Jejunostomy; Colonoscopy (2005  2007  11/10/2009); Colonoscopy (16); Colonoscopy (2016); Cardiac catheterization (); Abdomen surgery (2017); pr repair first abdominal wall hernia (N/A, 2017); and Small intestine surgery (N/A, 2017). Assessment   Performance deficits / Impairments: Decreased functional mobility ; Decreased endurance;Decreased ADL status; Decreased balance;Decreased high-level IADLs;Decreased strength  Prognosis: Good  Decision Making: Medium Complexity  REQUIRES OT FOLLOW-UP: Yes  Activity Tolerance  Activity Tolerance: Patient Tolerated treatment Comment: Pt friendly and cooperative, age-appropriate cognition observed  Orientation  Overall Orientation Status: Within Functional Limits  Orientation Level: Oriented X4                  Education Given To: Patient  Education Provided: Transfer Training;Role of Therapy;Plan of Care;ADL Adaptive Strategies; Equipment  Education Method: Verbal  Barriers to Learning: None  Education Outcome: Continued education needed            AM-PAC Score        AM-PAC Inpatient Daily Activity Raw Score: 16 (09/12/23 1202)  AM-PAC Inpatient ADL T-Scale Score : 35.96 (09/12/23 1202)  ADL Inpatient CMS 0-100% Score: 53.32 (09/12/23 1202)  ADL Inpatient CMS G-Code Modifier : CK (09/12/23 1202)    Goals  Short Term Goals  Time Frame for Short Term Goals: Pt will by discharge  Short Term Goal 1: demo good safety awareness during func mob around room using RW PRN and SUP only  Short Term Goal 2: demo ADL UB bathing/dressing activity at mod I  Short Term Goal 3: demo ADL LB bathing/dressing activity at [facility], using AE PRN  Short Term Goal 4: demo CGA for all bed mobility using railings PRN  Short Term Goal 5: demo standing during func activity for 10 min+, using RW and 1 seated rest break PRN, at mod I       Therapy Time   Individual Concurrent Group Co-treatment   Time In 1114         Time Out 1129         Minutes 15         Timed Code Treatment Minutes: 8 Minutes       Carey Blair OTR/L

## 2023-09-12 NOTE — PROGRESS NOTES
Physical Therapy  Facility/Department: [facility]- [facility]  Physical Therapy Daily Treatment Note    Name: Lizandro Cochran  : 1945  MRN: [id number]  Date of Service: 2023    Discharge Recommendations:  Therapy recommended at discharge, Patient would benefit from continued therapy after discharge          Patient Diagnosis(es): The primary encounter diagnosis was Chest pain, unspecified type. Diagnoses of Ventricular tachycardia ([address]), NSTEMI (non-ST elevated myocardial infarction) [facility]), NSTEMI (non-ST elevated myocardial infarction) ([address]), and Tachycardia were also pertinent to this visit. Past Medical History:  has a past medical history of Abdominal hernia, Atrophy of muscle of multiple sites, CAD (coronary artery disease), CKD (chronic kidney disease) stage 3, GFR 30-59 ml/min ([facility]), Colon polyp, Crohn disease ([address]), Crohn's colitis ([address]), Decubitus ulcer of coccyx, stage 2 ([address]), Diarrhea, Diverticulosis, Hemorrhoids, History of atrial fibrillation, Hypertension, Ileostomy in place [facility]), Internal hemorrhoids, NSVT (nonsustained ventricular tachycardia) ([address]), Other and unspecified hyperlipidemia, Past heart attack, Tobacco use disorder, and Tubular adenoma. Past Surgical History:  has a past surgical history that includes polypectomy; Abdomen surgery (2016); Jejunostomy; Colonoscopy (2005  2007  11/10/2009); Colonoscopy (16); Colonoscopy (2016); Cardiac catheterization (); Abdomen surgery (2017); pr repair first abdominal wall hernia (N/A, 2017); and Small intestine surgery (N/A, 2017). Assessment   Body Structures, Functions, Activity Limitations Requiring Skilled Therapeutic Intervention: Decreased functional mobility ; Decreased body mechanics; Decreased tolerance to work activity; Decreased balance;Decreased endurance;Decreased strength  Assessment: Pt presents with general weakness, slight decline in functional mobility and dynamic balance with PT recommendation to ambulate short term with rolling walker peding further balance training. Pt able to ambulate with improved ability using rolling walker Pt will continue to benefit from PT to progress activity and promote functional independence with gait and transfers  Activity Tolerance  Activity Tolerance: Patient limited by endurance; Patient limited by pain     Plan   Physcial Therapy Plan  General Plan:  (5-6 x week)  Current Treatment Recommendations: Strengthening, Balance training, Functional mobility training, Stair training, Neuromuscular re-education, Gait training, Endurance training, Therapeutic activities  Safety Devices  Type of Devices: Gait belt, Heels elevated for pressure relief, Patient at risk for falls, Left in chair, Nurse notified, Chair alarm in place, Call light within reach  Restraints  Restraints Initially in Place: No     Restrictions  Restrictions/Precautions  Restrictions/Precautions: Fall Risk, General Precautions  Required Braces or Orthoses?: No  Position Activity Restriction  Other position/activity restrictions: up w/ A, 3L O2     Subjective   Pain: 10/10 in BLE feet. General  Patient assessed for rehabilitation services?: Yes  Response To Previous Treatment: Patient with no complaints from previous session. Family / Caregiver Present: No  Follows Commands: Within Functional Limits  General Comment  Comments: pt resting in bed when writer entered room. Subjective  Subjective: RN and pt agreeable to therapy. Cognition   Orientation  Overall Orientation Status: Within Normal Limits  Orientation Level: Oriented X4  Cognition  Overall Cognitive Status: WNL     Objective     O2 Device: Nasal cannula     Bed mobility  Rolling to Left: Supervision  Supine to Sit: Contact guard assistance  Scooting: Stand by assistance  Bed Mobility Comments: vc for hand placement and safe transfer techniques. Transfers  Sit to Stand:  Moderate Assistance x2(retro lean in sitting and LOB when

## 2023-09-12 NOTE — PROGRESS NOTES
Renal Progress Note    Patient :  Gita Fan; 68 y.o. MRN# [id number]  Location:  3025/3025-01  Attending:  Francisco Smith MD  Admit Date:  9/7/2023   Hospital Day: 5      Subjective:     Patient had hemodialysis yesterday for 2 and half hours for hyperkalemia and for clearances. Did have some catheter related problems. Had 1 L of ultrafiltration. Since that has not been been responding to Lasix, urine output has improved with IV Lasix. Oral intake good. Feels well. No shortness of breath orthopnea. Does have a Arredondo. Lower extremity edema persists. Hemodynamically stable. Patient had cardiac catheterization earlier yesterday showed chronic total occlusion of RCA with collaterals no intervention was done. Since admission he has not had any further episodes of V. tach, awaiting AICD placement by cardiology at some point. Labs today showed a sodium of 137 potassium 4.1 chloride 102 bicarb 25 BUN 70 creatinine 3.5 calcium 8.1 albumin 2.9 magnesium 2.1 hemoglobin 7.6 white count 8.4 platelets 946. Work-up so far shows proteinuria of 0.2-0.3, low C3, otherwise negative serologies. History reviewed known history of chronic kidney disease stage IIIb-4 baseline creatinine in 2022 was in the 1.8-2.0 range has seen Dr. Lewis Higginbotham in 2018 has not followed up with nephrology. Known history of coronary artery disease previous history of Crohn's disease and bowel resection, ischemic cardiomyopathy. Came into the hospital after he felt dizzy and lightheaded was found to be hypotensive, developed V. tach when seen by EMS at home, converted to sinus rhythm had another episode of V. tach which was prolonged in the ER. Put on amiodarone infusion. Had a bump in troponin had cardiac catheterization done today no intervention done. Creatinine unfortunately has gone up from 3 on admission to 4.3. Nephrology consulted for acute kidney injury.     Outpatient Medications:     Medications Prior to Admission: furosemide 09:45 PM     Urine Osmolarity:   Lab Results   Component Value Date/Time     11/21/2016 02:51 PM     Urine Protein:   No components found for: \"TOTALPROTEIN\", \"URINE\"   Urine Creatinine:     Lab Results   Component Value Date/Time    LABCREA 173.5 09/09/2023 06:14 AM     Urine Eosinophils:  No components found for: \"UEOS\"    Radiology:     CXR:     Assessment:     1. Acute Kidney Injury: Secondary to ischemic ATN post V. tach, hypotension, low flow baseline creatinine 2.0-2.3 peaked up to 4.3, nonoliguric with Lasix. Received dialysis yesterday through temporary catheter. Did have dialysis catheter malfunction  2. Chronic kidney disease stage IIIb-4 secondary to ischemic nephrosclerosis baseline 2.2-2.4. Work-up showed low C3 and also shows decreased kappa lambda free light chain ratio  3. History of Crohn's disease  4. Atherosclerotic heart disease history of chronic RCA occlusion and cardiomyopathy ejection fraction of 20% repeat echocardiogram done this admission. Awaiting AICD device placement   5. Chronic lower extremity edema  6. Hypotension  7. Mild hyperkalemia  8. Episode of V. tach awaiting AICD placement  9. Dialysis catheter malfunction    Plan:   1. Hold hemodialysis today, will evaluate renal function and urine output tomorrow. If shows renal recovery will hold dialysis otherwise will place tunneled catheter and continue hemodialysis even in the outpatient setting. Same was discussed with him. 2.  Continue Lasix 40 IV twice daily  3. Continue ProAmatine  4. Await cardiology plans  5. We will follow with you  Nutrition   Please ensure that patient is on a renal diet/TF. Avoid nephrotoxic drugs/contrast exposure. We will continue to follow along with you.

## 2023-09-13 PROBLEM — R00.0 TACHYCARDIA: Status: ACTIVE | Noted: 2023-09-13

## 2023-09-13 LAB
ALBUMIN PERCENT: 51 % (ref 45–65)
ALBUMIN SERPL-MCNC: 2.9 G/DL (ref 3.5–5.2)
ALBUMIN SERPL-MCNC: 3.4 G/DL (ref 3.2–5.2)
ALBUMIN/GLOB SERPL: 0.8 {RATIO} (ref 1–2.5)
ALP SERPL-CCNC: 84 U/L (ref 40–129)
ALPHA 2 PERCENT: 11 % (ref 6–13)
ALPHA1 GLOB SERPL ELPH-MCNC: 0.2 G/DL (ref 0.1–0.4)
ALPHA1 GLOB SERPL ELPH-MCNC: 4 % (ref 3–6)
ALPHA2 GLOB SERPL ELPH-MCNC: 0.8 G/DL (ref 0.5–0.9)
ALT SERPL-CCNC: 315 U/L (ref 5–41)
ANA SER QL IA: NEGATIVE
ANCA MYELOPEROXIDASE: <0.3 AU/ML (ref 0–3.5)
ANCA PROTEINASE 3: <0.7 AU/ML (ref 0–2)
ANION GAP SERPL CALCULATED.3IONS-SCNC: 12 MMOL/L (ref 9–17)
ANTI-XA UNFRAC HEPARIN: 0.24 IU/L
ANTI-XA UNFRAC HEPARIN: 0.37 IU/L
ANTI-XA UNFRAC HEPARIN: 0.49 IU/L
AST SERPL-CCNC: 110 U/L
B-GLOBULIN SERPL ELPH-MCNC: 0.7 G/DL (ref 0.5–1.1)
B-GLOBULIN SERPL ELPH-MCNC: 10 % (ref 11–19)
BASOPHILS # BLD: 0.05 K/UL (ref 0–0.2)
BASOPHILS NFR BLD: 1 % (ref 0–2)
BILIRUB SERPL-MCNC: 0.8 MG/DL (ref 0.3–1.2)
BUN SERPL-MCNC: 69 MG/DL (ref 8–23)
CALCIUM SERPL-MCNC: 7.9 MG/DL (ref 8.6–10.4)
CHLORIDE SERPL-SCNC: 103 MMOL/L (ref 98–107)
CO2 SERPL-SCNC: 26 MMOL/L (ref 20–31)
CREAT SERPL-MCNC: 2.8 MG/DL (ref 0.7–1.2)
DSDNA IGG SER QL IA: 1.2 IU/ML
EKG ATRIAL RATE: 212 BPM
EKG P AXIS: 20 DEGREES
EKG Q-T INTERVAL: 430 MS
EKG QRS DURATION: 118 MS
EKG QTC CALCULATION (BAZETT): 473 MS
EKG R AXIS: 6 DEGREES
EKG T AXIS: 68 DEGREES
EKG VENTRICULAR RATE: 73 BPM
EOSINOPHIL # BLD: 0.19 K/UL (ref 0–0.44)
EOSINOPHILS RELATIVE PERCENT: 2 % (ref 1–4)
ERYTHROCYTE [DISTWIDTH] IN BLOOD BY AUTOMATED COUNT: 17.9 % (ref 11.8–14.4)
GAMMA GLOB SERPL ELPH-MCNC: 1.6 G/DL (ref 0.5–1.5)
GAMMA GLOBULIN %: 24 % (ref 9–20)
GFR SERPL CREATININE-BSD FRML MDRD: 23 ML/MIN/1.73M2
GLUCOSE SERPL-MCNC: 101 MG/DL (ref 70–99)
HCT VFR BLD AUTO: 39.5 % (ref 40.7–50.3)
HGB BLD-MCNC: 11.3 G/DL (ref 13–17)
IMM GRANULOCYTES # BLD AUTO: 0.03 K/UL (ref 0–0.3)
IMM GRANULOCYTES NFR BLD: 0 %
INTERPRETATION SERPL IFE-IMP: NORMAL
LYMPHOCYTES NFR BLD: 0.81 K/UL (ref 1.1–3.7)
LYMPHOCYTES RELATIVE PERCENT: 9 % (ref 24–43)
MCH RBC QN AUTO: 26 PG (ref 25.2–33.5)
MCHC RBC AUTO-ENTMCNC: 28.6 G/DL (ref 28.4–34.8)
MCV RBC AUTO: 90.8 FL (ref 82.6–102.9)
MICROORGANISM SPEC CULT: NORMAL
MICROORGANISM SPEC CULT: NORMAL
MONOCYTES NFR BLD: 0.75 K/UL (ref 0.1–1.2)
MONOCYTES NFR BLD: 9 % (ref 3–12)
NEUTROPHILS NFR BLD: 79 % (ref 36–65)
NEUTS SEG NFR BLD: 6.88 K/UL (ref 1.5–8.1)
NRBC BLD-RTO: 0 PER 100 WBC
NUCLEAR IGG SER IA-RTO: 0.3 U/ML
PATH REV: NORMAL
PATHOLOGIST: ABNORMAL
PLATELET # BLD AUTO: 125 K/UL (ref 138–453)
PMV BLD AUTO: 11 FL (ref 8.1–13.5)
POTASSIUM SERPL-SCNC: 3.9 MMOL/L (ref 3.7–5.3)
PROT PATTERN SERPL ELPH-IMP: ABNORMAL
PROT SERPL-MCNC: 6.5 G/DL (ref 6.4–8.3)
PROT SERPL-MCNC: 6.6 G/DL (ref 6.4–8.3)
RBC # BLD AUTO: 4.35 M/UL (ref 4.21–5.77)
RBC # BLD: ABNORMAL 10*6/UL
SERVICE CMNT-IMP: NORMAL
SERVICE CMNT-IMP: NORMAL
SODIUM SERPL-SCNC: 141 MMOL/L (ref 135–144)
SPECIMEN DESCRIPTION: NORMAL
SPECIMEN DESCRIPTION: NORMAL
TOTAL PROT. SUM,%: 100 % (ref 98–102)
TOTAL PROT. SUM: 6.7 G/DL (ref 6.3–8.2)
WBC OTHER # BLD: 8.7 K/UL (ref 3.5–11.3)

## 2023-09-13 PROCEDURE — 6370000000 HC RX 637 (ALT 250 FOR IP)

## 2023-09-13 PROCEDURE — 80053 COMPREHEN METABOLIC PANEL: CPT

## 2023-09-13 PROCEDURE — 97530 THERAPEUTIC ACTIVITIES: CPT

## 2023-09-13 PROCEDURE — 6370000000 HC RX 637 (ALT 250 FOR IP): Performed by: INTERNAL MEDICINE

## 2023-09-13 PROCEDURE — 93010 ELECTROCARDIOGRAM REPORT: CPT | Performed by: INTERNAL MEDICINE

## 2023-09-13 PROCEDURE — 36415 COLL VENOUS BLD VENIPUNCTURE: CPT

## 2023-09-13 PROCEDURE — 2060000000 HC ICU INTERMEDIATE R&B

## 2023-09-13 PROCEDURE — 85025 COMPLETE CBC W/AUTO DIFF WBC: CPT

## 2023-09-13 PROCEDURE — 99232 SBSQ HOSP IP/OBS MODERATE 35: CPT | Performed by: INTERNAL MEDICINE

## 2023-09-13 PROCEDURE — 99233 SBSQ HOSP IP/OBS HIGH 50: CPT | Performed by: INTERNAL MEDICINE

## 2023-09-13 PROCEDURE — 6360000002 HC RX W HCPCS: Performed by: INTERNAL MEDICINE

## 2023-09-13 PROCEDURE — 6360000002 HC RX W HCPCS: Performed by: STUDENT IN AN ORGANIZED HEALTH CARE EDUCATION/TRAINING PROGRAM

## 2023-09-13 PROCEDURE — 85520 HEPARIN ASSAY: CPT

## 2023-09-13 PROCEDURE — 6370000000 HC RX 637 (ALT 250 FOR IP): Performed by: STUDENT IN AN ORGANIZED HEALTH CARE EDUCATION/TRAINING PROGRAM

## 2023-09-13 PROCEDURE — 97110 THERAPEUTIC EXERCISES: CPT

## 2023-09-13 PROCEDURE — 2580000003 HC RX 258

## 2023-09-13 RX ORDER — PANTOPRAZOLE SODIUM 40 MG/1
40 TABLET, DELAYED RELEASE ORAL
Status: DISCONTINUED | OUTPATIENT
Start: 2023-09-13 | End: 2023-09-16 | Stop reason: HOSPADM

## 2023-09-13 RX ADMIN — ATORVASTATIN CALCIUM 40 MG: 40 TABLET, FILM COATED ORAL at 20:22

## 2023-09-13 RX ADMIN — FUROSEMIDE 40 MG: 10 INJECTION, SOLUTION INTRAMUSCULAR; INTRAVENOUS at 23:06

## 2023-09-13 RX ADMIN — FUROSEMIDE 40 MG: 10 INJECTION, SOLUTION INTRAMUSCULAR; INTRAVENOUS at 11:46

## 2023-09-13 RX ADMIN — MIDODRINE HYDROCHLORIDE 10 MG: 5 TABLET ORAL at 09:07

## 2023-09-13 RX ADMIN — METOPROLOL TARTRATE 12.5 MG: 25 TABLET, FILM COATED ORAL at 20:22

## 2023-09-13 RX ADMIN — MIDODRINE HYDROCHLORIDE 10 MG: 5 TABLET ORAL at 11:46

## 2023-09-13 RX ADMIN — PANTOPRAZOLE SODIUM 40 MG: 40 TABLET, DELAYED RELEASE ORAL at 09:08

## 2023-09-13 RX ADMIN — ASPIRIN 81 MG 81 MG: 81 TABLET ORAL at 09:08

## 2023-09-13 RX ADMIN — HEPARIN SODIUM 2000 UNITS: 1000 INJECTION INTRAVENOUS; SUBCUTANEOUS at 11:45

## 2023-09-13 RX ADMIN — HEPARIN SODIUM 16 UNITS/KG/HR: 10000 INJECTION, SOLUTION INTRAVENOUS at 11:45

## 2023-09-13 RX ADMIN — POLYETHYLENE GLYCOL 3350 17 G: 17 POWDER, FOR SOLUTION ORAL at 02:38

## 2023-09-13 RX ADMIN — SODIUM CHLORIDE, PRESERVATIVE FREE 10 ML: 5 INJECTION INTRAVENOUS at 20:22

## 2023-09-13 ASSESSMENT — PAIN SCALES - GENERAL
PAINLEVEL_OUTOF10: 0
PAINLEVEL_OUTOF10: 0

## 2023-09-13 NOTE — CARE COORDINATION
Met with patient to discuss transitional planning. He confirms that he plans to return home with [facility]. He would like Rosie Khalil from [facility] to be his nurse again. He is requesting a walker. He denies other needs    1812 spoke to Zunilda from [facility]. They are able to accept.  Notified her that patient is requesting Rosie Khalil as his nurse

## 2023-09-13 NOTE — PLAN OF CARE
Problem: Discharge Planning  Goal: Discharge to home or other facility with appropriate resources  9/12/2023 1056 by Gloria Guardado RN  Outcome: Progressing  Flowsheets (Taken 9/12/2023 0800)  Discharge to home or other facility with appropriate resources: Identify barriers to discharge with patient and caregiver     Problem: Pain  Goal: Verbalizes/displays adequate comfort level or baseline comfort level  9/12/2023 1056 by Gloria Guardado RN  Outcome: Progressing     Problem: Skin/Tissue Integrity  Goal: Absence of new skin breakdown  Description: 1. Monitor for areas of redness and/or skin breakdown  2. Assess vascular access sites hourly  3. Every 4-6 hours minimum:  Change oxygen saturation probe site  4. Every 4-6 hours:  If on nasal continuous positive airway pressure, respiratory therapy assess nares and determine need for appliance change or resting period.   9/12/2023 1056 by Gloria Guardado RN  Outcome: Progressing     Problem: ABCDS Injury Assessment  Goal: Absence of physical injury  9/12/2023 1056 by Gloria Guardado RN  Outcome: Progressing     Problem: Safety - Adult  Goal: Free from fall injury  9/12/2023 1056 by Gloria Guardado RN  Outcome: Progressing     Problem: Chronic Conditions and Co-morbidities  Goal: Patient's chronic conditions and co-morbidity symptoms are monitored and maintained or improved  9/12/2023 1056 by Gloria Guardado RN  Outcome: Progressing  Flowsheets (Taken 9/12/2023 0800)  Care Plan - Patient's Chronic Conditions and Co-Morbidity Symptoms are Monitored and Maintained or Improved: Monitor and assess patient's chronic conditions and comorbid symptoms for stability, deterioration, or improvement     Problem: Respiratory - Adult  Goal: Achieves optimal ventilation and oxygenation  9/12/2023 1056 by Gloria Guardado RN  Outcome: Progressing

## 2023-09-13 NOTE — PROGRESS NOTES
[address]     Department of Internal Medicine - Staff Internal Medicine Service   ICU PATIENT TRANSFER NOTE        Patient:  Hayes Alas  YOB: 1945  MRN: [id number]     Acct: [de-identified]     Admit date: 9/7/2023    Code Status:-  Full code     Reason for ICU Admission:-       SUPPORT DEVICES: [] Ventilator [] BIPAP  [] Nasal Cannula [] Room Air    Consultations:- [x] Cardiology [x] Nephrology  [] Hemo onco  [] GI                               [] ID [] ENT  [] Rheum [] Endo   []Physiotherapy                                 Others:-     NUTRITION:  [] NPO [] Tube Feeding (Specify: ) [] TPN  [] PO    Central Lines:- [] No   [] Yes           If yes - Days/Date of Insertion. Pt seen,examined and Chart reviewed. ICU COURSE:    The patient is a 68 y.o. male who was brought to the ER by EMS initially for chest pain, burning pain sensation in the arms, dizziness and palpitations. The patient had chest pain the morning when he was brought by the EMS which resolved by itself,  but he had another episode of the chest pain that is when he called his sister and 911. EMS on arrival to his home noticed that patient is in V. tach which he spontaneously converted into normal sinus rhythm before giving any medications for any interventions. On arrival in the ED the patient again went into V. tach during the initial evaluation by the ER physician and   he converted back into normal sinus rhythm before the cardioversion. Cardiology was consulted for V. tach and elevated troponins, per their recommendations he was given amiodarone bolus and started on heparin drip. Patient became hypotensive after the amiodarone drip, so was given fluid bolus for which he responded. Patient was transferred to ICU for sustained Lendon Fallow. on amiodarone infusion.     Patient's past medical history is significant for congestive heart failure, Crohn's disease s/p small bowel resection, stage III CKD,

## 2023-09-13 NOTE — PROGRESS NOTES
INTENSIVE CARE UNIT  Resident Physician Progress Note    Patient - Arabella [facility]  Date of Admission -  9/7/2023  4:45 PM  Date of Evaluation -  9/13/2023  Room and Bed Number -  2001/2001-01   Hospital Day - 6      Keith Berman is a 68 y.o. M with a past medical history of hypertension, Crohn's colitis s/p small bowel resection, stage III CKD, Hx of CAD with 100% occluded RCA and prior inferior MI,PAD, chronic lymphedema presented today after patient started having chest pain, palpitations dizziness, burning pain sensation in the arms and diaphoresis, initial episode was this morning which resolved on its own, after which he had another episode in the afternoon, he called his sister and 911. EMS arrived which showed sustained V. tach, patient converted into normal sinus rhythm even before giving any medications. On arrival to the ED patient went into V. tach again after initial evaluation by the ER physician, patient converted back to normal sinus rhythm before electrical cardioversion. After discussion with cardiology patient was given amio bolus and started on heparin drip in a.m. infusion. After the amio bolus, patient developed hypotension which responded to fluid bolus. Pertinent labs ordered, showed an elevated creatinine from his baseline of around 1.4-1.6, no electrolyte abnormalities. Lactic acid was elevated at around 2.6 down trended to 2.2. Troponin [id number]. proBNP 14,000. Critical care was consulted for admission for sustained V. tach on Amio infusion. ICU course:   09/08: Cardiology consulted, cont. IV amiodarone , EEP consulted due to Hx of RCA occulusion      09/09: Remained hemodynamically stable , nephrology consulted for MARK on CKD , wound care consulted for lower limbs lymphedema . Remains on Heparin infusion . On BiPAP . Transaminitis      09/10: Cental access for hemodialyses per nephrology due to worsening renal function.        09/11:Amiodarone was held due to worsening >92%  Continue pulmonary toilet     GI/Nutrition  Ulcer Prophylaxis: Protonix  Transamintis. Liver enzymes trending down . Amiodarone discontinued. Renal/Fluid/Electrolyte  MARK on CKD stage IIIB, resolving   Renal USG- Acquired renal cystic disease bilaterally,kidney stone in the left side,No hydronephrosis,Probable multiple bladder diverticula. Nephrology on board  Dialysis 09/11, waiting on nephro if patient needs further dialysis     Renal function improving   Lasix 40mg BID , good urine output        Endocrine:   LDSSI, hypoglycemia protocol in place  Monitor glucose levels     Musculoskeletal/Skin:  Chronic Lymphedema  Wound care on board         DVT Prophylaxis     EPC, Heparin infusion for A flutter           Raulito Astorga MD  Family medicine resident, PGY2  9/13/2023 at 10:16 AM     Attending Physician Statement  I have discussed the care of Nila Valencia, including pertinent history and exam findings with the resident. I have reviewed the key elements of all parts of the encounter with the resident. I have seen and examined the patient with the resident. I agree with the assessment and plan and status of the problem list as documented. I seen the patient during the rounds this morning. No acute events were reported overnight patient remains on nasal cannula did not use BiPAP. He is on heparin drip currently. Scheduled for AICD tomorrow. He is on Lasix 40 mg daily urine output is 2.5 L he is off amiodarone because of the elevated LFTs. We will transfer him to medicine floor with medicine team and critical care team will sign off once on the floor     Discussed with nursing staff, treatment and plan discussed. Please note that this chart was generated using voice recognition Dragon dictation software. Although every effort was made to ensure the accuracy of this automated transcription, some errors in transcription may have occurred.       Doe Sharma MD  9/13/2023 9:55 AM

## 2023-09-13 NOTE — PROGRESS NOTES
[facility]   Progress Note                   Date:   9/13/2023  Patient name: Scott Sotelo  Date of admission:  9/7/2023  4:45 PM  MRN:   [id number]  YOB: 1945  PCP: Ariel Turner MD    Reason for Admission:     Subjective:     Patient seen and examined at bedside: Denies any symptoms this morning. No acute events overnight, hemodynamically stable, on 2 L of oxygen, cath site clear. UOP 2.5 L / 24 hours. Last HD on 9/11/2023   Amiodarone on hold due to elevated LFTs,   Repeat echocardiogram showed LVEF 25%, global hypokinesia. plan for ICD on Thursday. Medications:   Scheduled Meds:   metoprolol tartrate  12.5 mg Oral BID    furosemide  40 mg IntraVENous Q12H    melatonin  3 mg Oral Once    atorvastatin  40 mg Oral Nightly    midodrine  10 mg Oral TID WC    sodium chloride flush  5-40 mL IntraVENous 2 times per day    pantoprazole (PROTONIX) 40 mg in sodium chloride (PF) 0.9 % 10 mL injection  40 mg IntraVENous Daily    aspirin  81 mg Oral Daily     Continuous Infusions:   heparin (PORCINE) Infusion 14 Units/kg/hr (09/13/23 0623)    sodium chloride Stopped (09/08/23 1635)    dextrose       CBC:   Recent Labs     09/12/23  0611 09/12/23  1440 09/13/23  0438   WBC 8.4 8.6 8.7   HGB 11.6* 11.7* 11.3*   * 137* 125*       BMP:    Recent Labs     09/11/23  0823 09/12/23  0611 09/13/23  0438    137 141   K 5.4* 4.1 3.9   CL 99 102 103   CO2 25 25 26   BUN 82* 70* 69*   CREATININE 4.3* 3.5* 2.8*   GLUCOSE 106* 103* 101*       Hepatic:   Recent Labs     09/11/23  0823 09/12/23  0611 09/13/23  0438   * 137* 110*   * 360* 315*   BILITOT 1.0 0.9 0.8   ALKPHOS 82 89 84       Troponin: No results for input(s): \"TROPONINI\" in the last 72 hours. BNP: No results for input(s): \"BNP\" in the last 72 hours. Lipids: No results for input(s): \"CHOL\", \"HDL\" in the last 72 hours.     Invalid input(s): \"LDLCALCU\"  INR:   Recent Labs     09/12/23  1440   INR 1.5 [place], [place]  4/48/9358,0:72 AM    Attending Cardiologist Addendum: I have reviewed and performed the history, physical, subjective, objective, assessment, and plan with the resident/fellow/NP and agree with the note. I performed the history and physical personally. I have made changes to the note above as needed. Thank you for allowing me to participate in the care of this patient, please do not hesitate to call if you have any questions. Keanu Rojas, [address] [facility]  [facility]. [facility]  [phone]

## 2023-09-13 NOTE — PROGRESS NOTES
Physical Therapy  Facility/Department: [facility] CAR 2- STEPDOWN  Physical Therapy Daily Treatment Note    Name: Edmond Reyes  : 1945  MRN: [id number]  Date of Service: 2023    Discharge Recommendations:  Therapy recommended at discharge, Patient would benefit from continued therapy after discharge   PT Equipment Recommendations  Equipment Needed: No      Patient Diagnosis(es): The primary encounter diagnosis was Chest pain, unspecified type. Diagnoses of Ventricular tachycardia ([address]), NSTEMI (non-ST elevated myocardial infarction) [facility]), NSTEMI (non-ST elevated myocardial infarction) ([address]), and Tachycardia were also pertinent to this visit. Past Medical History:  has a past medical history of Abdominal hernia, Atrophy of muscle of multiple sites, CAD (coronary artery disease), CKD (chronic kidney disease) stage 3, GFR 30-59 ml/min ([facility]), Colon polyp, Crohn disease ([address]), Crohn's colitis ([address]), Decubitus ulcer of coccyx, stage 2 ([address]), Diarrhea, Diverticulosis, Hemorrhoids, History of atrial fibrillation, Hypertension, Ileostomy in place [facility]), Internal hemorrhoids, NSVT (nonsustained ventricular tachycardia) ([address]), Other and unspecified hyperlipidemia, Past heart attack, Tobacco use disorder, and Tubular adenoma. Past Surgical History:  has a past surgical history that includes polypectomy; Abdomen surgery (2016); Jejunostomy; Colonoscopy (2005  2007  11/10/2009); Colonoscopy (16); Colonoscopy (2016); Cardiac catheterization (); Abdomen surgery (2017); pr repair first abdominal wall hernia (N/A, 2017); and Small intestine surgery (N/A, 2017). Assessment   Body Structures, Functions, Activity Limitations Requiring Skilled Therapeutic Intervention: Decreased functional mobility ; Decreased body mechanics; Decreased tolerance to work activity; Decreased balance;Decreased endurance;Decreased strength  Assessment: Pt amb 25' CGA RW.  Pt requires constant

## 2023-09-13 NOTE — CARE COORDINATION
Nephrology group declined to review referral until patient out of ICU. Patient now out of ICU. Will reopen when Nephrology requests.

## 2023-09-13 NOTE — PLAN OF CARE
Problem: Discharge Planning  Goal: Discharge to home or other facility with appropriate resources  Outcome: Progressing  Flowsheets (Taken 9/13/2023 1019)  Discharge to home or other facility with appropriate resources: Identify barriers to discharge with patient and caregiver     Problem: Pain  Goal: Verbalizes/displays adequate comfort level or baseline comfort level  Outcome: Progressing     Problem: Skin/Tissue Integrity  Goal: Absence of new skin breakdown  Description: 1. Monitor for areas of redness and/or skin breakdown  2. Assess vascular access sites hourly  3. Every 4-6 hours minimum:  Change oxygen saturation probe site  4. Every 4-6 hours:  If on nasal continuous positive airway pressure, respiratory therapy assess nares and determine need for appliance change or resting period.   Outcome: Progressing     Problem: ABCDS Injury Assessment  Goal: Absence of physical injury  Outcome: Progressing     Problem: Safety - Adult  Goal: Free from fall injury  Outcome: Progressing     Problem: Chronic Conditions and Co-morbidities  Goal: Patient's chronic conditions and co-morbidity symptoms are monitored and maintained or improved  Outcome: Progressing  Flowsheets (Taken 9/13/2023 1019)  Care Plan - Patient's Chronic Conditions and Co-Morbidity Symptoms are Monitored and Maintained or Improved: Monitor and assess patient's chronic conditions and comorbid symptoms for stability, deterioration, or improvement     Problem: Respiratory - Adult  Goal: Achieves optimal ventilation and oxygenation  Outcome: Progressing     Problem: Neurosensory - Adult  Goal: Achieves maximal functionality and self care  Outcome: Progressing     Problem: Cardiovascular - Adult  Goal: Maintains optimal cardiac output and hemodynamic stability  Outcome: Progressing  Flowsheets (Taken 9/13/2023 1019)  Maintains optimal cardiac output and hemodynamic stability: Monitor blood pressure and heart rate  Goal: Absence of cardiac

## 2023-09-13 NOTE — PROGRESS NOTES
Patient is doing very well. Afebrile, no clear evidence of infection. No leukocytosis. No more ventricular tachycardia or significant arrhythmias. He remains in atrial flutter. Examination is otherwise within his acceptable range of normality. Waiting for nephrology to make decision about dialysis. I did rather he have his dialysis done today as I want him not to have heparin for 48 hours after the ICD is implanted to avoid bleeding. The procedure of ICD implant was discussed again with the patient with all the possible risks and complications. Questions were answered. He understands and he agrees.   I placed an order to dc hep by 2 am on 09/14/23

## 2023-09-13 NOTE — PROGRESS NOTES
Renal Progress Note    Patient :  Lizandro Cochran; 68 y.o. MRN# [id number]  Location:  2001/2001-01  Attending:  Romie Erazo MD  Admit Date:  9/7/2023   Hospital Day: 6      Subjective:     Patient is seen and examined. On dialysis for the last 2 days. His labs are looking improved. His last dialysis was on 9/11/2023. He is scheduled for AICD tomorrow. Arredondo catheter remains in place. Urine output is excellent. His diuretic doses Lasix 40 mg IV every 12 are spread he is not short of breath. He continues to have chronic lymphedema. Appetite is improving. He feels much improved over the last 48 hours. Labs today showed sodium 141, potassium 3.9 chloride 103 and CO2 26 BUN 69 and creatinine 2.8 with a creatinine down from 3.5 yesterday without any dialysis. Calcium is 7.9 and albumin is 2.9. A LC is 3:15 and AST is 110, both are improving. CBC shows White blood cells 8.7 and hemoglobin 11.3 and platelets 186. Cardiology with like a Branden catheter pulled in light of the plan for AICD placement. I am agreeable to removing the Branden catheter. I have informed the nursing staff. Patient had cardiac catheterization earlier on 9/11/2023 showed chronic total occlusion of RCA with collaterals no intervention was done. Since admission he has not had any further episodes of V. tach, awaiting AICD placement by cardiology tomorrow, 9/14/2023. Workup for acute kidney injury and thus far shows proteinuria of 0.2-0.3, low C3, and just resulted in a zone of restriction in the gamma globulin region with monoclonal IgA lambda 0.06 g per DL. History reviewed known history of chronic kidney disease stage IIIb-4 baseline creatinine in 2022 was in the 1.8-2.0 range has seen Dr. Yudith Zaragoza in 2018 has not followed up with nephrology. Known history of coronary artery disease, previous history of Crohn's disease and bowel resection, ischemic cardiomyopathy.   Came into the hospital after he felt dizzy and 09/13/2023 04:38 AM    ALBCAL DUPLICATE ORDER 76/80/7009 01:36 PM    ALBPCT DUPLICATE ORDER 48/27/9598 01:36 PM    LABALPH DUPLICATE ORDER 86/14/4137 01:36 PM    LABALPH DUPLICATE ORDER 25/97/5066 05:68 PM    O3SCW DUPLICATE ORDER 57/95/2852 03:86 PM    A0OMM DUPLICATE ORDER 21/01/1572 01:36 PM    BETAPCT DUPLICATE ORDER 05/18/7272 01:36 PM    GAMGLOB DUPLICATE ORDER 91/49/6702 52:20 PM    GGPCT DUPLICATE ORDER 07/95/4428 01:36 PM    PATH PENDING 09/09/2023 04:13 AM     UPEP:   No results found for: \"LABPE\"  C3:     Lab Results   Component Value Date/Time    C3 83 09/09/2023 04:13 AM     C4:     Lab Results   Component Value Date/Time    C4 13 09/09/2023 04:13 AM     MPO ANCA:     Lab Results   Component Value Date/Time    MPO 0.3 09/07/2023 05:09 PM     PR3 ANCA:     Lab Results   Component Value Date/Time    PR3 <0.7 09/07/2023 05:09 PM     Anti-GBM:   No results found for: \"GBMABIGG\"  Hep BsAg:         Lab Results   Component Value Date/Time    HEPBSAG NONREACTIVE 09/11/2023 01:36 PM     Hep C AB:          Lab Results   Component Value Date/Time    HEPCAB NONREACTIVE 09/11/2023 01:36 PM       Urinalysis/Chemistries:      Lab Results   Component Value Date/Time    NITRU NEGATIVE 09/09/2023 06:14 AM    COLORU Dark Yellow 09/09/2023 06:14 AM    PHUR 5.5 09/09/2023 06:14 AM    WBCUA 20 TO 50 09/09/2023 06:14 AM    RBCUA 0 TO 2 09/09/2023 06:14 AM    MUCUS NOT REPORTED 02/26/2017 07:30 AM    TRICHOMONAS NOT REPORTED 02/26/2017 07:30 AM    YEAST NOT REPORTED 02/26/2017 07:30 AM    BACTERIA FEW 02/26/2017 07:30 AM    CLARITYU Clear 10/04/2017 03:33 PM    SPECGRAV 1.017 09/09/2023 06:14 AM    LEUKOCYTESUR SMALL 09/09/2023 06:14 AM    UROBILINOGEN Normal 09/09/2023 06:14 AM    BILIRUBINUR NEGATIVE 09/09/2023 06:14 AM    BILIRUBINUR NEGATIVE 04/18/2012 10:16 AM    BLOODU Negative 10/04/2017 03:33 PM    GLUCOSEU NEGATIVE 09/09/2023 06:14 AM    GLUCOSEU NEGATIVE 04/18/2012 10:16 AM    KETUA NEGATIVE 09/09/2023 06:14 AM No

## 2023-09-13 NOTE — PROGRESS NOTES
a 68 y.o. M with a past medical history of hypertension, Crohn's colitis s/p small bowel resection, stage III CKD, Hx of CAD with 100% occluded RCA and prior inferior MI,PAD, chronic lymphedema presented today after patient started having chest pain, palpitations dizziness, burning pain sensation in the arms and diaphoresis, initial episode was this morning which resolved on its own, after which he had another episode in the afternoon, he called his sister and 46. EMS arrived which showed sustained V. Tach    On arrival to the ED patient went into V. tach again after initial evaluation by the ER physician, patient converted back to normal sinus rhythm before electrical cardioversion. After discussion with cardiology patient was given amio bolus and started on heparin drip in a.m. infusion. Patient was found to be in A flutter vs VT concern by cardiology - recommended amioi and heparin gtt with EP consult. Patient had cardiac cath on 09/11 - which showed OM1 stenosis 40% and RCA  with left to right collaterals - recommended medical management per cardiology. Plan for ICD tomorrow. Amiodarone was held due to elevated liver function. Patient continued on asa, lipitor 40 qd and lopressor 25 bid. Echo is pending. Patient has hx of CKD stage 3b/4 - baseline creatinine 1.8 - 2. After cardiac cath on 09/11 - patient's creatinine went up - requiring HD after cardiac cath. Patient received HD on 09/11. Held HD on 09/12. Continued on lasix 40 IV BID. DNR CCA with no intubation. Cardiology consulted EP for further recs. On 09/09 - patient had MARK on CKD stage 3 - nephrology was consulted. 09/10 - patient required HD  09/11 - Patient had elevated liver function - amio held. 103/85, HR 70's, on 2 L NC  MARK creatinine 4.3 > 3.5 > 2.8, bun 69, K 3.9. LFT's down trending - 500's >  and 's 110, Bilirubin normal  CBC unremarkable.  Hb 12.2 > 11.3  Platelets are down trending 248 > 137 >

## 2023-09-13 NOTE — PROGRESS NOTES
Patient's Juxtalite-type compression garments are not at bedside. Reviewed with the bedside RN when the garments arrive please wash the BLE and feet, apply moisturizing cream, apply the tan stocking, and then assist patient in applying the two piece Juxtalite type compression garment to each leg.  Patient is fluent in care and can direct RN to assist.

## 2023-09-14 ENCOUNTER — APPOINTMENT (OUTPATIENT)
Dept: GENERAL RADIOLOGY | Age: 78
DRG: 224 | End: 2023-09-14
Payer: MEDICARE

## 2023-09-14 ENCOUNTER — OFFICE VISIT (OUTPATIENT)
Dept: OPERATING ROOM | Age: 78
End: 2023-09-14
Attending: SPECIALIST

## 2023-09-14 PROBLEM — N18.4 CKD (CHRONIC KIDNEY DISEASE) STAGE 4, GFR 15-29 ML/MIN (HCC): Status: ACTIVE | Noted: 2023-09-14

## 2023-09-14 LAB
ALBUMIN SERPL-MCNC: 2.8 G/DL (ref 3.5–5.2)
ALBUMIN/GLOB SERPL: 0.8 {RATIO} (ref 1–2.5)
ALP SERPL-CCNC: 77 U/L (ref 40–129)
ALT SERPL-CCNC: 224 U/L (ref 5–41)
ANION GAP SERPL CALCULATED.3IONS-SCNC: 12 MMOL/L (ref 9–17)
AST SERPL-CCNC: 65 U/L
BASOPHILS # BLD: 0 K/UL (ref 0–0.2)
BASOPHILS NFR BLD: 0 % (ref 0–2)
BILIRUB SERPL-MCNC: 1.1 MG/DL (ref 0.3–1.2)
BUN SERPL-MCNC: 63 MG/DL (ref 8–23)
CALCIUM SERPL-MCNC: 8.3 MG/DL (ref 8.6–10.4)
CHLORIDE SERPL-SCNC: 100 MMOL/L (ref 98–107)
CO2 SERPL-SCNC: 27 MMOL/L (ref 20–31)
CREAT SERPL-MCNC: 2.5 MG/DL (ref 0.7–1.2)
ECHO BSA: 2.07 M2
EOSINOPHIL # BLD: 0.15 K/UL (ref 0–0.4)
EOSINOPHILS RELATIVE PERCENT: 2 % (ref 1–4)
ERYTHROCYTE [DISTWIDTH] IN BLOOD BY AUTOMATED COUNT: 17.9 % (ref 11.8–14.4)
GFR SERPL CREATININE-BSD FRML MDRD: 26 ML/MIN/1.73M2
GLUCOSE SERPL-MCNC: 84 MG/DL (ref 70–99)
HCT VFR BLD AUTO: 38.3 % (ref 40.7–50.3)
HGB BLD-MCNC: 11.1 G/DL (ref 13–17)
IMM GRANULOCYTES # BLD AUTO: 0 K/UL (ref 0–0.3)
IMM GRANULOCYTES NFR BLD: 0 %
LYMPHOCYTES NFR BLD: 0.68 K/UL (ref 1–4.8)
LYMPHOCYTES RELATIVE PERCENT: 9 % (ref 24–44)
MCH RBC QN AUTO: 26.2 PG (ref 25.2–33.5)
MCHC RBC AUTO-ENTMCNC: 29 G/DL (ref 28.4–34.8)
MCV RBC AUTO: 90.3 FL (ref 82.6–102.9)
MONOCYTES NFR BLD: 0.75 K/UL (ref 0.1–0.8)
MONOCYTES NFR BLD: 10 % (ref 1–7)
MORPHOLOGY: ABNORMAL
NEUTROPHILS NFR BLD: 79 % (ref 36–66)
NEUTS SEG NFR BLD: 5.92 K/UL (ref 1.8–7.7)
NRBC BLD-RTO: 0 PER 100 WBC
PLATELET # BLD AUTO: 126 K/UL (ref 138–453)
PMV BLD AUTO: 12.2 FL (ref 8.1–13.5)
POTASSIUM SERPL-SCNC: 3.6 MMOL/L (ref 3.7–5.3)
PROT SERPL-MCNC: 6.4 G/DL (ref 6.4–8.3)
RBC # BLD AUTO: 4.24 M/UL (ref 4.21–5.77)
SODIUM SERPL-SCNC: 139 MMOL/L (ref 135–144)
WBC OTHER # BLD: 7.5 K/UL (ref 3.5–11.3)

## 2023-09-14 PROCEDURE — 02H63KZ INSERTION OF DEFIBRILLATOR LEAD INTO RIGHT ATRIUM, PERCUTANEOUS APPROACH: ICD-10-PCS | Performed by: SPECIALIST

## 2023-09-14 PROCEDURE — C1892 INTRO/SHEATH,FIXED,PEEL-AWAY: HCPCS | Performed by: SPECIALIST

## 2023-09-14 PROCEDURE — 84100 ASSAY OF PHOSPHORUS: CPT

## 2023-09-14 PROCEDURE — 6370000000 HC RX 637 (ALT 250 FOR IP): Performed by: STUDENT IN AN ORGANIZED HEALTH CARE EDUCATION/TRAINING PROGRAM

## 2023-09-14 PROCEDURE — 2060000000 HC ICU INTERMEDIATE R&B

## 2023-09-14 PROCEDURE — 0JH608Z INSERTION OF DEFIBRILLATOR GENERATOR INTO CHEST SUBCUTANEOUS TISSUE AND FASCIA, OPEN APPROACH: ICD-10-PCS | Performed by: SPECIALIST

## 2023-09-14 PROCEDURE — 80053 COMPREHEN METABOLIC PANEL: CPT

## 2023-09-14 PROCEDURE — 36415 COLL VENOUS BLD VENIPUNCTURE: CPT

## 2023-09-14 PROCEDURE — 6370000000 HC RX 637 (ALT 250 FOR IP)

## 2023-09-14 PROCEDURE — 02HK3KZ INSERTION OF DEFIBRILLATOR LEAD INTO RIGHT VENTRICLE, PERCUTANEOUS APPROACH: ICD-10-PCS | Performed by: SPECIALIST

## 2023-09-14 PROCEDURE — 6370000000 HC RX 637 (ALT 250 FOR IP): Performed by: NURSE PRACTITIONER

## 2023-09-14 PROCEDURE — 6360000002 HC RX W HCPCS: Performed by: INTERNAL MEDICINE

## 2023-09-14 PROCEDURE — 99232 SBSQ HOSP IP/OBS MODERATE 35: CPT | Performed by: INTERNAL MEDICINE

## 2023-09-14 PROCEDURE — 6360000002 HC RX W HCPCS: Performed by: SPECIALIST

## 2023-09-14 PROCEDURE — 33249 INSJ/RPLCMT DEFIB W/LEAD(S): CPT | Performed by: SPECIALIST

## 2023-09-14 PROCEDURE — C1898 LEAD, PMKR, OTHER THAN TRANS: HCPCS | Performed by: SPECIALIST

## 2023-09-14 PROCEDURE — C1889 IMPLANT/INSERT DEVICE, NOC: HCPCS | Performed by: SPECIALIST

## 2023-09-14 PROCEDURE — 99152 MOD SED SAME PHYS/QHP 5/>YRS: CPT | Performed by: SPECIALIST

## 2023-09-14 PROCEDURE — 2580000003 HC RX 258: Performed by: SPECIALIST

## 2023-09-14 PROCEDURE — C1777 LEAD, AICD, ENDO SINGLE COIL: HCPCS | Performed by: SPECIALIST

## 2023-09-14 PROCEDURE — 2580000003 HC RX 258

## 2023-09-14 PROCEDURE — 99291 CRITICAL CARE FIRST HOUR: CPT | Performed by: INTERNAL MEDICINE

## 2023-09-14 PROCEDURE — 6370000000 HC RX 637 (ALT 250 FOR IP): Performed by: SPECIALIST

## 2023-09-14 PROCEDURE — C1721 AICD, DUAL CHAMBER: HCPCS | Performed by: SPECIALIST

## 2023-09-14 PROCEDURE — 85025 COMPLETE CBC W/AUTO DIFF WBC: CPT

## 2023-09-14 PROCEDURE — 71045 X-RAY EXAM CHEST 1 VIEW: CPT

## 2023-09-14 PROCEDURE — 99153 MOD SED SAME PHYS/QHP EA: CPT | Performed by: SPECIALIST

## 2023-09-14 DEVICE — ICD-DR DDMB1D4 EVERA MRI XT US IS1/DF4
Type: IMPLANTABLE DEVICE | Status: FUNCTIONAL
Brand: EVERA MRI™ XT DR SURESCAN®

## 2023-09-14 DEVICE — LEAD 5076-45 MRI US RCMCRD
Type: IMPLANTABLE DEVICE | Status: FUNCTIONAL
Brand: CAPSUREFIX NOVUS MRI™ SURESCAN®

## 2023-09-14 DEVICE — LEAD 6935M62 QUATTRO SECURE S MRI US
Type: IMPLANTABLE DEVICE | Status: FUNCTIONAL
Brand: SPRINT QUATTRO SECURE S MRI™ SURESCAN™

## 2023-09-14 DEVICE — ENVELOPE CMRM6133 ABSORB LRG MR
Type: IMPLANTABLE DEVICE | Status: FUNCTIONAL
Brand: TYRX™

## 2023-09-14 RX ORDER — OXYCODONE HYDROCHLORIDE 5 MG/1
10 TABLET ORAL EVERY 4 HOURS PRN
Status: DISCONTINUED | OUTPATIENT
Start: 2023-09-14 | End: 2023-09-16

## 2023-09-14 RX ORDER — FENTANYL CITRATE 50 UG/ML
INJECTION, SOLUTION INTRAMUSCULAR; INTRAVENOUS PRN
Status: DISCONTINUED | OUTPATIENT
Start: 2023-09-14 | End: 2023-09-14 | Stop reason: HOSPADM

## 2023-09-14 RX ORDER — OXYCODONE HYDROCHLORIDE 5 MG/1
5 TABLET ORAL EVERY 4 HOURS PRN
Status: DISCONTINUED | OUTPATIENT
Start: 2023-09-14 | End: 2023-09-16

## 2023-09-14 RX ORDER — MIDAZOLAM HYDROCHLORIDE 1 MG/ML
INJECTION INTRAMUSCULAR; INTRAVENOUS PRN
Status: DISCONTINUED | OUTPATIENT
Start: 2023-09-14 | End: 2023-09-14 | Stop reason: HOSPADM

## 2023-09-14 RX ORDER — ONDANSETRON 2 MG/ML
4 INJECTION INTRAMUSCULAR; INTRAVENOUS EVERY 6 HOURS PRN
Status: DISCONTINUED | OUTPATIENT
Start: 2023-09-14 | End: 2023-09-16 | Stop reason: HOSPADM

## 2023-09-14 RX ORDER — CHLORHEXIDINE GLUCONATE 4 G/100ML
SOLUTION TOPICAL ONCE
Status: DISCONTINUED | OUTPATIENT
Start: 2023-09-14 | End: 2023-09-16 | Stop reason: HOSPADM

## 2023-09-14 RX ORDER — ACETAMINOPHEN 325 MG/1
650 TABLET ORAL EVERY 4 HOURS PRN
Status: DISCONTINUED | OUTPATIENT
Start: 2023-09-14 | End: 2023-09-16 | Stop reason: HOSPADM

## 2023-09-14 RX ORDER — AMIODARONE HYDROCHLORIDE 200 MG/1
200 TABLET ORAL 2 TIMES DAILY
Status: DISCONTINUED | OUTPATIENT
Start: 2023-09-14 | End: 2023-09-16 | Stop reason: HOSPADM

## 2023-09-14 RX ORDER — METOPROLOL SUCCINATE 25 MG/1
25 TABLET, EXTENDED RELEASE ORAL DAILY
Status: DISCONTINUED | OUTPATIENT
Start: 2023-09-14 | End: 2023-09-15

## 2023-09-14 RX ORDER — SODIUM CHLORIDE 9 MG/ML
INJECTION, SOLUTION INTRAVENOUS CONTINUOUS PRN
Status: COMPLETED | OUTPATIENT
Start: 2023-09-14 | End: 2023-09-14

## 2023-09-14 RX ORDER — MIDODRINE HYDROCHLORIDE 5 MG/1
2.5 TABLET ORAL
Status: DISCONTINUED | OUTPATIENT
Start: 2023-09-14 | End: 2023-09-16 | Stop reason: HOSPADM

## 2023-09-14 RX ORDER — FUROSEMIDE 10 MG/ML
40 INJECTION INTRAMUSCULAR; INTRAVENOUS DAILY
Status: DISCONTINUED | OUTPATIENT
Start: 2023-09-15 | End: 2023-09-15

## 2023-09-14 RX ADMIN — MUPIROCIN: 20 OINTMENT TOPICAL at 20:18

## 2023-09-14 RX ADMIN — SODIUM CHLORIDE, PRESERVATIVE FREE 10 ML: 5 INJECTION INTRAVENOUS at 20:19

## 2023-09-14 RX ADMIN — AMIODARONE HYDROCHLORIDE 200 MG: 200 TABLET ORAL at 20:18

## 2023-09-14 RX ADMIN — METOPROLOL SUCCINATE 25 MG: 25 TABLET, EXTENDED RELEASE ORAL at 12:38

## 2023-09-14 RX ADMIN — ATORVASTATIN CALCIUM 40 MG: 40 TABLET, FILM COATED ORAL at 20:18

## 2023-09-14 RX ADMIN — AMIODARONE HYDROCHLORIDE 200 MG: 200 TABLET ORAL at 11:28

## 2023-09-14 RX ADMIN — SODIUM CHLORIDE, PRESERVATIVE FREE 10 ML: 5 INJECTION INTRAVENOUS at 11:24

## 2023-09-14 RX ADMIN — FUROSEMIDE 40 MG: 10 INJECTION, SOLUTION INTRAMUSCULAR; INTRAVENOUS at 11:28

## 2023-09-14 RX ADMIN — MUPIROCIN: 20 OINTMENT TOPICAL at 12:37

## 2023-09-14 RX ADMIN — ASPIRIN 81 MG 81 MG: 81 TABLET ORAL at 11:23

## 2023-09-14 RX ADMIN — MIDODRINE HYDROCHLORIDE 2.5 MG: 5 TABLET ORAL at 16:10

## 2023-09-14 RX ADMIN — PANTOPRAZOLE SODIUM 40 MG: 40 TABLET, DELAYED RELEASE ORAL at 11:23

## 2023-09-14 ASSESSMENT — ENCOUNTER SYMPTOMS
SHORTNESS OF BREATH: 1
WHEEZING: 1

## 2023-09-14 ASSESSMENT — PAIN SCALES - GENERAL: PAINLEVEL_OUTOF10: 0

## 2023-09-14 NOTE — PROGRESS NOTES
Physical Therapy        Physical Therapy Cancel Note      DATE: 2023    NAME: Adali Rojas  MRN: [id number]   : 1945      Patient not seen this date for Physical Therapy due to: Other: Pt politely declined at this time. Pt educated on benefits of mobility, pt continued to decline. Will see 9/15 as able.        Electronically signed by Francisco Gomez PT on 2023 at 3:11 PM

## 2023-09-14 NOTE — PROGRESS NOTES
[facility]  Internal Medicine Teaching Residency Program  Inpatient Daily Progress Note  ______________________________________________________________________________    Patient: Edmond Reyes  YOB: 1945   JVJ:[id number]    Acct: [de-identified]     Room: 2001/2001-01  Admit date: 9/7/2023  Today's date: 09/14/23  Number of days in the hospital: 7    SUBJECTIVE   Admitting Diagnosis: VT (ventricular tachycardia) ([address])  CC: Chest pain, V. Tach    Pt examined at bedside. Chart & results reviewed. -Patient is alert, oriented, awake, cooperative, hemodynamically stable  -Patient is planned for AICD placement today by cardiology and EP    Review of Systems   Constitutional:  Positive for activity change and appetite change. HENT: Negative. Respiratory:  Positive for shortness of breath and wheezing. Cardiovascular:  Positive for leg swelling. Genitourinary: Negative. Skin: Negative. Neurological: Negative. Hematological: Negative. Psychiatric/Behavioral: Negative. BRIEF HISTORY     The patient is a 68 y.o. male who was brought to the ER by EMS initially for chest pain, burning pain sensation in the arms, dizziness and palpitations. The patient had chest pain the morning when he was brought by the EMS which resolved by itself,  but he had another episode of the chest pain that is when he called his sister and 911. EMS on arrival to his home noticed that patient is in V. tach which he spontaneously converted into normal sinus rhythm before giving any medications for any interventions. On arrival in the ED the patient again went into V. tach during the initial evaluation by the ER physician and   he converted back into normal sinus rhythm before the cardioversion. Cardiology was consulted for V. tach and elevated troponins, per their recommendations he was given amiodarone bolus and started on heparin drip.   Patient became Date/Time    CULTURE NO GROWTH 09/09/2023 06:14 AM       Imaging:    XR CHEST PORTABLE    Result Date: 9/10/2023  Placement of the left-sided jugular line. No complication. Pulmonary edema likely related to congestive heart failure. Associated small right-sided pleural effusion. XR CHEST PORTABLE    Result Date: 9/9/2023  Increasing basilar opacities favoring atelectasis and probable small right effusion. US RENAL COMPLETE    Result Date: 9/8/2023  1. Incidental right-sided pleural effusion. 2. Probable multiple bladder diverticula. 3. Bilateral renal cysts measuring up to 7.1 cm. 4. No hydronephrosis. 5. Probable left renal midpole calculus measuring 6 mm. XR CHEST PORTABLE    Result Date: 9/7/2023  1. No confluent airspace consolidation. 2. Cardiomegaly with increased size of the cardiac silhouette since the prior study. There is no evidence of CHF. ASSESSMENT & PLAN     ASSESSMENT / PLAN:     Ventricualr Tachycardia:   -Wide-complex  IV heparin drip  Lopressor 12.5 mg twice daily  -Plan for AICD placement  -Cardiology and EP on board follow recommendation     MARK on CKD  -I/O charts  -Avoid nephrotoxic drugs  -BMP checks  -Nephrology on board follow recommendations  -Lasix 40 mg twice daily nephrology recommendation     Chronic congestive heart failure with reduced ejection fraction  -Continue Lasix 40 mg twice daily  -I/O charts  -Daily weights  -Plan for AICD per cardiology and EP recommendations. DVT ppx : Heparin  GI ppx: Protonix 40 Mg  Diet: The patient is asked to make an attempt to improve diet and exercise patterns to aid in medical management of this problem.       PT/OT: On board  Discharge Planning / SW: Tushar Torrez MD  Internal Medicine Resident, PGY-1  [place], [place].  9/14/2023, 11:45 AM  Attending Physician Statement  I have discussed the care of Veragusto Prayer, including pertinent history and exam findings,  with the

## 2023-09-14 NOTE — PLAN OF CARE
Problem: Discharge Planning  Goal: Discharge to home or other facility with appropriate resources  9/14/2023 1004 by Germán Tran RN  Outcome: Progressing     Problem: Pain  Goal: Verbalizes/displays adequate comfort level or baseline comfort level  9/14/2023 1004 by Germán Tran RN  Outcome: Progressing     Problem: Skin/Tissue Integrity  Goal: Absence of new skin breakdown  Description: 1. Monitor for areas of redness and/or skin breakdown  2. Assess vascular access sites hourly  3. Every 4-6 hours minimum:  Change oxygen saturation probe site  4. Every 4-6 hours:  If on nasal continuous positive airway pressure, respiratory therapy assess nares and determine need for appliance change or resting period.   9/14/2023 1004 by Germán Tran RN  Outcome: Progressing     Problem: ABCDS Injury Assessment  Goal: Absence of physical injury  9/14/2023 1004 by Germán Tran RN  Outcome: Progressing     Problem: Safety - Adult  Goal: Free from fall injury  9/14/2023 1004 by Germán Tran RN  Outcome: Progressing     Problem: Chronic Conditions and Co-morbidities  Goal: Patient's chronic conditions and co-morbidity symptoms are monitored and maintained or improved  9/14/2023 1004 by Germán Tran RN  Outcome: Progressing     Problem: Neurosensory - Adult  Goal: Achieves maximal functionality and self care  Outcome: Progressing     Problem: Cardiovascular - Adult  Goal: Maintains optimal cardiac output and hemodynamic stability  9/14/2023 1004 by Germán Tran RN  Outcome: Progressing     Problem: Cardiovascular - Adult  Goal: Absence of cardiac dysrhythmias or at baseline  9/14/2023 1004 by Germán Tran RN  Outcome: Progressing     Problem: Skin/Tissue Integrity - Adult  Goal: Skin integrity remains intact  9/14/2023 1004 by Germán Tran RN  Outcome: Progressing

## 2023-09-14 NOTE — PLAN OF CARE
infection at discharge: Assess and monitor for signs and symptoms of infection     Problem: Infection - Adult  Goal: Absence of infection during hospitalization  9/13/2023 2224 by Rito Zamora RN  Outcome: Progressing  Flowsheets (Taken 9/13/2023 1515 by Oscar Burnham RN)  Absence of infection during hospitalization: Assess and monitor for signs and symptoms of infection     Problem: Infection - Adult  Goal: Absence of fever/infection during anticipated neutropenic period  9/13/2023 2224 by Rito Zamora RN  Outcome: Progressing  Flowsheets (Taken 9/13/2023 1515 by Oscar Burnham RN)  Absence of fever/infection during anticipated neutropenic period: Monitor white blood cell count     Problem: Metabolic/Fluid and Electrolytes - Adult  Goal: Electrolytes maintained within normal limits  9/13/2023 2224 by Rito Zamora RN  Outcome: Progressing  Flowsheets (Taken 9/13/2023 1515 by Oscar Burnham RN)  Electrolytes maintained within normal limits: Monitor labs and assess patient for signs and symptoms of electrolyte imbalances     Problem: Metabolic/Fluid and Electrolytes - Adult  Goal: Hemodynamic stability and optimal renal function maintained  9/13/2023 2224 by Rito Zamora RN  Outcome: Progressing  Flowsheets (Taken 9/13/2023 1515 by Oscar Burnham RN)  Hemodynamic stability and optimal renal function maintained: Monitor labs and assess for signs and symptoms of volume excess or deficit     Problem: Metabolic/Fluid and Electrolytes - Adult  Goal: Glucose maintained within prescribed range  9/13/2023 2224 by Rito Zamora RN  Outcome: Progressing  Flowsheets (Taken 9/13/2023 1515 by Oscar Burnham RN)  Glucose maintained within prescribed range: Monitor blood glucose as ordered     Problem: Hematologic - Adult  Goal: Maintains hematologic stability  9/13/2023 2224 by Rito Zamora RN  Outcome: Progressing  Flowsheets (Taken 9/13/2023 1515 by Malini Huston

## 2023-09-14 NOTE — PROGRESS NOTES
Nutrition Assessment     Type and Reason for Visit: RD Nutrition Re-Screen/LOS, Initial    Nutrition Recommendations/Plan:   Continue low fat/cholesterol, low sodium diet when able       Nutrition Assessment:  Pt seen for LOS. Pt admitted for Ventricular Tachycardia and chest pain with MARK with Hx CHF, Crohn's, CKD, PAD, lymphedema, CAD and Diverticulitis. Pt started on HD this admission and underwent Cardiac Cath 9/11, with plans for AICD placement today. Pt was not in room at time of visit, and nurse aide is not sure how well he has been eating recently. Pt is on Regular, low fat/low cholesterol high fiber, low sodium diet, which is appropriate, though is NPO for procedure today. Nursing records show % intake x 2 meals, which meets estimated needs. Weight history shows recent weight gain, going from 200# in April to [facility] 214#. Pt is noted to have some edema, and is on Lasix, which may affect weights this admission. No current nutritional problems are identified, so that full nutrition assessment is not warranted at this time. Nutrition Related Findings:   Meds/Labs reviewed. Wound Type:  (Lymphedema per nursing notes)    Current Nutrition Therapies:    ADULT DIET; Regular; 4 carb choices (60 gm/meal);  Low Fat/Low Chol/High Fiber/2 gm Na    Anthropometric Measures:  Height: 5' 7.01\" (170.2 cm)  Current Body Wt: 214 lb 8.1 oz (97.3 kg)   BMI: 33.6    Nutrition Diagnosis:   No nutrition diagnosis at this time    Nutrition Interventions:   Food and/or Nutrient Delivery: Continue Current Diet  Nutrition Education/Counseling: No recommendation at this time  Coordination of Nutrition Care: Continue to monitor while inpatient       Goals:     Goals: Meet at least 75% of estimated needs, prior to discharge       Nutrition Monitoring and Evaluation:   Behavioral-Environmental Outcomes: None Identified  Food/Nutrient Intake Outcomes: Food and Nutrient Intake  Physical Signs/Symptoms Outcomes: Biochemical

## 2023-09-14 NOTE — CARE COORDINATION
Pt is requesting referral sent to [facility], he has been in the facility in the past and would like to return. Referral sent.

## 2023-09-15 ENCOUNTER — APPOINTMENT (OUTPATIENT)
Dept: GENERAL RADIOLOGY | Age: 78
DRG: 224 | End: 2023-09-15
Payer: MEDICARE

## 2023-09-15 LAB
ALBUMIN SERPL-MCNC: 2.7 G/DL (ref 3.5–5.2)
ALBUMIN/GLOB SERPL: 0.7 {RATIO} (ref 1–2.5)
ALP SERPL-CCNC: 77 U/L (ref 40–129)
ALT SERPL-CCNC: 160 U/L (ref 5–41)
ANION GAP SERPL CALCULATED.3IONS-SCNC: 16 MMOL/L (ref 9–17)
AST SERPL-CCNC: 39 U/L
BASOPHILS # BLD: <0.03 K/UL (ref 0–0.2)
BASOPHILS NFR BLD: 0 % (ref 0–2)
BILIRUB SERPL-MCNC: 1.1 MG/DL (ref 0.3–1.2)
BUN SERPL-MCNC: 60 MG/DL (ref 8–23)
CALCIUM SERPL-MCNC: 8.2 MG/DL (ref 8.6–10.4)
CHLORIDE SERPL-SCNC: 101 MMOL/L (ref 98–107)
CO2 SERPL-SCNC: 22 MMOL/L (ref 20–31)
CREAT SERPL-MCNC: 2.3 MG/DL (ref 0.7–1.2)
EOSINOPHIL # BLD: 0.18 K/UL (ref 0–0.44)
EOSINOPHILS RELATIVE PERCENT: 2 % (ref 1–4)
ERYTHROCYTE [DISTWIDTH] IN BLOOD BY AUTOMATED COUNT: 17.7 % (ref 11.8–14.4)
GFR SERPL CREATININE-BSD FRML MDRD: 29 ML/MIN/1.73M2
GLUCOSE SERPL-MCNC: 108 MG/DL (ref 70–99)
HCT VFR BLD AUTO: 37.1 % (ref 40.7–50.3)
HGB BLD-MCNC: 11.2 G/DL (ref 13–17)
IMM GRANULOCYTES # BLD AUTO: 0.03 K/UL (ref 0–0.3)
IMM GRANULOCYTES NFR BLD: 0 %
LYMPHOCYTES NFR BLD: 0.77 K/UL (ref 1.1–3.7)
LYMPHOCYTES RELATIVE PERCENT: 10 % (ref 24–43)
MCH RBC QN AUTO: 27.1 PG (ref 25.2–33.5)
MCHC RBC AUTO-ENTMCNC: 30.2 G/DL (ref 28.4–34.8)
MCV RBC AUTO: 89.6 FL (ref 82.6–102.9)
MONOCYTES NFR BLD: 0.72 K/UL (ref 0.1–1.2)
MONOCYTES NFR BLD: 9 % (ref 3–12)
NEUTROPHILS NFR BLD: 79 % (ref 36–65)
NEUTS SEG NFR BLD: 6.26 K/UL (ref 1.5–8.1)
NRBC BLD-RTO: 0 PER 100 WBC
PHOSPHATE SERPL-MCNC: 3.1 MG/DL (ref 2.5–4.5)
PLATELET # BLD AUTO: 189 K/UL (ref 138–453)
PMV BLD AUTO: 11.9 FL (ref 8.1–13.5)
POTASSIUM SERPL-SCNC: 3.8 MMOL/L (ref 3.7–5.3)
PROT SERPL-MCNC: 6.4 G/DL (ref 6.4–8.3)
RBC # BLD AUTO: 4.14 M/UL (ref 4.21–5.77)
RBC # BLD: ABNORMAL 10*6/UL
SODIUM SERPL-SCNC: 139 MMOL/L (ref 135–144)
WBC OTHER # BLD: 8 K/UL (ref 3.5–11.3)

## 2023-09-15 PROCEDURE — 2580000003 HC RX 258

## 2023-09-15 PROCEDURE — 2700000000 HC OXYGEN THERAPY PER DAY

## 2023-09-15 PROCEDURE — 6370000000 HC RX 637 (ALT 250 FOR IP): Performed by: SPECIALIST

## 2023-09-15 PROCEDURE — 6370000000 HC RX 637 (ALT 250 FOR IP): Performed by: INTERNAL MEDICINE

## 2023-09-15 PROCEDURE — 6370000000 HC RX 637 (ALT 250 FOR IP)

## 2023-09-15 PROCEDURE — 36415 COLL VENOUS BLD VENIPUNCTURE: CPT

## 2023-09-15 PROCEDURE — 6360000002 HC RX W HCPCS: Performed by: SPECIALIST

## 2023-09-15 PROCEDURE — 6370000000 HC RX 637 (ALT 250 FOR IP): Performed by: NURSE PRACTITIONER

## 2023-09-15 PROCEDURE — 97530 THERAPEUTIC ACTIVITIES: CPT

## 2023-09-15 PROCEDURE — 99232 SBSQ HOSP IP/OBS MODERATE 35: CPT | Performed by: INTERNAL MEDICINE

## 2023-09-15 PROCEDURE — 2580000003 HC RX 258: Performed by: SPECIALIST

## 2023-09-15 PROCEDURE — 71046 X-RAY EXAM CHEST 2 VIEWS: CPT

## 2023-09-15 PROCEDURE — 97535 SELF CARE MNGMENT TRAINING: CPT

## 2023-09-15 PROCEDURE — 99233 SBSQ HOSP IP/OBS HIGH 50: CPT | Performed by: NURSE PRACTITIONER

## 2023-09-15 PROCEDURE — 6360000002 HC RX W HCPCS: Performed by: INTERNAL MEDICINE

## 2023-09-15 PROCEDURE — 85025 COMPLETE CBC W/AUTO DIFF WBC: CPT

## 2023-09-15 PROCEDURE — 99291 CRITICAL CARE FIRST HOUR: CPT | Performed by: INTERNAL MEDICINE

## 2023-09-15 PROCEDURE — 80053 COMPREHEN METABOLIC PANEL: CPT

## 2023-09-15 PROCEDURE — 6370000000 HC RX 637 (ALT 250 FOR IP): Performed by: STUDENT IN AN ORGANIZED HEALTH CARE EDUCATION/TRAINING PROGRAM

## 2023-09-15 PROCEDURE — 94761 N-INVAS EAR/PLS OXIMETRY MLT: CPT

## 2023-09-15 PROCEDURE — 2060000000 HC ICU INTERMEDIATE R&B

## 2023-09-15 RX ORDER — AMIODARONE HYDROCHLORIDE 200 MG/1
200 TABLET ORAL 2 TIMES DAILY
Qty: 25 TABLET | Refills: 0 | Status: SHIPPED | OUTPATIENT
Start: 2023-09-15 | End: 2023-09-28

## 2023-09-15 RX ORDER — SPIRONOLACTONE 25 MG/1
25 TABLET ORAL DAILY
Qty: 30 TABLET | Refills: 3 | Status: CANCELLED | OUTPATIENT
Start: 2023-09-15

## 2023-09-15 RX ORDER — TORSEMIDE 20 MG/1
40 TABLET ORAL DAILY
Status: DISCONTINUED | OUTPATIENT
Start: 2023-09-16 | End: 2023-09-16 | Stop reason: HOSPADM

## 2023-09-15 RX ORDER — ASPIRIN 81 MG/1
81 TABLET, CHEWABLE ORAL DAILY
Qty: 30 TABLET | Refills: 3 | Status: SHIPPED | OUTPATIENT
Start: 2023-09-16

## 2023-09-15 RX ORDER — METOPROLOL SUCCINATE 50 MG/1
50 TABLET, EXTENDED RELEASE ORAL DAILY
Status: DISCONTINUED | OUTPATIENT
Start: 2023-09-16 | End: 2023-09-16 | Stop reason: HOSPADM

## 2023-09-15 RX ORDER — ATORVASTATIN CALCIUM 40 MG/1
40 TABLET, FILM COATED ORAL NIGHTLY
Qty: 30 TABLET | Refills: 3 | Status: SHIPPED | OUTPATIENT
Start: 2023-09-15

## 2023-09-15 RX ORDER — MIDODRINE HYDROCHLORIDE 2.5 MG/1
2.5 TABLET ORAL
Qty: 90 TABLET | Refills: 3 | Status: SHIPPED | OUTPATIENT
Start: 2023-09-15

## 2023-09-15 RX ORDER — SPIRONOLACTONE 25 MG/1
25 TABLET ORAL DAILY
Status: DISCONTINUED | OUTPATIENT
Start: 2023-09-15 | End: 2023-09-16 | Stop reason: HOSPADM

## 2023-09-15 RX ORDER — METOPROLOL SUCCINATE 50 MG/1
50 TABLET, EXTENDED RELEASE ORAL DAILY
Qty: 30 TABLET | Refills: 3 | Status: SHIPPED | OUTPATIENT
Start: 2023-09-16

## 2023-09-15 RX ORDER — SPIRONOLACTONE 25 MG/1
25 TABLET ORAL DAILY
Qty: 30 TABLET | Refills: 3 | Status: SHIPPED | OUTPATIENT
Start: 2023-09-15

## 2023-09-15 RX ORDER — PANTOPRAZOLE SODIUM 40 MG/1
40 TABLET, DELAYED RELEASE ORAL
Qty: 30 TABLET | Refills: 3 | Status: SHIPPED | OUTPATIENT
Start: 2023-09-16

## 2023-09-15 RX ORDER — METOPROLOL SUCCINATE 25 MG/1
25 TABLET, EXTENDED RELEASE ORAL ONCE
Status: COMPLETED | OUTPATIENT
Start: 2023-09-15 | End: 2023-09-15

## 2023-09-15 RX ORDER — POTASSIUM CHLORIDE 20 MEQ/1
40 TABLET, EXTENDED RELEASE ORAL ONCE
Status: COMPLETED | OUTPATIENT
Start: 2023-09-15 | End: 2023-09-15

## 2023-09-15 RX ADMIN — MIDODRINE HYDROCHLORIDE 2.5 MG: 5 TABLET ORAL at 11:40

## 2023-09-15 RX ADMIN — SODIUM CHLORIDE, PRESERVATIVE FREE 10 ML: 5 INJECTION INTRAVENOUS at 20:07

## 2023-09-15 RX ADMIN — MIDODRINE HYDROCHLORIDE 2.5 MG: 5 TABLET ORAL at 09:26

## 2023-09-15 RX ADMIN — AMIODARONE HYDROCHLORIDE 200 MG: 200 TABLET ORAL at 20:06

## 2023-09-15 RX ADMIN — VANCOMYCIN HYDROCHLORIDE 1000 MG: 1 INJECTION, POWDER, LYOPHILIZED, FOR SOLUTION INTRAVENOUS at 09:23

## 2023-09-15 RX ADMIN — SODIUM CHLORIDE, PRESERVATIVE FREE 10 ML: 5 INJECTION INTRAVENOUS at 09:27

## 2023-09-15 RX ADMIN — FUROSEMIDE 40 MG: 10 INJECTION, SOLUTION INTRAMUSCULAR; INTRAVENOUS at 09:27

## 2023-09-15 RX ADMIN — SPIRONOLACTONE 25 MG: 25 TABLET ORAL at 16:05

## 2023-09-15 RX ADMIN — MUPIROCIN: 20 OINTMENT TOPICAL at 16:05

## 2023-09-15 RX ADMIN — ASPIRIN 81 MG 81 MG: 81 TABLET ORAL at 09:26

## 2023-09-15 RX ADMIN — PANTOPRAZOLE SODIUM 40 MG: 40 TABLET, DELAYED RELEASE ORAL at 09:27

## 2023-09-15 RX ADMIN — MUPIROCIN: 20 OINTMENT TOPICAL at 20:06

## 2023-09-15 RX ADMIN — ATORVASTATIN CALCIUM 40 MG: 40 TABLET, FILM COATED ORAL at 20:06

## 2023-09-15 RX ADMIN — AMIODARONE HYDROCHLORIDE 200 MG: 200 TABLET ORAL at 09:26

## 2023-09-15 RX ADMIN — MUPIROCIN: 20 OINTMENT TOPICAL at 09:29

## 2023-09-15 RX ADMIN — METOPROLOL SUCCINATE 25 MG: 25 TABLET, EXTENDED RELEASE ORAL at 11:40

## 2023-09-15 RX ADMIN — POTASSIUM CHLORIDE 40 MEQ: 1500 TABLET, EXTENDED RELEASE ORAL at 09:27

## 2023-09-15 RX ADMIN — MIDODRINE HYDROCHLORIDE 2.5 MG: 5 TABLET ORAL at 16:08

## 2023-09-15 RX ADMIN — METOPROLOL SUCCINATE 25 MG: 25 TABLET, EXTENDED RELEASE ORAL at 09:27

## 2023-09-15 ASSESSMENT — ENCOUNTER SYMPTOMS: SHORTNESS OF BREATH: 1

## 2023-09-15 NOTE — PLAN OF CARE
Problem: Discharge Planning  Goal: Discharge to home or other facility with appropriate resources  9/14/2023 2147 by Elmo Hagen RN  Outcome: Progressing     Problem: Pain  Goal: Verbalizes/displays adequate comfort level or baseline comfort level  9/14/2023 2147 by Elmo Hagen RN  Outcome: Progressing     Problem: Skin/Tissue Integrity  Goal: Absence of new skin breakdown  Description: 1. Monitor for areas of redness and/or skin breakdown  2. Assess vascular access sites hourly  3. Every 4-6 hours minimum:  Change oxygen saturation probe site  4. Every 4-6 hours:  If on nasal continuous positive airway pressure, respiratory therapy assess nares and determine need for appliance change or resting period.   9/14/2023 2147 by Elmo Hagen RN  Outcome: Progressing     Problem: ABCDS Injury Assessment  Goal: Absence of physical injury  9/14/2023 2147 by Elmo Hagen RN  Outcome: Progressing     Problem: Safety - Adult  Goal: Free from fall injury  9/14/2023 2147 by Elmo Hagen RN  Outcome: Progressing     Problem: Chronic Conditions and Co-morbidities  Goal: Patient's chronic conditions and co-morbidity symptoms are monitored and maintained or improved  9/14/2023 2147 by Elmo Hagen RN  Outcome: Progressing     Problem: Respiratory - Adult  Goal: Achieves optimal ventilation and oxygenation  9/14/2023 2147 by Elmo Hagen RN  Outcome: Completed     Problem: Neurosensory - Adult  Goal: Achieves maximal functionality and self care  9/14/2023 2147 by Elmo Hagen RN  Outcome: Completed     Problem: Cardiovascular - Adult  Goal: Maintains optimal cardiac output and hemodynamic stability  9/14/2023 2147 by Elmo Hagen RN  Outcome: Progressing     Problem: Cardiovascular - Adult  Goal: Absence of cardiac dysrhythmias or at baseline  9/14/2023 2147 by Elmo Hagen RN  Outcome: Progressing     Problem: Skin/Tissue Integrity - Adult  Goal: Skin integrity remains intact  9/14/2023 2147 by Zuleyka Uribe RN  Outcome: Progressing     Problem: Skin/Tissue Integrity - Adult  Goal: Incisions, wounds, or drain sites healing without S/S of infection  9/14/2023 2147 by Zuleyka Uribe RN  Outcome: Progressing     Problem: Skin/Tissue Integrity - Adult  Goal: Oral mucous membranes remain intact  9/14/2023 2147 by Zuleyka Uribe RN  Outcome: Completed     Problem: Musculoskeletal - Adult  Goal: Return mobility to safest level of function  9/14/2023 2147 by Zuleyka Uribe RN  Outcome: Progressing     Problem: Gastrointestinal - Adult  Goal: Maintains or returns to baseline bowel function  9/14/2023 2147 by Zuleyka Uribe RN  Outcome: Completed     Problem: Gastrointestinal - Adult  Goal: Maintains adequate nutritional intake  9/14/2023 2147 by Zuleyka Uribe RN  Outcome: Completed     Problem: Genitourinary - Adult  Goal: Absence of urinary retention  9/14/2023 1004 by Lyle Reese RN  Outcome: Progressing     Problem: Genitourinary - Adult  Goal: Urinary catheter remains patent  9/14/2023 2147 by Zuleyka Uribe RN  Outcome: Progressing     Problem: Infection - Adult  Goal: Absence of infection at discharge  9/14/2023 2147 by Zuleyka Uribe RN  Outcome: Progressing     Problem: Infection - Adult  Goal: Absence of infection during hospitalization  9/14/2023 2147 by Zuleyka Uribe RN  Outcome: Progressing     Problem: Infection - Adult  Goal: Absence of fever/infection during anticipated neutropenic period  9/14/2023 1004 by Lyle Reese RN  Outcome: Progressing     Problem: Metabolic/Fluid and Electrolytes - Adult  Goal: Electrolytes maintained within normal limits  9/14/2023 2147 by Zuleyka Uribe RN  Outcome: Completed     Problem: Metabolic/Fluid and Electrolytes - Adult  Goal: Hemodynamic stability and optimal renal function maintained  9/14/2023 2147 by Zuleyka Uribe RN  Outcome: Completed     Problem: Metabolic/Fluid and

## 2023-09-15 NOTE — CARE COORDINATION
Transitional planning    Odilia from [facility]. They are able to accept patient. Her # is [phone] if discharged over the weekend. 1425 CM gave Odilia patient's SS#.  They can accept patient today or tomorrow

## 2023-09-15 NOTE — DISCHARGE INSTR - COC
Amount Other (Comment) 09/15/23 0400   Drainage Description Other (Comment) 09/15/23 0400   Odor None 09/15/23 0400   Number of days: 1        Elimination:  Continence: Bowel: No  Bladder: Yes  Urinary Catheter: None   Colostomy/Ileostomy/Ileal Conduit: No       Date of Last BM: 09/15/23      Intake/Output Summary (Last 24 hours) at 9/15/2023 1627  Last data filed at 9/15/2023 0600  Gross per 24 hour   Intake 430 ml   Output 1500 ml   Net -1070 ml     I/O last 3 completed shifts: In: 12 [P.O.:670]  Out: 4520 [Urine:4500; Blood:20]    Safety Concerns:     History of Falls (last 30 days)    Impairments/Disabilities:      None    Nutrition Therapy:  Current Nutrition Therapy:   - Oral Diet:  General    Routes of Feeding: Oral  Liquids: Thin Liquids  Daily Fluid Restriction: yes - amount 1500  Last Modified Barium Swallow with Video (Video Swallowing Test): not done    Treatments at the Time of Hospital Discharge:   Respiratory Treatments: nasal cannula  Oxygen Therapy:  is on oxygen at 2 L/min per nasal cannula.   Ventilator:    - No ventilator support    Rehab Therapies: Physical Therapy and Occupational Therapy  Weight Bearing Status/Restrictions: No weight bearing restrictions  Other Medical Equipment (for information only, NOT a DME order):  wheelchair and walker  Other Treatments: wound care    Patient's personal belongings (please select all that are sent with patient):  None    RN SIGNATURE:  Electronically signed by Darylene Golas, RN on 9/15/23 at 5:15 PM EDT    CASE MANAGEMENT/SOCIAL WORK SECTION    Inpatient Status Date: 9/7/23    Readmission Risk Assessment Score:  Readmission Risk              Risk of Unplanned Readmission:  25           Discharging to Facility/ Agency   Name:   Address: [facility]  Phone:  Fax:    Dialysis Facility (if applicable)   Name:  Address:  Dialysis Schedule:  Phone:  Fax:    / signature: Electronically signed by Jemima Newton RN on 9/15/23 at 4:30 PM EDT    PHYSICIAN SECTION    Prognosis: Fair    Condition at Discharge: Stable    Rehab Potential (if transferring to Rehab): Fair    Recommended Labs or Other Treatments After Discharge: You came in with abnormal heart rhythm (ventricular tachycardia) which was corrected by amiodarone drip. Your heart enzymes were also elevated for which she received heart catheterization. Echo showed reduced ejection fraction. Cardiology placed AICD device. Continue Bactroban for 7 days. Wound check as outpatient with 1. Dr. Fili Fregoso in 7-10 days  Continue aspirin, statin, Toprol XL amiodarone along with blood thinner. Eliquis. Continue midodrine for blood pressure support. Nephrology made changes in your water pill. Stop taking Lasix. Start taking oral Demadex 40 mg daily & spironolactone 25 daily. Please have blood work done in 1 week. Follow-up with nephrology in 2 to 3 weeks. If you experience any chest pain, shortness of breath or palpitations please come back to the emergency department for evaluation. If you experience any signs of bleeding like bruises, dark tarry stools, fresh blood in the stools or vomiting blood please come back to the emergency department for evaluation. Please follow-up with PCP, cardiology and kidney doctors for posthospital follow-up. Physician Certification: I certify the above information and transfer of Keith Berman  is necessary for the continuing treatment of the diagnosis listed and that he requires [address] for greater 30 days.      Update Admission H&P: No change in H&P    PHYSICIAN SIGNATURE:  Electronically signed by Homer Gaffney MD on 9/15/23 at 4:47 PM EDT

## 2023-09-15 NOTE — CARE COORDINATION
TRansitional planning    Spoke to patient about plan for discharge. Plan is for him to go to [facility]. CM told him he has d/c order and [facility] can accept him. CM will let him know when transportation is arranged. Apryl FITZGERALD requesting JONA be completed and signed. On call team said they would pass information on to primary team.     [id number] completed    [phone] Returned phone call to Miguelito Castro at [facility] and told her CM is working on transportation.  Fax # is [phone], report # [phone] called [facility]/ Miguelito Castro [phone] and left VM that patient has transport time of 1 pm tomorrow 9/16.    1800 AVS/JONA faxed to [phone] Updated patient that  time to go to [facility] is tomorrow 9/16 at 1pm.

## 2023-09-15 NOTE — DISCHARGE INSTRUCTIONS
You came in with abnormal heart rhythm (ventricular tachycardia) which was corrected by amiodarone drip. Your heart enzymes were also elevated for which she received heart catheterization. Echo showed reduced ejection fraction. Cardiology placed AICD device. Continue Bactroban for 7 days. Wound check as outpatient with 1. Dr. [address] in 7-10 days  Continue aspirin, statin, Toprol XL amiodarone along with blood thinner. Eliquis. Continue midodrine for blood pressure support. Nephrology made changes in your water pill. Stop taking Lasix. Start taking oral Demadex 40 mg daily & spironolactone 25 daily. Please have blood work done in 1 week. Follow-up with nephrology in 2 to 3 weeks. If you experience any chest pain, shortness of breath or palpitations please come back to the emergency department for evaluation. If you experience any signs of bleeding like bruises, dark tarry stools, fresh blood in the stools or vomiting blood please come back to the emergency department for evaluation. Please follow-up with PCP, cardiology and kidney doctors for posthospital follow-up.

## 2023-09-15 NOTE — PROGRESS NOTES
using RW and 1 seated rest break PRN, at mod I       Therapy Time   Individual Concurrent Group Co-treatment   Time In 1319         Time Out 1430         Minutes 71         Timed Code Treatment Minutes: 71 Minutes    Pt supine in bed upon therapist arrival. Pleasant and agreeable to therapy. See above for LOF for all tasks. Pt retired to seated in chair at end of session with call light within reach.       LINDA Greenberg/LINSEY

## 2023-09-15 NOTE — PROGRESS NOTES
[address] [facility]  Internal Medicine Teaching Residency Program  Inpatient Daily Progress Note  ______________________________________________________________________________    Patient: Nila Valencia  YOB: 1945   OTR:[id number]    Acct: [de-identified]     Room: 2001/2001-01  Admit date: 9/7/2023  Today's date: 09/15/23  Number of days in the hospital: 8    SUBJECTIVE   Admitting Diagnosis: VT (ventricular tachycardia) ([address])  CC: Chest pain, V. Tach    Pt examined at bedside. Chart & results reviewed. -Patient is alert, oriented, awake, cooperative, hemodynamically stable  -AICD was placed yesterday, postop was uneventful  -No overnight acute events  -On examination the wound site of AICD placement looks without any infection  -Nephrology will discontinue dialysis catheter and Lasix will be switched to p.o. in another 24 to 48 hours  -IV heparin has been switched to p.o. Eliquis 2.5 mg per cardiology recommendation    Review of Systems   Constitutional:  Positive for activity change and appetite change. HENT: Negative. Respiratory:  Positive for shortness of breath. Cardiovascular:  Positive for leg swelling. Genitourinary: Negative. Musculoskeletal:         Bilateral lower limb swelling till knees with redness and peeling of skin noted  There is oozing wound on posterior part of the left leg just above the ankle   Skin: Negative. Neurological: Negative. Hematological: Negative. Psychiatric/Behavioral: Negative. BRIEF HISTORY     The patient is a 68 y.o. male who was brought to the ER by EMS initially for chest pain, burning pain sensation in the arms, dizziness and palpitations. The patient had chest pain the morning when he was brought by the EMS which resolved by itself,  but he had another episode of the chest pain that is when he called his sister and 911.   EMS on arrival to his home noticed that patient is in V. tach which chlorhexidine   Topical Once    gentamicin (GARAMYCIN) 80 mg in 0.9% sodium chloride 500 mL irrigation   Irrigation Once    vancomycin  1,000 mg IntraVENous Once    amiodarone  200 mg Oral BID    [START ON 9/18/2023] apixaban  2.5 mg Oral BID    metoprolol succinate  25 mg Oral Daily    midodrine  2.5 mg Oral TID WC    furosemide  40 mg IntraVENous Daily    pantoprazole  40 mg Oral QAM AC    melatonin  3 mg Oral Once    atorvastatin  40 mg Oral Nightly    sodium chloride flush  5-40 mL IntraVENous 2 times per day    aspirin  81 mg Oral Daily     Continuous Infusions:    sodium chloride Stopped (09/08/23 1635)    dextrose       PRN Medicationsondansetron, 4 mg, Q6H PRN  acetaminophen, 650 mg, Q4H PRN  oxyCODONE, 5 mg, Q4H PRN   Or  oxyCODONE, 10 mg, Q4H PRN  heparin (porcine), 4,000 Units, PRN  heparin (porcine), 2,000 Units, PRN  melatonin, 5 mg, Nightly PRN  heparin (porcine), 1,900 Units, PRN  heparin (porcine), 1,600 Units, PRN  sodium chloride flush, 5-40 mL, PRN  sodium chloride, , PRN  ondansetron, 4 mg, Q8H PRN   Or  ondansetron, 4 mg, Q6H PRN  polyethylene glycol, 17 g, Daily PRN  acetaminophen, 650 mg, Q6H PRN  glucose, 4 tablet, PRN  dextrose bolus, 125 mL, PRN   Or  dextrose bolus, 250 mL, PRN  glucagon (rDNA), 1 mg, PRN  dextrose, , Continuous PRN        Diagnostic Labs:  CBC:   Recent Labs     09/13/23  0438 09/14/23  0650 09/15/23  0540   WBC 8.7 7.5 8.0   RBC 4.35 4.24 4.14*   HGB 11.3* 11.1* 11.2*   HCT 39.5* 38.3* 37.1*   MCV 90.8 90.3 89.6   RDW 17.9* 17.9* 17.7*   * 126* 189       BMP:   Recent Labs     09/13/23  0438 09/14/23  0650    139   K 3.9 3.6*    100   CO2 26 27   BUN 69* 63*   CREATININE 2.8* 2.5*       BNP: No results for input(s): \"BNP\" in the last 72 hours.   PT/INR:   Recent Labs     09/12/23  1440   PROTIME 17.4*   INR 1.5       APTT:   Recent Labs     09/12/23  1440   APTT 35.0       CARDIAC ENZYMES: No results for input(s): \"CKMB\", \"CKMBINDEX\", \"TROPONINI\" in the

## 2023-09-15 NOTE — PLAN OF CARE
Problem: Discharge Planning  Goal: Discharge to home or other facility with appropriate resources  Outcome: Progressing  Flowsheets (Taken 9/15/2023 1840)  Discharge to home or other facility with appropriate resources:   Identify barriers to discharge with patient and caregiver   Identify discharge learning needs (meds, wound care, etc)     Problem: Pain  Goal: Verbalizes/displays adequate comfort level or baseline comfort level  Outcome: Progressing  Flowsheets (Taken 9/15/2023 1840)  Verbalizes/displays adequate comfort level or baseline comfort level:   Encourage patient to monitor pain and request assistance   Assess pain using appropriate pain scale   Consider cultural and social influences on pain and pain management     Problem: Skin/Tissue Integrity  Goal: Absence of new skin breakdown  Description: 1. Monitor for areas of redness and/or skin breakdown  2. Assess vascular access sites hourly  3. Every 4-6 hours minimum:  Change oxygen saturation probe site  4. Every 4-6 hours:  If on nasal continuous positive airway pressure, respiratory therapy assess nares and determine need for appliance change or resting period.   Outcome: Progressing     Problem: ABCDS Injury Assessment  Goal: Absence of physical injury  Outcome: Progressing  Flowsheets (Taken 9/15/2023 1840)  Absence of Physical Injury: Implement safety measures based on patient assessment     Problem: Safety - Adult  Goal: Free from fall injury  Outcome: Progressing  Flowsheets (Taken 9/15/2023 1840)  Free From Fall Injury: Instruct family/caregiver on patient safety     Problem: Chronic Conditions and Co-morbidities  Goal: Patient's chronic conditions and co-morbidity symptoms are monitored and maintained or improved  Outcome: Progressing  Flowsheets (Taken 9/15/2023 1840)  Care Plan - Patient's Chronic Conditions and Co-Morbidity Symptoms are Monitored and Maintained or Improved:   Monitor and assess patient's chronic conditions and comorbid

## 2023-09-16 VITALS
HEART RATE: 81 BPM | SYSTOLIC BLOOD PRESSURE: 96 MMHG | HEIGHT: 67 IN | OXYGEN SATURATION: 99 % | BODY MASS INDEX: 33.67 KG/M2 | TEMPERATURE: 97.3 F | WEIGHT: 214.51 LBS | DIASTOLIC BLOOD PRESSURE: 61 MMHG | RESPIRATION RATE: 18 BRPM

## 2023-09-16 LAB
ALBUMIN SERPL-MCNC: 3.1 G/DL (ref 3.5–5.2)
ALBUMIN/GLOB SERPL: 0.8 {RATIO} (ref 1–2.5)
ALP SERPL-CCNC: 76 U/L (ref 40–129)
ALT SERPL-CCNC: 120 U/L (ref 5–41)
ANION GAP SERPL CALCULATED.3IONS-SCNC: 9 MMOL/L (ref 9–17)
AST SERPL-CCNC: 25 U/L
BASOPHILS # BLD: <0.03 K/UL (ref 0–0.2)
BASOPHILS NFR BLD: 0 % (ref 0–2)
BILIRUB SERPL-MCNC: 1.2 MG/DL (ref 0.3–1.2)
BUN SERPL-MCNC: 59 MG/DL (ref 8–23)
CALCIUM SERPL-MCNC: 8.4 MG/DL (ref 8.6–10.4)
CHLORIDE SERPL-SCNC: 104 MMOL/L (ref 98–107)
CO2 SERPL-SCNC: 33 MMOL/L (ref 20–31)
CREAT SERPL-MCNC: 2.2 MG/DL (ref 0.7–1.2)
EOSINOPHIL # BLD: 0.21 K/UL (ref 0–0.44)
EOSINOPHILS RELATIVE PERCENT: 3 % (ref 1–4)
ERYTHROCYTE [DISTWIDTH] IN BLOOD BY AUTOMATED COUNT: 17.7 % (ref 11.8–14.4)
GFR SERPL CREATININE-BSD FRML MDRD: 30 ML/MIN/1.73M2
GLUCOSE SERPL-MCNC: 95 MG/DL (ref 70–99)
HCT VFR BLD AUTO: 40.9 % (ref 40.7–50.3)
HGB BLD-MCNC: 11.8 G/DL (ref 13–17)
IMM GRANULOCYTES # BLD AUTO: <0.03 K/UL (ref 0–0.3)
IMM GRANULOCYTES NFR BLD: 0 %
LYMPHOCYTES NFR BLD: 0.92 K/UL (ref 1.1–3.7)
LYMPHOCYTES RELATIVE PERCENT: 12 % (ref 24–43)
MCH RBC QN AUTO: 25.7 PG (ref 25.2–33.5)
MCHC RBC AUTO-ENTMCNC: 28.9 G/DL (ref 28.4–34.8)
MCV RBC AUTO: 89.1 FL (ref 82.6–102.9)
MONOCYTES NFR BLD: 0.54 K/UL (ref 0.1–1.2)
MONOCYTES NFR BLD: 7 % (ref 3–12)
NEUTROPHILS NFR BLD: 79 % (ref 36–65)
NEUTS SEG NFR BLD: 6.32 K/UL (ref 1.5–8.1)
NRBC BLD-RTO: 0 PER 100 WBC
PLATELET # BLD AUTO: 125 K/UL (ref 138–453)
PMV BLD AUTO: 11.5 FL (ref 8.1–13.5)
POTASSIUM SERPL-SCNC: 4.1 MMOL/L (ref 3.7–5.3)
PROT SERPL-MCNC: 7 G/DL (ref 6.4–8.3)
RBC # BLD AUTO: 4.59 M/UL (ref 4.21–5.77)
RBC # BLD: ABNORMAL 10*6/UL
SODIUM SERPL-SCNC: 146 MMOL/L (ref 135–144)
WBC OTHER # BLD: 8 K/UL (ref 3.5–11.3)

## 2023-09-16 PROCEDURE — 6370000000 HC RX 637 (ALT 250 FOR IP)

## 2023-09-16 PROCEDURE — 6370000000 HC RX 637 (ALT 250 FOR IP): Performed by: INTERNAL MEDICINE

## 2023-09-16 PROCEDURE — 2580000003 HC RX 258

## 2023-09-16 PROCEDURE — 36415 COLL VENOUS BLD VENIPUNCTURE: CPT

## 2023-09-16 PROCEDURE — 6370000000 HC RX 637 (ALT 250 FOR IP): Performed by: SPECIALIST

## 2023-09-16 PROCEDURE — 99291 CRITICAL CARE FIRST HOUR: CPT | Performed by: INTERNAL MEDICINE

## 2023-09-16 PROCEDURE — 6370000000 HC RX 637 (ALT 250 FOR IP): Performed by: NURSE PRACTITIONER

## 2023-09-16 PROCEDURE — 80053 COMPREHEN METABOLIC PANEL: CPT

## 2023-09-16 PROCEDURE — 85025 COMPLETE CBC W/AUTO DIFF WBC: CPT

## 2023-09-16 RX ADMIN — SPIRONOLACTONE 25 MG: 25 TABLET ORAL at 08:40

## 2023-09-16 RX ADMIN — METOPROLOL SUCCINATE 50 MG: 50 TABLET, FILM COATED, EXTENDED RELEASE ORAL at 08:39

## 2023-09-16 RX ADMIN — MIDODRINE HYDROCHLORIDE 2.5 MG: 5 TABLET ORAL at 08:39

## 2023-09-16 RX ADMIN — SODIUM CHLORIDE, PRESERVATIVE FREE 10 ML: 5 INJECTION INTRAVENOUS at 08:40

## 2023-09-16 RX ADMIN — AMIODARONE HYDROCHLORIDE 200 MG: 200 TABLET ORAL at 08:40

## 2023-09-16 RX ADMIN — ASPIRIN 81 MG 81 MG: 81 TABLET ORAL at 08:39

## 2023-09-16 RX ADMIN — MUPIROCIN: 20 OINTMENT TOPICAL at 08:40

## 2023-09-16 RX ADMIN — TORSEMIDE 40 MG: 20 TABLET ORAL at 08:38

## 2023-09-16 RX ADMIN — PANTOPRAZOLE SODIUM 40 MG: 40 TABLET, DELAYED RELEASE ORAL at 05:47

## 2023-09-16 NOTE — PROGRESS NOTES
Report called to [facility] at [facility]. All assessments, orders and concerns were addressed at that time.

## 2023-09-16 NOTE — PROGRESS NOTES
[facility]  Internal Medicine Teaching Residency Program  Inpatient Daily Progress Note  ______________________________________________________________________________    Patient: Basil Blue  YOB: 1945   BYV:[id number]    Acct: [de-identified]     Room: 2001/2001-01  Admit date: 9/7/2023  Today's date: 09/16/23  Number of days in the hospital: 9    SUBJECTIVE   Admitting Diagnosis: Ventricular tachycardia ([address])  CC: no complaints  Pt examined at bedside. Chart & results reviewed. Patient doing well  Hemodynamically stable, afebrile  Labs reviewed  Discharge today    ROS:  Constitutional:  negative for chills, fevers, sweats  Respiratory:  negative for cough, dyspnea on exertion, hemoptysis, shortness of breath, wheezing  Cardiovascular:  negative for chest pain, chest pressure/discomfort, lower extremity edema, palpitations  Gastrointestinal:  negative for abdominal pain, constipation, diarrhea, nausea, vomiting  Neurological:  negative for dizziness, headache  BRIEF HISTORY     The patient is a 68 y.o. male who was brought to the ER by EMS initially for chest pain, burning pain sensation in the arms, dizziness and palpitations. The patient had chest pain the morning when he was brought by the EMS which resolved by itself,  but he had another episode of the chest pain that is when he called his sister and 911. EMS on arrival to his home noticed that patient is in V. tach which he spontaneously converted into normal sinus rhythm before giving any medications for any interventions. On arrival in the ED the patient again went into V. tach during the initial evaluation by the ER physician and   he converted back into normal sinus rhythm before the cardioversion. Cardiology was consulted for V. tach and elevated troponins, per their recommendations he was given amiodarone bolus and started on heparin drip.   Patient became hypotensive after the Nightly    sodium chloride flush  5-40 mL IntraVENous 2 times per day    aspirin  81 mg Oral Daily     Continuous Infusions:    sodium chloride Stopped (09/08/23 1635)    dextrose       PRN Medicationsondansetron, 4 mg, Q6H PRN  acetaminophen, 650 mg, Q4H PRN  oxyCODONE, 5 mg, Q4H PRN   Or  oxyCODONE, 10 mg, Q4H PRN  heparin (porcine), 4,000 Units, PRN  heparin (porcine), 2,000 Units, PRN  melatonin, 5 mg, Nightly PRN  heparin (porcine), 1,900 Units, PRN  heparin (porcine), 1,600 Units, PRN  sodium chloride flush, 5-40 mL, PRN  sodium chloride, , PRN  ondansetron, 4 mg, Q8H PRN   Or  ondansetron, 4 mg, Q6H PRN  polyethylene glycol, 17 g, Daily PRN  acetaminophen, 650 mg, Q6H PRN  glucose, 4 tablet, PRN  dextrose bolus, 125 mL, PRN   Or  dextrose bolus, 250 mL, PRN  glucagon (rDNA), 1 mg, PRN  dextrose, , Continuous PRN        Diagnostic Labs:  CBC:   Recent Labs     09/14/23  0650 09/15/23  0540 09/16/23  0613   WBC 7.5 8.0 8.0   RBC 4.24 4.14* 4.59   HGB 11.1* 11.2* 11.8*   HCT 38.3* 37.1* 40.9   MCV 90.3 89.6 89.1   RDW 17.9* 17.7* 17.7*   * 189 125*     BMP:   Recent Labs     09/14/23  0650 09/15/23  0540 09/16/23  0613    139 146*   K 3.6* 3.8 4.1    101 104   CO2 27 22 33*   PHOS 3.1  --   --    BUN 63* 60* 59*   CREATININE 2.5* 2.3* 2.2*     BNP: No results for input(s): \"BNP\" in the last 72 hours. PT/INR: No results for input(s): \"PROTIME\", \"INR\" in the last 72 hours. APTT: No results for input(s): \"APTT\" in the last 72 hours. CARDIAC ENZYMES: No results for input(s): \"CKMB\", \"CKMBINDEX\", \"TROPONINI\" in the last 72 hours.     Invalid input(s): \"CKTOTAL;3\"  FASTING LIPID PANEL:  Lab Results   Component Value Date    CHOL 138 02/28/2022    HDL 44 02/28/2022    TRIG 104 02/28/2022     LIVER PROFILE:   Recent Labs     09/14/23  0650 09/15/23  0540 09/16/23  0613   AST 65* 39 25   * 160* 120*   BILITOT 1.1 1.1 1.2   ALKPHOS 77 77 76      MICROBIOLOGY:   Lab Results   Component Value

## 2023-09-16 NOTE — PROGRESS NOTES
Physician Progress Note      Rebecca Hamilton  CSN #:                  [id number]  :                       1945  ADMIT DATE:       2023 4:45 PM  DISCH DATE:  RESPONDING  PROVIDER #:        Germaine Lundberg          QUERY TEXT:    Patient admitted with Rajwinder. Documentation reflects \"shock, likely   cardiogenic\" in H & P. BP down to 44/29, HR up to 226, SpO2 69%. Pt needing BP   support with Anupam-synephrine. If possible, please document in the progress   notes and discharge summary if cardiogenic shock was: The medical record reflects the following:  Risk Factors: s/p vtach  Clinical Indicators: BP down to 44/29, HR up to 226, SpO2 69%. Pt needing BP   support with Anupam-synephrine. Treatment: Anupam-synephrine, Echo, Monitor telemetry and vital signs. Thank you for your time,    Jos Martinez MSN, RN CDS  [email or web address]  Options provided:  -- Cardiogenic shock confirmed after study  -- Cardiogenic shock treated and resolved  -- Cardiogenic shock ruled out after study  -- Other - I will add my own diagnosis  -- Disagree - Not applicable / Not valid  -- Disagree - Clinically unable to determine / Unknown  -- Refer to Clinical Documentation Reviewer    PROVIDER RESPONSE TEXT:    Cardiogenic shock confirmed after study.     Query created by: Jeremie Rader on 2023 10:35 AM      Electronically signed by:  Germaine Lundberg 2023 8:04 AM

## 2023-09-16 NOTE — PLAN OF CARE
Problem: Discharge Planning  Goal: Discharge to home or other facility with appropriate resources  9/16/2023 0404 by Sathish Yates RN  Outcome: Progressing  9/15/2023 1840 by Dharmesh Romero RN  Outcome: Progressing  Flowsheets (Taken 9/15/2023 1840)  Discharge to home or other facility with appropriate resources:   Identify barriers to discharge with patient and caregiver   Identify discharge learning needs (meds, wound care, etc)     Problem: Pain  Goal: Verbalizes/displays adequate comfort level or baseline comfort level  9/16/2023 0404 by Sathish Yates RN  Outcome: Progressing  9/15/2023 1840 by Dharmesh Romero RN  Outcome: Progressing  Flowsheets (Taken 9/15/2023 1840)  Verbalizes/displays adequate comfort level or baseline comfort level:   Encourage patient to monitor pain and request assistance   Assess pain using appropriate pain scale   Consider cultural and social influences on pain and pain management     Problem: Skin/Tissue Integrity  Goal: Absence of new skin breakdown  Description: 1. Monitor for areas of redness and/or skin breakdown  2. Assess vascular access sites hourly  3. Every 4-6 hours minimum:  Change oxygen saturation probe site  4. Every 4-6 hours:  If on nasal continuous positive airway pressure, respiratory therapy assess nares and determine need for appliance change or resting period.   9/16/2023 0404 by Sathish Yates RN  Outcome: Progressing  9/15/2023 1840 by Dharmesh Romero RN  Outcome: Progressing     Problem: ABCDS Injury Assessment  Goal: Absence of physical injury  9/16/2023 0404 by Sathish Yates RN  Outcome: Progressing  9/15/2023 1840 by Dharmesh Romero RN  Outcome: Progressing  Flowsheets (Taken 9/15/2023 1840)  Absence of Physical Injury: Implement safety measures based on patient assessment     Problem: Safety - Adult  Goal: Free from fall injury  9/16/2023 0404 by Sathish Yates RN  Outcome: Progressing  9/15/2023 1840 by Dharmesh Romero RN  Outcome:

## 2023-09-16 NOTE — CARE COORDINATION
Patient updated on plan and agreeable    Discharge Report    [facility] Case Management Department  Written by: Warren Rodriguez RN    Patient Name: Stephy Greenwood  Attending Provider: Kalin Barragan MD  Admit Date: 2023  4:45 PM  MRN: [id number]  Account: [de-identified]                     : 1945  Discharge Date: 2023      Disposition: [facility]    Warren Rodriguez RN

## 2023-09-17 LAB — ECHO BSA: 2.07 M2

## 2023-09-18 ENCOUNTER — CLINICAL DOCUMENTATION ONLY (OUTPATIENT)
Facility: CLINIC | Age: 78
End: 2023-09-18

## 2023-09-19 ENCOUNTER — OUTSIDE SERVICES (OUTPATIENT)
Dept: PRIMARY CARE CLINIC | Age: 78
End: 2023-09-19

## 2023-09-19 DIAGNOSIS — I50.22 CHRONIC SYSTOLIC CONGESTIVE HEART FAILURE (HCC): ICD-10-CM

## 2023-09-19 DIAGNOSIS — I34.0 MITRAL VALVE INSUFFICIENCY, UNSPECIFIED ETIOLOGY: ICD-10-CM

## 2023-09-19 DIAGNOSIS — I89.0 LYMPHEDEMA OF BOTH LOWER EXTREMITIES: ICD-10-CM

## 2023-09-19 DIAGNOSIS — N18.4 CKD (CHRONIC KIDNEY DISEASE) STAGE 4, GFR 15-29 ML/MIN (HCC): ICD-10-CM

## 2023-09-19 ASSESSMENT — ENCOUNTER SYMPTOMS
COUGH: 0
VOMITING: 0
NAUSEA: 0
SHORTNESS OF BREATH: 0
DIARRHEA: 0

## 2023-09-19 NOTE — PROGRESS NOTES
etiology        3. Chronic systolic congestive heart failure ([address])        4. CKD (chronic kidney disease) stage 4, GFR 15-29 ml/min ([facility])            PLAN:    1. Lymphedema of both lower extremities  Assessment & Plan:   has refused lasix in the past and continues to have edema with skin changes- monitor and keep feet up as much as possible. Is currently on Torsemide and aldactone. 2. Mitral valve insufficiency, unspecified etiology  3. Chronic systolic congestive heart failure ([address])  4. CKD (chronic kidney disease) stage 4, GFR 15-29 ml/min ([facility])  Assessment & Plan:   recheck labs ordered.

## 2023-09-19 NOTE — ASSESSMENT & PLAN NOTE
has refused lasix in the past and continues to have edema with skin changes- monitor and keep feet up as much as possible. Is currently on Torsemide and aldactone.

## 2023-09-22 ENCOUNTER — OUTSIDE SERVICES (OUTPATIENT)
Dept: PRIMARY CARE CLINIC | Age: 78
End: 2023-09-22

## 2023-09-22 DIAGNOSIS — M79.671 PAIN IN BOTH FEET: ICD-10-CM

## 2023-09-22 DIAGNOSIS — M79.672 PAIN IN BOTH FEET: ICD-10-CM

## 2023-09-22 DIAGNOSIS — N18.4 CKD (CHRONIC KIDNEY DISEASE) STAGE 4, GFR 15-29 ML/MIN (HCC): ICD-10-CM

## 2023-09-22 DIAGNOSIS — I89.0 LYMPHEDEMA OF BOTH LOWER EXTREMITIES: Primary | ICD-10-CM

## 2023-09-23 ASSESSMENT — ENCOUNTER SYMPTOMS
NAUSEA: 0
SHORTNESS OF BREATH: 0
CONSTIPATION: 0
DIARRHEA: 0
COUGH: 0
COLOR CHANGE: 1
SINUS PAIN: 0

## 2023-09-26 ENCOUNTER — OUTSIDE SERVICES (OUTPATIENT)
Dept: PRIMARY CARE CLINIC | Age: 78
End: 2023-09-26

## 2023-09-26 DIAGNOSIS — N18.4 CKD (CHRONIC KIDNEY DISEASE) STAGE 4, GFR 15-29 ML/MIN (HCC): ICD-10-CM

## 2023-09-26 DIAGNOSIS — I89.0 LYMPHEDEMA OF BOTH LOWER EXTREMITIES: ICD-10-CM

## 2023-09-26 DIAGNOSIS — I50.22 CHRONIC SYSTOLIC CONGESTIVE HEART FAILURE (HCC): ICD-10-CM

## 2023-09-26 DIAGNOSIS — R60.0 LOCALIZED EDEMA: ICD-10-CM

## 2023-09-26 ASSESSMENT — ENCOUNTER SYMPTOMS
VOMITING: 0
SHORTNESS OF BREATH: 0
NAUSEA: 0
COUGH: 0
DIARRHEA: 0

## 2023-09-26 NOTE — ASSESSMENT & PLAN NOTE
BUN/ Cr getting worse with all the diuretics. Will check labs weekly x3. Do not want to increase meds due to renal failure.

## 2023-09-26 NOTE — PROGRESS NOTES
Saadia Gan is a 68 y.o. male being seen for his weekly follow up. Location of visit: [facility]    HPI:  New today:Pt doing okay except nursing concerned about the edema/ weeping of his feet. Review of Systems   Constitutional:  Negative for activity change, appetite change, chills, diaphoresis and fever. HENT:  Negative for congestion. Respiratory:  Negative for cough and shortness of breath. Cardiovascular:  Positive for leg swelling. Negative for chest pain and palpitations. Gastrointestinal:  Negative for diarrhea, nausea and vomiting. Genitourinary:  Negative for hematuria. Musculoskeletal:  Negative for arthralgias and myalgias. Skin:  Negative for rash. Neurological:  Negative for weakness and headaches. Psychiatric/Behavioral:  Negative for agitation, dysphoric mood and sleep disturbance. The patient is not nervous/anxious. Physical Exam  Vitals reviewed. Constitutional:       General: He is not in acute distress. HENT:      Head: Normocephalic and atraumatic. Nose: No congestion or rhinorrhea. Eyes:      General:         Right eye: No discharge. Left eye: No discharge. Cardiovascular:      Rate and Rhythm: Normal rate and regular rhythm. Pulmonary:      Effort: Pulmonary effort is normal. No respiratory distress. Breath sounds: Normal breath sounds. Abdominal:      Palpations: Abdomen is soft. Tenderness: There is no abdominal tenderness. Musculoskeletal:      Right lower leg: Edema present. Left lower leg: Edema present. Skin:     General: Skin is warm and dry. Neurological:      Mental Status: He is alert. Mental status is at baseline. ASSESSMENT:   Diagnosis Orders   1. CKD (chronic kidney disease) stage 4, GFR 15-29 ml/min ([facility])        2. Localized edema        3. Lymphedema of both lower extremities        4. Chronic systolic congestive heart failure ([address])            PLAN:    1.  CKD (chronic kidney

## 2023-09-26 NOTE — ASSESSMENT & PLAN NOTE
edema is stable in legs, but feet are having significant weeping. wearing wraps on legs, but refuses LUCA boots- too hot he says.   Hurts to keep his feet up (probably due to PAD)

## 2023-10-03 ENCOUNTER — OUTSIDE SERVICES (OUTPATIENT)
Dept: PRIMARY CARE CLINIC | Age: 78
End: 2023-10-03

## 2023-10-03 DIAGNOSIS — I89.0 LYMPHEDEMA OF BOTH LOWER EXTREMITIES: ICD-10-CM

## 2023-10-03 DIAGNOSIS — I10 ESSENTIAL HYPERTENSION: Chronic | ICD-10-CM

## 2023-10-03 DIAGNOSIS — I50.22 CHRONIC SYSTOLIC CONGESTIVE HEART FAILURE (HCC): ICD-10-CM

## 2023-10-04 NOTE — PROGRESS NOTES
Antionette Salmon is a 68 y.o. male being seen for his weekly follow up. Location of visit: [facility]    HPI:  New today:Pt states he is doing okay. Feet and legs are looking a little better. Doing fine with the Unna boots. Review of Systems   Constitutional:  Negative for activity change, appetite change, chills, diaphoresis and fever. HENT:  Negative for congestion. Respiratory:  Negative for cough and shortness of breath. Cardiovascular:  Positive for leg swelling. Negative for chest pain and palpitations. Gastrointestinal:  Negative for diarrhea, nausea and vomiting. Genitourinary:  Negative for hematuria. Musculoskeletal:  Negative for arthralgias and myalgias. Skin:  Negative for rash. Neurological:  Negative for weakness and headaches. Psychiatric/Behavioral:  Negative for agitation, dysphoric mood and sleep disturbance. The patient is not nervous/anxious. Physical Exam  Vitals and nursing note reviewed. Constitutional:       General: He is not in acute distress. Appearance: He is well-developed. He is not ill-appearing. HENT:      Head: Normocephalic and atraumatic. Right Ear: External ear normal.      Left Ear: External ear normal.   Eyes:      General: No scleral icterus. Right eye: No discharge. Left eye: No discharge. Conjunctiva/sclera: Conjunctivae normal.   Neck:      Thyroid: No thyromegaly. Trachea: No tracheal deviation. Cardiovascular:      Rate and Rhythm: Normal rate and regular rhythm. Heart sounds: Normal heart sounds. Pulmonary:      Effort: Pulmonary effort is normal. No respiratory distress. Breath sounds: Normal breath sounds. No wheezing. Musculoskeletal:      Right lower leg: Edema present. Left lower leg: Edema present. Lymphadenopathy:      Cervical: No cervical adenopathy. Skin:     General: Skin is warm. Findings: No rash.    Neurological:      Mental Status: He is alert

## 2023-10-05 ASSESSMENT — ENCOUNTER SYMPTOMS
NAUSEA: 0
SHORTNESS OF BREATH: 0
VOMITING: 0
DIARRHEA: 0
COUGH: 0

## 2023-10-06 ENCOUNTER — OUTSIDE SERVICES (OUTPATIENT)
Dept: PRIMARY CARE CLINIC | Age: 78
End: 2023-10-06

## 2023-10-06 DIAGNOSIS — N18.4 CKD (CHRONIC KIDNEY DISEASE) STAGE 4, GFR 15-29 ML/MIN (HCC): ICD-10-CM

## 2023-10-06 DIAGNOSIS — H10.32 ACUTE BACTERIAL CONJUNCTIVITIS OF LEFT EYE: Primary | ICD-10-CM

## 2023-10-07 ASSESSMENT — ENCOUNTER SYMPTOMS
EYE PAIN: 1
DIARRHEA: 0
SHORTNESS OF BREATH: 0
EYE ITCHING: 1
CONSTIPATION: 0
NAUSEA: 0
EYE DISCHARGE: 1
EYE REDNESS: 1

## 2023-10-07 NOTE — PROGRESS NOTES
Ariel Rubalcava is a 68 y.o. male being seen for his weekly follow up. Location of visit: [facility]    Visit Date: 10/06/2023    Reason for Visit:    Chief Complaint   Patient presents with    Eye Pain        Eye Pain   Associated symptoms include an eye discharge, eye redness and itching. Pertinent negatives include no fever or nausea. Right great toe bumped and broke nail - bloody and blood tinged puss under nail yesterday. Left eye pink, green drainage, itchy X 2 days. BUN and Creatinine increasing. GFR decreased to 22 from 26. Angelita boots in place    Review of Systems   Constitutional:  Negative for chills, fatigue and fever. HENT:  Negative for congestion and nosebleeds. Eyes:  Positive for pain, discharge, redness and itching. Respiratory:  Negative for shortness of breath. Cardiovascular:  Positive for leg swelling. Gastrointestinal:  Negative for constipation, diarrhea and nausea. Genitourinary:  Negative for difficulty urinating and dysuria. Musculoskeletal:  Positive for gait problem. Skin:  Positive for wound. Neurological:  Negative for light-headedness and headaches. Psychiatric/Behavioral:  Negative for dysphoric mood and sleep disturbance. The patient is not nervous/anxious. Physical Exam  Vitals and nursing note reviewed. Constitutional:       Appearance: He is obese. HENT:      Head: Normocephalic and atraumatic. Eyes:      General: Lids are normal.      Extraocular Movements:      Right eye: Normal extraocular motion. Left eye: Normal extraocular motion. Conjunctiva/sclera:      Right eye: Right conjunctiva is not injected. No chemosis, exudate or hemorrhage. Left eye: Left conjunctiva is injected. Exudate present. No chemosis or hemorrhage. Cardiovascular:      Rate and Rhythm: Normal rate and regular rhythm. Heart sounds: Normal heart sounds.       Comments: Severe edema bilateral lower legs minimally improved with

## 2023-10-09 ENCOUNTER — OFFICE VISIT (OUTPATIENT)
Age: 78
End: 2023-10-09

## 2023-10-09 VITALS
TEMPERATURE: 98.4 F | RESPIRATION RATE: 18 BRPM | WEIGHT: 226 LBS | BODY MASS INDEX: 35.47 KG/M2 | DIASTOLIC BLOOD PRESSURE: 66 MMHG | OXYGEN SATURATION: 95 % | HEIGHT: 67 IN | SYSTOLIC BLOOD PRESSURE: 111 MMHG | HEART RATE: 80 BPM

## 2023-10-09 DIAGNOSIS — D68.69 SECONDARY HYPERCOAGULABLE STATE (HCC): ICD-10-CM

## 2023-10-09 DIAGNOSIS — I48.92 ATRIAL FLUTTER BY ELECTROCARDIOGRAM (HCC): Primary | ICD-10-CM

## 2023-10-09 DIAGNOSIS — E66.01 SEVERE OBESITY (BMI 35.0-39.9) WITH COMORBIDITY (HCC): ICD-10-CM

## 2023-10-09 RX ORDER — AMIODARONE HYDROCHLORIDE 200 MG/1
200 TABLET ORAL DAILY
Qty: 25 TABLET | Refills: 0 | Status: SHIPPED | OUTPATIENT
Start: 2023-10-09 | End: 2023-11-03

## 2023-10-09 NOTE — PROGRESS NOTES
areas that are resolving. The rest of the examination is within acceptable range of normality. ASSESSMENT/PLAN     María Elena Mooney was seen today for follow-up. Diagnoses and all orders for this visit:    Atrial flutter by electrocardiogram [facility])  -     Cardioversion external; Future  -     Basic Metabolic Panel; Future    Severe obesity (BMI 35.0-39. 9) with comorbidity ([address])    Secondary hypercoagulable state ([address])    Other orders  -     amiodarone (CORDARONE) 200 MG tablet; Take 1 tablet by mouth daily for 25 doses    Plan as discussed above, plans for DC cardioversion decrease amiodarone to 200 mg p.o. daily and proceed from there. The DC cardioversion was discussed with the patient with all possible risks and complication. No follow-ups on file.     COMMUNICATION:       Electronically signed by Mikey Chacon MD on 10/9/2023 at 8:59 AM

## 2023-10-10 ENCOUNTER — OUTSIDE SERVICES (OUTPATIENT)
Dept: PRIMARY CARE CLINIC | Age: 78
End: 2023-10-10

## 2023-10-10 DIAGNOSIS — N18.4 CKD (CHRONIC KIDNEY DISEASE) STAGE 4, GFR 15-29 ML/MIN (HCC): ICD-10-CM

## 2023-10-10 DIAGNOSIS — N17.9 AKI (ACUTE KIDNEY INJURY) (HCC): ICD-10-CM

## 2023-10-10 DIAGNOSIS — I50.22 CHRONIC SYSTOLIC CONGESTIVE HEART FAILURE (HCC): ICD-10-CM

## 2023-10-10 DIAGNOSIS — I89.0 LYMPHEDEMA OF BOTH LOWER EXTREMITIES: ICD-10-CM

## 2023-10-10 DIAGNOSIS — I48.92 ATRIAL FLUTTER, UNSPECIFIED TYPE (HCC): ICD-10-CM

## 2023-10-10 ASSESSMENT — ENCOUNTER SYMPTOMS
COUGH: 1
NAUSEA: 0
SHORTNESS OF BREATH: 1
DIARRHEA: 0
VOMITING: 0
SORE THROAT: 0

## 2023-10-10 NOTE — ASSESSMENT & PLAN NOTE
remains in a fib/ flutter per pt and is to have cardioversion next week. Is currently having some increased SOB- probably due to this. Will continue same meds for now. Don't want to increase diuretic due to renal function- and pt is not acutely symptomatic.

## 2023-10-10 NOTE — PROGRESS NOTES
week.  Is currently having some increased SOB- probably due to this. Will continue same meds for now. Don't want to increase diuretic due to renal function- and pt is not acutely symptomatic. 2. MARK (acute kidney injury) ([address])  Assessment & Plan:   numbers remaining stable with current doses. Hopefully cardioversion will help some also. 3. CKD (chronic kidney disease) stage 4, GFR 15-29 ml/min ([facility])  Assessment & Plan:   as above  4. Chronic systolic congestive heart failure ([address])  Assessment & Plan:   s/p AICD- continue same meds. As above- monitored by specialist  5. Lymphedema of both lower extremities  Assessment & Plan:   continue UNNA boots.

## 2023-10-13 ENCOUNTER — OUTSIDE SERVICES (OUTPATIENT)
Dept: PRIMARY CARE CLINIC | Age: 78
End: 2023-10-13

## 2023-10-13 DIAGNOSIS — I50.22 CHRONIC SYSTOLIC CONGESTIVE HEART FAILURE (HCC): Primary | ICD-10-CM

## 2023-10-13 DIAGNOSIS — F41.9 ANXIETY: ICD-10-CM

## 2023-10-13 DIAGNOSIS — Z79.899 MEDICATION MANAGEMENT: ICD-10-CM

## 2023-10-14 ASSESSMENT — ENCOUNTER SYMPTOMS
COUGH: 1
CONSTIPATION: 0
DIARRHEA: 0
SHORTNESS OF BREATH: 1
COLOR CHANGE: 1
NAUSEA: 0

## 2023-10-17 ENCOUNTER — OUTSIDE SERVICES (OUTPATIENT)
Dept: PRIMARY CARE CLINIC | Age: 78
End: 2023-10-17

## 2023-10-17 DIAGNOSIS — E87.5 HYPERKALEMIA: ICD-10-CM

## 2023-10-17 DIAGNOSIS — N18.4 CKD (CHRONIC KIDNEY DISEASE) STAGE 4, GFR 15-29 ML/MIN (HCC): ICD-10-CM

## 2023-10-17 DIAGNOSIS — I48.92 ATRIAL FLUTTER BY ELECTROCARDIOGRAM (HCC): ICD-10-CM

## 2023-10-17 DIAGNOSIS — I89.0 LYMPHEDEMA OF BOTH LOWER EXTREMITIES: ICD-10-CM

## 2023-10-18 ENCOUNTER — OFFICE VISIT (OUTPATIENT)
Dept: OPERATING ROOM | Age: 78
End: 2023-10-18
Attending: SPECIALIST

## 2023-10-18 ENCOUNTER — HOSPITAL ENCOUNTER (OUTPATIENT)
Age: 78
Discharge: HOME | End: 2023-10-20
Attending: SPECIALIST
Payer: MEDICARE

## 2023-10-18 VITALS
RESPIRATION RATE: 27 BRPM | DIASTOLIC BLOOD PRESSURE: 68 MMHG | HEART RATE: 78 BPM | WEIGHT: 222 LBS | BODY MASS INDEX: 34.84 KG/M2 | TEMPERATURE: 97.8 F | OXYGEN SATURATION: 99 % | SYSTOLIC BLOOD PRESSURE: 124 MMHG | HEIGHT: 67 IN

## 2023-10-18 DIAGNOSIS — I49.01 VF (VENTRICULAR FIBRILLATION) (HCC): ICD-10-CM

## 2023-10-18 DIAGNOSIS — I48.92 ATRIAL FLUTTER (HCC): Primary | ICD-10-CM

## 2023-10-18 DIAGNOSIS — I48.92 ATRIAL FLUTTER BY ELECTROCARDIOGRAM (HCC): ICD-10-CM

## 2023-10-18 DIAGNOSIS — I48.3 TYPICAL ATRIAL FLUTTER (HCC): ICD-10-CM

## 2023-10-18 LAB
BUN BLD-MCNC: 77 MG/DL (ref 8–26)
CHLORIDE BLD-SCNC: 111 MMOL/L (ref 98–107)
ECHO BSA: 2.18 M2
EGFR, POC: 26 ML/MIN/1.73M2
EKG ATRIAL RATE: 96 BPM
EKG Q-T INTERVAL: 364 MS
EKG QRS DURATION: 106 MS
EKG QTC CALCULATION (BAZETT): 452 MS
EKG R AXIS: 7 DEGREES
EKG T AXIS: 78 DEGREES
EKG VENTRICULAR RATE: 93 BPM
GLUCOSE BLD-MCNC: 100 MG/DL (ref 74–100)
HCT VFR BLD AUTO: 40 % (ref 41–53)
POC CREATININE: 2.5 MG/DL (ref 0.51–1.19)
POC HEMOGLOBIN (CALC): 13.7 G/DL (ref 13.5–17.5)
POTASSIUM BLD-SCNC: 4.6 MMOL/L (ref 3.5–4.5)
SODIUM BLD-SCNC: 149 MMOL/L (ref 138–146)

## 2023-10-18 PROCEDURE — 84295 ASSAY OF SERUM SODIUM: CPT

## 2023-10-18 PROCEDURE — 7100000011 HC PHASE II RECOVERY - ADDTL 15 MIN

## 2023-10-18 PROCEDURE — 6360000002 HC RX W HCPCS: Performed by: SPECIALIST

## 2023-10-18 PROCEDURE — 82435 ASSAY OF BLOOD CHLORIDE: CPT

## 2023-10-18 PROCEDURE — 85014 HEMATOCRIT: CPT

## 2023-10-18 PROCEDURE — 93005 ELECTROCARDIOGRAM TRACING: CPT | Performed by: SPECIALIST

## 2023-10-18 PROCEDURE — 92960 CARDIOVERSION ELECTRIC EXT: CPT

## 2023-10-18 PROCEDURE — 93010 ELECTROCARDIOGRAM REPORT: CPT | Performed by: INTERNAL MEDICINE

## 2023-10-18 PROCEDURE — 7100000010 HC PHASE II RECOVERY - FIRST 15 MIN

## 2023-10-18 PROCEDURE — 82565 ASSAY OF CREATININE: CPT

## 2023-10-18 PROCEDURE — 82947 ASSAY GLUCOSE BLOOD QUANT: CPT

## 2023-10-18 PROCEDURE — 84132 ASSAY OF SERUM POTASSIUM: CPT

## 2023-10-18 PROCEDURE — 84520 ASSAY OF UREA NITROGEN: CPT

## 2023-10-18 RX ORDER — PROPOFOL 10 MG/ML
INJECTION, EMULSION INTRAVENOUS CONTINUOUS PRN
Status: COMPLETED | OUTPATIENT
Start: 2023-10-18 | End: 2023-10-18

## 2023-10-18 RX ORDER — HYDROXYZINE HYDROCHLORIDE 25 MG/1
25 TABLET, FILM COATED ORAL 4 TIMES DAILY PRN
COMMUNITY

## 2023-10-18 RX ORDER — SODIUM CHLORIDE 9 MG/ML
INJECTION, SOLUTION INTRAVENOUS CONTINUOUS
Status: DISCONTINUED | OUTPATIENT
Start: 2023-10-18 | End: 2023-10-21 | Stop reason: HOSPADM

## 2023-10-18 RX ADMIN — PROPOFOL 50 MG: 10 INJECTION, EMULSION INTRAVENOUS at 09:30

## 2023-10-18 NOTE — DISCHARGE INSTRUCTIONS
Discharge Instructions for Cardioversion     Cardioversion is a procedure used to shock the heart back into a normal rhythm. Paddles or electrodes applied to the chest are used to deliver an electric shock to the heart so that it will return to a normal rhythm. What You Will Need   Someone to drive you home from the hospital  NO DRIVING FOR [address] [place] [address] may feel sleepy after the procedure because of anesthesia or a sedative. Rest when you arrive home. If your chest skin is irritated, apply an ointment or hydrocortisone cream, as directed by your doctor. Do not apply any bandages. Diet   Resume your normal diet after the procedure     Physical Activity   You may feel sleepy after the procedure and should rest as needed. In addition:   Rest as needed. Avoid heavy lifting or strenuous activity immediately following your procedure. Ask your doctor when you can resume normal physical activity (typically, the day after the cardioversion). Medications   You may be given a medicine called an anti-arrhythmic. This type of drug prevents your heart from having abnormal rhythms. You may also be told to take a blood thinner medication to prevent blood clots. Your doctor will tell you when you should resume any normal medications. If you are taking medications, follow these general guidelines:   Take your medication as directed. Do not change the amount or the schedule. Do not stop taking them without talking to your doctor. Do not share them. Know what the results and side effects. Report them to your doctor. Some drugs can be dangerous when mixed. Talk to a doctor or pharmacist if you are taking more than one drug. This includes over-the-counter medication and herb or dietary supplements. Plan ahead for refills so you don't run out. Follow-up   Schedule a follow-up appointment as directed by your doctor.  If you are started on blood thinner medication, you may need regular

## 2023-10-18 NOTE — H&P
68years old gentleman with known history of dilated cardiomyopathy atrial flutter status post ICD implant. Presented today for DC cardioversion he has been on amiodarone despite that he continued to be in atrial flutter. Has been on oral anticoagulation for more than 3 weeks. Plans for DC cardioversion and ICD test reprogramming. Above has been in discussion with the patient he understands and he agrees. Today he is awake alert oriented in no distress no symptoms of chest distant comfort. Vitals are stable he remains in atrial flutter as when we interrogated his Medtronic device. The rest of the examination is within acceptable range of normality. We will proceed with the cardioversion and ICD interrogation.   End of dictation

## 2023-10-18 NOTE — PROGRESS NOTES
TRANSFER - OUT REPORT:    Verbal report given to sonia(name) on Bernie Hartmann being transferred to [facility](unit) for routine post-op       Report consisted of patient's Situation, Background, Assessment and   Recommendations(SBAR). Information from the following report(s) Nurse Handoff Report was reviewed with the receiving nurse. Opportunity for questions and clarification was provided.       Patient transported with:   O2 @ 15 liters  Registered Nurse  [facility]

## 2023-10-19 ASSESSMENT — ENCOUNTER SYMPTOMS
DIARRHEA: 0
SHORTNESS OF BREATH: 0
COLOR CHANGE: 1
VOMITING: 0
NAUSEA: 0
COUGH: 0

## 2023-10-19 NOTE — ASSESSMENT & PLAN NOTE
Continue Angelita boots (although toes are a little bruised/ have petechiae so maybe a little looser this wrap)

## 2023-10-19 NOTE — PROGRESS NOTES
Fer Freed is a 68 y.o. male being seen for his weekly follow up. Location of visit: [facility]    HPI:  New today:pt with some discoloration of toes. No pain. No increased SOB        Review of Systems   Constitutional:  Negative for activity change, appetite change, chills, diaphoresis and fever. HENT:  Negative for congestion. Respiratory:  Negative for cough and shortness of breath. Cardiovascular:  Positive for leg swelling. Negative for chest pain and palpitations. Gastrointestinal:  Negative for diarrhea, nausea and vomiting. Genitourinary:  Negative for hematuria. Musculoskeletal:  Negative for arthralgias and myalgias. Skin:  Positive for color change (toes are bluish). Negative for rash. Neurological:  Negative for weakness and headaches. Psychiatric/Behavioral:  Negative for agitation, dysphoric mood and sleep disturbance. The patient is not nervous/anxious. Physical Exam  Vitals reviewed. Constitutional:       General: He is not in acute distress. HENT:      Head: Normocephalic and atraumatic. Nose: No congestion or rhinorrhea. Eyes:      General:         Right eye: No discharge. Left eye: No discharge. Cardiovascular:      Rate and Rhythm: Normal rate and regular rhythm. Pulmonary:      Effort: Pulmonary effort is normal. No respiratory distress. Breath sounds: Normal breath sounds. Abdominal:      Palpations: Abdomen is soft. Tenderness: There is no abdominal tenderness. Musculoskeletal:      Right lower leg: No edema. Left lower leg: No edema. Skin:     General: Skin is warm and dry. Findings: Bruising (and petechiae in toes) present. Neurological:      Mental Status: He is alert. Mental status is at baseline. ASSESSMENT:   Diagnosis Orders   1. Lymphedema of both lower extremities        2. Atrial flutter by electrocardiogram ([address])        3. Hyperkalemia        4.  CKD (chronic kidney disease) stage

## 2023-10-24 ENCOUNTER — OUTSIDE SERVICES (OUTPATIENT)
Dept: PRIMARY CARE CLINIC | Age: 78
End: 2023-10-24

## 2023-10-24 DIAGNOSIS — I50.22 CHRONIC SYSTOLIC CONGESTIVE HEART FAILURE (HCC): ICD-10-CM

## 2023-10-24 DIAGNOSIS — I89.0 LYMPHEDEMA OF BOTH LOWER EXTREMITIES: ICD-10-CM

## 2023-10-24 DIAGNOSIS — I49.01 VF (VENTRICULAR FIBRILLATION) (HCC): ICD-10-CM

## 2023-10-24 DIAGNOSIS — N18.4 CKD (CHRONIC KIDNEY DISEASE) STAGE 4, GFR 15-29 ML/MIN (HCC): ICD-10-CM

## 2023-10-24 DIAGNOSIS — I48.92 ATRIAL FLUTTER BY ELECTROCARDIOGRAM (HCC): ICD-10-CM

## 2023-10-24 ASSESSMENT — ENCOUNTER SYMPTOMS
DIARRHEA: 0
NAUSEA: 0
VOMITING: 0
COUGH: 0
SHORTNESS OF BREATH: 0

## 2023-10-24 NOTE — PROGRESS NOTES
Mady Vázquez is a 68 y.o. male being seen for his weekly follow up. Location of visit: [facility]    HPI:  New today:Pt had cardioversion last week. States that he feels less SOB and dizzy now. No new complaints. Legs are about the same. Review of Systems   Constitutional:  Negative for activity change, appetite change, chills, diaphoresis and fever. HENT:  Negative for congestion. Respiratory:  Negative for cough and shortness of breath. Cardiovascular:  Positive for leg swelling. Negative for chest pain and palpitations. Gastrointestinal:  Negative for diarrhea, nausea and vomiting. Genitourinary:  Negative for hematuria. Musculoskeletal:  Negative for arthralgias and myalgias. Skin:  Negative for rash. Neurological:  Negative for weakness and headaches. Psychiatric/Behavioral:  Negative for agitation, dysphoric mood and sleep disturbance. The patient is not nervous/anxious. Physical Exam  Vitals reviewed. Constitutional:       General: He is not in acute distress. HENT:      Head: Normocephalic and atraumatic. Nose: No congestion or rhinorrhea. Eyes:      General:         Right eye: No discharge. Left eye: No discharge. Cardiovascular:      Rate and Rhythm: Normal rate. Rhythm irregular. Pulmonary:      Effort: Pulmonary effort is normal. No respiratory distress. Breath sounds: Normal breath sounds. Abdominal:      Palpations: Abdomen is soft. Tenderness: There is no abdominal tenderness. Musculoskeletal:      Right lower leg: Edema present. Left lower leg: Edema present. Skin:     General: Skin is warm and dry. Neurological:      Mental Status: He is alert. Mental status is at baseline. ASSESSMENT:   Diagnosis Orders   1. VF (ventricular fibrillation) ([address])        2. Atrial flutter by electrocardiogram ([address])        3. CKD (chronic kidney disease) stage 4, GFR 15-29 ml/min ([facility])        4.  Chronic systolic

## 2023-10-24 NOTE — ASSESSMENT & PLAN NOTE
Monitored by specialist- no acute findings meriting change in the plan- s/p  Cardioversion- currently rhythm is irregular, but pt feeling better- not as SOB or dizzy

## 2023-10-24 NOTE — ASSESSMENT & PLAN NOTE
Monitored by specialist- no acute findings meriting change in the plan- less SOB now so will continue same meds for now.

## 2023-10-31 ENCOUNTER — OUTSIDE SERVICES (OUTPATIENT)
Dept: PRIMARY CARE CLINIC | Age: 78
End: 2023-10-31

## 2023-10-31 DIAGNOSIS — I50.22 CHRONIC SYSTOLIC CONGESTIVE HEART FAILURE (HCC): ICD-10-CM

## 2023-10-31 DIAGNOSIS — N18.4 CKD (CHRONIC KIDNEY DISEASE) STAGE 4, GFR 15-29 ML/MIN (HCC): ICD-10-CM

## 2023-10-31 DIAGNOSIS — I48.92 ATRIAL FLUTTER BY ELECTROCARDIOGRAM (HCC): ICD-10-CM

## 2023-10-31 ASSESSMENT — ENCOUNTER SYMPTOMS
VOMITING: 0
COUGH: 0
NAUSEA: 0
DIARRHEA: 0
SHORTNESS OF BREATH: 0

## 2023-10-31 NOTE — PROGRESS NOTES
Edmond Reyes is a 68 y.o. male being seen for his weekly follow up. Location of visit: [facility]    HPI:  New today:Pt states feet and legs are about the same. Wants to make sure the podiatrist sees him this week. No other new complaints. No new SOB         Review of Systems   Constitutional:  Negative for activity change, appetite change, chills, diaphoresis and fever. HENT:  Negative for congestion. Respiratory:  Negative for cough and shortness of breath. Cardiovascular:  Positive for leg swelling. Negative for chest pain and palpitations. Gastrointestinal:  Negative for diarrhea, nausea and vomiting. Genitourinary:  Negative for hematuria. Musculoskeletal:  Negative for arthralgias and myalgias. Skin:  Negative for rash. Neurological:  Negative for weakness and headaches. Psychiatric/Behavioral:  Negative for agitation, dysphoric mood and sleep disturbance. The patient is not nervous/anxious. Physical Exam  Vitals reviewed. Constitutional:       General: He is not in acute distress. HENT:      Head: Normocephalic and atraumatic. Nose: No congestion or rhinorrhea. Eyes:      General:         Right eye: No discharge. Left eye: No discharge. Cardiovascular:      Rate and Rhythm: Normal rate and regular rhythm. Pulmonary:      Effort: Pulmonary effort is normal. No respiratory distress. Breath sounds: Normal breath sounds. Abdominal:      Palpations: Abdomen is soft. Tenderness: There is no abdominal tenderness. Musculoskeletal:      Right lower leg: No edema. Left lower leg: No edema. Skin:     General: Skin is warm and dry. Neurological:      Mental Status: He is alert. Mental status is at baseline. ASSESSMENT:   Diagnosis Orders   1. Atrial flutter by electrocardiogram ([address])        2. CKD (chronic kidney disease) stage 4, GFR 15-29 ml/min ([facility])        3.  Chronic systolic congestive heart failure ([address])

## 2023-10-31 NOTE — ASSESSMENT & PLAN NOTE
currently rhythm sounds A fib- has f/u next week with cardiology. Symptoms are better overall per pt.

## 2023-11-09 PROBLEM — N28.1 BILATERAL RENAL CYSTS: Status: ACTIVE | Noted: 2023-11-09

## 2023-11-10 ENCOUNTER — OUTSIDE SERVICES (OUTPATIENT)
Dept: PRIMARY CARE CLINIC | Age: 78
End: 2023-11-10

## 2023-11-10 DIAGNOSIS — I89.0 LYMPHEDEMA OF BOTH LOWER EXTREMITIES: ICD-10-CM

## 2023-11-10 DIAGNOSIS — U07.1 COVID-19: Primary | ICD-10-CM

## 2023-11-10 DIAGNOSIS — I50.22 CHRONIC SYSTOLIC CONGESTIVE HEART FAILURE (HCC): ICD-10-CM

## 2023-11-11 ASSESSMENT — ENCOUNTER SYMPTOMS
ABDOMINAL PAIN: 0
SORE THROAT: 0
SHORTNESS OF BREATH: 0
COUGH: 0
VOMITING: 0
DIARRHEA: 0
EYE REDNESS: 0
FACIAL SWELLING: 0
WHEEZING: 0
STRIDOR: 1
NAUSEA: 0
EYE DISCHARGE: 0
RHINORRHEA: 0

## 2023-11-12 ENCOUNTER — HOSPITAL ENCOUNTER (INPATIENT)
Age: 78
LOS: 3 days | Discharge: NURSING, REHAB OR LONG-TERM CARE | DRG: 302 | End: 2023-11-15
Attending: EMERGENCY MEDICINE | Admitting: INTERNAL MEDICINE
Payer: MEDICARE

## 2023-11-12 ENCOUNTER — APPOINTMENT (OUTPATIENT)
Dept: GENERAL RADIOLOGY | Age: 78
DRG: 302 | End: 2023-11-12
Payer: MEDICARE

## 2023-11-12 DIAGNOSIS — I83.019 VENOUS ULCER OF RIGHT LEG (HCC): ICD-10-CM

## 2023-11-12 DIAGNOSIS — L97.919 VENOUS ULCER OF RIGHT LEG (HCC): ICD-10-CM

## 2023-11-12 DIAGNOSIS — R07.9 CHEST PAIN, UNSPECIFIED TYPE: Primary | ICD-10-CM

## 2023-11-12 DIAGNOSIS — I83.029 VENOUS ULCER OF LEFT LEG (HCC): ICD-10-CM

## 2023-11-12 DIAGNOSIS — I48.3 TYPICAL ATRIAL FLUTTER (HCC): ICD-10-CM

## 2023-11-12 DIAGNOSIS — I89.0 LYMPHEDEMA OF BOTH LOWER EXTREMITIES: ICD-10-CM

## 2023-11-12 DIAGNOSIS — L97.929 VENOUS ULCER OF LEFT LEG (HCC): ICD-10-CM

## 2023-11-12 LAB
ALBUMIN SERPL-MCNC: 3.5 G/DL (ref 3.5–5.2)
ALP SERPL-CCNC: 89 U/L (ref 40–129)
ALT SERPL-CCNC: 14 U/L (ref 5–41)
ANION GAP SERPL CALCULATED.3IONS-SCNC: 12 MMOL/L (ref 9–17)
AST SERPL-CCNC: 20 U/L
BASOPHILS # BLD: 0.06 K/UL (ref 0–0.2)
BASOPHILS NFR BLD: 1 % (ref 0–2)
BILIRUB SERPL-MCNC: 1 MG/DL (ref 0.3–1.2)
BNP SERPL-MCNC: ABNORMAL PG/ML
BUN SERPL-MCNC: 57 MG/DL (ref 8–23)
CALCIUM SERPL-MCNC: 8.3 MG/DL (ref 8.6–10.4)
CHLORIDE SERPL-SCNC: 101 MMOL/L (ref 98–107)
CO2 SERPL-SCNC: 29 MMOL/L (ref 20–31)
CREAT SERPL-MCNC: 2.4 MG/DL (ref 0.7–1.2)
EOSINOPHIL # BLD: 0.06 K/UL (ref 0–0.4)
EOSINOPHILS RELATIVE PERCENT: 1 % (ref 0–4)
ERYTHROCYTE [DISTWIDTH] IN BLOOD BY AUTOMATED COUNT: 24 % (ref 11.5–14.9)
GFR SERPL CREATININE-BSD FRML MDRD: 27 ML/MIN/1.73M2
GLUCOSE SERPL-MCNC: 101 MG/DL (ref 70–99)
HCT VFR BLD AUTO: 31.2 % (ref 41–53)
HGB BLD-MCNC: 9.5 G/DL (ref 13.5–17.5)
INR PPP: 1.7
LYMPHOCYTES NFR BLD: 0.68 K/UL (ref 1–4.8)
LYMPHOCYTES RELATIVE PERCENT: 11 % (ref 24–44)
MAGNESIUM SERPL-MCNC: 1.9 MG/DL (ref 1.6–2.6)
MCH RBC QN AUTO: 26.4 PG (ref 26–34)
MCHC RBC AUTO-ENTMCNC: 30.4 G/DL (ref 31–37)
MCV RBC AUTO: 86.6 FL (ref 80–100)
MONOCYTES NFR BLD: 0.5 K/UL (ref 0.1–1.3)
MONOCYTES NFR BLD: 8 % (ref 1–7)
MORPHOLOGY: ABNORMAL
NEUTROPHILS NFR BLD: 79 % (ref 36–66)
NEUTS SEG NFR BLD: 4.9 K/UL (ref 1.3–9.1)
PLATELET # BLD AUTO: 195 K/UL (ref 150–450)
PMV BLD AUTO: 8 FL (ref 6–12)
POTASSIUM SERPL-SCNC: 4.4 MMOL/L (ref 3.7–5.3)
PROT SERPL-MCNC: 7.6 G/DL (ref 6.4–8.3)
PROTHROMBIN TIME: 20.2 SEC (ref 11.8–14.6)
RBC # BLD AUTO: 3.6 M/UL (ref 4.5–5.9)
SODIUM SERPL-SCNC: 142 MMOL/L (ref 135–144)
TROPONIN I SERPL HS-MCNC: 59 NG/L (ref 0–22)
TROPONIN I SERPL HS-MCNC: 67 NG/L (ref 0–22)
WBC OTHER # BLD: 6.2 K/UL (ref 3.5–11)

## 2023-11-12 PROCEDURE — 80053 COMPREHEN METABOLIC PANEL: CPT

## 2023-11-12 PROCEDURE — 99223 1ST HOSP IP/OBS HIGH 75: CPT | Performed by: INTERNAL MEDICINE

## 2023-11-12 PROCEDURE — 85025 COMPLETE CBC W/AUTO DIFF WBC: CPT

## 2023-11-12 PROCEDURE — 84484 ASSAY OF TROPONIN QUANT: CPT

## 2023-11-12 PROCEDURE — 6370000000 HC RX 637 (ALT 250 FOR IP): Performed by: INTERNAL MEDICINE

## 2023-11-12 PROCEDURE — 2060000000 HC ICU INTERMEDIATE R&B

## 2023-11-12 PROCEDURE — 6370000000 HC RX 637 (ALT 250 FOR IP): Performed by: EMERGENCY MEDICINE

## 2023-11-12 PROCEDURE — 99285 EMERGENCY DEPT VISIT HI MDM: CPT

## 2023-11-12 PROCEDURE — 83735 ASSAY OF MAGNESIUM: CPT

## 2023-11-12 PROCEDURE — 2580000003 HC RX 258: Performed by: INTERNAL MEDICINE

## 2023-11-12 PROCEDURE — 83880 ASSAY OF NATRIURETIC PEPTIDE: CPT

## 2023-11-12 PROCEDURE — 85610 PROTHROMBIN TIME: CPT

## 2023-11-12 PROCEDURE — 71045 X-RAY EXAM CHEST 1 VIEW: CPT

## 2023-11-12 PROCEDURE — 36415 COLL VENOUS BLD VENIPUNCTURE: CPT

## 2023-11-12 PROCEDURE — 93005 ELECTROCARDIOGRAM TRACING: CPT | Performed by: EMERGENCY MEDICINE

## 2023-11-12 RX ORDER — ACETAMINOPHEN 500 MG
1000 TABLET ORAL DAILY
Status: DISCONTINUED | OUTPATIENT
Start: 2023-11-12 | End: 2023-11-15 | Stop reason: HOSPADM

## 2023-11-12 RX ORDER — HYDROXYZINE HYDROCHLORIDE 25 MG/1
25 TABLET, FILM COATED ORAL 4 TIMES DAILY PRN
Status: DISCONTINUED | OUTPATIENT
Start: 2023-11-12 | End: 2023-11-15 | Stop reason: HOSPADM

## 2023-11-12 RX ORDER — SODIUM CHLORIDE 0.9 % (FLUSH) 0.9 %
5-40 SYRINGE (ML) INJECTION PRN
Status: DISCONTINUED | OUTPATIENT
Start: 2023-11-12 | End: 2023-11-15 | Stop reason: HOSPADM

## 2023-11-12 RX ORDER — TORSEMIDE 20 MG/1
40 TABLET ORAL DAILY
Status: DISCONTINUED | OUTPATIENT
Start: 2023-11-12 | End: 2023-11-15 | Stop reason: HOSPADM

## 2023-11-12 RX ORDER — ASPIRIN 325 MG
325 TABLET ORAL ONCE
Status: COMPLETED | OUTPATIENT
Start: 2023-11-12 | End: 2023-11-12

## 2023-11-12 RX ORDER — ACETAMINOPHEN 325 MG/1
650 TABLET ORAL EVERY 6 HOURS PRN
Status: DISCONTINUED | OUTPATIENT
Start: 2023-11-12 | End: 2023-11-15 | Stop reason: HOSPADM

## 2023-11-12 RX ORDER — ASPIRIN 81 MG/1
81 TABLET, CHEWABLE ORAL DAILY
Status: DISCONTINUED | OUTPATIENT
Start: 2023-11-12 | End: 2023-11-15 | Stop reason: HOSPADM

## 2023-11-12 RX ORDER — MIDODRINE HYDROCHLORIDE 2.5 MG/1
2.5 TABLET ORAL
Status: DISCONTINUED | OUTPATIENT
Start: 2023-11-12 | End: 2023-11-13

## 2023-11-12 RX ORDER — ACETAMINOPHEN 650 MG/1
650 SUPPOSITORY RECTAL EVERY 6 HOURS PRN
Status: DISCONTINUED | OUTPATIENT
Start: 2023-11-12 | End: 2023-11-15 | Stop reason: HOSPADM

## 2023-11-12 RX ORDER — SODIUM CHLORIDE 0.9 % (FLUSH) 0.9 %
5-40 SYRINGE (ML) INJECTION EVERY 12 HOURS SCHEDULED
Status: DISCONTINUED | OUTPATIENT
Start: 2023-11-12 | End: 2023-11-15 | Stop reason: HOSPADM

## 2023-11-12 RX ORDER — ISOSORBIDE MONONITRATE 30 MG/1
30 TABLET, EXTENDED RELEASE ORAL DAILY
Status: DISCONTINUED | OUTPATIENT
Start: 2023-11-12 | End: 2023-11-15 | Stop reason: HOSPADM

## 2023-11-12 RX ORDER — SODIUM CHLORIDE 9 MG/ML
INJECTION, SOLUTION INTRAVENOUS PRN
Status: DISCONTINUED | OUTPATIENT
Start: 2023-11-12 | End: 2023-11-15 | Stop reason: HOSPADM

## 2023-11-12 RX ORDER — SPIRONOLACTONE 25 MG/1
25 TABLET ORAL DAILY
Status: DISCONTINUED | OUTPATIENT
Start: 2023-11-12 | End: 2023-11-15 | Stop reason: HOSPADM

## 2023-11-12 RX ORDER — METOPROLOL SUCCINATE 50 MG/1
50 TABLET, EXTENDED RELEASE ORAL DAILY
Status: DISCONTINUED | OUTPATIENT
Start: 2023-11-12 | End: 2023-11-15 | Stop reason: HOSPADM

## 2023-11-12 RX ORDER — ATORVASTATIN CALCIUM 40 MG/1
40 TABLET, FILM COATED ORAL NIGHTLY
Status: DISCONTINUED | OUTPATIENT
Start: 2023-11-12 | End: 2023-11-15 | Stop reason: HOSPADM

## 2023-11-12 RX ORDER — ONDANSETRON 2 MG/ML
4 INJECTION INTRAMUSCULAR; INTRAVENOUS EVERY 6 HOURS PRN
Status: DISCONTINUED | OUTPATIENT
Start: 2023-11-12 | End: 2023-11-15 | Stop reason: HOSPADM

## 2023-11-12 RX ORDER — PANTOPRAZOLE SODIUM 40 MG/1
40 TABLET, DELAYED RELEASE ORAL
Status: DISCONTINUED | OUTPATIENT
Start: 2023-11-13 | End: 2023-11-15 | Stop reason: HOSPADM

## 2023-11-12 RX ORDER — POLYETHYLENE GLYCOL 3350 17 G/17G
17 POWDER, FOR SOLUTION ORAL DAILY PRN
Status: DISCONTINUED | OUTPATIENT
Start: 2023-11-12 | End: 2023-11-15 | Stop reason: HOSPADM

## 2023-11-12 RX ORDER — ONDANSETRON 4 MG/1
4 TABLET, ORALLY DISINTEGRATING ORAL EVERY 8 HOURS PRN
Status: DISCONTINUED | OUTPATIENT
Start: 2023-11-12 | End: 2023-11-15 | Stop reason: HOSPADM

## 2023-11-12 RX ADMIN — SODIUM CHLORIDE, PRESERVATIVE FREE 10 ML: 5 INJECTION INTRAVENOUS at 20:34

## 2023-11-12 RX ADMIN — ACETAMINOPHEN 650 MG: 325 TABLET ORAL at 23:47

## 2023-11-12 RX ADMIN — ASPIRIN 325 MG: 325 TABLET ORAL at 10:16

## 2023-11-12 RX ADMIN — ATORVASTATIN CALCIUM 40 MG: 40 TABLET, FILM COATED ORAL at 20:34

## 2023-11-12 RX ADMIN — MIDODRINE HYDROCHLORIDE 2.5 MG: 2.5 TABLET ORAL at 16:58

## 2023-11-12 RX ADMIN — APIXABAN 2.5 MG: 2.5 TABLET, FILM COATED ORAL at 20:34

## 2023-11-12 ASSESSMENT — PAIN SCALES - GENERAL
PAINLEVEL_OUTOF10: 8
PAINLEVEL_OUTOF10: 5

## 2023-11-12 ASSESSMENT — PAIN - FUNCTIONAL ASSESSMENT
PAIN_FUNCTIONAL_ASSESSMENT: NONE - DENIES PAIN
PAIN_FUNCTIONAL_ASSESSMENT: 0-10

## 2023-11-12 ASSESSMENT — PAIN DESCRIPTION - DESCRIPTORS: DESCRIPTORS: BURNING

## 2023-11-12 ASSESSMENT — PAIN DESCRIPTION - LOCATION: LOCATION: LEG

## 2023-11-12 ASSESSMENT — PAIN DESCRIPTION - ORIENTATION: ORIENTATION: RIGHT;LEFT

## 2023-11-12 NOTE — ED PROVIDER NOTES
EMERGENCY DEPARTMENT ENCOUNTER    Pt Name: Stephy Greenwood  MRN: [id number]  [address] [facility] 1945  Date of evaluation: 11/12/23  CHIEF COMPLAINT       Chief Complaint   Patient presents with    Chest Pain    Arm Pain     Bilat arm pain     HISTORY OF PRESENT ILLNESS   HPI  Chest burning radiating to both arms. Occurred when walking to the bathroom this morning, he lives in a nursing home. History of heart disease and atrial flutter. Compliant with medications. Symptoms have greatly improved. Arrived by EMS. Denies any fevers chills cough or congestion. He has chronic lymphedema, wears Unna boot, gets those changed weekly at the nursing home. REVIEW OF SYSTEMS     Review of Systems   All other systems reviewed and are negative.     PASTMEDICAL HISTORY     Past Medical History:   Diagnosis Date    Abdominal hernia     Atrophy of muscle of multiple sites 8/19/2016    CAD (coronary artery disease)     CKD (chronic kidney disease) stage 3, GFR 30-59 ml/min ([facility])     Colon polyp     Crohn disease ([address])     Crohn's colitis ([address])     Decubitus ulcer of coccyx, stage 2 ([address]) 8/8/2016    Diarrhea     Diverticulosis     Hemorrhoids     History of atrial fibrillation     Hypertension     Ileostomy in place ([address]) 8/18/2016    Internal hemorrhoids     NSVT (nonsustained ventricular tachycardia) ([address]) 08/18/2016    Other and unspecified hyperlipidemia     HISTORY OF HIGH CHOLESTEROL    Past heart attack     Stage 3 chronic kidney disease ([address])     Tobacco use disorder     Tubular adenoma      Past Problem List  Patient Active Problem List   Diagnosis Code    Hemorrhoids K64.9    Essential hypertension I10    Coronary artery disease involving native coronary artery of native heart without angina pectoris I25.10    Diverticulosis K57.90    Crohn's disease ([address]) K50.90    Crohn's disease of small intestine ([address]) K50.00    Refused pneumococcal vaccine Z28.21    Internal hemorrhoids K64.8    Calculus of kidney N20.0    H/O

## 2023-11-12 NOTE — ED NOTES
Pt is brought to this ER by [facility] from the [facility] with c/o a \"burning\" pain across his chest and radiating down his bilat arms after he was walking at the F. Pt also reportedly tested positive for Covid about 8 days ago at the [facility] after all of the residents were tested d/t an outbreak. Pt states he was asymptomatic at the time but 30 some residents out of 60 some residents did test positive. Pt arrives A+O x 4, GCS = 15, PMS x 4 intact, eupneic, and PWD. Pt's pulse is irregular with frequent PVC's and on demand atrial pacer. Pt has hx of lymph edema, and he has crackles in is bilat posterior lung fields as well as moderate pitting from his hips down to this feet. Pt presents with his lower legs and feet wrapped with compression dressing. Pt is having appropriate conversation with this nurse.      Ortega Corado RN  11/12/23 [id number]

## 2023-11-12 NOTE — H&P
Troponin    Collection Time: 11/12/23 10:06 AM   Result Value Ref Range    Troponin, High Sensitivity 67 (HH) 0 - 22 ng/L   Magnesium    Collection Time: 11/12/23 10:06 AM   Result Value Ref Range    Magnesium 1.9 1.6 - 2.6 mg/dL   Brain Natriuretic Peptide    Collection Time: 11/12/23 10:06 AM   Result Value Ref Range    Pro-BNP 25,039 (H) <300 pg/mL   Protime-INR    Collection Time: 11/12/23 10:06 AM   Result Value Ref Range    Protime 20.2 (H) 11.8 - 14.6 sec    INR 1.7        Imaging/Diagnostics:  Left Main   The vessel was visualized by angiography and is large. Size of vessel >=2.0 mm. The vessel exhibits minimal luminal irregularities. Left Anterior Descending   The vessel was visualized by angiography and is large. Size of vessel >=2.0 mm. The vessel exhibits minimal luminal irregularities. Left Circumflex   The vessel was visualized by angiography and is large. Size of vessel >=2.0 mm. The vessel exhibits minimal luminal irregularities. OM1 had 40% proximal stenosis. Right Coronary Artery   Size of vessel >=2.0 mm. 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 Known from before. Left to right collaterals. Assessment :    Principal Problem:    Chest pain  Resolved Problems:    * No resolved hospital problems.  *              Plan:     Patient status Admit as inpatient in the  Progressive Unit/Step down    Unstable angina, admitted with chest pain with exertion, EKG suggestive atrial flutter, no ST-T changes, has chronically elevated troponins, patient is on Eliquis, ER physician spoke with cardiologist, no indication for heparin drip  Patient has 100%  of RCA, will add Imdur, may need to add Ranexa if pain does not improve  Atrial flutter s/p cardiac ablation,, rate controlled on Eliquis  Ischemic cardiomyopathy s/p AICD  DIONI, not on CPAP machine  Chronic lymphedema, patient both legs are wrapped, will request wound care  DVT prophylaxis with Eliquis  Has chronic kidney

## 2023-11-13 LAB
CRP SERPL HS-MCNC: 25.8 MG/L (ref 0–5)
EKG ATRIAL RATE: 83 BPM
EKG P-R INTERVAL: 232 MS
EKG Q-T INTERVAL: 400 MS
EKG QRS DURATION: 128 MS
EKG QTC CALCULATION (BAZETT): 470 MS
EKG R AXIS: -8 DEGREES
EKG T AXIS: 90 DEGREES
EKG VENTRICULAR RATE: 83 BPM

## 2023-11-13 PROCEDURE — 2580000003 HC RX 258: Performed by: INTERNAL MEDICINE

## 2023-11-13 PROCEDURE — 36415 COLL VENOUS BLD VENIPUNCTURE: CPT

## 2023-11-13 PROCEDURE — 99233 SBSQ HOSP IP/OBS HIGH 50: CPT | Performed by: INTERNAL MEDICINE

## 2023-11-13 PROCEDURE — 97166 OT EVAL MOD COMPLEX 45 MIN: CPT

## 2023-11-13 PROCEDURE — 97162 PT EVAL MOD COMPLEX 30 MIN: CPT

## 2023-11-13 PROCEDURE — 97530 THERAPEUTIC ACTIVITIES: CPT

## 2023-11-13 PROCEDURE — 6370000000 HC RX 637 (ALT 250 FOR IP): Performed by: INTERNAL MEDICINE

## 2023-11-13 PROCEDURE — 99232 SBSQ HOSP IP/OBS MODERATE 35: CPT | Performed by: INTERNAL MEDICINE

## 2023-11-13 PROCEDURE — 93010 ELECTROCARDIOGRAM REPORT: CPT | Performed by: INTERNAL MEDICINE

## 2023-11-13 PROCEDURE — 86140 C-REACTIVE PROTEIN: CPT

## 2023-11-13 PROCEDURE — 99222 1ST HOSP IP/OBS MODERATE 55: CPT | Performed by: INTERNAL MEDICINE

## 2023-11-13 PROCEDURE — 2060000000 HC ICU INTERMEDIATE R&B

## 2023-11-13 RX ORDER — DEXAMETHASONE 6 MG/1
6 TABLET ORAL DAILY
Status: DISCONTINUED | OUTPATIENT
Start: 2023-11-13 | End: 2023-11-15 | Stop reason: HOSPADM

## 2023-11-13 RX ORDER — MIDODRINE HYDROCHLORIDE 5 MG/1
5 TABLET ORAL
Status: DISCONTINUED | OUTPATIENT
Start: 2023-11-13 | End: 2023-11-15 | Stop reason: HOSPADM

## 2023-11-13 RX ADMIN — MIDODRINE HYDROCHLORIDE 2.5 MG: 2.5 TABLET ORAL at 09:25

## 2023-11-13 RX ADMIN — TORSEMIDE 40 MG: 20 TABLET ORAL at 09:26

## 2023-11-13 RX ADMIN — PANTOPRAZOLE SODIUM 40 MG: 40 TABLET, DELAYED RELEASE ORAL at 06:32

## 2023-11-13 RX ADMIN — ATORVASTATIN CALCIUM 40 MG: 40 TABLET, FILM COATED ORAL at 21:44

## 2023-11-13 RX ADMIN — SODIUM CHLORIDE, PRESERVATIVE FREE 10 ML: 5 INJECTION INTRAVENOUS at 09:26

## 2023-11-13 RX ADMIN — MIDODRINE HYDROCHLORIDE 2.5 MG: 2.5 TABLET ORAL at 12:24

## 2023-11-13 RX ADMIN — SPIRONOLACTONE 25 MG: 25 TABLET ORAL at 09:24

## 2023-11-13 RX ADMIN — MIDODRINE HYDROCHLORIDE 5 MG: 5 TABLET ORAL at 16:59

## 2023-11-13 RX ADMIN — ACETAMINOPHEN 1000 MG: 500 TABLET ORAL at 09:25

## 2023-11-13 RX ADMIN — APIXABAN 2.5 MG: 2.5 TABLET, FILM COATED ORAL at 21:44

## 2023-11-13 RX ADMIN — HYDROXYZINE HYDROCHLORIDE 25 MG: 25 TABLET, FILM COATED ORAL at 21:51

## 2023-11-13 RX ADMIN — METOPROLOL SUCCINATE 50 MG: 50 TABLET, EXTENDED RELEASE ORAL at 09:24

## 2023-11-13 RX ADMIN — DEXAMETHASONE 6 MG: 6 TABLET ORAL at 12:24

## 2023-11-13 RX ADMIN — SODIUM CHLORIDE, PRESERVATIVE FREE 10 ML: 5 INJECTION INTRAVENOUS at 21:44

## 2023-11-13 RX ADMIN — ASPIRIN 81 MG: 81 TABLET, CHEWABLE ORAL at 09:24

## 2023-11-13 RX ADMIN — ISOSORBIDE MONONITRATE 30 MG: 30 TABLET, EXTENDED RELEASE ORAL at 09:24

## 2023-11-13 RX ADMIN — APIXABAN 2.5 MG: 2.5 TABLET, FILM COATED ORAL at 09:26

## 2023-11-13 ASSESSMENT — PAIN SCALES - GENERAL
PAINLEVEL_OUTOF10: 3
PAINLEVEL_OUTOF10: 0
PAINLEVEL_OUTOF10: 3

## 2023-11-13 ASSESSMENT — PAIN DESCRIPTION - LOCATION: LOCATION: FOOT

## 2023-11-13 ASSESSMENT — PAIN DESCRIPTION - DESCRIPTORS: DESCRIPTORS: ACHING

## 2023-11-13 ASSESSMENT — PAIN DESCRIPTION - ORIENTATION: ORIENTATION: RIGHT;LEFT

## 2023-11-13 ASSESSMENT — PAIN - FUNCTIONAL ASSESSMENT: PAIN_FUNCTIONAL_ASSESSMENT: ACTIVITIES ARE NOT PREVENTED

## 2023-11-13 NOTE — DISCHARGE INSTR - COC
Continuity of Care Form    Patient Name: Mady Vázquez   :  1945  MRN:  [id number]    Admit date:  2023  Discharge date:  2023    Code Status Order: DNR-CCA   Advance Directives:     Admitting Physician:  Liseth Parekh MD  PCP: Jose Francisco Noland MD    Discharging Nurse: [facility] AT [place] Unit/Room#: 2120/2120-01  Discharging Unit Phone Number: [phone]    Emergency Contact:   Extended Emergency Contact Information  Primary Emergency Contact: Dameon Heredia  Address: [facility] [address] [address] [place] Phone: [phone]  Mobile Phone: [phone]  Relation: Brother/Sister  Secondary Emergency Contact: [address] [place] [address] Phone: [phone]  Mobile Phone: [phone]  Relation: Brother/Sister  Preferred language: English    Past Surgical History:  Past Surgical History:   Procedure Laterality Date    ABDOMEN SURGERY  2016    explor.  lap., ileostomy    ABDOMEN SURGERY  2017    colostomy/ileostomy takedown with wound vac application hernia peristomal repair    CARDIAC CATHETERIZATION      NO STENTS    CARDIAC PROCEDURE N/A 2023    taleb / Left heart cath / rm 3025 performed by Jorge Casiano MD at [address] [facility] N/A 2023    Coronary angiography performed by Jorge Casiano MD at [facility]  10/18/2023    COLONOSCOPY  2005    COLONOSCOPY  2016    extreme spasmatic colon; extensive diverticulosis; ventral herniation; flat polyp; large internal hemorrhoids    COLONOSCOPY  2016    through ileostomy    EP DEVICE PROCEDURE N/A 2023    El-Atassi / icd / rm 3025 performed by Odilia Castle MD at [address] [facility] N/A 2017    HERNIA PERISTOMAL REPAIR RIGHT performed by Aquiles Mckeon MD at [address] [facility] N/A 2017 ventilator support    Rehab Therapies: Physical Therapy and Occupational Therapy  Weight Bearing Status/Restrictions: No weight bearing restrictions  Other Medical Equipment (for information only, NOT a DME order):  walker  Other Treatments: n/a    Patient's personal belongings (please select all that are sent with patient):  None    RN SIGNATURE:  Electronically signed by Aleisha Baker RN on 11/13/23 at 11:23 AM EST    CASE MANAGEMENT/SOCIAL WORK SECTION    Inpatient Status Date: 11/12/23    Readmission Risk Assessment Score:  Readmission Risk              Risk of Unplanned Readmission:  22           Discharging to Facility/ [address]  Phone: [phone]  Fax: [phone]      Dialysis Facility (if applicable)   Name:  Address:  Dialysis Schedule:  Phone:  Fax:    / signature: Electronically signed by Gabby Golden RN on 11/14/23 at 9:02 AM EST    PHYSICIAN SECTION    Prognosis: Good    Condition at Discharge: Stable    Rehab Potential (if transferring to Rehab): Good    Recommended Labs or Other Treatments After Discharge:     Physician Certification: I certify the above information and transfer of Felicita Warren  is necessary for the continuing treatment of the diagnosis listed and that he requires [address] for greater 30 days.      Update Admission H&P: No change in H&P    PHYSICIAN SIGNATURE:  Electronically signed by Isabela Blanca MD on 11/13/23 at 9:54 AM EST

## 2023-11-13 NOTE — CONSULTS
[facility]    Consult                        Today's Date: 11/13/2023  Patient Name: Jonnie Jiang  Date of admission: 11/12/2023  9:42 AM  Patient's age: 68 y. o., 1945  Admission Dx: Chest pain [R07.9]  Typical atrial flutter ([address]) [I48.3]  Chest pain, unspecified type [R07.9]    Reason for Consult:  Cardiac evaluation    Requesting Physician: Elda Garcia MD    CHIEF COMPLAINT:  chest pain    History Obtained From:  patient, electronic medical record    HISTORY OF PRESENT ILLNESS:      The patient is a 68 y.o.  male who is admitted to the hospital for chest pain. He has history of RCA . Patient also has AICD for cardiomyopathy and is on eliquis for atrial fibrillation/flutter. He had cardioversion on 10/18/23. Upon arrival ECG showed atrial flutter. Patient has COVID infection. Past Medical History:   has a past medical history of Abdominal hernia, Atrophy of muscle of multiple sites, CAD (coronary artery disease), CKD (chronic kidney disease) stage 3, GFR 30-59 ml/min ([facility]), Colon polyp, Crohn disease ([address]), Crohn's colitis ([address]), Decubitus ulcer of coccyx, stage 2 ([address]), Diarrhea, Diverticulosis, Hemorrhoids, History of atrial fibrillation, Hypertension, Ileostomy in place [facility]), Internal hemorrhoids, NSVT (nonsustained ventricular tachycardia) ([address]), Other and unspecified hyperlipidemia, Past heart attack, Stage 3 chronic kidney disease ([address]), Tobacco use disorder, and Tubular adenoma. Past Surgical History:   has a past surgical history that includes polypectomy; Abdomen surgery (08/02/2016); Jejunostomy; Colonoscopy (06/20/2005); Colonoscopy (04/19/2016); Colonoscopy (12/06/2016); Cardiac catheterization (2010); Abdomen surgery (02/19/2017); pr repair first abdominal wall hernia (N/A, 02/19/2017); Small intestine surgery (N/A, 02/19/2017); ep device procedure (N/A, 09/14/2023); Cardiac procedure (N/A, 09/11/2023);  Cardiac procedure (N/A, 09/11/2023); and Cardioversion (10/18/2023). Home Medications:    Prior to Admission medications    Medication Sig Start Date End Date Taking? Authorizing Provider   hydrOXYzine HCl (ATARAX) 25 MG tablet Take 1 tablet by mouth 4 times daily as needed for Itching    Flor Pompa MD   amiodarone (CORDARONE) 200 MG tablet Take 1 tablet by mouth daily for 25 doses 10/9/23 11/3/23  Candido Mckeon MD   acetaminophen (TYLENOL) 500 MG tablet Take 2 tablets by mouth daily    Flor Pompa MD   sodium hypochlorite (DAKINS) 0.25 % external solution Apply topically once Apply topically once. Flor Pompa MD   Multiple Vitamins-Minerals (MULTIVITAMIN ADULT EXTRA C PO) Take by mouth    Flor Pompa MD   aspirin 81 MG chewable tablet Take 1 tablet by mouth daily 9/16/23   Albina Rivera MD   apixaban Evalina Tra) 2.5 MG TABS tablet Take 1 tablet by mouth 2 times daily 9/18/23 12/17/23  Albina Rivera MD   atorvastatin (LIPITOR) 40 MG tablet Take 1 tablet by mouth nightly 9/15/23   Albina Rivera MD   metoprolol succinate (TOPROL XL) 50 MG extended release tablet Take 1 tablet by mouth daily 9/16/23   Albina Rivera MD   mupirocin (BACTROBAN) 2 % ointment Apply topically 3 times daily. 9/15/23   Albina Rivera MD   midodrine (PROAMATINE) 2.5 MG tablet Take 1 tablet by mouth 3 times daily (with meals) 9/15/23   Albina Rivera MD   pantoprazole (PROTONIX) 40 MG tablet Take 1 tablet by mouth every morning (before breakfast) 9/16/23   Albina Rivera MD   torsemide 40 MG TABS Take 40 mg by mouth daily 9/16/23   Albina Rivera MD   spironolactone (ALDACTONE) 25 MG tablet Take 1 tablet by mouth daily 9/15/23   Albina Rivera MD       Allergies:  Patient has no known allergies. Social History:   reports that he quit smoking about 7 years ago. His smoking use included cigarettes. He has a 12.50 pack-year smoking history.  He has never used smokeless tobacco. He reports that he does not drink alcohol and does not

## 2023-11-13 NOTE — PROGRESS NOTES
Pt complaining of burning in bilateral legs. Pt requested RN remove dressings. RN removed top layers of Coban and rolled gauze to find pink rolled dressing on bottom layer. Pt requested to keep bottom layer on and leave the rest off for the night until wound care can assess in the morning.

## 2023-11-13 NOTE — PLAN OF CARE
Problem: Discharge Planning  Goal: Discharge to home or other facility with appropriate resources  11/13/2023 1550 by Riya Prince RN  Outcome: Progressing     Problem: Skin/Tissue Integrity  Goal: Absence of new skin breakdown  Description: 1. Monitor for areas of redness and/or skin breakdown  2. Assess vascular access sites hourly  3. Every 4-6 hours minimum:  Change oxygen saturation probe site  4. Every 4-6 hours:  If on nasal continuous positive airway pressure, respiratory therapy assess nares and determine need for appliance change or resting period.   11/13/2023 1550 by Riya Prince RN  Outcome: Progressing     Problem: Safety - Adult  Goal: Free from fall injury  11/13/2023 1550 by Riya Prince RN  Outcome: Progressing     Problem: ABCDS Injury Assessment  Goal: Absence of physical injury  11/13/2023 1550 by Riya Prince RN  Outcome: Progressing     Problem: Pain  Goal: Verbalizes/displays adequate comfort level or baseline comfort level  11/13/2023 1550 by Riya Prince RN  Outcome: Progressing     Problem: Chronic Conditions and Co-morbidities  Goal: Patient's chronic conditions and co-morbidity symptoms are monitored and maintained or improved  Outcome: Progressing

## 2023-11-13 NOTE — PROGRESS NOTES
Physical Therapy  Facility/Department: [address] [place] [address]  Physical Therapy Initial Assessment    Name: Juliette Kidd  : 1945  MRN: [id number]  Date of Service: 2023    Discharge Recommendations:  Patient would benefit from continued therapy after discharge   PT Equipment Recommendations  Equipment Needed: No      Patient Diagnosis(es): The primary encounter diagnosis was Chest pain, unspecified type. A diagnosis of Typical atrial flutter (HCC) was also pertinent to this visit. Past Medical History:  has a past medical history of Abdominal hernia, Atrophy of muscle of multiple sites, CAD (coronary artery disease), CKD (chronic kidney disease) stage 3, GFR 30-59 ml/min (HCC), Colon polyp, Crohn disease ([address]), Crohn's colitis ([address]), Decubitus ulcer of coccyx, stage 2 ([address]), Diarrhea, Diverticulosis, Hemorrhoids, History of atrial fibrillation, Hypertension, Ileostomy in place [facility]), Internal hemorrhoids, NSVT (nonsustained ventricular tachycardia) ([address]), Other and unspecified hyperlipidemia, Past heart attack, Stage 3 chronic kidney disease ([address]), Tobacco use disorder, and Tubular adenoma. Past Surgical History:  has a past surgical history that includes polypectomy; Abdomen surgery (2016); Jejunostomy; Colonoscopy (2005); Colonoscopy (2016); Colonoscopy (2016); Cardiac catheterization (); Abdomen surgery (2017); pr repair first abdominal wall hernia (N/A, 2017); Small intestine surgery (N/A, 2017); ep device procedure (N/A, 2023); Cardiac procedure (N/A, 2023); Cardiac procedure (N/A, 2023); and Cardioversion (10/18/2023). Assessment   Body Structures, Functions, Activity Limitations Requiring Skilled Therapeutic Intervention: Decreased functional mobility ; Decreased balance;Decreased strength;Decreased endurance  Assessment: Impaired mobility due to decreased tolerance to activity and safety concerns of  balance and strength  Decision Making: Medium Complexity  History: chest pain  Exam: decreased balance, strength, endurance, mobiltiy  Clinical Presentation: evolving  Requires PT Follow-Up: Yes  Activity Tolerance  Activity Tolerance: Patient tolerated evaluation without incident;Patient limited by endurance     Plan   Physical Therapy Plan  General Plan: 5-7 times per week  Current Treatment Recommendations: Strengthening, Balance training, Gait training, Functional mobility training, Transfer training, Endurance training, Therapeutic activities  Safety Devices  Type of Devices: All fall risk precautions in place, Call light within reach, Chair alarm in place, Gait belt, Patient at risk for falls, Left in chair, Nurse notified     Restrictions  Restrictions/Precautions  Restrictions/Precautions: Fall Risk, General Precautions, Isolation (COVID-19)  Implants present? : Pacemaker     Subjective   Pain: Pt denied pain  General  Patient assessed for rehabilitation services?: Yes  Family / Caregiver Present: No  Follows Commands: Within Functional Limits  Subjective  Subjective: pt pleasant and cooperative         Social/Functional History  Social/Functional History  Lives With: Alone  Type of Home: House  Home Layout: Two level, Able to Live on Main level with bedroom/bathroom  Home Access: Stairs to enter with rails  Entrance Stairs - Number of Steps: 4  Entrance Stairs - Rails: Right  Bathroom Shower/Tub: Tub/Shower unit  Bathroom Toilet: Handicap height  Bathroom Equipment: Shower chair  Bathroom Accessibility: Accessible  Home Equipment: Zaki Ore, rolling, Lift chair  Has the patient had two or more falls in the past year or any fall with injury in the past year?: No  ADL Assistance: Needs assistance (IND prior to SNF; Staff assists with foot level care and threading lower body clothing, and bathing)  Homemaking Assistance: Independent  Homemaking Responsibilities: Yes (IND prior to SNF;  Staff are primary)  Ambulation

## 2023-11-13 NOTE — PROGRESS NOTES
Perfect serve message sent to wound care at 11:57 am, they said patient is on the list.Perfect serve message sent again at 4:57 making sure that it gets done today because  was wondering. And they said it will be tomorrow morning.

## 2023-11-13 NOTE — PROGRESS NOTES
(12/06/2016); Cardiac catheterization (2010); Abdomen surgery (02/19/2017); pr repair first abdominal wall hernia (N/A, 02/19/2017); Small intestine surgery (N/A, 02/19/2017); ep device procedure (N/A, 09/14/2023); Cardiac procedure (N/A, 09/11/2023); Cardiac procedure (N/A, 09/11/2023); and Cardioversion (10/18/2023). Restrictions  Restrictions/Precautions  Restrictions/Precautions: Fall Risk;General Precautions;Isolation (COVID-19)  Implants present? : Pacemaker      Vitals  Vitals  Pulse: 61  Heart Rate Source: Monitor  BP: (!) 109/59  BP Location: Left upper arm  BP Method: Automatic  Patient Position: Semi fowlers  MAP (Calculated): 76  Respirations: 16  SpO2: 100 %  O2 Device: Nasal cannula (3.5 L O2)     Subjective  Subjective: Pt is pleasant and agreeable for therapy  Comments: OKay for OT PT eval and treat per RN Aline Triplett  Subjective  Pain: Pt denied pain      Social/Functional History  Social/Functional History  Lives With: Alone  Type of Home: House  Home Layout: Two level, Able to Live on Main level with bedroom/bathroom  Home Access: Stairs to enter with rails  Entrance Stairs - Number of Steps: 4  Entrance Stairs - Rails: Right  Bathroom Shower/Tub: Tub/Shower unit  Bathroom Toilet: Handicap height  Bathroom Equipment: Shower chair  Bathroom Accessibility: Accessible  Home Equipment: Julia Phenes, Walker, rolling, Lift chair  Has the patient had two or more falls in the past year or any fall with injury in the past year?: No  ADL Assistance: Needs assistance (IND prior to SNF; Staff assists with foot level care and threading lower body clothing, and bathing)  Homemaking Assistance: Independent  Homemaking Responsibilities: Yes (IND prior to SNF; Staff are primary)  Ambulation Assistance: Independent  Transfer Assistance: Independent  Active : Yes  Mode of Transportation: Truck  IADL Comments: sleeps in a lift chair  Additional Comments: Pt has been at [facility] for approx.  8 weeks receiving T-Scale Score : 34.69  ADL Inpatient CMS 0-100% Score: 56.46  ADL Inpatient CMS G-Code Modifier : CK       Goals     Short Term Goals  Time Frame for Short Term Goals: By discharge, pt will  Short Term Goal 1: perform upper body dressing/bathing with set-up and Fair safety  Short Term Goal 2: perform lower body dressing/bathing with CGA, using adaptive techniques/equipment as appropriate  Short Term Goal 3: perform functional transfers/mobility CGA using least restrictive device, and Good safety  Short Term Goal 4: tolerate standing for 5+ minutes with CGA during functional activity of choice  Short Term Goal 5: actively participate in 10-15+ minutes of therapeutic exercise/functional activity to promote safety and independence with self care and mobility    Plan  Occupational Therapy Plan  Times Per Week: 4-6  Current Treatment Recommendations: ROM, Strengthening, Balance training, Functional mobility training, Endurance training, Patient/Caregiver education & training, Equipment evaluation, education, & procurement, Safety education & training, Self-Care / ADL      OT Individual Minutes  OT Individual Minutes  Time In: 1312  Time Out: 4992  Minutes: 26  Time Code Minutes   Timed Code Treatment Minutes: 12 Minutes        Electronically signed by BASILIO Young on 11/13/23 at 1:52 PM EST

## 2023-11-13 NOTE — CARE COORDINATION
Case Management Assessment  Initial Evaluation    Date/Time of Evaluation: 11/13/2023 3:43 PM  Assessment Completed by: Evelin Sharma RN    If patient is discharged prior to next notation, then this note serves as note for discharge by case management. Patient Name: Saadia Gan                   YOB: 1945  Diagnosis: Chest pain [R07.9]  Typical atrial flutter [facility]) [I48.3]  Chest pain, unspecified type [R07.9]                   Date / Time: 11/12/2023  9:42 AM    Patient Admission Status: Inpatient   Readmission Risk (Low < 19, Mod (19-27), High > 27): Readmission Risk Score: 24.1    Current PCP: Jeni Neal MD  PCP verified by CM? No    Chart Reviewed: Yes      History Provided by: Patient  Patient Orientation: Alert and Oriented    Patient Cognition: Alert    Hospitalization in the last 30 days (Readmission):  No    If yes, Readmission Assessment in  Navigator will be completed. Advance Directives:      Code Status: DNR-CCA   Patient's Primary Decision Maker is: Legal Next of Kin    Primary Decision Maker: Dameon Heredia - Brother/Sister - [phone]    Discharge Planning:    Patient lives with: Family Members Type of Home:    Primary Care Giver: Other (Comment)  Patient Support Systems include: Family Members   Current Financial resources: None  Current community resources:    Current services prior to admission: None            Current DME:              Type of Home Care services:  None    ADLS  Prior functional level: Assistance with the following:  Current functional level: Assistance with the following:    PT AM-PAC: 12 /24  OT AM-PAC: 15 /24    Family can provide assistance at DC: Yes  Would you like Case Management to discuss the discharge plan with any other family members/significant others, and if so, who?  Yes  Plans to Return to Present Housing: No  Other Identified Issues/Barriers to RETURNING to current housing: no  Potential Assistance needed at discharge: N/A Salma Milner, [place]  Case Management Department  Ph: [phone] Fax: [phone]

## 2023-11-14 LAB
ANION GAP SERPL CALCULATED.3IONS-SCNC: 6 MMOL/L (ref 9–17)
ANION GAP SERPL CALCULATED.3IONS-SCNC: 8 MMOL/L (ref 9–17)
BASOPHILS # BLD: 0 K/UL (ref 0–0.2)
BASOPHILS NFR BLD: 0 % (ref 0–2)
BUN SERPL-MCNC: 56 MG/DL (ref 8–23)
BUN SERPL-MCNC: 58 MG/DL (ref 8–23)
CALCIUM SERPL-MCNC: 8.2 MG/DL (ref 8.6–10.4)
CALCIUM SERPL-MCNC: 8.5 MG/DL (ref 8.6–10.4)
CHLORIDE SERPL-SCNC: 102 MMOL/L (ref 98–107)
CHLORIDE SERPL-SCNC: 103 MMOL/L (ref 98–107)
CO2 SERPL-SCNC: 30 MMOL/L (ref 20–31)
CO2 SERPL-SCNC: 32 MMOL/L (ref 20–31)
CREAT SERPL-MCNC: 2.9 MG/DL (ref 0.7–1.2)
CREAT SERPL-MCNC: 2.9 MG/DL (ref 0.7–1.2)
EOSINOPHIL # BLD: 0 K/UL (ref 0–0.4)
EOSINOPHILS RELATIVE PERCENT: 0 % (ref 0–4)
ERYTHROCYTE [DISTWIDTH] IN BLOOD BY AUTOMATED COUNT: 24.3 % (ref 11.5–14.9)
GFR SERPL CREATININE-BSD FRML MDRD: 22 ML/MIN/1.73M2
GFR SERPL CREATININE-BSD FRML MDRD: 22 ML/MIN/1.73M2
GLUCOSE SERPL-MCNC: 141 MG/DL (ref 70–99)
GLUCOSE SERPL-MCNC: 176 MG/DL (ref 70–99)
HCT VFR BLD AUTO: 27.6 % (ref 41–53)
HGB BLD-MCNC: 8.3 G/DL (ref 13.5–17.5)
LYMPHOCYTES NFR BLD: 0.26 K/UL (ref 1–4.8)
LYMPHOCYTES RELATIVE PERCENT: 3 % (ref 24–44)
MAGNESIUM SERPL-MCNC: 2 MG/DL (ref 1.6–2.6)
MCH RBC QN AUTO: 26.8 PG (ref 26–34)
MCHC RBC AUTO-ENTMCNC: 30.2 G/DL (ref 31–37)
MCV RBC AUTO: 88.8 FL (ref 80–100)
MONOCYTES NFR BLD: 0.35 K/UL (ref 0.1–1.3)
MONOCYTES NFR BLD: 4 % (ref 1–7)
MORPHOLOGY: ABNORMAL
MORPHOLOGY: ABNORMAL
NEUTROPHILS NFR BLD: 93 % (ref 36–66)
NEUTS SEG NFR BLD: 8.09 K/UL (ref 1.3–9.1)
PLATELET # BLD AUTO: 171 K/UL (ref 150–450)
PMV BLD AUTO: 8.1 FL (ref 6–12)
POTASSIUM SERPL-SCNC: 5.2 MMOL/L (ref 3.7–5.3)
POTASSIUM SERPL-SCNC: 5.5 MMOL/L (ref 3.7–5.3)
RBC # BLD AUTO: 3.11 M/UL (ref 4.5–5.9)
SODIUM SERPL-SCNC: 140 MMOL/L (ref 135–144)
SODIUM SERPL-SCNC: 141 MMOL/L (ref 135–144)
WBC OTHER # BLD: 8.7 K/UL (ref 3.5–11)

## 2023-11-14 PROCEDURE — 6370000000 HC RX 637 (ALT 250 FOR IP): Performed by: INTERNAL MEDICINE

## 2023-11-14 PROCEDURE — 2580000003 HC RX 258: Performed by: INTERNAL MEDICINE

## 2023-11-14 PROCEDURE — 80048 BASIC METABOLIC PNL TOTAL CA: CPT

## 2023-11-14 PROCEDURE — 99233 SBSQ HOSP IP/OBS HIGH 50: CPT | Performed by: SURGERY

## 2023-11-14 PROCEDURE — 6370000000 HC RX 637 (ALT 250 FOR IP): Performed by: SURGERY

## 2023-11-14 PROCEDURE — 99213 OFFICE O/P EST LOW 20 MIN: CPT

## 2023-11-14 PROCEDURE — 85025 COMPLETE CBC W/AUTO DIFF WBC: CPT

## 2023-11-14 PROCEDURE — 2060000000 HC ICU INTERMEDIATE R&B

## 2023-11-14 PROCEDURE — 36415 COLL VENOUS BLD VENIPUNCTURE: CPT

## 2023-11-14 PROCEDURE — 97530 THERAPEUTIC ACTIVITIES: CPT

## 2023-11-14 PROCEDURE — 83735 ASSAY OF MAGNESIUM: CPT

## 2023-11-14 PROCEDURE — 93005 ELECTROCARDIOGRAM TRACING: CPT | Performed by: INTERNAL MEDICINE

## 2023-11-14 PROCEDURE — 97116 GAIT TRAINING THERAPY: CPT

## 2023-11-14 PROCEDURE — 97110 THERAPEUTIC EXERCISES: CPT

## 2023-11-14 PROCEDURE — 99233 SBSQ HOSP IP/OBS HIGH 50: CPT | Performed by: INTERNAL MEDICINE

## 2023-11-14 RX ORDER — MORPHINE SULFATE 2 MG/ML
2 INJECTION, SOLUTION INTRAMUSCULAR; INTRAVENOUS ONCE
Status: DISCONTINUED | OUTPATIENT
Start: 2023-11-14 | End: 2023-11-15 | Stop reason: HOSPADM

## 2023-11-14 RX ORDER — RANOLAZINE 500 MG/1
500 TABLET, EXTENDED RELEASE ORAL DAILY
Status: DISCONTINUED | OUTPATIENT
Start: 2023-11-15 | End: 2023-11-15 | Stop reason: HOSPADM

## 2023-11-14 RX ORDER — RANOLAZINE 500 MG/1
500 TABLET, EXTENDED RELEASE ORAL DAILY
Status: DISCONTINUED | OUTPATIENT
Start: 2023-11-14 | End: 2023-11-14

## 2023-11-14 RX ORDER — NITROGLYCERIN 0.4 MG/1
0.4 TABLET SUBLINGUAL EVERY 5 MIN PRN
Status: DISCONTINUED | OUTPATIENT
Start: 2023-11-14 | End: 2023-11-15 | Stop reason: HOSPADM

## 2023-11-14 RX ORDER — AMIODARONE HYDROCHLORIDE 200 MG/1
200 TABLET ORAL 2 TIMES DAILY
Status: DISCONTINUED | OUTPATIENT
Start: 2023-11-14 | End: 2023-11-15 | Stop reason: HOSPADM

## 2023-11-14 RX ADMIN — PANTOPRAZOLE SODIUM 40 MG: 40 TABLET, DELAYED RELEASE ORAL at 05:40

## 2023-11-14 RX ADMIN — SPIRONOLACTONE 25 MG: 25 TABLET ORAL at 08:45

## 2023-11-14 RX ADMIN — TORSEMIDE 40 MG: 20 TABLET ORAL at 08:45

## 2023-11-14 RX ADMIN — SODIUM CHLORIDE, PRESERVATIVE FREE 10 ML: 5 INJECTION INTRAVENOUS at 21:11

## 2023-11-14 RX ADMIN — ISOSORBIDE MONONITRATE 30 MG: 30 TABLET, EXTENDED RELEASE ORAL at 08:45

## 2023-11-14 RX ADMIN — AMIODARONE HYDROCHLORIDE 200 MG: 200 TABLET ORAL at 21:05

## 2023-11-14 RX ADMIN — DEXAMETHASONE 6 MG: 6 TABLET ORAL at 08:45

## 2023-11-14 RX ADMIN — APIXABAN 2.5 MG: 2.5 TABLET, FILM COATED ORAL at 08:45

## 2023-11-14 RX ADMIN — AMIODARONE HYDROCHLORIDE 200 MG: 200 TABLET ORAL at 14:33

## 2023-11-14 RX ADMIN — MIDODRINE HYDROCHLORIDE 5 MG: 5 TABLET ORAL at 17:58

## 2023-11-14 RX ADMIN — APIXABAN 2.5 MG: 2.5 TABLET, FILM COATED ORAL at 21:06

## 2023-11-14 RX ADMIN — MIDODRINE HYDROCHLORIDE 5 MG: 5 TABLET ORAL at 11:38

## 2023-11-14 RX ADMIN — ATORVASTATIN CALCIUM 40 MG: 40 TABLET, FILM COATED ORAL at 21:05

## 2023-11-14 RX ADMIN — RANOLAZINE 500 MG: 500 TABLET, EXTENDED RELEASE ORAL at 11:38

## 2023-11-14 RX ADMIN — METOPROLOL SUCCINATE 50 MG: 50 TABLET, EXTENDED RELEASE ORAL at 08:45

## 2023-11-14 RX ADMIN — MIDODRINE HYDROCHLORIDE 5 MG: 5 TABLET ORAL at 08:45

## 2023-11-14 RX ADMIN — ASPIRIN 81 MG: 81 TABLET, CHEWABLE ORAL at 08:45

## 2023-11-14 RX ADMIN — NITROGLYCERIN 0.4 MG: 0.4 TABLET, ORALLY DISINTEGRATING SUBLINGUAL at 09:20

## 2023-11-14 RX ADMIN — ACETAMINOPHEN 1000 MG: 500 TABLET ORAL at 21:05

## 2023-11-14 RX ADMIN — SODIUM CHLORIDE, PRESERVATIVE FREE 10 ML: 5 INJECTION INTRAVENOUS at 08:46

## 2023-11-14 ASSESSMENT — PAIN DESCRIPTION - LOCATION: LOCATION: CHEST

## 2023-11-14 NOTE — PLAN OF CARE
Problem: Discharge Planning  Goal: Discharge to home or other facility with appropriate resources  11/14/2023 0336 by Bishop Tee RN  Outcome: Progressing     Problem: Skin/Tissue Integrity  Goal: Absence of new skin breakdown  Description: 1. Monitor for areas of redness and/or skin breakdown  2. Assess vascular access sites hourly  3. Every 4-6 hours minimum:  Change oxygen saturation probe site  4. Every 4-6 hours:  If on nasal continuous positive airway pressure, respiratory therapy assess nares and determine need for appliance change or resting period.   11/14/2023 0336 by Bishop Tee RN  Outcome: Progressing     Problem: Safety - Adult  Goal: Free from fall injury  11/14/2023 0336 by Bishop Tee RN  Outcome: Progressing     Problem: Pain  Goal: Verbalizes/displays adequate comfort level or baseline comfort level  11/14/2023 0336 by Bishop Tee RN  Outcome: Progressing

## 2023-11-14 NOTE — PROGRESS NOTES
Physical Therapy  Facility/Department: [facility] PROGRESSIVE CARE  Daily Treatment Note  NAME: Ariel Rubalcava  : 1945  MRN: [id number]  Date of Service: 2023    Discharge Recommendations:  Patient would benefit from continued therapy after discharge     Patient Diagnosis(es): The primary encounter diagnosis was Chest pain, unspecified type. A diagnosis of Typical atrial flutter (HCC) was also pertinent to this visit. Assessment   Activity Tolerance: Patient tolerated treatment well;Patient limited by pain      23 1008   [facility] Basic Mobility - Inpatient    How much help is needed turning from your back to your side while in a flat bed without using bedrails? 3   How much help is needed moving from lying on your back to sitting on the side of a flat bed without using bedrails? 3   How much help is needed moving to and from a bed to a chair? 3   How much help is needed standing up from a chair using your arms? 4   How much help is needed walking in hospital room? 4   How much help is needed climbing 3-5 steps with a railing? 3   [facility] Inpatient Mobility Raw Score  20   [facility] Inpatient T-Scale Score  47.67   Mobility Inpatient CMS 0-100% Score 35.83   Mobility Inpatient CMS G-Code Modifier  CJ     Plan    Physical Therapy Plan  General Plan: 5-7 times per week  Current Treatment Recommendations: Strengthening;Balance training;Gait training;Functional mobility training;Transfer training; Endurance training; Therapeutic activities     Restrictions  Restrictions/Precautions  Restrictions/Precautions: Fall Risk, General Precautions, Isolation (COVID-19, day 10, per Pt)  Implants present? : Pacemaker     Subjective    Subjective  Subjective: Pt states chest pain is as bad today as it was when he was admitted, reluctant to do anything pending physician assessment that he's asked RN for. Denies problems sleeping or with appetite. During session, LAMIN paiz'd sublingual nitro for chest pain.      Objective Vitals  Pulse: 70 (baseline)  Heart Rate Source: Monitor  SpO2: 100 %  O2 Device: Nasal cannula  Comment: 4 L/min supp. O2       11/14/23 0900   Balance   Sitting Without support   Standing With support   Transfer Training   Transfer Training Yes   Overall Level of Assistance Stand-by assistance   Sit to Stand Stand-by assistance   Stand to Sit Stand-by assistance   Stand Pivot Transfers Stand-by assistance   Gait Training   Gait Training Yes   Gait   Overall Level of Assistance Stand-by assistance  (line management)   Distance (ft) 36 Feet   Assistive Device Walker, rolling   Interventions Visual cues; Verbal cues   Base of Support Widened   Speed/Alyssa Other (comment)   Step Length Left shortened;Right shortened   Stance Right increased; Left increased   Gait Abnormalities Steppage gait  (increased foot clearance noted)     PT Exercises  A/AROM Exercises: Seated bilateral LEs: march, long-arc quads, ankle pumps, hip adduction vs pillow, hip abduction vs manual resistance, isometric hamstring curls, 10x each. Goals  Short Term Goals  Time Frame for Short Term Goals: 3-4 days  Short Term Goal 1: pt able to stand from various surfaces with Henny  Short Term Goal 2: pt able to ambulate with RW and CGA x 10-15ft  Short Term Goal 3: pt able to tolerate 25min of therapeutic activity/exercises for functional endurance    Education  Patient Education  Education Given To: Patient  Education Provided: Plan of Care  Education Provided Comments: Pt expressing amazement that nitro relieved his pain so quickly; affirmed that medication is fast-acting.   Education Method: Verbal  Education Outcome: Continued education needed    Therapy Time   Individual Concurrent Group Co-treatment   Time In 0910         Time Out 6864         Minutes [address] [place], [place]

## 2023-11-14 NOTE — PROGRESS NOTES
[address] [facility]   OCCUPATIONAL THERAPY MISSED TREATMENT NOTE   INPATIENT   Date: 23  Patient Name: Fer Freed       Room:   MRN: [id number]   Account #: [de-identified]    : 1945  (79 y.o.)  Gender: male   Referring Practitioner: Hattie Hyman MD  Diagnosis: Chest pain             REASON FOR MISSED TREATMENT:  Patient declined   -    pt seated in chair upon arrival, pt verbalized his frustration with current medical condition regarding cardiac issues, pt states \"nobody seems to care\", pt declines OT at this time due to burning pain in chest stating 8/10, but also states \"I'm just frustrated\", pt states his RN is aware of his pain, this writer did inform RN Gia of pt pain and frustration. Will continue to follow as needed.              Electronically signed by Mateo NÚÑEZ on 23 at 1:15 PM EST

## 2023-11-14 NOTE — CARE COORDINATION
Writer is following for potential discharge to [facility]. Pt was at [facility] and plans to return, writer placed call to Reed Baum at [facility]. Pt is not a bed hold, will need new referral for this pt. Referral sent.   Electronically signed by JOSE Dennison on 11/14/2023 at 3:55 PM

## 2023-11-14 NOTE — PROGRESS NOTES
place, and time with normal affect  Head:  normocephalic, atraumatic. Eye: no icterus, redness, pupils equal and reactive, extraocular eye movements intact, conjunctiva clear  Ear: normal external ear, no discharge, hearing intact  Nose:  no drainage noted  Mouth: mucous membranes moist  Neck: supple, no carotid bruits, thyroid not palpable  Lungs: Bilateral equal air entry, clear to ausculation, no wheezing, rales or rhonchi, normal effort  Cardiovascular: normal rate, regular rhythm, no murmur, gallop, rub. Abdomen: Soft, nontender, nondistended, normal bowel sounds, no hepatomegaly or splenomegaly  Neurologic: There are no new focal motor or sensory deficits, normal muscle tone and bulk, no abnormal sensation, normal speech, cranial nerves II through XII grossly intact  Skin: No gross lesions, rashes, bruising or bleeding on exposed skin area  Extremities: Chronic lymphedema, affecting both legs, both legs are wrapped  Psych: normal affect     Investigations:      Laboratory Testing:  Recent Results (from the past 24 hour(s))   C-Reactive Protein    Collection Time: 11/13/23  2:06 PM   Result Value Ref Range    CRP 25.8 (H) 0.0 - 5.0 mg/L       Imaging/Diagnostics:  Left Main   The vessel was visualized by angiography and is large. Size of vessel >=2.0 mm. The vessel exhibits minimal luminal irregularities. Left Anterior Descending   The vessel was visualized by angiography and is large. Size of vessel >=2.0 mm. The vessel exhibits minimal luminal irregularities. Left Circumflex   The vessel was visualized by angiography and is large. Size of vessel >=2.0 mm. The vessel exhibits minimal luminal irregularities. OM1 had 40% proximal stenosis. Right Coronary Artery   Size of vessel >=2.0 mm. 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 Known from before. Left to right collaterals. Assessment :    Principal Problem:    Chest pain  Resolved Problems:    * No resolved hospital problems.  *

## 2023-11-14 NOTE — PLAN OF CARE
Problem: Discharge Planning  Goal: Discharge to home or other facility with appropriate resources  11/14/2023 1608 by Kim Wang RN  Outcome: Progressing     Problem: Skin/Tissue Integrity  Goal: Absence of new skin breakdown  Description: 1. Monitor for areas of redness and/or skin breakdown  2. Assess vascular access sites hourly  3. Every 4-6 hours minimum:  Change oxygen saturation probe site  4. Every 4-6 hours:  If on nasal continuous positive airway pressure, respiratory therapy assess nares and determine need for appliance change or resting period.   11/14/2023 1608 by Kim Wang RN  Outcome: Progressing     Problem: Safety - Adult  Goal: Free from fall injury  11/14/2023 1608 by Kim Wang RN  Outcome: Progressing     Problem: ABCDS Injury Assessment  Goal: Absence of physical injury  11/14/2023 1608 by Kim Wang RN  Outcome: Progressing     Problem: Pain  Goal: Verbalizes/displays adequate comfort level or baseline comfort level  11/14/2023 1608 by Kim Wang RN  Outcome: Progressing     Problem: Chronic Conditions and Co-morbidities  Goal: Patient's chronic conditions and co-morbidity symptoms are monitored and maintained or improved  11/14/2023 1608 by Kim Wang RN  Outcome: Progressing

## 2023-11-14 NOTE — PROGRESS NOTES
Called Dr. Starla Maxwell about patients chest pain and Dr. Philly Murguia is coming back to see this patient

## 2023-11-15 VITALS
HEIGHT: 67 IN | WEIGHT: 215 LBS | HEART RATE: 63 BPM | SYSTOLIC BLOOD PRESSURE: 99 MMHG | OXYGEN SATURATION: 100 % | BODY MASS INDEX: 33.74 KG/M2 | DIASTOLIC BLOOD PRESSURE: 66 MMHG | RESPIRATION RATE: 16 BRPM | TEMPERATURE: 97.4 F

## 2023-11-15 LAB
ANION GAP SERPL CALCULATED.3IONS-SCNC: 11 MMOL/L (ref 9–17)
BASOPHILS # BLD: 0 K/UL (ref 0–0.2)
BASOPHILS NFR BLD: 0 % (ref 0–2)
BUN SERPL-MCNC: 66 MG/DL (ref 8–23)
CALCIUM SERPL-MCNC: 8.2 MG/DL (ref 8.6–10.4)
CHLORIDE SERPL-SCNC: 99 MMOL/L (ref 98–107)
CO2 SERPL-SCNC: 29 MMOL/L (ref 20–31)
CREAT SERPL-MCNC: 2.5 MG/DL (ref 0.7–1.2)
EKG ATRIAL RATE: 62 BPM
EKG ATRIAL RATE: 69 BPM
EKG P AXIS: -5 DEGREES
EKG P AXIS: 20 DEGREES
EKG P-R INTERVAL: 318 MS
EKG P-R INTERVAL: 324 MS
EKG Q-T INTERVAL: 412 MS
EKG Q-T INTERVAL: 432 MS
EKG QRS DURATION: 116 MS
EKG QRS DURATION: 118 MS
EKG QTC CALCULATION (BAZETT): 418 MS
EKG QTC CALCULATION (BAZETT): 462 MS
EKG R AXIS: -2 DEGREES
EKG R AXIS: 11 DEGREES
EKG T AXIS: -140 DEGREES
EKG T AXIS: 134 DEGREES
EKG VENTRICULAR RATE: 62 BPM
EKG VENTRICULAR RATE: 69 BPM
EOSINOPHIL # BLD: 0 K/UL (ref 0–0.4)
EOSINOPHILS RELATIVE PERCENT: 0 % (ref 0–4)
ERYTHROCYTE [DISTWIDTH] IN BLOOD BY AUTOMATED COUNT: 23.5 % (ref 11.5–14.9)
GFR SERPL CREATININE-BSD FRML MDRD: 26 ML/MIN/1.73M2
GLUCOSE SERPL-MCNC: 125 MG/DL (ref 70–99)
HCT VFR BLD AUTO: 26.9 % (ref 41–53)
HGB BLD-MCNC: 8 G/DL (ref 13.5–17.5)
LYMPHOCYTES NFR BLD: 0.48 K/UL (ref 1–4.8)
LYMPHOCYTES RELATIVE PERCENT: 6 % (ref 24–44)
MCH RBC QN AUTO: 25.9 PG (ref 26–34)
MCHC RBC AUTO-ENTMCNC: 29.9 G/DL (ref 31–37)
MCV RBC AUTO: 86.7 FL (ref 80–100)
MONOCYTES NFR BLD: 0.56 K/UL (ref 0.1–1.3)
MONOCYTES NFR BLD: 7 % (ref 1–7)
MORPHOLOGY: ABNORMAL
NEUTROPHILS NFR BLD: 87 % (ref 36–66)
NEUTS SEG NFR BLD: 6.96 K/UL (ref 1.3–9.1)
PLATELET # BLD AUTO: 186 K/UL (ref 150–450)
PMV BLD AUTO: 8.2 FL (ref 6–12)
POTASSIUM SERPL-SCNC: 4.5 MMOL/L (ref 3.7–5.3)
RBC # BLD AUTO: 3.1 M/UL (ref 4.5–5.9)
SODIUM SERPL-SCNC: 139 MMOL/L (ref 135–144)
WBC OTHER # BLD: 8 K/UL (ref 3.5–11)

## 2023-11-15 PROCEDURE — 85025 COMPLETE CBC W/AUTO DIFF WBC: CPT

## 2023-11-15 PROCEDURE — 93005 ELECTROCARDIOGRAM TRACING: CPT | Performed by: EMERGENCY MEDICINE

## 2023-11-15 PROCEDURE — 6370000000 HC RX 637 (ALT 250 FOR IP): Performed by: INTERNAL MEDICINE

## 2023-11-15 PROCEDURE — 36415 COLL VENOUS BLD VENIPUNCTURE: CPT

## 2023-11-15 PROCEDURE — 93010 ELECTROCARDIOGRAM REPORT: CPT | Performed by: INTERNAL MEDICINE

## 2023-11-15 PROCEDURE — 80048 BASIC METABOLIC PNL TOTAL CA: CPT

## 2023-11-15 PROCEDURE — 6370000000 HC RX 637 (ALT 250 FOR IP): Performed by: SURGERY

## 2023-11-15 PROCEDURE — 99232 SBSQ HOSP IP/OBS MODERATE 35: CPT | Performed by: INTERNAL MEDICINE

## 2023-11-15 PROCEDURE — 99239 HOSP IP/OBS DSCHRG MGMT >30: CPT | Performed by: INTERNAL MEDICINE

## 2023-11-15 PROCEDURE — 2580000003 HC RX 258: Performed by: INTERNAL MEDICINE

## 2023-11-15 RX ORDER — ISOSORBIDE MONONITRATE 30 MG/1
30 TABLET, EXTENDED RELEASE ORAL DAILY
Qty: 30 TABLET | Refills: 3 | Status: SHIPPED | OUTPATIENT
Start: 2023-11-16

## 2023-11-15 RX ORDER — AMIODARONE HYDROCHLORIDE 200 MG/1
200 TABLET ORAL 2 TIMES DAILY
Qty: 60 TABLET | Refills: 0 | Status: SHIPPED | OUTPATIENT
Start: 2023-11-15 | End: 2023-11-20

## 2023-11-15 RX ORDER — DEXAMETHASONE 6 MG/1
6 TABLET ORAL DAILY
Qty: 7 TABLET | Refills: 0 | Status: SHIPPED | OUTPATIENT
Start: 2023-11-16 | End: 2023-11-23

## 2023-11-15 RX ORDER — RANOLAZINE 500 MG/1
500 TABLET, EXTENDED RELEASE ORAL DAILY
Qty: 60 TABLET | Refills: 3 | Status: SHIPPED | OUTPATIENT
Start: 2023-11-16

## 2023-11-15 RX ADMIN — AMIODARONE HYDROCHLORIDE 200 MG: 200 TABLET ORAL at 08:43

## 2023-11-15 RX ADMIN — PANTOPRAZOLE SODIUM 40 MG: 40 TABLET, DELAYED RELEASE ORAL at 06:40

## 2023-11-15 RX ADMIN — APIXABAN 2.5 MG: 2.5 TABLET, FILM COATED ORAL at 08:43

## 2023-11-15 RX ADMIN — METOPROLOL SUCCINATE 50 MG: 50 TABLET, EXTENDED RELEASE ORAL at 08:43

## 2023-11-15 RX ADMIN — ISOSORBIDE MONONITRATE 30 MG: 30 TABLET, EXTENDED RELEASE ORAL at 08:43

## 2023-11-15 RX ADMIN — ASPIRIN 81 MG: 81 TABLET, CHEWABLE ORAL at 08:43

## 2023-11-15 RX ADMIN — NITROGLYCERIN 0.4 MG: 0.4 TABLET, ORALLY DISINTEGRATING SUBLINGUAL at 06:18

## 2023-11-15 RX ADMIN — RANOLAZINE 500 MG: 500 TABLET, EXTENDED RELEASE ORAL at 08:43

## 2023-11-15 RX ADMIN — MIDODRINE HYDROCHLORIDE 5 MG: 5 TABLET ORAL at 11:37

## 2023-11-15 RX ADMIN — TORSEMIDE 40 MG: 20 TABLET ORAL at 08:48

## 2023-11-15 RX ADMIN — DEXAMETHASONE 6 MG: 6 TABLET ORAL at 08:48

## 2023-11-15 RX ADMIN — SODIUM CHLORIDE, PRESERVATIVE FREE 10 ML: 5 INJECTION INTRAVENOUS at 08:49

## 2023-11-15 RX ADMIN — MIDODRINE HYDROCHLORIDE 5 MG: 5 TABLET ORAL at 08:43

## 2023-11-15 NOTE — PROGRESS NOTES
11/15/23 1237   Encounter Summary   Service Provided For: Patient   Referral/Consult From: Luciana   Last Encounter  11/15/23   Complexity of Encounter Low   Spiritual/Emotional needs   Type Spiritual Support   Rituals, Rites and [address]

## 2023-11-15 NOTE — CARE COORDINATION
After Visit Summary  Jonnie Jiang  MRN: [id number]    Icon primary diagnosis        Chest pain  Icon Date   11/12/2023 - 11/15/2023  Icon Location   [facility]: GUY ESQUIVEL  Icon phone   [phone]   Icon Checklist header    Your Next Steps    Icon do   Do    Icon Checkbox     these medications from [address], [address] [place] [address] Road -  [phone]  amiodarone  dexamethasone  isosorbide mononitrate  ranolazine  Icon Location   Go    Nov20 Follow Up Appointment 8:45 AM   Dr. Lamin Borjas MD  [facility] [place]   [address] [place] [address] 2, [address] [address]   You have more future appointments. Please review your full appointment list.  [email or web address] Sign-Up    Send messages to your doctor, view your test results, renew your prescriptions, schedule appointments, and more. Go to [email or web address] click \"Sign Up Now\", and enter your personal activation code: [email or web address]. Activation code expires 12/27/2023. After Visit Summary  Instructions    Icon medication changes this visit         Your medications have changed    Icon medications to start taking   START taking:  dexamethasone (DECADRON)   Start taking on: November 16, 2023  isosorbide mononitrate (IMDUR)   Start taking on: November 16, 2023  ranolazine [facility])   Start taking on: November 16, 2023  Icon medications to change how you take   CHANGE how you take:  amiodarone (CORDARONE)   Icon medications to stop taking   STOP taking:  spironolactone 25 MG tablet (ALDACTONE)   Review your updated medication list below.   Your Next Steps  Icon do   Do  Icon Location   Go      Icon Lab Orders Placed Today            Labs and Other Follow-ups after Discharge    [address] [place] [address]   The patient can be scheduled with any member of the group, including the provider with the first available Lymphedema (Active)   Number of days: 67       Wound 09/14/23 Chest Left (Active)   Number of days: 61       Wound 11/12/23 Coccyx (Active)   Wound Etiology Skin Tear 11/14/23 1604   Wound Cleansed Soap and water 11/12/23 1330   Dressing/Treatment Zinc paste 11/14/23 1604   Wound Assessment Pink/red 11/14/23 1604   Drainage Amount None (dry) 11/14/23 1604   Odor None 11/14/23 1604   Olivia-wound Assessment Blanchable erythema 11/14/23 1604   Number of days: 2       Wound 11/14/23 Leg Left including ankles, toes (Active)   Wound Image   11/14/23 1719   Wound Etiology Venous 11/14/23 1719   Dressing Status Old drainage noted;New dressing applied 11/14/23 1719   Wound Cleansed Soap and water 11/14/23 1719   Dressing/Treatment Other (comment) 11/14/23 1719   Wound Assessment Pink/red;Slough 11/14/23 1719   Drainage Amount Moderate (25-50%) 11/14/23 1719   Drainage Description Serosanguinous 11/14/23 1719   Odor Moderate 11/14/23 1719   Olivia-wound Assessment Fragile; Maceration 11/14/23 1719   Margins Attached edges 11/14/23 1719   Number of days: 0       Wound 11/14/23 Leg Right including ankles and toes (Active)   Wound Image   11/14/23 1719   Wound Etiology Venous 11/14/23 1719   Dressing Status Old drainage noted;New dressing applied 11/14/23 1719   Wound Cleansed Soap and water 11/14/23 1719   Dressing/Treatment Other (comment) 11/14/23 1719   Wound Assessment Pink/red;Slough 11/14/23 1719   Drainage Amount Moderate (25-50%) 11/14/23 1719   Drainage Description Serosanguinous 11/14/23 1719   Odor Moderate 11/14/23 1719   Olivia-wound Assessment Fragile; Maceration 11/14/23 1719   Margins Attached edges 11/14/23 1719   Number of days: 0      Bilateral lower legs: Keep unna boots dry and intact. Wound care nurse will change weekly or as needed if soiled. Toes to bilateral feet: Cleanse with soap and water, pat dry. Apply strips of opticell ag between toes, cover with ABD, and wrap lightly with coban.  Change daily and as

## 2023-11-15 NOTE — PLAN OF CARE
Problem: Discharge Planning  Goal: Discharge to home or other facility with appropriate resources  11/15/2023 1809 by Milka Balderrama RN  Outcome: Adequate for Discharge  11/15/2023 0739 by Evelia Malloy RN  Outcome: Progressing     Problem: Skin/Tissue Integrity  Goal: Absence of new skin breakdown  Description: 1. Monitor for areas of redness and/or skin breakdown  2. Assess vascular access sites hourly  3. Every 4-6 hours minimum:  Change oxygen saturation probe site  4. Every 4-6 hours:  If on nasal continuous positive airway pressure, respiratory therapy assess nares and determine need for appliance change or resting period.   Outcome: Adequate for Discharge     Problem: Safety - Adult  Goal: Free from fall injury  Outcome: Adequate for Discharge     Problem: ABCDS Injury Assessment  Goal: Absence of physical injury  Outcome: Adequate for Discharge     Problem: Pain  Goal: Verbalizes/displays adequate comfort level or baseline comfort level  Outcome: Adequate for Discharge     Problem: Chronic Conditions and Co-morbidities  Goal: Patient's chronic conditions and co-morbidity symptoms are monitored and maintained or improved  Outcome: Adequate for Discharge

## 2023-11-15 NOTE — PROGRESS NOTES
..Discharge teaching and instructions for diagnosis of Chest pain completed with patient using teachback method. AVS reviewed. Escripted Rxs to home pharmacy. Patient voiced understanding regarding prescriptions, follow up appointments, and care of self at home. Denies questions. IV d/c'd with cath intact and drsg applied. Skin Pk/W/D. Easy respirations. Denies pain. D/c'd with all belongings.  Discharged in a wheelchair to  assisted living per EMS transportation

## 2023-11-15 NOTE — DISCHARGE SUMMARY
[facility] Internal Medicine    Discharge Summary     Patient ID: Nila Valencia  :  1945   MRN: [id number]     ACCOUNT:  [de-identified]   Patient's PCP: Quinten Jones MD  Admit Date: 2023   Discharge Date: 11/15/2023    Length of Stay: 3  Code Status:  DNR-CCA  Admitting Physician: Adrianna Torrez MD  Discharge Physician: Adrianna Torrez MD     Active Discharge Diagnoses:     Primary Problem  Chest pain      [address] Problems    Diagnosis Date Noted    Chest pain [R07.9] 2023     Priority: High       Admission Condition:  poor     Discharged Condition: fair    Hospital Stay:     Hospital Course:  Nila Valencia is a 68 y.o. male who was admitted for the management of Chest pain , presented to ER with Chest Pain and Arm Pain (Bilat arm pain)  Patient, has past medical multimedical problem which include coronary artery disease, ischemic cardiomyopathy with EF of 15%, s/p AICD, A-fib on Eliquis, CKD, patient admitted with complaints of chest pain  Recently diagnosed with COVID  On oxygen 4 L  Patient evaluated by cardiologist, he had cardiac catheter on found to have 100%  of RCA  Patient also ordered by ID, treated with Decadron for COVID  Patient had episode of nonsustained V. tach, amiodarone added by cardiologist  I added Ranexa and Imdur, chest pain is better  6 getting discharged on Decadron to SNF      Significant therapeutic interventions:     Significant Diagnostic Studies:   Labs / Micro:        Radiology:    XR CHEST PORTABLE    Result Date: 2023  EXAMINATION: ONE XRAY VIEW OF THE CHEST 2023 10:23 am COMPARISON: 09/15/2023 HISTORY: ORDERING SYSTEM PROVIDED HISTORY: cp TECHNOLOGIST PROVIDED HISTORY: cp Reason for Exam: Pt states chest pain. FINDINGS: There are hypoventilatory changes in the lung bases. There is suggestion of a small left pleural effusion. There is no evidence of any CHF or pneumonia.  There tablet  Commonly known as: DECADRON  Take 1 tablet by mouth daily for 7 doses  Start taking on: November 16, 2023     isosorbide mononitrate 30 MG extended release tablet  Commonly known as: IMDUR  Take 1 tablet by mouth daily  Start taking on: November 16, 2023     ranolazine 500 MG extended release tablet  Commonly known as: RANEXA  Take 1 tablet by mouth daily  Start taking on: November 16, 2023            CHANGE how you take these medications      amiodarone 200 MG tablet  Commonly known as: CORDARONE  Take 1 tablet by mouth 2 times daily  What changed: when to take this            CONTINUE taking these medications      acetaminophen 500 MG tablet  Commonly known as: TYLENOL     apixaban 2.5 MG Tabs tablet  Commonly known as: ELIQUIS  Take 1 tablet by mouth 2 times daily     aspirin 81 MG chewable tablet  Take 1 tablet by mouth daily     atorvastatin 40 MG tablet  Commonly known as: LIPITOR  Take 1 tablet by mouth nightly     hydrOXYzine HCl 25 MG tablet  Commonly known as: ATARAX     metoprolol succinate 50 MG extended release tablet  Commonly known as: TOPROL XL  Take 1 tablet by mouth daily     midodrine 2.5 MG tablet  Commonly known as: PROAMATINE  Take 1 tablet by mouth 3 times daily (with meals)     MULTIVITAMIN ADULT EXTRA C PO     mupirocin 2 % ointment  Commonly known as: BACTROBAN  Apply topically 3 times daily.      pantoprazole 40 MG tablet  Commonly known as: PROTONIX  Take 1 tablet by mouth every morning (before breakfast)     sodium hypochlorite 0.25 % external solution  Commonly known as: DAKINS     Torsemide 40 MG Tabs  Take 40 mg by mouth daily            STOP taking these medications      spironolactone 25 MG tablet  Commonly known as: ALDACTONE               Where to Get Your Medications        These medications were sent to [facility] [id number] [place] [address], [address] [place] [address]  [address], [facility] [address]      Phone: [phone]

## 2023-11-15 NOTE — CARE COORDINATION
Patient will discharge to [facility]  Phone: [phone]  Fax: [phone]   at [facility] via [address] [facility] faxed to facility. Patient/Family informed of discharge and is agreeable. Bedside RN notified, Call report to [phone]      Notified brother Chase Mom and patient of discharge, both are agreeable . [employer]

## 2023-11-15 NOTE — CONSULTS
[facility]  Consult Note       NAME:  [facility]  MEDICAL RECORD NUMBER:  [id number]  AGE: 68 y.o. GENDER: male  : 1945  TODAY'S DATE:  2023    Subjective:      Jonnie Jiang is a 68 y.o. male with inpatient referral to [facility] for:  Bilateral leg wounds      Wound Identification:  Wound Type: venous and lymphedema  Contributing Factors: edema, venous stasis, lymphedema, and decreased tissue oxygenation    Wound History: pt has history of PAD and lymphedema, was following with vascular surgeon weekly for application of unna boots until he went to [facility]; he states that the facility now changes his dressings once per week  Current Wound Care Treatment:  unna boots    Patient Goal of Care:  [x] Wound Healing  [] Odor Control  [] Palliative Care  [] Pain Control   [] Other:         PAST MEDICAL HISTORY        Diagnosis Date    Abdominal hernia     Atrophy of muscle of multiple sites 2016    CAD (coronary artery disease)     CKD (chronic kidney disease) stage 3, GFR 30-59 ml/min ([facility])     Colon polyp     Crohn disease ([address])     Crohn's colitis ([address])     Decubitus ulcer of coccyx, stage 2 ([address]) 2016    Diarrhea     Diverticulosis     Hemorrhoids     History of atrial fibrillation     Hypertension     Ileostomy in place ([address]) 2016    Internal hemorrhoids     NSVT (nonsustained ventricular tachycardia) ([address]) 2016    Other and unspecified hyperlipidemia     HISTORY OF HIGH CHOLESTEROL    Past heart attack     Stage 3 chronic kidney disease ([address])     Tobacco use disorder     Tubular adenoma        PAST SURGICAL HISTORY    Past Surgical History:   Procedure Laterality Date    ABDOMEN SURGERY  2016    explor.  lap., ileostomy    ABDOMEN SURGERY  2017    colostomy/ileostomy takedown with wound vac application hernia peristomal repair    CARDIAC CATHETERIZATION      NO STENTS    CARDIAC PROCEDURE N/A 2023 Upon assessment, pt's legs are mostly dry and intact. He has a few areas where there were blisters present upon removal of dressing. Feet and ankles have areas of thick hyperkeratotic skin and areas of weeping. Wounds are pretty superficial, red, and clean, however pt states that these areas drain a lot. Periwound skin to toes macerated. Pt noted to be sitting with his legs in dependent position and he knows that this is part of the problem, but states that his legs and back hurt if he sits in a reclined position for too long. Will reapply unna boots and apply daily dressings to toes. Response to treatment:  Well tolerated by patient. Plan:     Plan of Care:     Bilateral lower legs: Keep unna boots dry and intact. Wound care nurse will change weekly or as needed if soiled. Toes to bilateral feet: Cleanse with soap and water, pat dry. Apply strips of opticell ag between toes, cover with ABD, and wrap lightly with coban. Change daily and as needed if loose or soiled (leave unna boot intact and only change the dressing on the toes)    Encourage pt to elevate his legs      [x] Turn and reposition every 2 hours while in bed. [x] Float heels off of bed with pillows under calves. [] Heel protective boots (heel medix boots) at all times while in bed.    [] Sacral foam dressing to sacrococcygeal area. Use skin barrier film prior to placement. Peel back dressing, inspect skin beneath, and re-secure every shift. Change every 3 days and as needed if loose or soiled. Discontinue sacral foam if repeatedly soiled by incontinence. [] Apply zinc oxide cream twice daily and as needed after incontinent episodes. [] Perform routine incontinence care with use of foam cleanser. [x] Use single layer moisture wicking underpad. [] Use comfort glide system and wedges to reposition patient. [x] Keep the head of the bed below 30 degrees unless contraindicated.     [] Pressure reducing chair cushion while up to

## 2023-11-15 NOTE — PROGRESS NOTES
Pt complaining of \"burning\" chest pain radiating to BUE. EKG complete, one dose of nitro given and chest pain subsides. Dr. Woo Fabian updated and states he will be in to see patient this morning.

## 2023-11-15 NOTE — PROGRESS NOTES
[address]    HISTORY AND PHYSICAL EXAMINATION            Date:   11/15/2023  Patient name:  Felicita Warren  Date of admission:  11/12/2023  9:42 AM  MRN:   [id number]  Account:  [de-identified]  YOB: 1945  PCP:    Cruz Ely MD  Room:   2120/2120-01  Code Status:    DNR-CCA    Chief Complaint:     Chief Complaint   Patient presents with    Chest Pain    Arm Pain     Bilat arm pain       History Obtained From:     patient, electronic medical record    History of Present Illness: The patient is a 68 y.o. Non- / non  male who presents with Chest Pain and Arm Pain (Bilat arm pain)   and he is admitted to the hospital for the management of chest pain, left arm pain  Patient, has past medical history multiple medical problem which include coronary artery disease, GERD, history of Crohn's disease s/p surgery of small intestine, bilateral lymphedema, obstructive sleep apnea not on CPAP ischemic cardiomyopathy with EF of 15%, s/p AICD atrial fibrillation on Eliquis, chronic kidney disease, patient had cardiac cath back in September of this year, when he was admitted with NSTEMI, underwent cardiac cath, found to have 100%  of RCA, patient was managed conservatively  Patient follows with electro-physiologist  Patient told me that he was in usual state of health, for last 2 days, he was experiencing chest pain, chest pain was mainly during exertion, he started to experience chest pain after walking 10 to 20 feet, chest pain radiates to left arm  No nausea, vomiting, shortness of breath  11/14  Patient, is a very poor historian  He told me he was diagnosed with COVID at nursing home 10 days ago  Never on oxygen before  Now requiring 4 L of oxygen  Was not treated with Paxlovid   Patient, has episode of chest pain today morning which aborted by nitro  Had 8 beats of nonsustained V.  Tach    111/5  Patient, had episode of chest pain Decadron ID following  Had worsening creatinine, elevated potassium Aldactone on Hold  We Will Repeat CBC and BMP  Consultations:   IP CONSULT TO CARDIOLOGY  IP CONSULT TO HOSPITALIST  IP CONSULT TO CARDIOLOGY  IP CONSULT TO INFECTIOUS DISEASES    Patient is admitted as inpatient status because of co-morbidities listed above, severity of signs and symptoms as outlined, requirement for current medical therapies and most importantly because of direct risk to patient if care not provided in a hospital setting. Adrianna Torrez MD  11/15/2023  10:46 AM    Copy sent to Dr. Quinten Jones MD    Please note that this chart was generated using voice recognition Dragon dictation software. Although every effort was made to ensure the accuracy of this automated transcription, some errors in transcription may have occurred.

## 2023-11-15 NOTE — CARE COORDINATION
ONGOING DISCHARGE PLAN:    Patient is alert and oriented x4. Spoke with patient regarding discharge plan and patient confirms that plan is still to discharge to [address] and Imdur    On Decadron     Worsening cre, elevated potassium    Aldactone on Hold    Repeat Labs     Will continue to follow for additional discharge needs. If patient is discharged prior to next notation, then this note serves as note for discharge by case management.     Electronically signed by Josh Salazar RN on 11/15/2023 at 1:33 PM

## 2023-11-15 NOTE — PROGRESS NOTES
11/15/23 1330   Encounter Summary   Encounter Overview/Reason   Encounter   Service Provided For: Patient   Referral/Consult From: Rounding   Last Encounter  11/15/23   Complexity of Encounter Low   Spiritual/Emotional needs   Type Spiritual Support   Rituals, Rites and Sacraments   Type Sacrament of Sick  (11/15/23)

## 2023-11-16 ENCOUNTER — CLINICAL DOCUMENTATION ONLY (OUTPATIENT)
Facility: CLINIC | Age: 78
End: 2023-11-16

## 2023-11-17 ENCOUNTER — OUTSIDE SERVICES (OUTPATIENT)
Dept: PRIMARY CARE CLINIC | Age: 78
End: 2023-11-17

## 2023-11-17 DIAGNOSIS — I50.22 CHRONIC SYSTOLIC CONGESTIVE HEART FAILURE (HCC): ICD-10-CM

## 2023-11-17 DIAGNOSIS — U09.9 POST-COVID SYNDROME: Primary | ICD-10-CM

## 2023-11-18 ASSESSMENT — ENCOUNTER SYMPTOMS
DIARRHEA: 0
SINUS PAIN: 0
SHORTNESS OF BREATH: 1
CONSTIPATION: 0
NAUSEA: 0
COUGH: 0
BACK PAIN: 1

## 2023-11-20 ENCOUNTER — OFFICE VISIT (OUTPATIENT)
Age: 78
End: 2023-11-20

## 2023-11-20 VITALS
HEIGHT: 67 IN | HEART RATE: 77 BPM | DIASTOLIC BLOOD PRESSURE: 66 MMHG | WEIGHT: 200 LBS | OXYGEN SATURATION: 95 % | RESPIRATION RATE: 16 BRPM | BODY MASS INDEX: 31.39 KG/M2 | SYSTOLIC BLOOD PRESSURE: 112 MMHG

## 2023-11-20 DIAGNOSIS — I49.01 VENTRICULAR FIBRILLATION AND FLUTTER (HCC): Primary | ICD-10-CM

## 2023-11-20 DIAGNOSIS — I49.02 VENTRICULAR FIBRILLATION AND FLUTTER (HCC): Primary | ICD-10-CM

## 2023-11-20 RX ORDER — CIPROFLOXACIN HYDROCHLORIDE 3.5 MG/ML
SOLUTION/ DROPS TOPICAL
COMMUNITY
Start: 2023-10-12

## 2023-11-20 RX ORDER — AMIODARONE HYDROCHLORIDE 200 MG/1
200 TABLET ORAL DAILY
Qty: 60 TABLET | Refills: 0 | Status: SHIPPED | OUTPATIENT
Start: 2023-11-20

## 2023-11-20 NOTE — PROGRESS NOTES
[address] [address]     Date of Visit:  2023  Patient Name: Felicita Warren   Patient :  1945     CHIEF COMPLAINT/HPI:     Felicita Warren is a 68 y.o. male who presents today for an general visit to be evaluated for the following condition(s):  Chief Complaint   Patient presents with    Post-Op Check     S/p ICD implant    Patient did have DC cardioversion that converted him to sinus and paced rhythm and he did very well with that. He presents today for follow-up. He has an ICD by [employer]. Atrial lead impedance 418  ohms. He has been in paced in sinus rhythm since the DC cardioversion. No episodes of atrial or ventricular malignant arrhythmias. Paced in the atrium in 87% of the time in the ventricle and less than 1% of the time. In the atrium at 1.0 ms voltage threshold 0.5 V. In the RV at 0.4 ms was 0.75 V. P wave of 1.4 R wave of 8.6 mV. The output in both chambers reduced to 2.25 V. Again I noticed that the patient is a still on 200 mg of amiodarone twice daily. I changed it on the epic and I changes that on the order sheet for the nursing home. The office is supposed to call them and notify them again. Lengthy discussion was done with the patient about ablation for atrial flutter. He understands that the atrial flutter is going to happen again and that and ablative procedure will make his chances of staying in sinus rhythm better and avoiding decompensation into heart failure. At this point he wants to continue to observe and proceed from there. He understands that the risk of recurrence is definitely high. REVIEW OF SYSTEM      Review of Systems  Patient was recently admitted to local hospital with chest burning and no available information about any findings. He remains in the nursing home.   REVIEWED INFORMATION      No Known

## 2023-11-21 ENCOUNTER — OUTSIDE SERVICES (OUTPATIENT)
Dept: PRIMARY CARE CLINIC | Age: 78
End: 2023-11-21

## 2023-11-21 DIAGNOSIS — I50.22 CHRONIC SYSTOLIC CONGESTIVE HEART FAILURE (HCC): ICD-10-CM

## 2023-11-21 DIAGNOSIS — I48.92 ATRIAL FLUTTER BY ELECTROCARDIOGRAM (HCC): ICD-10-CM

## 2023-11-21 DIAGNOSIS — N18.4 CKD (CHRONIC KIDNEY DISEASE) STAGE 4, GFR 15-29 ML/MIN (HCC): ICD-10-CM

## 2023-11-21 DIAGNOSIS — I89.0 LYMPHEDEMA OF BOTH LOWER EXTREMITIES: ICD-10-CM

## 2023-11-21 ASSESSMENT — ENCOUNTER SYMPTOMS
RHINORRHEA: 0
EYE REDNESS: 0
WHEEZING: 0
VOMITING: 0
COUGH: 0
DIARRHEA: 0
ABDOMINAL PAIN: 0
SORE THROAT: 0
NAUSEA: 0
EYE DISCHARGE: 0
SHORTNESS OF BREATH: 0

## 2023-11-21 NOTE — PROGRESS NOTES
Physician Progress Note      Ann Zuniga  CSN #:                  [id number]  :                       1945  ADMIT DATE:       2023 9:42 AM  [facility] DATE:        11/15/2023 6:12 PM  RESPONDING  PROVIDER #:        Chitra Contreras MD          QUERY TEXT:    Dr Rojelio Armstrong,    Patient admitted with chest pain. Noted documentation of acute hypoxic   respiratory failure in ID consult note from . In order to support the   diagnosis of acute respiratory failure, please include additional clinical   indicators in your documentation. Or please document if the diagnosis of   acute hypoxic respiratory failure has been ruled out after further study. The medical record reflects the following:  Risk Factors: COVID+  Clinical Indicators: RR 16-21, pt satting % on 2-4L o2 via NC; per ED   notes--> Pulmonary effort is normal. No respiratory distress. Normal breath   sounds, No wheezing or rales; per H/P--> Lungs: Bilateral equal air entry,   clear to auscultation, no wheezing, rales or rhonchi, normal effort; per ID   consult note--> Pulmonary effort is normal. No respiratory distress.   Treatment: 2-4L O2 via NC, monitoring, IV Decadron, hospital admission,    Acute Respiratory Failure Clinical Indicators per [employer] Training Guide and   Quick Reference Guide:  pO2 < 60 mmHg or SpO2 (pulse oximetry) < 91% breathing room air  pCO2 > 50 and pH < 7.35  P/F ratio (pO2 / FIO2) < 300  pO2 decrease or pCO2 increase by 10 mmHg from baseline (if known)  Supplemental oxygen of 40% or more  Presence of respiratory distress, tachypnea, dyspnea, shortness of breath,   wheezing  Unable to speak in complete sentences  Use of accessory muscles to breathe  Extreme anxiety and feeling of impending doom  Tripod position  Confusion/altered mental status/obtunded  Options provided:  -- Acute Hypoxic Respiratory Failure ruled out after study  -- Acute Hypoxic Respiratory Failure as evidenced by, Please

## 2023-11-21 NOTE — PROGRESS NOTES
Gita Fan is a 68 y.o. male being seen for his weekly follow up. Location of visit: [facility]    HPI:  New today:Pt doing okay today. Has a few small open areas on legs- wraps on currently. Is to go to wound care. No other new issues or complaints. Feeling pretty good as far as dizziness and SOB go. Review of Systems   Constitutional:  Negative for chills and fever. HENT:  Negative for rhinorrhea and sore throat. Eyes:  Negative for discharge and redness. Respiratory:  Negative for cough, shortness of breath and wheezing. Cardiovascular:  Negative for chest pain and palpitations. Gastrointestinal:  Negative for abdominal pain, diarrhea, nausea and vomiting. Genitourinary:  Negative for dysuria and frequency. Musculoskeletal:  Negative for arthralgias and myalgias. Neurological:  Negative for dizziness, light-headedness and headaches. Psychiatric/Behavioral:  Negative for sleep disturbance. Physical Exam  Vitals reviewed. Constitutional:       General: He is not in acute distress. HENT:      Head: Normocephalic and atraumatic. Nose: No congestion or rhinorrhea. Eyes:      General:         Right eye: No discharge. Left eye: No discharge. Cardiovascular:      Rate and Rhythm: Normal rate and regular rhythm. Pulmonary:      Effort: Pulmonary effort is normal. No respiratory distress. Breath sounds: Normal breath sounds. Abdominal:      Palpations: Abdomen is soft. Tenderness: There is no abdominal tenderness. Musculoskeletal:      Right lower leg: No edema. Left lower leg: No edema. Skin:     General: Skin is warm and dry. Neurological:      Mental Status: He is alert. Mental status is at baseline. ASSESSMENT:   Diagnosis Orders   1. Lymphedema of both lower extremities        2. Atrial flutter by electrocardiogram ([address])        3. CKD (chronic kidney disease) stage 4, GFR 15-29 ml/min ([facility])        4.  Chronic

## 2023-11-24 ENCOUNTER — OUTSIDE SERVICES (OUTPATIENT)
Dept: PRIMARY CARE CLINIC | Age: 78
End: 2023-11-24

## 2023-11-24 DIAGNOSIS — I48.92 ATRIAL FLUTTER, UNSPECIFIED TYPE (HCC): Primary | ICD-10-CM

## 2023-11-24 DIAGNOSIS — I95.9 HYPOTENSION, UNSPECIFIED HYPOTENSION TYPE: ICD-10-CM

## 2023-11-24 ASSESSMENT — ENCOUNTER SYMPTOMS
DIARRHEA: 0
NAUSEA: 0
SHORTNESS OF BREATH: 0
COUGH: 0
CONSTIPATION: 0

## 2023-11-25 NOTE — PROGRESS NOTES
Efren Silvestre is a 68 y.o. male being seen for his  resident requested  follow up. Location of visit: [facility]    Visit Date:  11/24/2023     Reason for Visit:    Chief Complaint   Patient presents with    Hypotension        HPI   Resident request I review his medication with him. He has reviewed them with nursing staff. He states he has never had GERD and does not need Pantoprazole. He questions why on metoprolol and midodrine  He states he has never  had high cholesterol. Review of Systems   Constitutional:  Negative for chills, fatigue and fever. HENT:  Negative for congestion, mouth sores and nosebleeds. Respiratory:  Negative for cough and shortness of breath. Cardiovascular:  Positive for leg swelling. Negative for chest pain and palpitations. Gastrointestinal:  Negative for constipation, diarrhea and nausea. Genitourinary:  Negative for difficulty urinating and dysuria. Musculoskeletal:  Positive for arthralgias and gait problem. Negative for myalgias and neck pain. Skin:  Negative for rash. Neurological:  Negative for facial asymmetry, light-headedness and numbness. Psychiatric/Behavioral:  Negative for dysphoric mood and sleep disturbance. The patient is not nervous/anxious. Physical Exam  Vitals and nursing note reviewed. Constitutional:       Appearance: He is obese. HENT:      Head: Normocephalic and atraumatic. Cardiovascular:      Rate and Rhythm: Normal rate and regular rhythm. Heart sounds: Normal heart sounds. Pulmonary:      Effort: Pulmonary effort is normal.      Breath sounds: Normal breath sounds. Abdominal:      General: Bowel sounds are normal.      Palpations: Abdomen is soft. Musculoskeletal:      Right lower leg: Edema present. Left lower leg: Edema present. Skin:     General: Skin is warm.       Comments: Unna boots bilateral lower legs in place   Neurological:      Mental Status: He is alert and oriented to person,

## 2023-11-26 NOTE — DISCHARGE INSTRUCTIONS
[facility]      Visit  Discharge Instructions / Physician Orders  DATE:11/29/23     Home Care: [facility] [address]     SUPPLIES ORDERED THRU:          NA           DATE LAST SUPPLIED     Wound Location:  Right and Left Lower Leg right medial foot, right second toe, left toe     Cleanse with: Liquid antibacterial soap and water, rinse well      Dressing Orders:  Primary dressing   silvercel toe toes,  enlivaderm to bilateral lower legs, silvercel to right and left medial foot wounds. bilateral zinc unna boots                 Frequency:  change silvercel to toes daily, leave unna boots dry and in place until next visit. Additional Orders: Increase protein to diet (meat, cheese, eggs, fish, peanut butter, nuts and beans)  Multivitamin daily    OFFLOADING [] YES  TYPE:                  [] NA    Weekly wound care visits until determined otherwise. Antibiotic therapy-wound care related YES [] NO [] NA[]    MY CHART []     Smart Device  []     HYPERBARIC TREATMENT-                TREATMENT #                          Your next appointment with the [address] [place] [address] is in 1 week                                                                                                   (Please note your next appointment above and if you are unable to keep, kindly give a 24 hour notice. Thank you.)  If more than 15 min late we cannot guarantee you will be seen due to clinician schedule  Per Policy, Excessive cancellation will call for dismissal from program.  If you experience any of the following, please call the [address] [place] [address] during business hours:  [phone]     * Increase in Pain  * Temperature over 101  * Increase in drainage from your wound  * Drainage with a foul odor  * Bleeding  * Increase in swelling  * Need for compression bandage changes due to slippage, breakthrough drainage.      If you need medical attention outside of the business hours of the Wound Care

## 2023-11-28 ENCOUNTER — OUTSIDE SERVICES (OUTPATIENT)
Dept: PRIMARY CARE CLINIC | Age: 78
End: 2023-11-28

## 2023-11-28 DIAGNOSIS — N18.4 CKD (CHRONIC KIDNEY DISEASE) STAGE 4, GFR 15-29 ML/MIN (HCC): ICD-10-CM

## 2023-11-28 DIAGNOSIS — I89.0 LYMPHEDEMA OF BOTH LOWER EXTREMITIES: ICD-10-CM

## 2023-11-28 DIAGNOSIS — I48.92 ATRIAL FLUTTER BY ELECTROCARDIOGRAM (HCC): ICD-10-CM

## 2023-11-28 ASSESSMENT — ENCOUNTER SYMPTOMS
DIARRHEA: 0
COUGH: 0
NAUSEA: 0
VOMITING: 0

## 2023-11-28 NOTE — PROGRESS NOTES
Efren Silvestre is a 66 y.o. male being seen for his weekly follow up. Location of visit: [facility]    HPI:  New today:pt doing well. Denies any new complaints. No CP or SOB out of the ordinary. Swelling doing well with the boots. Review of Systems   Constitutional:  Negative for activity change, appetite change, chills, diaphoresis and fever. HENT:  Negative for congestion. Respiratory:  Negative for cough. Cardiovascular:  Negative for palpitations. Gastrointestinal:  Negative for diarrhea, nausea and vomiting. Genitourinary:  Negative for hematuria. Musculoskeletal:  Negative for arthralgias and myalgias. Skin:  Negative for rash. Neurological:  Negative for weakness and headaches. Psychiatric/Behavioral:  Negative for agitation, dysphoric mood and sleep disturbance. The patient is not nervous/anxious. Physical Exam  Vitals reviewed. Constitutional:       General: He is not in acute distress. HENT:      Head: Normocephalic and atraumatic. Nose: No congestion or rhinorrhea. Eyes:      General:         Right eye: No discharge. Left eye: No discharge. Cardiovascular:      Rate and Rhythm: Normal rate and regular rhythm. Pulmonary:      Effort: Pulmonary effort is normal. No respiratory distress. Breath sounds: Normal breath sounds. Abdominal:      Palpations: Abdomen is soft. Tenderness: There is no abdominal tenderness. Musculoskeletal:      Right lower leg: No edema. Left lower leg: No edema. Comments: Wraps on bilateral lower ext. Skin:     General: Skin is warm and dry. Neurological:      Mental Status: He is alert. Mental status is at baseline. ASSESSMENT:   Diagnosis Orders   1. Lymphedema of both lower extremities        2. Atrial flutter by electrocardiogram ([address])        3. CKD (chronic kidney disease) stage 4, GFR 15-29 ml/min ([facility])            PLAN:  1.  Lymphedema of both lower

## 2023-11-29 ENCOUNTER — HOSPITAL ENCOUNTER (OUTPATIENT)
Dept: WOUND CARE | Age: 78
Discharge: HOME | End: 2023-11-29
Payer: MEDICARE

## 2023-11-29 VITALS
TEMPERATURE: 97.7 F | HEART RATE: 76 BPM | DIASTOLIC BLOOD PRESSURE: 70 MMHG | SYSTOLIC BLOOD PRESSURE: 133 MMHG | RESPIRATION RATE: 20 BRPM

## 2023-11-29 DIAGNOSIS — I89.0 LYMPHEDEMA OF BOTH LOWER EXTREMITIES: Primary | ICD-10-CM

## 2023-11-29 DIAGNOSIS — S91.002D ANKLE WOUND, LEFT, SUBSEQUENT ENCOUNTER: ICD-10-CM

## 2023-11-29 DIAGNOSIS — S91.109D OPEN TOE WOUND, SUBSEQUENT ENCOUNTER: ICD-10-CM

## 2023-11-29 DIAGNOSIS — S91.001D ANKLE WOUND, RIGHT, SUBSEQUENT ENCOUNTER: ICD-10-CM

## 2023-11-29 PROBLEM — T81.89XA DELAYED SURGICAL WOUND HEALING: Status: RESOLVED | Noted: 2017-03-07 | Resolved: 2023-11-29

## 2023-11-29 PROCEDURE — 11045 DBRDMT SUBQ TISS EACH ADDL: CPT | Performed by: NURSE PRACTITIONER

## 2023-11-29 PROCEDURE — 99203 OFFICE O/P NEW LOW 30 MIN: CPT | Performed by: NURSE PRACTITIONER

## 2023-11-29 PROCEDURE — 11042 DBRDMT SUBQ TIS 1ST 20SQCM/<: CPT

## 2023-11-29 PROCEDURE — 99214 OFFICE O/P EST MOD 30 MIN: CPT

## 2023-11-29 PROCEDURE — 11045 DBRDMT SUBQ TISS EACH ADDL: CPT

## 2023-11-29 PROCEDURE — 11042 DBRDMT SUBQ TIS 1ST 20SQCM/<: CPT | Performed by: NURSE PRACTITIONER

## 2023-11-29 RX ORDER — LIDOCAINE HYDROCHLORIDE 40 MG/ML
SOLUTION TOPICAL ONCE
Status: DISCONTINUED | OUTPATIENT
Start: 2023-11-29 | End: 2023-11-30 | Stop reason: HOSPADM

## 2023-11-29 RX ORDER — LIDOCAINE HYDROCHLORIDE 20 MG/ML
JELLY TOPICAL ONCE
OUTPATIENT
Start: 2023-11-29 | End: 2023-11-29

## 2023-11-29 RX ORDER — LIDOCAINE HYDROCHLORIDE 40 MG/ML
SOLUTION TOPICAL ONCE
OUTPATIENT
Start: 2023-11-29 | End: 2023-11-29

## 2023-11-29 ASSESSMENT — ENCOUNTER SYMPTOMS
RHINORRHEA: 0
NAUSEA: 0
VOMITING: 0
SHORTNESS OF BREATH: 0
DIARRHEA: 0
COUGH: 0

## 2023-11-29 NOTE — PROGRESS NOTES
[employer] Application   Below Knee    NAME:  Jonnie Jiang  YOB: 1945  MEDICAL RECORD NUMBER:  [id number]  DATE:  11/29/2023     Removed old Corey Hoar boot if i[x]ndicated and wash leg with mild soap and water. [x] Applied moisturizing agent to dry skin as needed. [x] Appied primary and secondary dressing as ordered    [x] Applied Unna roll from toes to knee overlapping each time. [x] Applied ace wrap or coban from toes to below the knee. [x] Secured with tape and/or metal clips covered with tape. [x] Instructed patient/caregiver to keep dressing dry and intact. DO NOT REMOVE DRESSING. [x] Instructed pt/family/caregiver to report excessive draining, loose bandage, wet dressing, severe pain or tingling in toes. [x] Applied [place] dressing below the knee to Bilateral lower leg(s)       Unna Boot(s) were applied per  Guidelines.      Electronically signed by Laurie Jamil RN on 11/29/2023 at 10:38 AM

## 2023-11-29 NOTE — PROGRESS NOTES
[facility]   Progress Note and Procedure Note      [address] [facility] RECORD NUMBER:  [id number]  AGE: 66 y.o. GENDER: male  : 1945  EPISODE DATE:  2023    Subjective:     Chief Complaint   Patient presents with    Wound Check     Right and left foot wounds x 4         HISTORY of PRESENT ILLNESS HPI     Basil Blue is a 66 y.o. male who presents today for wound/ulcer evaluation. History of Wound Context: presents for initial evaluation of bilateral toe and ankle wounds. Residing at [facility]. Currently, bilateral lower legs are being wrapped in unna boots. History of lymphedema. Wound/Ulcer Pain Timing/Severity: none  Quality of pain: N/A  Severity:  0 / 10   Modifying Factors: None  Associated Signs/Symptoms: none    Ulcer Identification:  Ulcer Type: lymphedema  Contributing Factors: edema, venous stasis, lymphedema, decreased mobility, obesity, and incontinence of urine         PAST MEDICAL HISTORY        Diagnosis Date    Abdominal hernia     Atrophy of muscle of multiple sites 2016    CAD (coronary artery disease)     CKD (chronic kidney disease) stage 3, GFR 30-59 ml/min ([facility])     Colon polyp     Crohn disease ([address])     Crohn's colitis ([address])     Decubitus ulcer of coccyx, stage 2 ([address]) 2016    Diarrhea     Diverticulosis     Hemorrhoids     History of atrial fibrillation     Hypertension     Ileostomy in place ([address]) 2016    Internal hemorrhoids     NSVT (nonsustained ventricular tachycardia) ([address]) 2016    Other and unspecified hyperlipidemia     HISTORY OF HIGH CHOLESTEROL    Past heart attack     Stage 3 chronic kidney disease ([address])     Tobacco use disorder     Tubular adenoma        PAST SURGICAL HISTORY    Past Surgical History:   Procedure Laterality Date    ABDOMEN SURGERY  2016    explor.  lap., ileostomy    ABDOMEN SURGERY  2017    colostomy/ileostomy takedown with wound vac application hernia

## 2023-12-05 ENCOUNTER — OUTSIDE SERVICES (OUTPATIENT)
Dept: PRIMARY CARE CLINIC | Age: 78
End: 2023-12-05

## 2023-12-05 DIAGNOSIS — I48.92 ATRIAL FLUTTER BY ELECTROCARDIOGRAM (HCC): ICD-10-CM

## 2023-12-05 DIAGNOSIS — I10 ESSENTIAL HYPERTENSION: Chronic | ICD-10-CM

## 2023-12-05 DIAGNOSIS — S91.109D OPEN TOE WOUND, SUBSEQUENT ENCOUNTER: ICD-10-CM

## 2023-12-05 DIAGNOSIS — I47.20 VENTRICULAR TACHYCARDIA (HCC): Primary | ICD-10-CM

## 2023-12-05 ASSESSMENT — ENCOUNTER SYMPTOMS
NAUSEA: 0
COUGH: 0
SHORTNESS OF BREATH: 0
VOMITING: 0
DIARRHEA: 0

## 2023-12-05 NOTE — PROGRESS NOTES
[facility])  Assessment & Plan:    Symptoms much improved. Continue same meds. 2. Open toe wound, subsequent encounter  Assessment & Plan:   Monitored by specialist- no acute findings meriting change in the plan- sees wound care regularly for LUCA boots and wound care. Continue same  3. Essential hypertension  Assessment & Plan:   Well-controlled, continue current medications  4. Atrial flutter by electrocardiogram [facility])  Assessment & Plan:    Symptoms doing well. Continue same meds.

## 2023-12-05 NOTE — ASSESSMENT & PLAN NOTE
Monitored by specialist- no acute findings meriting change in the plan- sees wound care regularly for LUCA boots and wound care.  Continue same

## 2023-12-06 ENCOUNTER — HOSPITAL ENCOUNTER (OUTPATIENT)
Dept: WOUND CARE | Age: 78
Discharge: HOME | End: 2023-12-06
Payer: MEDICARE

## 2023-12-06 VITALS
HEART RATE: 87 BPM | BODY MASS INDEX: 31.39 KG/M2 | TEMPERATURE: 96.2 F | HEIGHT: 67 IN | DIASTOLIC BLOOD PRESSURE: 76 MMHG | RESPIRATION RATE: 18 BRPM | WEIGHT: 200 LBS | SYSTOLIC BLOOD PRESSURE: 144 MMHG

## 2023-12-06 DIAGNOSIS — S91.109D OPEN TOE WOUND, SUBSEQUENT ENCOUNTER: ICD-10-CM

## 2023-12-06 DIAGNOSIS — S91.001D ANKLE WOUND, RIGHT, SUBSEQUENT ENCOUNTER: ICD-10-CM

## 2023-12-06 DIAGNOSIS — I89.0 LYMPHEDEMA OF BOTH LOWER EXTREMITIES: Primary | ICD-10-CM

## 2023-12-06 DIAGNOSIS — S91.002D ANKLE WOUND, LEFT, SUBSEQUENT ENCOUNTER: ICD-10-CM

## 2023-12-06 PROCEDURE — 11042 DBRDMT SUBQ TIS 1ST 20SQCM/<: CPT | Performed by: NURSE PRACTITIONER

## 2023-12-06 PROCEDURE — 11042 DBRDMT SUBQ TIS 1ST 20SQCM/<: CPT

## 2023-12-06 RX ORDER — LIDOCAINE HYDROCHLORIDE 40 MG/ML
SOLUTION TOPICAL ONCE
OUTPATIENT
Start: 2023-12-06 | End: 2023-12-06

## 2023-12-06 RX ORDER — LIDOCAINE HYDROCHLORIDE 40 MG/ML
SOLUTION TOPICAL ONCE
Status: COMPLETED | OUTPATIENT
Start: 2023-12-06 | End: 2023-12-06

## 2023-12-06 RX ORDER — LIDOCAINE HYDROCHLORIDE 20 MG/ML
JELLY TOPICAL ONCE
OUTPATIENT
Start: 2023-12-06 | End: 2023-12-06

## 2023-12-06 RX ADMIN — LIDOCAINE HYDROCHLORIDE 5 ML: 40 SOLUTION TOPICAL at 09:20

## 2023-12-06 ASSESSMENT — ENCOUNTER SYMPTOMS
COUGH: 0
VOMITING: 0
SHORTNESS OF BREATH: 0
DIARRHEA: 0
NAUSEA: 0
RHINORRHEA: 0

## 2023-12-06 ASSESSMENT — PAIN SCALES - GENERAL: PAINLEVEL_OUTOF10: 0

## 2023-12-06 NOTE — DISCHARGE INSTRUCTIONS
[facility]                            Visit  Discharge Instructions / Physician Orders  [address]: [facility] [address]     SUPPLIES ORDERED THRU:          NA           DATE LAST SUPPLIED     Wound Location:  Right and Left Lower Leg right medial foot, right second toe, left toe     Cleanse with: Liquid antibacterial soap and water, rinse well      Dressing Orders:  Primary dressing   silvercel toe toes,  enlivaderm to bilateral lower legs, silvercel to right and left medial foot wounds. bilateral zinc unna boots                  Frequency:  change silvercel to toes daily, leave unna boots dry and in place until next visit. Additional Orders: Increase protein to diet (meat, cheese, eggs, fish, peanut butter, nuts and beans)  Multivitamin daily     OFFLOADING [] YES  TYPE:                  [] NA     Weekly wound care visits until determined otherwise. Antibiotic therapy-wound care related YES [] NO [] NA[]     MY CHART []     Smart Device  []      HYPERBARIC TREATMENT-                TREATMENT #                          Your next appointment with the [address] [place], [place] [address] is in 1 week                                                                                                   (Please note your next appointment above and if you are unable to keep, kindly give a 24 hour notice. Thank you.)  If more than 15 min late we cannot guarantee you will be seen due to clinician schedule  Per Policy, Excessive cancellation will call for dismissal from program.  If you experience any of the following, please call the [address] [place] [address] during business hours:  [phone]     * Increase in Pain  * Temperature over 101  * Increase in drainage from your wound  * Drainage with a foul odor  * Bleeding  * Increase in swelling  * Need for compression bandage changes due to slippage, breakthrough drainage.      If you need medical attention outside of the business

## 2023-12-06 NOTE — PROGRESS NOTES
SUBJECTIVE:   CC: Coty Myles is an 50 year old woman who presents for preventive health visit.     Physical   Annual:     Getting at least 3 servings of Calcium per day::  Yes    Bi-annual eye exam::  Yes    Dental care twice a year::  Yes    Sleep apnea or symptoms of sleep apnea::  Daytime drowsiness    Diet::  Low fat/cholesterol, Diabetic and Carbohydrate counting    Frequency of exercise::  2-3 days/week    Duration of exercise::  15-30 minutes    Taking medications regularly::  Yes    Medication side effects::  None    Additional concerns today::  YES                Diabetes Follow-up      Patient is checking blood sugars: 120 this morning, 180 last night    Diabetic concerns: None     Symptoms of hypoglycemia (low blood sugar): none     Paresthesias (numbness or burning in feet) or sores: No     Date of last diabetic eye exam: 5/14/2017    Patient is not seeking other clinics for diabetes diagnosis.  Patient is following with luzmaria riggs.     Hyperlipidemia Follow-Up      Rate your low fat/cholesterol diet?: good    Taking statin?  No    Other lipid medications/supplements?:  None    She is not taking statins due to leg cramps. Patient has not yet tried Pravachol.     Hypertension Follow-up      Outpatient blood pressures are not being checked.    Low Salt Diet: no added salt    She is taking blood pressure medications consistently.      Asthma   Asthma is well controlled.     Colon Cancer Screening   Patient has not yet completed the FIT CARD. [employer] for colon cancer screening was recommended.      Headaches   Patient takes NORCO for headaches. She states that she rarely uses NORCO, and last prescription was prescribed last year. Headaches occur once or twice a month. No new medical problems with respect to headaches.     Right Arm and Shoulder   Patient reports chronic right shoulder pain, and limited range of motion of the right arm above the head since August of last year. At times 
[place] Application   Below Knee    NAME:  Gita Fan  YOB: 1945  MEDICAL RECORD NUMBER:  [id number]  DATE:  12/6/2023    [x] Removed old Ladonna [place] boot if indicated and wash leg with mild soap and water. [x] Applied moisturizing agent to dry skin as needed. [x] Appied primary and secondary dressing as ordered    [x] Applied Unna roll from toes to knee overlapping each time. [x] Applied ace wrap or coban from toes to below the knee. [x] Secured with tape and/or metal clips covered with tape. [x] Instructed patient/caregiver to keep dressing dry and intact. DO NOT REMOVE DRESSING. [x] Instructed pt/family/caregiver to report excessive draining, loose bandage, wet dressing, severe pain or tingling in toes. [x] Applied [place] dressing below the knee to Bilateral lower leg(s)       Unna Boot(s) were applied per  Guidelines.      Electronically signed by Olegario Hardwick RN on 12/6/2023 at 9:47 AM
patient has difficulty getting dressed in the morning. Pain is worst at night since she uses her cell phone more frequently, and states this does not feel like a neuropathic type pain. She has tried PT in the past that provided some relief. Denies trauma. Denies weakness in the fingers or hindrance when grasping. No imaging was done.     Neck   Patient has bilateral neck pain and applies over the counter salonpas cream for relief. Salonpas cream is also noted to provide radiating relief down the arms.     Recent life event   She noted that in 2016 she went through a divorce,  and is remarrying in October. She is not regretful, and is moving on.     Menses   Patient stopped taking birth control a year ago. Three months ago, she noted light spotting lasting a few days, and no spotting occurring since. No personal or family history of gyn cancer. Denies abdominal pain. In the past she declined endometrial biopsy and ultrasound.       BP Readings from Last 2 Encounters:   04/02/18 140/88   03/06/18 136/90     Hemoglobin A1C (%)   Date Value   04/02/2018 8.7 (H)   10/06/2017 9.5 (H)     LDL Cholesterol Calculated (mg/dL)   Date Value   05/01/2017     Cannot estimate LDL when triglyceride exceeds 400 mg/dL   02/27/2017 66     LDL Cholesterol Direct (mg/dL)   Date Value   05/01/2017 92       Today's PHQ-2 Score:   PHQ-2 ( 1999 [employer]) 4/2/2018   Q1: Little interest or pleasure in doing things 1   Q2: Feeling down, depressed or hopeless 1   PHQ-2 Score 2   Q1: Little interest or pleasure in doing things Several days   Q2: Feeling down, depressed or hopeless Several days   PHQ-2 Score 2       Abuse: Current or Past(Physical, Sexual or Emotional)- No  Do you feel safe in your environment - Yes    Social History   Substance Use Topics     Smoking status: Never Smoker     Smokeless tobacco: Never Used     Alcohol use Yes      Comment: 3 to 4 per year     Alcohol Use 4/2/2018   If you drink alcohol do you typically have greater 
than 3 drinks per day OR greater than 7 drinks per week? No   No flowsheet data found.    Reviewed orders with patient.  Reviewed health maintenance and updated orders accordingly - Yes  BP Readings from Last 3 Encounters:   04/02/18 140/88   03/06/18 136/90   02/05/18 127/85    Wt Readings from Last 3 Encounters:   04/02/18 255 lb (115.7 kg)   03/06/18 254 lb (115.2 kg)   10/06/17 254 lb (115.2 kg)                  Recent Labs   Lab Test  04/02/18   0910  10/06/17   0923  07/06/17   0816  05/01/17   1004  05/01/17   1003  02/27/17   0809   11/27/15   1037   01/31/13   1812   05/15/12   0809   08/09/11   0939   A1C  8.7*  9.5*  8.8*   --   9.7*  9.8*   < >  9.5*   < >   --    < >  11.3*   < >   --    LDL   --    --    --    --   Cannot estimate LDL when triglyceride exceeds 400 mg/dL  92  66   --   44   < >   --    --   84   --   63   HDL   --    --    --    --   45*  42*   --   52   < >   --    --   49*   --   48*   TRIG   --    --    --    --   429*  300*   --   199*   < >   --    --   241*   --   193*   ALT   --    --    --    --    --    --    --    --    --   33   --   18   --   19   CR   --    --   0.87  1.05*   --   1.10*   < >  0.80   < >  0.85   < >  0.80   < >   --    GFRESTIMATED   --    --   69  56*   --   53*   < >  77   < >  73   < >  78   < >   --    GFRESTBLACK   --    --   83  67   --   64   < >  >90   GFR Calc     < >  88   < >  >90   < >   --    POTASSIUM   --    --   3.7  4.3   --   4.1   < >  4.4   < >  4.5   < >  4.3   < >   --    TSH   --    --    --    --    --   3.55   --   3.30   < >   --    --   3.81   --    --     < > = values in this interval not displayed.        Patient under age 50, mutual decision reflected in health maintenance.      Pertinent mammograms are reviewed under the imaging tab.  History of abnormal Pap smear:   Last 3 Pap and HPV Results:   PAP / HPV Latest Ref Rng & Units 2/27/2017 11/27/2015 11/7/2014   PAP - NIL ASC-US(A) NIL   HPV 16 DNA NEG Negative 
Negative -   HPV 18 DNA NEG Negative Negative -   OTHER HR HPV NEG Positive(A) Positive(A) -     PAP Q1.     Reviewed and updated as needed this visit by clinical staff  Tobacco  Allergies  Meds  Med Hx  Surg Hx  Fam Hx  Soc Hx         Reviewed and updated as needed this visit by Provider        Past Medical History:   Diagnosis Date     Alexia and dyslexia      Allergic rhinitis due to other allergen      ASCUS on Pap smear 13    neg HPV. refused colp. repeat pap/HPV in  with annual exam     ASCUS with positive high risk HPV 13,11/27/15    9/11/12 colp- WNL     Attention deficit disorder without mention of hyperactivity      Calculus of kidney      Cervical high risk HPV (human papillomavirus) test positive 2017     Depressive disorder, not elsewhere classified      Diabetes mellitus with complication (H) 3/22/2010     Essential hypertension, benign      High risk HPV infection 14    normal pap     Migraine headaches 2011     Mild intermittent asthma      Moderate major depression (H) 2010     Other and unspecified hyperlipidemia      Sciatica      Type II or unspecified type diabetes mellitus without mention of complication, not stated as uncontrolled       Past Surgical History:   Procedure Laterality Date     ENT SURGERY       ORTHOPEDIC SURGERY       SURGICAL HISTORY OF -       right knee arthroscopic     SURGICAL HISTORY OF -       lump on neck removed     Obstetric History       T0      L0     SAB0   TAB0   Ectopic0   Multiple0   Live Births0           Review of Systems  10 point ROS of systems including Constitutional, Eyes, Respiratory, Cardiovascular, Gastroenterology, Genitourinary, Integumentary, Muscularskeletal, Psychiatric were all negative except for pertinent positives noted in my HPI.    This document serves as a record of the services and decisions personally performed and made by Graciela Gates DO. It was created on her behalf by 
"Blas Dowd, a trained medical scribe. The creation of this document is based on the provider's statements to the medical scribe.  lBas Dowd 9:43 AM April 2, 2018        OBJECTIVE:   /88  Pulse 84  Temp 98.4  F (36.9  C) (Oral)  Ht 5' 6.5\" (1.689 m)  Wt 255 lb (115.7 kg)  LMP 06/30/2016  Breastfeeding? No  BMI 40.54 kg/m2  Physical Exam  GENERAL APPEARANCE: alert, no distress and obese  EYES: Eyes grossly normal to inspection, PERRL and conjunctivae and sclerae normal  HENT: ear canals and TM's normal, nose and mouth without ulcers or lesions, oropharynx clear and oral mucous membranes moist  NECK: no adenopathy, no asymmetry, masses, or scars and thyroid normal to palpation  RESP: lungs clear to auscultation - no rales, rhonchi or wheezes  BREAST: normal without masses, tenderness or nipple discharge and no palpable axillary masses or adenopathy  CV: regular rate and rhythm, normal S1 S2, no S3 or S4, no murmur, click or rub, no peripheral edema and peripheral pulses strong  ABDOMEN: soft, nontender, no hepatosplenomegaly, no masses and bowel sounds normal   (female): normal female external genitalia, normal urethral meatus, vaginal mucosal atrophy noted, normal cervix, adnexae, and uterus without masses. and cervical polyp in the os.   MS: no musculoskeletal defects are noted, gait is age appropriate without ataxia, bilateral upper extremities  exam reveals normal strength and muscle mass, no deformities, no erythema, induration, or nodules, joint mobility at level bilateral upper extremities unable to lift beyond 180deg and reduced with intenal and external rotation, and positive neer test , LUE exam reveals normal strength and muscle mass, no deformities, no erythema, induration, or nodules, no evidence of joint effusion, ROM of all joints is normal and no evidence of joint instability  SKIN: no suspicious lesions or rashes  NEURO: Normal strength and tone, sensory exam grossly normal, "
mentation intact and speech normal  PSYCH: mentation appears normal and affect normal/bright      XRAY: I independently visualized the xray: No abnormal results detected on shoulder xray.   Results for orders placed or performed in visit on 04/02/18   XR Shoulder Right G/E 3 Views    Narrative    XR SHOULDER RT G/E 3 VW 4/2/2018 10:17 AM    HISTORY: ; Chronic right shoulder pain; Chronic right shoulder pain      Impression    IMPRESSION: Negative.    KERRIE BLAS MD   Hemoglobin A1c   Result Value Ref Range    Hemoglobin A1C 8.7 (H) 0 - 6.4 %   Hemoglobin   Result Value Ref Range    Hemoglobin 15.4 11.7 - 15.7 g/dL         ASSESSMENT/PLAN:   1. Encounter for routine adult health examination with abnormal findings      2. Hypothyroidism  Labs today. I will notify patient of results when I receive them. Patient will continue taking Levothyroxine as directed as previous.   - Albumin Random Urine Quantitative  - Hemoglobin A1c  - levothyroxine (SYNTHROID/LEVOTHROID) 88 MCG tablet; TAKE 1 TABLET (88 MCG) BY MOUTH DAILY  Dispense: 30 tablet; Refill: 0    3. Type 2 diabetes mellitus with complication, with long-term current use of insulin (H)  Labs today. I will notify patient of results when I receive them. Patient will continue all medications as previously directed. Patient will begin PRAVACHOL as directed for high cholesterol.    - BASIC METABOLIC PANEL  - Albumin Random Urine Quantitative  - metFORMIN (GLUCOPHAGE) 500 MG tablet; TAKE 3 TABLETS BY MOUTH IN THE MORNING AND 2 TABLETS IN THE EVENING.  Dispense: 150 tablet; Refill: 0  - pravastatin (PRAVACHOL) 20 MG tablet; Take 1 tablet (20 mg) by mouth daily  Dispense: 30 tablet; Refill: 0  - Hemoglobin  - exenatide ER (BYDUREON) 2 MG pen; Inject 2 mg Subcutaneous every 7 days  Dispense: 3 each; Refill: 3    4. High blood cholesterol  Labs today. I will notify patient of results when I receive them. Patient will begin PRAVACHOL as directed for high cholesterol.  Once 
weekly for now then if tolerated can increase to every 3 days, patient has strong history of statin intolerace  - Lipid panel reflex to direct LDL Fasting  - pravastatin (PRAVACHOL) 20 MG tablet; Take 1 tablet (20 mg) by mouth daily  Dispense: 30 tablet; Refill: 0    5. Chronic right shoulder pain  Xray today. I will notify patient of final results when I receive them from Radiology.   - [facility] PT, HAND, AND CHIROPRACTIC REFERRAL  - SPORTS MEDICINE REFERRAL  - XR Shoulder Right G/E 3 Views    6. Essential hypertension  Patient will continue taking as previously directed.    - lisinopril-hydrochlorothiazide (PRINZIDE/ZESTORETIC) 10-12.5 MG per tablet; Take 2 tablets by mouth daily  Dispense: 180 tablet; Refill: 1    7. Hypothyroidism, unspecified type  Labs today. I will notify patient of results when I receive them.   - TSH WITH FREE T4 REFLEX    8. Mild intermittent asthma without complication  Controlled.     9. Chronic nonintractable headache, unspecified headache type  Controlled. Patient will continue to take NORCO as directed for headaches. NORCO prescription given to patient.     10. Morbid obesity (H)  Patient is following with luzmaria riggs.     11. Screen for colon cancer  Patient will look into [employer] for colon cancer screening.     12. Screening for malignant neoplasm of cervix  - Pap imaged thin layer screen with HPV - recommended age 30 - 65 years (select HPV order below)  - HPV High Risk Types DNA Cervical    13. Screening for diabetic peripheral neuropathy  - FOOT EXAM  NO CHARGE [84729.114]    14. Vaginal spotting-in the past has been advised to see gyn for endometrial bx and ultrasound, recommended again  US Pelvic Complete w Transvaginal referral made.   - US Pelvic Complete w Transvaginal; Future    15. Screen for STD (sexually transmitted disease)  Screening today. I will notify patient of results when I receive them.   - Chlamydia trachomatis PCR  - Neisseria gonorrhoeae PCR    16. 
"Acute pain of left shoulder  Patient will take NORCO for pain as directed.   - HYDROcodone-acetaminophen (NORCO) 5-325 MG per tablet; Take 1 tablet by mouth daily as needed for pain  Dispense: 40 tablet; Refill: 0    COUNSELING:  Reviewed preventive health counseling, as reflected in patient instructions       Regular exercise       Healthy diet/nutrition       Vision screening       Hearing screening       Immunizations- reviewed and up to date.        Contraception       Osteoporosis Prevention/Bone Health       Safe sex practices/STD prevention       Colon cancer screening       (Cara)menopause management         reports that she has never smoked. She has never used smokeless tobacco.    Estimated body mass index is 40.54 kg/(m^2) as calculated from the following:    Height as of this encounter: 5' 6.5\" (1.689 m).    Weight as of this encounter: 255 lb (115.7 kg).   Weight management plan: Discussed healthy diet and exercise guidelines and patient will follow up in 6 months in clinic to re-evaluate. Specific weight management program called luzmaria riggs discussed and follow up in 6 months in clinic to re-evaluate.    Counseling Resources:  ATP IV Guidelines  Pooled Cohorts Equation Calculator  Breast Cancer Risk Calculator  FRAX Risk Assessment  ICSI Preventive Guidelines  Dietary Guidelines for Americans, 2010  USDA's MyPlate  ASA Prophylaxis  Lung CA Screening        The information in this document, created by the medical scribe for me, accurately reflects the services I personally performed and the decisions made by me. I have reviewed and approved this document for accuracy prior to leaving the patient care area.  April 2, 2018 10:48 AM    Graciela Gates DO  [facility]  Answers for HPI/ROS submitted by the patient on 4/2/2018   PHQ-2 Score: 2    "
Instructions    Written patient dismissal instructions given to patient and signed by patient or POA.            Electronically signed by ROSA Dodge CNP on 12/6/2023 at 9:33 AM

## 2023-12-11 NOTE — DISCHARGE INSTRUCTIONS
Bleeding  * Increase in swelling  * Need for compression bandage changes due to slippage, breakthrough drainage. If you need medical attention outside of the business hours of the [address] [place] [address] please contact your PCP or go to the nearest emergency room. The information contained in the After Visit Summary has been reviewed with me, the patient and/or responsible adult, by my health care provider(s). I had the opportunity to ask questions regarding this information.  I have elected to receive;      []After Visit Summary  [x]Comprehensive Discharge Instruction        Patient signature______________________________________Date:___  Electronically signed by Xiomara Thornton RN on 12/14/2023 at 10:08 AM   Electronically signed by ROSA Kennedy CNP on 12/14/2023 at 10:16 AM

## 2023-12-12 ENCOUNTER — OUTSIDE SERVICES (OUTPATIENT)
Dept: PRIMARY CARE CLINIC | Age: 78
End: 2023-12-12

## 2023-12-12 DIAGNOSIS — N18.4 CKD (CHRONIC KIDNEY DISEASE) STAGE 4, GFR 15-29 ML/MIN (HCC): ICD-10-CM

## 2023-12-12 DIAGNOSIS — I89.0 LYMPHEDEMA OF BOTH LOWER EXTREMITIES: Primary | ICD-10-CM

## 2023-12-12 DIAGNOSIS — I50.22 CHRONIC SYSTOLIC CONGESTIVE HEART FAILURE (HCC): ICD-10-CM

## 2023-12-12 DIAGNOSIS — S91.109D OPEN TOE WOUND, SUBSEQUENT ENCOUNTER: ICD-10-CM

## 2023-12-12 ASSESSMENT — ENCOUNTER SYMPTOMS
NAUSEA: 0
COUGH: 0
SHORTNESS OF BREATH: 0
VOMITING: 0
DIARRHEA: 0

## 2023-12-12 NOTE — PROGRESS NOTES
Syed Kenyon is a 66 y.o. male being seen for his weekly follow up. Location of visit: [facility]    HPI:  New today:Pt doing okay. Going home on Friday. No new complaints. Has home care set up for dressing changes/ LUCA boots. Review of Systems   Constitutional:  Negative for activity change, appetite change, chills, diaphoresis and fever. HENT:  Negative for congestion. Respiratory:  Negative for cough and shortness of breath. Cardiovascular:  Negative for chest pain and palpitations. Gastrointestinal:  Negative for diarrhea, nausea and vomiting. Genitourinary:  Negative for hematuria. Musculoskeletal:  Negative for arthralgias and myalgias. Skin:  Negative for rash. Neurological:  Negative for weakness and headaches. Psychiatric/Behavioral:  Negative for agitation, dysphoric mood and sleep disturbance. The patient is not nervous/anxious. Physical Exam  Vitals reviewed. Constitutional:       General: He is not in acute distress. HENT:      Head: Normocephalic and atraumatic. Nose: No congestion or rhinorrhea. Eyes:      General:         Right eye: No discharge. Left eye: No discharge. Cardiovascular:      Rate and Rhythm: Normal rate and regular rhythm. Pulmonary:      Effort: Pulmonary effort is normal. No respiratory distress. Breath sounds: Normal breath sounds. Abdominal:      Palpations: Abdomen is soft. Tenderness: There is no abdominal tenderness. Musculoskeletal:      Right lower leg: Edema present. Left lower leg: Edema present. Skin:     General: Skin is warm and dry. Neurological:      Mental Status: He is alert. Mental status is at baseline. ASSESSMENT:   Diagnosis Orders   1. Lymphedema of both lower extremities        2. Open toe wound, subsequent encounter        3. Chronic systolic congestive heart failure ([address])        4.  CKD (chronic kidney disease) stage 4, GFR 15-29 ml/min ([facility])

## 2023-12-12 NOTE — ASSESSMENT & PLAN NOTE
Continue at same meds to keep fluid off as GFR has been stable for the past few weeks and has decreased fluid overload symptoms

## 2023-12-12 NOTE — ASSESSMENT & PLAN NOTE
Continue same meds- has had stable renal function as well as symptoms. No tub baths/No weight bearing/Quiet play/No heavy lifting/No sports/gym

## 2023-12-14 ENCOUNTER — HOSPITAL ENCOUNTER (OUTPATIENT)
Dept: WOUND CARE | Age: 78
Discharge: HOME | End: 2023-12-14
Payer: MEDICARE

## 2023-12-14 VITALS
WEIGHT: 200 LBS | SYSTOLIC BLOOD PRESSURE: 118 MMHG | HEIGHT: 67 IN | RESPIRATION RATE: 18 BRPM | BODY MASS INDEX: 31.39 KG/M2 | DIASTOLIC BLOOD PRESSURE: 57 MMHG | TEMPERATURE: 96.3 F | HEART RATE: 87 BPM

## 2023-12-14 DIAGNOSIS — I89.0 LYMPHEDEMA OF BOTH LOWER EXTREMITIES: Primary | ICD-10-CM

## 2023-12-14 DIAGNOSIS — S91.109D OPEN TOE WOUND, SUBSEQUENT ENCOUNTER: ICD-10-CM

## 2023-12-14 DIAGNOSIS — S91.002D ANKLE WOUND, LEFT, SUBSEQUENT ENCOUNTER: ICD-10-CM

## 2023-12-14 DIAGNOSIS — S91.001D ANKLE WOUND, RIGHT, SUBSEQUENT ENCOUNTER: ICD-10-CM

## 2023-12-14 PROCEDURE — 29580 STRAPPING UNNA BOOT: CPT

## 2023-12-14 PROCEDURE — 99213 OFFICE O/P EST LOW 20 MIN: CPT | Performed by: NURSE PRACTITIONER

## 2023-12-14 RX ORDER — LIDOCAINE HYDROCHLORIDE 20 MG/ML
JELLY TOPICAL ONCE
OUTPATIENT
Start: 2023-12-14 | End: 2023-12-14

## 2023-12-14 RX ORDER — LIDOCAINE HYDROCHLORIDE 40 MG/ML
SOLUTION TOPICAL ONCE
Status: COMPLETED | OUTPATIENT
Start: 2023-12-14 | End: 2023-12-14

## 2023-12-14 RX ORDER — LIDOCAINE HYDROCHLORIDE 40 MG/ML
SOLUTION TOPICAL ONCE
OUTPATIENT
Start: 2023-12-14 | End: 2023-12-14

## 2023-12-14 RX ADMIN — LIDOCAINE HYDROCHLORIDE 5 ML: 40 SOLUTION TOPICAL at 09:48

## 2023-12-14 ASSESSMENT — PAIN SCALES - GENERAL: PAINLEVEL_OUTOF10: 0

## 2023-12-14 NOTE — PLAN OF CARE
Problem: Chronic Conditions and Co-morbidities  Goal: Patient's chronic conditions and co-morbidity symptoms are monitored and maintained or improved  12/14/2023 0940 by Srini Aranda RN  Outcome: Progressing  12/14/2023 0939 by Srini Aranda RN  Outcome: Progressing     Problem: Pain  Goal: Verbalizes/displays adequate comfort level or baseline comfort level  12/14/2023 0940 by Srini Aranda RN  Outcome: Progressing  12/14/2023 0939 by Srini Aranda RN  Outcome: Progressing     Problem: Wound:  Goal: Will show signs of wound healing; wound closure and no evidence of infection  Description: Will show signs of wound healing; wound closure and no evidence of infection  12/14/2023 0940 by Srini Aranda RN  Outcome: Progressing  12/14/2023 0939 by Srini Aranda RN  Outcome: Progressing     Problem: Venous:  Goal: Signs of wound healing will improve  Description: Signs of wound healing will improve  12/14/2023 0940 by Srini Aranda RN  Outcome: Progressing  12/14/2023 0939 by Srini Aranda RN  Outcome: Progressing     Problem: Falls - Risk of:  Goal: Will remain free from falls  Description: Will remain free from falls  12/14/2023 0940 by Srini Aranda RN  Outcome: Progressing  12/14/2023 0939 by Srini Aranda RN  Outcome: Progressing

## 2023-12-14 NOTE — PLAN OF CARE
Problem: Chronic Conditions and Co-morbidities  Goal: Patient's chronic conditions and co-morbidity symptoms are monitored and maintained or improved  Outcome: Progressing     Problem: Pain  Goal: Verbalizes/displays adequate comfort level or baseline comfort level  Outcome: Progressing     Problem: Wound:  Goal: Will show signs of wound healing; wound closure and no evidence of infection  Description: Will show signs of wound healing; wound closure and no evidence of infection  Outcome: Progressing     Problem: Venous:  Goal: Signs of wound healing will improve  Description: Signs of wound healing will improve  Outcome: Progressing     Problem: Falls - Risk of:  Goal: Will remain free from falls  Description: Will remain free from falls  Outcome: Progressing

## 2023-12-14 NOTE — PROGRESS NOTES
[place] Application   Below Knee    NAME:  Jomar Hawthorne  YOB: 1945  MEDICAL RECORD NUMBER:  [id number]  DATE:  12/14/2023    [x] Removed old Elizabeth Smiles boot if indicated and wash leg with mild soap and water. [x] Applied moisturizing agent to dry skin as needed. [x] Appied primary and secondary dressing as ordered    [x] Applied Unna roll from toes to knee overlapping each time. [x] Applied ace wrap or coban from toes to below the knee. [x] Secured with tape and/or metal clips covered with tape. [x] Instructed patient/caregiver to keep dressing dry and intact. DO NOT REMOVE DRESSING. [x] Instructed pt/family/caregiver to report excessive draining, loose bandage, wet dressing, severe pain or tingling in toes. [x] Applied [place] dressing below the knee to Bilateral lower leg(s)       Unna Boot(s) were applied per  Guidelines.      Electronically signed by Susan Menjivar RN on 12/14/2023 at 10:53 AM
healing and evaluate the wound base. Wound Measurements:  Wound/Ulcer Descriptions are Pre Debridement except measurements:    Wound 11/29/23 Ankle Left;Lateral Wound #2 Left lat ankle (Active)   Wound Image   12/14/23 0925   Wound Etiology Other 12/14/23 0925   Dressing Status Old drainage noted 12/14/23 0925   Wound Cleansed Soap and water 12/14/23 0925   Offloading for Diabetic Foot Ulcers Offloading not ordered 12/14/23 0925   Wound Length (cm) 0.1 cm 12/14/23 0925   Wound Width (cm) 0.1 cm 12/14/23 0925   Wound Depth (cm) 0.1 cm 12/14/23 0925   Wound Surface Area (cm^2) 0.01 cm^2 12/14/23 0925   Change in Wound Size % (l*w) 99.64 12/14/23 0925   Wound Volume (cm^3) 0.001 cm^3 12/14/23 0925   Wound Healing % 100 12/14/23 0925   Post-Procedure Length (cm) 0.1 cm 12/14/23 0925   Post-Procedure Width (cm) 0.1 cm 12/14/23 0925   Post-Procedure Depth (cm) 0.1 cm 12/14/23 0925   Post-Procedure Surface Area (cm^2) 0.01 cm^2 12/14/23 0925   Post-Procedure Volume (cm^3) 0.001 cm^3 12/14/23 0925   Wound Assessment Pink/red;Slough 12/14/23 0925   Drainage Amount Moderate (25-50%) 12/14/23 0925   Drainage Description Yellow 12/14/23 0925   Odor None 12/14/23 0925   Olivia-wound Assessment Dry/flaky 12/14/23 0925   Margins Defined edges 12/14/23 0925   Wound Thickness Description not for Pressure Injury Full thickness 11/29/23 0909   Number of days: 15       Wound 11/29/23 Toe (Comment  which one) Right; Anterior Wound # 3 right foot  2nd Toe (Active)   Wound Image   11/29/23 0909   Wound Etiology Other 12/14/23 0925   Dressing Status Dry 12/14/23 0925   Wound Cleansed Soap and water 12/14/23 0925   Offloading for Diabetic Foot Ulcers Offloading not ordered 12/14/23 0925   Wound Length (cm) 1.5 cm 12/14/23 0925   Wound Width (cm) 2 cm 12/14/23 0925   Wound Depth (cm) 0.1 cm 12/14/23 0925   Wound Surface Area (cm^2) 3 cm^2 12/14/23 0925   Change in Wound Size % (l*w) -47.06 12/14/23 0925   Wound Volume (cm^3) 0.3 cm^3

## 2023-12-15 ENCOUNTER — TELEPHONE (OUTPATIENT)
Dept: INTERNAL MEDICINE CLINIC | Age: 78
End: 2023-12-15

## 2024-01-02 RX ORDER — AMIODARONE HYDROCHLORIDE 200 MG/1
200 TABLET ORAL DAILY
Qty: 60 TABLET | Refills: 0 | Status: SHIPPED | OUTPATIENT
Start: 2024-01-02

## 2024-01-02 RX ORDER — AMIODARONE HYDROCHLORIDE 200 MG/1
200 TABLET ORAL DAILY
Qty: 60 TABLET | Refills: 5 | Status: SHIPPED | OUTPATIENT
Start: 2024-01-02 | End: 2024-01-02

## 2024-01-04 NOTE — DISCHARGE INSTRUCTIONS
[facility]                            Visit  Discharge Instructions / Physician Orders  DATE:1/5/24     [facility]     SUPPLIES ORDERED THRU:          NA           DATE LAST SUPPLIED home care to supply per medicare guidelines      Wound Location:  Right and Left dorsal feet, left medial lower leg     Cleanse with: Liquid antibacterial soap and water, rinse well      Dressing Orders:  Primary dressing   silvercel toes,  enlivaderm to bilateral lower legs, silvercel and kerramax to right and left dorsal foot wounds. bilateral zinc unna boots                  Frequency:  change silvercel to toes daily, leave unna boots dry and in place .        Additional Orders: Increase protein to diet (meat, cheese, eggs, fish, peanut butter, nuts and beans)  Multivitamin daily     Bring compression wraps with you to next visit, (legs and feet)     OFFLOADING [] YES  TYPE:                  [x] NA     Weekly wound care visits until determined otherwise.     Antibiotic therapy-wound care related YES [] NO [] NA[x]     MY CHART []     Smart Device  []      HYPERBARIC TREATMENT-                TREATMENT #                          Your next appointment with the Wound Care Center is in 1 week                                                                                                   (Please note your next appointment above and if you are unable to keep, kindly give a 24 hour notice. Thank you.)  If more than 15 min late we cannot guarantee you will be seen due to clinician schedule  Per Policy, Excessive cancellation will call for dismissal from program.  If you experience any of the following, please call the Wound Care Center during business hours:  [phone]     * Increase in Pain  * Temperature over 101  * Increase in drainage from your wound  * Drainage with a foul odor  * Bleeding  * Increase in swelling  * Need for compression bandage changes due to slippage,

## 2024-01-05 ENCOUNTER — HOSPITAL ENCOUNTER (OUTPATIENT)
Dept: WOUND CARE | Age: 79
Discharge: HOME | End: 2024-01-05
Payer: MEDICARE

## 2024-01-05 VITALS
HEIGHT: 67 IN | TEMPERATURE: 96.4 F | BODY MASS INDEX: 31.39 KG/M2 | RESPIRATION RATE: 18 BRPM | HEART RATE: 82 BPM | DIASTOLIC BLOOD PRESSURE: 68 MMHG | WEIGHT: 200 LBS | SYSTOLIC BLOOD PRESSURE: 141 MMHG

## 2024-01-05 DIAGNOSIS — S91.109D OPEN TOE WOUND, SUBSEQUENT ENCOUNTER: Primary | ICD-10-CM

## 2024-01-05 DIAGNOSIS — S91.002D ANKLE WOUND, LEFT, SUBSEQUENT ENCOUNTER: ICD-10-CM

## 2024-01-05 DIAGNOSIS — I89.0 LYMPHEDEMA OF BOTH LOWER EXTREMITIES: ICD-10-CM

## 2024-01-05 PROCEDURE — 29580 STRAPPING UNNA BOOT: CPT

## 2024-01-05 PROCEDURE — 99213 OFFICE O/P EST LOW 20 MIN: CPT | Performed by: NURSE PRACTITIONER

## 2024-01-05 RX ORDER — LIDOCAINE HYDROCHLORIDE 40 MG/ML
SOLUTION TOPICAL ONCE
OUTPATIENT
Start: 2024-01-05 | End: 2024-01-05

## 2024-01-05 RX ORDER — LIDOCAINE HYDROCHLORIDE 40 MG/ML
SOLUTION TOPICAL ONCE
Status: COMPLETED | OUTPATIENT
Start: 2024-01-05 | End: 2024-01-05

## 2024-01-05 RX ORDER — LIDOCAINE HYDROCHLORIDE 20 MG/ML
JELLY TOPICAL ONCE
OUTPATIENT
Start: 2024-01-05 | End: 2024-01-05

## 2024-01-05 RX ADMIN — LIDOCAINE HYDROCHLORIDE 15 ML: 40 SOLUTION TOPICAL at 13:20

## 2024-01-05 ASSESSMENT — ENCOUNTER SYMPTOMS
VOMITING: 0
COUGH: 0
RHINORRHEA: 0
DIARRHEA: 0
SHORTNESS OF BREATH: 0
NAUSEA: 0

## 2024-01-05 ASSESSMENT — PAIN SCALES - GENERAL: PAINLEVEL_OUTOF10: 0

## 2024-01-05 NOTE — PROGRESS NOTES
[facility]   Progress Note and Procedure Note      Jona Heredia  MEDICAL RECORD NUMBER:  [id number]  AGE: 78 y.o.   GENDER: male  : 1945  EPISODE DATE:  2024    Subjective:     Chief Complaint   Patient presents with    Wound Check     Bilateral lower legs and feet         HISTORY of PRESENT ILLNESS HPI     Jona Heredia is a 78 y.o. male who presents today for wound/ulcer evaluation.   History of Wound Context: here to follow up on left lower leg and right ankle wounds. Has wounds to bilateral dorsal foot and left lower leg on exam today. [facility] did not change unna boot - need to clarify as phone call stated that they felt he did not have any wounds.   Wound/Ulcer Pain Timing/Severity: none  Quality of pain: N/A  Severity:  0 / 10   Modifying Factors: None  Associated Signs/Symptoms: none    Ulcer Identification:  Ulcer Type: lymphedema  Contributing Factors: edema, venous stasis, lymphedema, decreased mobility, obesity, and incontinence of urine         PAST MEDICAL HISTORY        Diagnosis Date    Abdominal hernia     Atrophy of muscle of multiple sites 2016    CAD (coronary artery disease)     CKD (chronic kidney disease) stage 3, GFR 30-59 ml/min ([facility])     Colon polyp     Crohn disease ([facility])     Crohn's colitis ([facility])     Decubitus ulcer of coccyx, stage 2 ([facility]) 2016    Diarrhea     Diverticulosis     Hemorrhoids     History of atrial fibrillation     Hypertension     Ileostomy in place ([facility]) 2016    Internal hemorrhoids     NSVT (nonsustained ventricular tachycardia) ([facility]) 2016    Other and unspecified hyperlipidemia     HISTORY OF HIGH CHOLESTEROL    Past heart attack     Stage 3 chronic kidney disease (HCC)     Tobacco use disorder     Tubular adenoma        PAST SURGICAL HISTORY    Past Surgical History:   Procedure Laterality Date    ABDOMEN SURGERY  2016    explor. lap., ileostomy    ABDOMEN SURGERY  2017

## 2024-01-05 NOTE — PROGRESS NOTES
Unna Boot Application   Below Knee    NAME:  Jona Heredia  YOB: 1945  MEDICAL RECORD NUMBER:  [id number]  DATE:  1/5/2024    [x] Applied moisturizing agent to dry skin as needed.   [x] Appied primary and secondary dressing as ordered    [x] Applied Unna roll from toes to knee overlapping each time.   [x] Applied ace wrap or coban from toes to below the knee.   [x] Secured with tape and/or metal clips covered with tape.   [x] Instructed patient/caregiver to keep dressing dry and intact. DO NOT REMOVE DRESSING.   [x] Instructed pt/family/caregiver to report excessive draining, loose bandage, wet dressing, severe pain or tingling in toes.  [x] Applied Unna Boot dressing below the knee to Bilateral lower leg(s)       Unna Boot(s) were applied per  Guidelines.     Electronically signed by Low Murguia RN on 1/5/2024 at 2:02 PM

## 2024-01-09 ENCOUNTER — OFFICE VISIT (OUTPATIENT)
Dept: INTERNAL MEDICINE CLINIC | Age: 79
End: 2024-01-09

## 2024-01-09 VITALS
HEART RATE: 79 BPM | DIASTOLIC BLOOD PRESSURE: 72 MMHG | BODY MASS INDEX: 31.17 KG/M2 | WEIGHT: 199 LBS | SYSTOLIC BLOOD PRESSURE: 130 MMHG | OXYGEN SATURATION: 97 %

## 2024-01-09 DIAGNOSIS — K50.019 CROHN'S DISEASE OF SMALL INTESTINE WITH COMPLICATION (HCC): Chronic | ICD-10-CM

## 2024-01-09 DIAGNOSIS — I50.23 ACUTE ON CHRONIC SYSTOLIC (CONGESTIVE) HEART FAILURE (HCC): ICD-10-CM

## 2024-01-09 DIAGNOSIS — I10 ESSENTIAL HYPERTENSION: Primary | Chronic | ICD-10-CM

## 2024-01-09 DIAGNOSIS — I47.20 VENTRICULAR TACHYCARDIA (HCC): ICD-10-CM

## 2024-01-09 DIAGNOSIS — I83.029 VENOUS ULCER OF LEFT LEG (HCC): ICD-10-CM

## 2024-01-09 DIAGNOSIS — N18.4 CKD (CHRONIC KIDNEY DISEASE) STAGE 4, GFR 15-29 ML/MIN (HCC): ICD-10-CM

## 2024-01-09 DIAGNOSIS — I50.22 CHRONIC SYSTOLIC CONGESTIVE HEART FAILURE (HCC): ICD-10-CM

## 2024-01-09 DIAGNOSIS — L97.929 VENOUS ULCER OF LEFT LEG (HCC): ICD-10-CM

## 2024-01-09 DIAGNOSIS — Z86.79 HISTORY OF ATRIAL FIBRILLATION: Chronic | ICD-10-CM

## 2024-01-09 DIAGNOSIS — D68.69 SECONDARY HYPERCOAGULABLE STATE (HCC): ICD-10-CM

## 2024-01-09 DIAGNOSIS — L97.412 NON-PRESSURE CHRONIC ULCER OF RIGHT HEEL AND MIDFOOT WITH FAT LAYER EXPOSED (HCC): ICD-10-CM

## 2024-01-09 RX ORDER — LOPERAMIDE HYDROCHLORIDE 2 MG/1
2 CAPSULE ORAL 3 TIMES DAILY PRN
Qty: 90 CAPSULE | Refills: 3 | Status: SHIPPED | OUTPATIENT
Start: 2024-01-09 | End: 2024-02-08

## 2024-01-09 RX ORDER — TORSEMIDE 20 MG/1
TABLET ORAL
COMMUNITY
Start: 2023-12-14 | End: 2024-01-09 | Stop reason: SDUPTHER

## 2024-01-09 ASSESSMENT — ENCOUNTER SYMPTOMS
BLOOD IN STOOL: 0
EYE PAIN: 0
EYE DISCHARGE: 0
WHEEZING: 0
COLOR CHANGE: 0
COUGH: 0
ABDOMINAL DISTENTION: 0
TROUBLE SWALLOWING: 0
SHORTNESS OF BREATH: 0
DIARRHEA: 0

## 2024-01-09 NOTE — PROGRESS NOTES
Visit Information    Have you changed or started any medications since your last visit including any over-the-counter medicines, vitamins, or herbal medicines? no   Are you having any side effects from any of your medications? -  no  Have you stopped taking any of your medications? Is so, why? -  no    Have you seen any other physician or provider since your last visit? Yes - Records Obtained  Have you had any other diagnostic tests since your last visit? Yes - Records Obtained  Have you been seen in the emergency room and/or had an admission to a hospital since we last saw you? Yes - Records Obtained  Have you had your routine dental cleaning in the past 6 months? no    Have you activated your [employer] account? If not, what are your barriers? Yes     Patient Care Team:  Ry Boston MD as PCP - General (Internal Medicine)  Ry Boston MD as PCP - Empaneled Provider  Jackie Alcantar MD as Consulting Physician (Gastroenterology)    Medical History Review  Past Medical, Family, and Social History reviewed and does contribute to the patient presenting condition    Health Maintenance   Topic Date Due    DTaP/Tdap/Td vaccine (1 - Tdap) Never done    Respiratory Syncytial Virus (RSV) Pregnant or age 60 yrs+ (1 - 1-dose 60+ series) Never done    Pneumococcal 65+ years Vaccine (2 - PPSV23 or PCV20) 12/10/2018    Lipids  02/28/2023    Flu vaccine (1) 08/01/2023    COVID-19 Vaccine (4 - 2023-24 season) 09/01/2023    Depression Screen  04/19/2024    Annual Wellness Visit (Medicare)  04/19/2024    Shingles vaccine  Completed    Hepatitis C screen  Completed    Hepatitis A vaccine  Aged Out    Hepatitis B vaccine  Aged Out    Hib vaccine  Aged Out    Polio vaccine  Aged Out    Meningococcal (ACWY) vaccine  Aged Out    A1C test (Diabetic or Prediabetic)  Discontinued    AAA screen  Discontinued    Colonoscopy  Discontinued    Prostate Specific Antigen (PSA) Screening or Monitoring  Discontinued       
person, place, and time.      Cranial Nerves: No cranial nerve deficit.      Sensory: No sensory deficit.      Motor: No atrophy or abnormal muscle tone.      Coordination: Coordination normal.   Psychiatric:         Mood and Affect: Mood is not anxious. Affect is not angry.         Speech: Speech is not slurred.         Behavior: Behavior normal. Behavior is not aggressive.         Thought Content: Thought content does not include homicidal ideation.         Cognition and Memory: Memory is not impaired.       /72 (Site: Left Upper Arm, Position: Sitting)   Pulse 79   Wt 90.3 kg (199 lb)   SpO2 97%   BMI 31.17 kg/m²     Assessment:       Diagnosis Orders   1. Essential hypertension  Lipid, Fasting      2. Crohn's disease of small intestine with complication (HCC)        3. History of atrial fibrillation        4. 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 Venous ulcer of left leg (HCC)        6. Acute on chronic systolic (congestive) heart failure (HCC)        7. Chronic systolic congestive heart failure (HCC)        8. CKD (chronic kidney disease) stage 4, GFR 15-29 ml/min (HCC)        9. Ventricular tachycardia (HCC)  Lipid, Fasting      10. Secondary hypercoagulable state (HCC)                  Plan:      Return in about 3 months (around 4/9/2024).    Orders Placed This Encounter   Procedures    Lipid, Fasting     Standing Status:   Future     Standing Expiration Date:   4/9/2024     Orders Placed This Encounter   Medications    loperamide (RA ANTI-DIARRHEAL) 2 MG capsule     Sig: Take 1 capsule by mouth 3 times daily as needed for Diarrhea     Dispense:  90 capsule     Refill:  3    Pt seeing EP and gen cardio dr treviño  On diuretics and bb  Amodaorne and eliuqis  Not on ace or arb  Was on lisinopirl had hyperkalemia off now  Post AICD  Seeing wound care of [facility] lymphdema  Wt loss   Better diet  Compliance with meds  Torsemide   Not on entresto   Will defer

## 2024-01-11 ENCOUNTER — HOSPITAL ENCOUNTER (OUTPATIENT)
Dept: WOUND CARE | Age: 79
Discharge: HOME | End: 2024-01-11
Payer: MEDICARE

## 2024-01-11 VITALS
DIASTOLIC BLOOD PRESSURE: 65 MMHG | HEART RATE: 67 BPM | TEMPERATURE: 96.1 F | HEIGHT: 67 IN | SYSTOLIC BLOOD PRESSURE: 113 MMHG | WEIGHT: 199 LBS | RESPIRATION RATE: 18 BRPM | BODY MASS INDEX: 31.23 KG/M2

## 2024-01-11 DIAGNOSIS — S91.002D ANKLE WOUND, LEFT, SUBSEQUENT ENCOUNTER: ICD-10-CM

## 2024-01-11 DIAGNOSIS — S91.109D OPEN TOE WOUND, SUBSEQUENT ENCOUNTER: Primary | ICD-10-CM

## 2024-01-11 DIAGNOSIS — I89.0 LYMPHEDEMA OF BOTH LOWER EXTREMITIES: ICD-10-CM

## 2024-01-11 PROCEDURE — 11045 DBRDMT SUBQ TISS EACH ADDL: CPT

## 2024-01-11 PROCEDURE — 11042 DBRDMT SUBQ TIS 1ST 20SQCM/<: CPT

## 2024-01-11 RX ORDER — LIDOCAINE HYDROCHLORIDE 40 MG/ML
SOLUTION TOPICAL ONCE
OUTPATIENT
Start: 2024-01-11 | End: 2024-01-11

## 2024-01-11 RX ORDER — LIDOCAINE HYDROCHLORIDE 40 MG/ML
SOLUTION TOPICAL ONCE
Status: COMPLETED | OUTPATIENT
Start: 2024-01-11 | End: 2024-01-11

## 2024-01-11 RX ORDER — LIDOCAINE HYDROCHLORIDE 20 MG/ML
JELLY TOPICAL ONCE
OUTPATIENT
Start: 2024-01-11 | End: 2024-01-11

## 2024-01-11 RX ADMIN — LIDOCAINE HYDROCHLORIDE 5 ML: 40 SOLUTION TOPICAL at 08:51

## 2024-01-11 ASSESSMENT — PAIN SCALES - GENERAL: PAINLEVEL_OUTOF10: 0

## 2024-01-11 ASSESSMENT — ENCOUNTER SYMPTOMS
COUGH: 0
NAUSEA: 0
VOMITING: 0
DIARRHEA: 0
SHORTNESS OF BREATH: 0
RHINORRHEA: 0

## 2024-01-11 NOTE — PROGRESS NOTES
Unna Boot Application   Below Knee    NAME:  Jona Heredia  YOB: 1945  MEDICAL RECORD NUMBER:  [id number]  DATE:  1/11/2024    [x] Removed old Unna boot if indicated and wash leg with mild soap and water.  [x] Applied moisturizing agent to dry skin as needed.   [x] Appied primary and secondary dressing as ordered    [x] Applied Unna roll from toes to knee overlapping each time.   [x] Applied ace wrap or coban from toes to below the knee.   [x] Secured with tape and/or metal clips covered with tape.   [x] Instructed patient/caregiver to keep dressing dry and intact. DO NOT REMOVE DRESSING.   [x] Instructed pt/family/caregiver to report excessive draining, loose bandage, wet dressing, severe pain or tingling in toes.  [x] Applied Unna Boot dressing below the knee to Bilateral lower leg(s)       Unna Boot(s) were applied per  Guidelines.     Electronically signed by Claudia Patterson RN on 1/11/2024 at 9:48 AM    
Etiology Other 01/11/24 0843   Dressing Status New drainage noted;Old drainage noted 01/11/24 0843   Wound Cleansed Soap and water 01/11/24 0843   Dressing/Treatment Other (comment) 01/05/24 1359   Wound Length (cm) 4 cm 01/11/24 0843   Wound Width (cm) 6 cm 01/11/24 0843   Wound Depth (cm) 0.1 cm 01/11/24 0843   Wound Surface Area (cm^2) 24 cm^2 01/11/24 0843   Change in Wound Size % (l*w) -280.95 01/11/24 0843   Wound Volume (cm^3) 2.4 cm^3 01/11/24 0843   Wound Healing % -281 01/11/24 0843   Post-Procedure Length (cm) 4 cm 01/11/24 0843   Post-Procedure Width (cm) 6 cm 01/11/24 0843   Post-Procedure Depth (cm) 0.1 cm 01/11/24 0843   Post-Procedure Surface Area (cm^2) 24 cm^2 01/11/24 0843   Post-Procedure Volume (cm^3) 2.4 cm^3 01/11/24 0843   Wound Assessment Pink/red;Slough 01/11/24 0843   Drainage Amount Moderate (25-50%) 01/11/24 0843   Drainage Description Serous 01/11/24 0843   Odor None 01/11/24 0843   Olivia-wound Assessment Blanchable erythema 01/11/24 0843   Margins Undefined edges 01/11/24 0843   Wound Thickness Description not for Pressure Injury Full thickness 01/11/24 0843   Number of days: 6          Percent of Wound(s)/Ulcer(s) Debrided: 20%    Total Surface Area Debrided:  35.89 sq cm     Estimated Blood Loss:  Minimal    Hemostasis Achieved:  by pressure    Procedural Pain:  0  / 10     Post Procedural Pain:  0 / 10     Response to treatment:  Well tolerated by patient.       Plan:     Treatment Note please see Discharge Instructions    Written patient dismissal instructions given to patient and signed by patient or POA.           Electronically signed by ROSA SAHNI CNP on 1/11/2024 at 9:22 AM

## 2024-01-11 NOTE — DISCHARGE INSTRUCTIONS
compression bandage changes due to slippage, breakthrough drainage.     If you need medical attention outside of the business hours of the Wound Care Centers please contact your PCP or go to the nearest emergency room.     The information contained in the After Visit Summary has been reviewed with me, the patient and/or responsible adult, by my health care provider(s). I had the opportunity to ask questions regarding this information. I have elected to receive;      []After Visit Summary  [x]Comprehensive Discharge Instruction        Patient signature______________________________________Date:___  Electronically signed by Low Murguia RN on 1/11/2024 at 9:05 AM   Electronically signed by ROSA SAHNI CNP on 1/11/2024 at 9:18 AM

## 2024-01-11 NOTE — PLAN OF CARE
Problem: Chronic Conditions and Co-morbidities  Goal: Patient's chronic conditions and co-morbidity symptoms are monitored and maintained or improved  Outcome: Progressing     Problem: Wound:  Goal: Will show signs of wound healing; wound closure and no evidence of infection  Description: Will show signs of wound healing; wound closure and no evidence of infection  Outcome: Progressing     Problem: Venous:  Goal: Signs of wound healing will improve  Description: Signs of wound healing will improve  Outcome: Progressing     Problem: Falls - Risk of:  Goal: Will remain free from falls  Description: Will remain free from falls  Outcome: Progressing

## 2024-01-14 NOTE — DISCHARGE INSTRUCTIONS
[facility]                            Visit  Discharge Instructions / Physician Orders  DATE:1/18/24     [facility] Please see patient on Mondays     SUPPLIES ORDERED THRU:          NA           DATE LAST SUPPLIED   home care to supply per medicare guidelines      Wound Location:  Right and Left dorsal feet, left medial lower leg     Cleanse with: Liquid antibacterial soap and water, rinse well      Dressing Orders:  Primary dressing   silvercel toes,  enlivaderm to bilateral lower legs, Ioplex to right and left dorsal foot wounds. bilateral zinc 3 layer wraps                Frequency:  change silvercel to toes daily ( family will help), Change 3 layer wraps on Mondays ( by home care) and Thursday by wound care         Additional Orders: Increase protein to diet (meat, cheese, eggs, fish, peanut butter, nuts and beans)  Multivitamin daily     Bring compression wraps with you to next visit, (legs and feet)     OFFLOADING [] YES  TYPE:                  [x] NA     Weekly wound care visits until determined otherwise.     Antibiotic therapy-wound care related YES [] NO [] NA[x]     MY CHART []     Smart Device  []      HYPERBARIC TREATMENT-                TREATMENT #                          Your next appointment with the Wound Care Center is in 1 week                                                                                                   (Please note your next appointment above and if you are unable to keep, kindly give a 24 hour notice. Thank you.)  If more than 15 min late we cannot guarantee you will be seen due to clinician schedule  Per Policy, Excessive cancellation will call for dismissal from program.  If you experience any of the following, please call the Wound Care Center during business hours:  [phone]     * Increase in Pain  * Temperature over 101  * Increase in drainage from your wound  * Drainage with a foul odor  * Bleeding  *

## 2024-01-18 ENCOUNTER — HOSPITAL ENCOUNTER (OUTPATIENT)
Dept: WOUND CARE | Age: 79
Discharge: HOME | End: 2024-01-18
Payer: MEDICARE

## 2024-01-18 VITALS
TEMPERATURE: 96.9 F | DIASTOLIC BLOOD PRESSURE: 79 MMHG | SYSTOLIC BLOOD PRESSURE: 147 MMHG | HEART RATE: 79 BPM | RESPIRATION RATE: 20 BRPM

## 2024-01-18 DIAGNOSIS — I89.0 LYMPHEDEMA OF BOTH LOWER EXTREMITIES: ICD-10-CM

## 2024-01-18 DIAGNOSIS — S91.002D ANKLE WOUND, LEFT, SUBSEQUENT ENCOUNTER: ICD-10-CM

## 2024-01-18 DIAGNOSIS — I83.029 VENOUS ULCER OF LEFT LEG (HCC): ICD-10-CM

## 2024-01-18 DIAGNOSIS — S91.109D OPEN TOE WOUND, SUBSEQUENT ENCOUNTER: Primary | ICD-10-CM

## 2024-01-18 DIAGNOSIS — L97.929 VENOUS ULCER OF LEFT LEG (HCC): ICD-10-CM

## 2024-01-18 PROCEDURE — 97598 DBRDMT OPN WND ADDL 20CM/<: CPT

## 2024-01-18 PROCEDURE — 97597 DBRDMT OPN WND 1ST 20 CM/<: CPT

## 2024-01-18 RX ORDER — LIDOCAINE HYDROCHLORIDE 40 MG/ML
SOLUTION TOPICAL ONCE
Status: COMPLETED | OUTPATIENT
Start: 2024-01-18 | End: 2024-01-18

## 2024-01-18 RX ORDER — LIDOCAINE HYDROCHLORIDE 40 MG/ML
SOLUTION TOPICAL ONCE
OUTPATIENT
Start: 2024-01-18 | End: 2024-01-18

## 2024-01-18 RX ORDER — LISINOPRIL 20 MG/1
20 TABLET ORAL DAILY
Qty: 90 TABLET | Refills: 3 | Status: SHIPPED | OUTPATIENT
Start: 2024-01-18 | End: 2025-01-12

## 2024-01-18 RX ORDER — LIDOCAINE HYDROCHLORIDE 20 MG/ML
JELLY TOPICAL ONCE
OUTPATIENT
Start: 2024-01-18 | End: 2024-01-18

## 2024-01-18 RX ADMIN — LIDOCAINE HYDROCHLORIDE 10 ML: 40 SOLUTION TOPICAL at 15:08

## 2024-01-18 ASSESSMENT — ENCOUNTER SYMPTOMS
COUGH: 0
RHINORRHEA: 0
VOMITING: 0
NAUSEA: 0
SHORTNESS OF BREATH: 0
DIARRHEA: 0

## 2024-01-18 NOTE — PLAN OF CARE
Problem: Chronic Conditions and Co-morbidities  Goal: Patient's chronic conditions and co-morbidity symptoms are monitored and maintained or improved  Outcome: Progressing     Problem: Chronic Conditions and Co-morbidities  Goal: Patient's chronic conditions and co-morbidity symptoms are monitored and maintained or improved  1/18/2024 1549 by Dinora Samson RN  Outcome: Progressing  1/18/2024 1548 by Dinora Samson RN  Outcome: Progressing     Problem: Wound:  Goal: Will show signs of wound healing; wound closure and no evidence of infection  Description: Will show signs of wound healing; wound closure and no evidence of infection  Outcome: Progressing     Problem: Venous:  Goal: Signs of wound healing will improve  Description: Signs of wound healing will improve  Outcome: Progressing     Problem: Falls - Risk of:  Goal: Will remain free from falls  Description: Will remain free from falls  Outcome: Progressing

## 2024-01-18 NOTE — PROGRESS NOTES
Multilayer Compression Wrap   (Not Unna) Below the Knee    NAME:  Jona Heredia  YOB: 1945  MEDICAL RECORD NUMBER:  [id number]  DATE:  1/18/2024    Multilayer compression wrap: Removed old Multilayer wrap if indicated and wash leg with mild soap/water.  Applied moisturizing agent to dry skin as needed.   Applied primary and secondary dressing as ordered.  Applied multilayered dressing below the knee to right lower leg.  Applied multilayered dressing below the knee to left lower leg.  Instructed patient/caregiver not to remove dressing and to keep it clean and dry.   Instructed patient/caregiver on complications to report to provider, such as pain, numbness in toes, heavy drainage, and slippage of dressing.  Instructed patient on purpose of compression dressing and on activity and exercise recommendations.    Dressings and wraps applied per H Johnny KIMBLE  Electronically signed by Claudia Patterson RN on 1/18/2024 at 3:48 PM  
  Wound Etiology Other 01/18/24 1503   Dressing Status Old drainage noted;New drainage noted 01/18/24 1503   Wound Cleansed Soap and water 01/18/24 1503   Dressing/Treatment Other (comment) 01/05/24 1359   Wound Length (cm) 3 cm 01/18/24 1503   Wound Width (cm) 3.5 cm 01/18/24 1503   Wound Depth (cm) 0.1 cm 01/18/24 1503   Wound Surface Area (cm^2) 10.5 cm^2 01/18/24 1503   Change in Wound Size % (l*w) -66.67 01/18/24 1503   Wound Volume (cm^3) 1.05 cm^3 01/18/24 1503   Wound Healing % -67 01/18/24 1503   Post-Procedure Length (cm) 3 cm 01/18/24 1503   Post-Procedure Width (cm) 3.5 cm 01/18/24 1503   Post-Procedure Depth (cm) 0.1 cm 01/18/24 1503   Post-Procedure Surface Area (cm^2) 10.5 cm^2 01/18/24 1503   Post-Procedure Volume (cm^3) 1.05 cm^3 01/18/24 1503   Wound Assessment Pink/red;Slough 01/18/24 1503   Drainage Amount Moderate (25-50%) 01/18/24 1503   Drainage Description Serosanguinous;Yellow 01/18/24 1503   Odor Mild 01/18/24 1503   Olivia-wound Assessment Blanchable erythema;Dry/flaky 01/18/24 1503   Margins Undefined edges 01/18/24 1503   Wound Thickness Description not for Pressure Injury Full thickness 01/18/24 1503   Number of days: 13          Percent of Wound(s)/Ulcer(s) Debrided: 40%    Total Surface Area Debrided:  35.30 sq cm     Estimated Blood Loss:  None    Hemostasis Achieved:  not needed    Procedural Pain:  0  / 10     Post Procedural Pain:  0 / 10     Response to treatment:  Well tolerated by patient.       Plan:     Treatment Note please see Discharge Instructions    Written patient dismissal instructions given to patient and signed by patient or POA.           Electronically signed by ROSA SAHNI CNP on 1/18/2024 at 3:48 PM

## 2024-01-22 NOTE — DISCHARGE INSTRUCTIONS
[facility]OUND and HYPERBAR[facility] TREATMENT  CENTER                            Visit  Discharge Instructions / Physician Orders  DATE:1/25/24     [facility] Please see patient on      SUPPLIES ORDERED THRU:          NA           DATE LAST SUPPLIED   home care to supply per medicare guidelines      Wound Location:  Right and Left dorsal feet, left medial lower leg     Cleanse with: Liquid antibacterial soap and water, rinse well      Dressing Orders:  Primary dressing   silvercel toes,  enlivaderm to bilateral lower legs, Ioplex to right and left dorsal foot wounds. bilateral zinc 3 layer wraps                Frequency:  change silvercel to toes daily ( family will help)        Additional Orders: Increase protein to diet (meat, cheese, eggs, fish, peanut butter, nuts and beans)  Multivitamin daily     Bring compression wraps with you to next visit, (legs and feet)     OFFLOADING [] YES  TYPE:                  [x] NA     Weekly wound care visits until determined otherwise.     Antibiotic therapy-wound care related YES [] NO [] NA[x]     MY CHART []     Smart Device  []      HYPERBARIC TREATMENT-                TREATMENT #                          Your next appointment with the Wound Care Center is in 1 week                                                                                                   (Please note your next appointment above and if you are unable to keep, kindly give a 24 hour notice. Thank you.)  If more than 15 min late we cannot guarantee you will be seen due to clinician schedule  Per Policy, Excessive cancellation will call for dismissal from program.  If you experience any of the following, please call the Wound Care Center during business hours:  [phone]     * Increase in Pain  * Temperature over 101  * Increase in drainage from your wound  * Drainage with a foul odor  * Bleeding  * Increase in swelling  * Need for compression bandage changes due to slippage,

## 2024-01-24 ENCOUNTER — TELEPHONE (OUTPATIENT)
Dept: ONCOLOGY | Age: 79
End: 2024-01-24

## 2024-01-24 NOTE — TELEPHONE ENCOUNTER
Tried to reach pt to schedule a consult appt with Dr Rosas. Pt's VM is full and unable to leave a message.    Electronically signed by Alice Cuevas on 1/24/2024 at 11:42 AM

## 2024-01-25 ENCOUNTER — HOSPITAL ENCOUNTER (OUTPATIENT)
Dept: WOUND CARE | Age: 79
Discharge: HOME | End: 2024-01-25
Payer: MEDICARE

## 2024-01-25 VITALS
TEMPERATURE: 97.3 F | RESPIRATION RATE: 16 BRPM | SYSTOLIC BLOOD PRESSURE: 145 MMHG | HEART RATE: 75 BPM | DIASTOLIC BLOOD PRESSURE: 77 MMHG

## 2024-01-25 DIAGNOSIS — S91.109D OPEN TOE WOUND, SUBSEQUENT ENCOUNTER: Primary | ICD-10-CM

## 2024-01-25 DIAGNOSIS — S91.002D ANKLE WOUND, LEFT, SUBSEQUENT ENCOUNTER: ICD-10-CM

## 2024-01-25 DIAGNOSIS — I89.0 LYMPHEDEMA OF BOTH LOWER EXTREMITIES: ICD-10-CM

## 2024-01-25 PROCEDURE — 29581 APPL MULTLAYER CMPRN SYS LEG: CPT

## 2024-01-25 PROCEDURE — 99213 OFFICE O/P EST LOW 20 MIN: CPT | Performed by: NURSE PRACTITIONER

## 2024-01-25 RX ORDER — LIDOCAINE HYDROCHLORIDE 40 MG/ML
SOLUTION TOPICAL ONCE
OUTPATIENT
Start: 2024-01-25 | End: 2024-01-25

## 2024-01-25 RX ORDER — LIDOCAINE HYDROCHLORIDE 20 MG/ML
JELLY TOPICAL ONCE
OUTPATIENT
Start: 2024-01-25 | End: 2024-01-25

## 2024-01-25 RX ORDER — LIDOCAINE HYDROCHLORIDE 40 MG/ML
SOLUTION TOPICAL ONCE
Status: DISCONTINUED | OUTPATIENT
Start: 2024-01-25 | End: 2024-01-26 | Stop reason: HOSPADM

## 2024-01-25 ASSESSMENT — ENCOUNTER SYMPTOMS
DIARRHEA: 0
SHORTNESS OF BREATH: 0
VOMITING: 0
RHINORRHEA: 0
NAUSEA: 0
COUGH: 0

## 2024-01-25 ASSESSMENT — PAIN SCALES - GENERAL: PAINLEVEL_OUTOF10: 0

## 2024-01-25 NOTE — PROGRESS NOTES
[facility]   Progress Note and Procedure Note      Jona Heredia  MEDICAL RECORD NUMBER:  [id number]  AGE: 78 y.o.   GENDER: male  : 1945  EPISODE DATE:  2024    Subjective:     Chief Complaint   Patient presents with    Wound Check     Bilat feet         HISTORY of PRESENT ILLNESS HPI     Jona Heredia is a 78 y.o. male who presents today for wound/ulcer evaluation.   History of Wound Context: presents in follow up on bilateral dorsal foot and left lower leg wounds that area healed today. Will wrap in compression wrap one more time and recheck in one week.   Wound/Ulcer Pain Timing/Severity: none  Quality of pain: N/A  Severity:  0 / 10   Modifying Factors: None  Associated Signs/Symptoms: none    Ulcer Identification:  Ulcer Type: lymphedema  Contributing Factors: edema, venous stasis, lymphedema, decreased mobility, obesity, and incontinence of urine         PAST MEDICAL HISTORY        Diagnosis Date    Abdominal hernia     Atrophy of muscle of multiple sites 2016    CAD (coronary artery disease)     CKD (chronic kidney disease) stage 3, GFR 30-59 ml/min ([facility])     Colon polyp     Crohn disease ([facility])     Crohn's colitis ([facility])     Decubitus ulcer of coccyx, stage 2 ([facility]) 2016    Diarrhea     Diverticulosis     Hemorrhoids     History of atrial fibrillation     Hypertension     Ileostomy in place ([facility]) 2016    Internal hemorrhoids     NSVT (nonsustained ventricular tachycardia) ([facility]) 2016    Other and unspecified hyperlipidemia     HISTORY OF HIGH CHOLESTEROL    Past heart attack     Stage 3 chronic kidney disease (HCC)     Tobacco use disorder     Tubular adenoma        PAST SURGICAL HISTORY    Past Surgical History:   Procedure Laterality Date    ABDOMEN SURGERY  2016    explor. lap., ileostomy    ABDOMEN SURGERY  2017    colostomy/ileostomy takedown with wound vac application hernia peristomal repair    CARDIAC CATHETERIZATION

## 2024-01-26 ENCOUNTER — HOSPITAL ENCOUNTER (OUTPATIENT)
Age: 79
Discharge: HOME | End: 2024-01-26
Payer: MEDICARE

## 2024-01-26 DIAGNOSIS — N18.4 CKD (CHRONIC KIDNEY DISEASE) STAGE 4, GFR 15-29 ML/MIN (HCC): ICD-10-CM

## 2024-01-26 DIAGNOSIS — Z90.49 H/O RESECTION OF SMALL BOWEL: Chronic | ICD-10-CM

## 2024-01-26 DIAGNOSIS — N20.0 CALCULUS OF KIDNEY: ICD-10-CM

## 2024-01-26 DIAGNOSIS — Z86.39 HISTORY OF HYPERKALEMIA: ICD-10-CM

## 2024-01-26 DIAGNOSIS — I10 ESSENTIAL HYPERTENSION: Chronic | ICD-10-CM

## 2024-01-26 LAB
25(OH)D3 SERPL-MCNC: 27.5 NG/ML
ALBUMIN SERPL-MCNC: 3.9 G/DL (ref 3.5–5.2)
ANION GAP SERPL CALCULATED.3IONS-SCNC: 6 MMOL/L (ref 9–17)
BUN SERPL-MCNC: 35 MG/DL (ref 8–23)
CALCIUM SERPL-MCNC: 9 MG/DL (ref 8.6–10.4)
CHLORIDE SERPL-SCNC: 103 MMOL/L (ref 98–107)
CO2 SERPL-SCNC: 30 MMOL/L (ref 20–31)
CREAT SERPL-MCNC: 2 MG/DL (ref 0.7–1.2)
ERYTHROCYTE [DISTWIDTH] IN BLOOD BY AUTOMATED COUNT: 18.3 % (ref 11.5–14.9)
FERRITIN SERPL-MCNC: 70 NG/ML (ref 30–400)
GFR SERPL CREATININE-BSD FRML MDRD: 34 ML/MIN/1.73M2
GLUCOSE SERPL-MCNC: 86 MG/DL (ref 70–99)
HCT VFR BLD AUTO: 38.5 % (ref 41–53)
HGB BLD-MCNC: 11.8 G/DL (ref 13.5–17.5)
IRON SATN MFR SERPL: 12 % (ref 20–55)
IRON SERPL-MCNC: 46 UG/DL (ref 59–158)
MCH RBC QN AUTO: 26.5 PG (ref 26–34)
MCHC RBC AUTO-ENTMCNC: 30.6 G/DL (ref 31–37)
MCV RBC AUTO: 86.7 FL (ref 80–100)
PHOSPHATE SERPL-MCNC: 3.4 MG/DL (ref 2.5–4.5)
PLATELET # BLD AUTO: 153 K/UL (ref 150–450)
PMV BLD AUTO: 8.1 FL (ref 6–12)
POTASSIUM SERPL-SCNC: 4.7 MMOL/L (ref 3.7–5.3)
PTH-INTACT SERPL-MCNC: 69 PG/ML (ref 15–65)
RBC # BLD AUTO: 4.44 M/UL (ref 4.5–5.9)
SODIUM SERPL-SCNC: 139 MMOL/L (ref 135–144)
TIBC SERPL-MCNC: 369 UG/DL (ref 250–450)
UNSATURATED IRON BINDING CAPACITY: 323 UG/DL (ref 112–347)
WBC OTHER # BLD: 5 K/UL (ref 3.5–11)

## 2024-01-26 PROCEDURE — 82306 VITAMIN D 25 HYDROXY: CPT

## 2024-01-26 PROCEDURE — 80048 BASIC METABOLIC PNL TOTAL CA: CPT

## 2024-01-26 PROCEDURE — 83540 ASSAY OF IRON: CPT

## 2024-01-26 PROCEDURE — 84100 ASSAY OF PHOSPHORUS: CPT

## 2024-01-26 PROCEDURE — 83970 ASSAY OF PARATHORMONE: CPT

## 2024-01-26 PROCEDURE — 82728 ASSAY OF FERRITIN: CPT

## 2024-01-26 PROCEDURE — 82040 ASSAY OF SERUM ALBUMIN: CPT

## 2024-01-26 PROCEDURE — 83550 IRON BINDING TEST: CPT

## 2024-01-26 PROCEDURE — 36415 COLL VENOUS BLD VENIPUNCTURE: CPT

## 2024-01-26 PROCEDURE — 85027 COMPLETE CBC AUTOMATED: CPT

## 2024-01-28 NOTE — DISCHARGE INSTRUCTIONS
[facility]                            Visit  Discharge Instructions / Physician Orders  DATE:2/1/24     [facility] Please see patient on      SUPPLIES ORDERED THRU:          NA           DATE LAST SUPPLIED   home care to supply per medicare guidelines      Wound Location:  Right and Left dorsal feet, left medial lower leg     Cleanse with: Liquid antibacterial soap and water, rinse well      Dressing Orders:  Primary dressing   silvercel toes,  enlivaderm to bilateral lower legs, Ioplex to right and left dorsal foot wounds. bilateral zinc 3 layer wraps                Frequency:  change silvercel to toes daily ( family will help)        Additional Orders: Increase protein to diet (meat, cheese, eggs, fish, peanut butter, nuts and beans)  Multivitamin daily     Bring compression wraps with you to next visit, (legs and feet)     OFFLOADING [] YES  TYPE:                  [x] NA     Weekly wound care visits until determined otherwise.     Antibiotic therapy-wound care related YES [] NO [] NA[x]     MY CHART []     Smart Device  []      HYPERBARIC TREATMENT-                TREATMENT #                          Your next appointment with the Wound Care Center is in 1 week                                                                                                   (Please note your next appointment above and if you are unable to keep, kindly give a 24 hour notice. Thank you.)  If more than 15 min late we cannot guarantee you will be seen due to clinician schedule  Per Policy, Excessive cancellation will call for dismissal from program.  If you experience any of the following, please call the Wound Care Center during business hours:  [phone]     * Increase in Pain  * Temperature over 101  * Increase in drainage from your wound  * Drainage with a foul odor  * Bleeding  * Increase in swelling  * Need for compression bandage changes due to slippage,

## 2024-02-01 ENCOUNTER — HOSPITAL ENCOUNTER (OUTPATIENT)
Dept: WOUND CARE | Age: 79
Discharge: HOME | End: 2024-02-01
Payer: MEDICARE

## 2024-02-01 ENCOUNTER — TELEPHONE (OUTPATIENT)
Dept: ONCOLOGY | Age: 79
End: 2024-02-01

## 2024-02-01 VITALS
HEIGHT: 67 IN | TEMPERATURE: 97 F | HEART RATE: 79 BPM | WEIGHT: 195 LBS | SYSTOLIC BLOOD PRESSURE: 119 MMHG | RESPIRATION RATE: 18 BRPM | BODY MASS INDEX: 30.61 KG/M2 | DIASTOLIC BLOOD PRESSURE: 67 MMHG

## 2024-02-01 DIAGNOSIS — S91.109D OPEN TOE WOUND, SUBSEQUENT ENCOUNTER: Primary | ICD-10-CM

## 2024-02-01 DIAGNOSIS — I83.029 VENOUS ULCER OF LEFT LEG (HCC): ICD-10-CM

## 2024-02-01 DIAGNOSIS — S91.002D ANKLE WOUND, LEFT, SUBSEQUENT ENCOUNTER: ICD-10-CM

## 2024-02-01 DIAGNOSIS — I89.0 LYMPHEDEMA OF BOTH LOWER EXTREMITIES: ICD-10-CM

## 2024-02-01 DIAGNOSIS — L97.929 VENOUS ULCER OF LEFT LEG (HCC): ICD-10-CM

## 2024-02-01 DIAGNOSIS — S91.302D OPEN WOUND OF LEFT FOOT, SUBSEQUENT ENCOUNTER: ICD-10-CM

## 2024-02-01 PROBLEM — S91.302A OPEN WOUND OF LEFT FOOT: Status: ACTIVE | Noted: 2024-02-01

## 2024-02-01 PROCEDURE — 97597 DBRDMT OPN WND 1ST 20 CM/<: CPT

## 2024-02-01 PROCEDURE — 97597 DBRDMT OPN WND 1ST 20 CM/<: CPT | Performed by: NURSE PRACTITIONER

## 2024-02-01 PROCEDURE — 29581 APPL MULTLAYER CMPRN SYS LEG: CPT

## 2024-02-01 RX ORDER — LIDOCAINE HYDROCHLORIDE 40 MG/ML
SOLUTION TOPICAL ONCE
Status: COMPLETED | OUTPATIENT
Start: 2024-02-01 | End: 2024-02-01

## 2024-02-01 RX ORDER — LIDOCAINE HYDROCHLORIDE 20 MG/ML
JELLY TOPICAL ONCE
OUTPATIENT
Start: 2024-02-01 | End: 2024-02-01

## 2024-02-01 RX ORDER — LIDOCAINE HYDROCHLORIDE 40 MG/ML
SOLUTION TOPICAL ONCE
OUTPATIENT
Start: 2024-02-01 | End: 2024-02-01

## 2024-02-01 RX ADMIN — LIDOCAINE HYDROCHLORIDE 5 ML: 40 SOLUTION TOPICAL at 15:25

## 2024-02-01 ASSESSMENT — ENCOUNTER SYMPTOMS
SHORTNESS OF BREATH: 0
COUGH: 0
NAUSEA: 0
DIARRHEA: 0
VOMITING: 0
RHINORRHEA: 0

## 2024-02-01 ASSESSMENT — PAIN SCALES - GENERAL: PAINLEVEL_OUTOF10: 0

## 2024-02-01 NOTE — TELEPHONE ENCOUNTER
LVM for pt to call and schedule a consult appt with Dr Rosas    Electronically signed by Alice Cuevas on 2/1/2024 at 11:36 AM

## 2024-02-01 NOTE — PROGRESS NOTES
Multilayer Compression Wrap   (Not Unna) Below the Knee    NAME:  Jona Heredia  YOB: 1945  MEDICAL RECORD NUMBER:  [id number]  DATE:  2/1/2024    Multilayer compression wrap: Removed old Multilayer wrap if indicated and wash leg with mild soap/water.  Applied moisturizing agent to dry skin as needed.   Applied primary and secondary dressing as ordered.  Applied multilayered dressing below the knee to right lower leg.  Applied multilayered dressing below the knee to left lower leg.  Instructed patient/caregiver not to remove dressing and to keep it clean and dry.   Instructed patient/caregiver on complications to report to provider, such as pain, numbness in toes, heavy drainage, and slippage of dressing.  Instructed patient on purpose of compression dressing and on activity and exercise recommendations.      Electronically signed by Ariela Turner RN on 2/1/2024 at 4:02 PM

## 2024-02-01 NOTE — PLAN OF CARE
Problem: Chronic Conditions and Co-morbidities  Goal: Patient's chronic conditions and co-morbidity symptoms are monitored and maintained or improved  2/1/2024 1547 by Dinora Samson RN  Outcome: Progressing  2/1/2024 1546 by Dinora Samson RN  Outcome: Progressing     Problem: Pain  Goal: Verbalizes/displays adequate comfort level or baseline comfort level  2/1/2024 1547 by Dinora Samson RN  Outcome: Progressing  2/1/2024 1546 by Dinora Samson RN  Outcome: Progressing     Problem: Wound:  Goal: Will show signs of wound healing; wound closure and no evidence of infection  Description: Will show signs of wound healing; wound closure and no evidence of infection  Outcome: Progressing     Problem: Venous:  Goal: Signs of wound healing will improve  Description: Signs of wound healing will improve  Outcome: Progressing     Problem: Falls - Risk of:  Goal: Will remain free from falls  Description: Will remain free from falls  Outcome: Progressing

## 2024-02-01 NOTE — PROGRESS NOTES
[facility]   Progress Note and Procedure Note      Jona Heredia  MEDICAL RECORD NUMBER:  [id number]  AGE: 78 y.o.   GENDER: male  : 1945  EPISODE DATE:  2024    Subjective:     Chief Complaint   Patient presents with    Wound Check     Lower legs         HISTORY of PRESENT ILLNESS HPI     Jona Heredia is a 78 y.o. male who presents today for wound/ulcer evaluation.   History of Wound Context: here to follow up on venous wounds. Has scattered open areas over left foot and one left lower leg wound. Does well with compression.   Wound/Ulcer Pain Timing/Severity: none  Quality of pain: N/A  Severity:  0 / 10   Modifying Factors: None  Associated Signs/Symptoms: none    Ulcer Identification:  Ulcer Type: lymphedema  Contributing Factors: edema, venous stasis, lymphedema, decreased mobility, and obesity         PAST MEDICAL HISTORY        Diagnosis Date    Abdominal hernia     Atrophy of muscle of multiple sites 2016    CAD (coronary artery disease)     CKD (chronic kidney disease) stage 3, GFR 30-59 ml/min ([facility])     Colon polyp     Crohn disease ([facility])     Crohn's colitis ([facility])     Decubitus ulcer of coccyx, stage 2 ([facility]) 2016    Diarrhea     Diverticulosis     Hemorrhoids     History of atrial fibrillation     Hypertension     Ileostomy in place ([facility]) 2016    Internal hemorrhoids     NSVT (nonsustained ventricular tachycardia) ([facility]) 2016    Other and unspecified hyperlipidemia     HISTORY OF HIGH CHOLESTEROL    Past heart attack     Stage 3 chronic kidney disease ([facility])     Tobacco use disorder     Tubular adenoma        PAST SURGICAL HISTORY    Past Surgical History:   Procedure Laterality Date    ABDOMEN SURGERY  2016    explor. lap., ileostomy    ABDOMEN SURGERY  2017    colostomy/ileostomy takedown with wound vac application hernia peristomal repair    CARDIAC CATHETERIZATION  2010    NO STENTS    CARDIAC PROCEDURE N/A 2023

## 2024-02-08 ENCOUNTER — HOSPITAL ENCOUNTER (OUTPATIENT)
Dept: WOUND CARE | Age: 79
Discharge: HOME | End: 2024-02-08
Payer: MEDICARE

## 2024-02-08 VITALS
HEART RATE: 84 BPM | TEMPERATURE: 97 F | RESPIRATION RATE: 20 BRPM | SYSTOLIC BLOOD PRESSURE: 147 MMHG | DIASTOLIC BLOOD PRESSURE: 73 MMHG

## 2024-02-08 DIAGNOSIS — S91.302D OPEN WOUND OF LEFT FOOT, SUBSEQUENT ENCOUNTER: ICD-10-CM

## 2024-02-08 DIAGNOSIS — I89.0 LYMPHEDEMA OF BOTH LOWER EXTREMITIES: Primary | ICD-10-CM

## 2024-02-08 PROBLEM — S91.002D ANKLE WOUND, LEFT, SUBSEQUENT ENCOUNTER: Status: RESOLVED | Noted: 2023-11-29 | Resolved: 2024-02-08

## 2024-02-08 PROBLEM — L97.412 NON-PRESSURE CHRONIC ULCER OF RIGHT HEEL AND MIDFOOT WITH FAT LAYER EXPOSED (HCC): Status: RESOLVED | Noted: 2024-01-09 | Resolved: 2024-02-08

## 2024-02-08 PROBLEM — S91.001D ANKLE WOUND, RIGHT, SUBSEQUENT ENCOUNTER: Status: RESOLVED | Noted: 2023-11-29 | Resolved: 2024-02-08

## 2024-02-08 PROBLEM — S91.109D OPEN TOE WOUND, SUBSEQUENT ENCOUNTER: Status: RESOLVED | Noted: 2023-11-29 | Resolved: 2024-02-08

## 2024-02-08 PROCEDURE — 99213 OFFICE O/P EST LOW 20 MIN: CPT

## 2024-02-08 PROCEDURE — 99213 OFFICE O/P EST LOW 20 MIN: CPT | Performed by: NURSE PRACTITIONER

## 2024-02-08 RX ORDER — LIDOCAINE HYDROCHLORIDE 40 MG/ML
SOLUTION TOPICAL ONCE
OUTPATIENT
Start: 2024-02-08 | End: 2024-02-08

## 2024-02-08 RX ORDER — LIDOCAINE HYDROCHLORIDE 40 MG/ML
SOLUTION TOPICAL ONCE
Status: COMPLETED | OUTPATIENT
Start: 2024-02-08 | End: 2024-02-08

## 2024-02-08 RX ORDER — LIDOCAINE HYDROCHLORIDE 20 MG/ML
JELLY TOPICAL ONCE
OUTPATIENT
Start: 2024-02-08 | End: 2024-02-08

## 2024-02-08 RX ADMIN — LIDOCAINE HYDROCHLORIDE 10 ML: 40 SOLUTION TOPICAL at 15:33

## 2024-02-08 ASSESSMENT — ENCOUNTER SYMPTOMS
COUGH: 0
VOMITING: 0
RHINORRHEA: 0
DIARRHEA: 0
NAUSEA: 0
SHORTNESS OF BREATH: 0

## 2024-02-08 NOTE — DISCHARGE INSTRUCTIONS
bandage changes due to slippage, breakthrough drainage.     If you need medical attention outside of the business hours of the Wound Care Centers please contact your PCP or go to the nearest emergency room.     The information contained in the After Visit Summary has been reviewed with me, the patient and/or responsible adult, by my health care provider(s). I had the opportunity to ask questions regarding this information. I have elected to receive;      []After Visit Summary  [x]Comprehensive Discharge Instruction        Patient signature______________________________________Date:____  Electronically signed by Dinora Samson RN on 2/8/2024 at 3:53 PM  Electronically signed by ROSA SAHNI - CNP on 2/8/2024 at 3:24 PM

## 2024-02-08 NOTE — PROGRESS NOTES
Initiate Outpatient Wound Care Protocol    Open wound of left foot        Procedure Note  Indications:  Based on my examination of this patient's wound(s)/ulcer(s) today, debridement is not required to promote healing and evaluate the wound base.    Wound Measurements:  Wound/Ulcer Descriptions are Pre Debridement except measurements:    Wound 01/05/24 Pretibial Left;Medial wound #4 cluster (Active)   Wound Image   02/08/24 1513   Wound Etiology Venous 02/08/24 1513   Dressing Status Old drainage noted;New drainage noted 02/08/24 1513   Wound Cleansed Soap and water 02/08/24 1513   Dressing/Treatment Other (comment) 01/05/24 1359   Wound Length (cm) 2.3 cm 02/08/24 1513   Wound Width (cm) 3.6 cm 02/08/24 1513   Wound Depth (cm) 0.1 cm 02/08/24 1513   Wound Surface Area (cm^2) 8.28 cm^2 02/08/24 1513   Change in Wound Size % (l*w) -2660 02/08/24 1513   Wound Volume (cm^3) 0.828 cm^3 02/08/24 1513   Wound Healing % -2660 02/08/24 1513   Post-Procedure Length (cm) 2.3 cm 02/08/24 1513   Post-Procedure Width (cm) 3.6 cm 02/08/24 1513   Post-Procedure Depth (cm) 0.1 cm 02/08/24 1513   Post-Procedure Surface Area (cm^2) 8.28 cm^2 02/08/24 1513   Post-Procedure Volume (cm^3) 0.828 cm^3 02/08/24 1513   Wound Assessment Pink/red;Slough 02/08/24 1513   Drainage Amount Moderate (25-50%) 02/08/24 1513   Drainage Description Serosanguinous;Yellow 02/08/24 1513   Odor None 02/08/24 1513   Olivia-wound Assessment Blanchable erythema;Fragile 02/08/24 1513   Margins Defined edges 02/08/24 1513   Wound Thickness Description not for Pressure Injury Full thickness 02/08/24 1513   Number of days: 34       Wound 01/05/24 Foot Left;Dorsal;Distal wound #5 (Active)   Wound Image   02/01/24 1513   Wound Etiology Venous 02/08/24 1513   Dressing Status Old drainage noted;New drainage noted 02/08/24 1513   Wound Cleansed Soap and water 02/08/24 1513   Dressing/Treatment Other (comment) 01/05/24 1359   Wound Length (cm) 4.4 cm 02/08/24 1513

## 2024-02-13 NOTE — DISCHARGE INSTRUCTIONS
[facility]                            Visit  Discharge Instructions / Physician Orders  DATE:2/15/24     [facility] -may discharge     SUPPLIES ORDERED THRU:          NA           DATE LAST SUPPLIED   home care to supply per medicare guidelines      Wound Location:  Bilat legs-healed     Cleanse with: Liquid antibacterial soap and water, rinse well      Dressing Orders: wear compression wraps on first thing in the am  May apply lotion to lower legs- leave wraps off for 1-2 hours then apply wraps- do this 2-3 x per week (elevate legs)  Frequency:                              Continue pumping 2-3 x per day     Additional Orders: Increase protein to diet (meat, cheese, eggs, fish, peanut butter, nuts and beans)  Multivitamin daily        OFFLOADING [] YES  TYPE:                  [x] NA     Weekly wound care visits until determined otherwise.     Antibiotic therapy-wound care related YES [] NO [] NA[x]     MY CHART []     Smart Device  []      HYPERBARIC TREATMENT-                TREATMENT #                          Your next appointment with the Wound Care Center -call if needed                                                                                                   (Please note your next appointment above and if you are unable to keep, kindly give a 24 hour notice. Thank you.)  If more than 15 min late we cannot guarantee you will be seen due to clinician schedule  Per Policy, Excessive cancellation will call for dismissal from program.  If you experience any of the following, please call the Wound Care Center during business hours:  [phone]     * Increase in Pain  * Temperature over 101  * Increase in drainage from your wound  * Drainage with a foul odor  * Bleeding  * Increase in swelling  * Need for compression bandage changes due to slippage, breakthrough drainage.     If you need medical attention outside of the business hours of the Wound

## 2024-02-15 ENCOUNTER — HOSPITAL ENCOUNTER (OUTPATIENT)
Dept: WOUND CARE | Age: 79
Discharge: HOME | End: 2024-02-15
Payer: MEDICARE

## 2024-02-15 VITALS
TEMPERATURE: 96.8 F | HEART RATE: 85 BPM | RESPIRATION RATE: 20 BRPM | SYSTOLIC BLOOD PRESSURE: 150 MMHG | DIASTOLIC BLOOD PRESSURE: 78 MMHG

## 2024-02-15 DIAGNOSIS — I89.0 LYMPHEDEMA OF BOTH LOWER EXTREMITIES: ICD-10-CM

## 2024-02-15 DIAGNOSIS — S91.109D OPEN TOE WOUND, SUBSEQUENT ENCOUNTER: Primary | ICD-10-CM

## 2024-02-15 DIAGNOSIS — S91.002D ANKLE WOUND, LEFT, SUBSEQUENT ENCOUNTER: ICD-10-CM

## 2024-02-15 PROBLEM — L97.929 VENOUS ULCER OF LEFT LEG (HCC): Status: RESOLVED | Noted: 2024-01-09 | Resolved: 2024-02-15

## 2024-02-15 PROBLEM — S91.302A OPEN WOUND OF LEFT FOOT: Status: RESOLVED | Noted: 2024-02-01 | Resolved: 2024-02-15

## 2024-02-15 PROBLEM — I83.029 VENOUS ULCER OF LEFT LEG (HCC): Status: RESOLVED | Noted: 2024-01-09 | Resolved: 2024-02-15

## 2024-02-15 PROCEDURE — 99213 OFFICE O/P EST LOW 20 MIN: CPT | Performed by: NURSE PRACTITIONER

## 2024-02-15 PROCEDURE — 99213 OFFICE O/P EST LOW 20 MIN: CPT

## 2024-02-15 RX ORDER — LIDOCAINE HYDROCHLORIDE 40 MG/ML
SOLUTION TOPICAL ONCE
Status: COMPLETED | OUTPATIENT
Start: 2024-02-15 | End: 2024-02-15

## 2024-02-15 RX ORDER — LIDOCAINE HYDROCHLORIDE 40 MG/ML
SOLUTION TOPICAL ONCE
Status: CANCELLED | OUTPATIENT
Start: 2024-02-15 | End: 2024-02-15

## 2024-02-15 RX ORDER — LIDOCAINE HYDROCHLORIDE 20 MG/ML
JELLY TOPICAL ONCE
Status: CANCELLED | OUTPATIENT
Start: 2024-02-15 | End: 2024-02-15

## 2024-02-15 RX ADMIN — LIDOCAINE HYDROCHLORIDE 15 ML: 40 SOLUTION TOPICAL at 15:30

## 2024-02-15 NOTE — PROGRESS NOTES
[facility]   Progress Note and Procedure Note      Jona Heredia  MEDICAL RECORD NUMBER:  [id number]  AGE: 78 y.o.   GENDER: male  : 1945  EPISODE DATE:  2/15/2024    Subjective:     Chief Complaint   Patient presents with    Wound Check     Right foot, left lower leg         HISTORY of PRESENT ILLNESS HPI     Jona Heredia is a 78 y.o. male who presents today for wound/ulcer evaluation.   History of Wound Context: presents in follow up on left foot and left lower leg wounds. Still with some drainage. We did trial last week out of compression and legs are stable. He has wraps and pumps (which he uses twice daily) at home. Getting rides to his appointments is challenging. He no longer wants to come to wound care and wound like to manage this at home. He does not want home care either.   Wound/Ulcer Pain Timing/Severity: none  Quality of pain: N/A  Severity:  0 / 10   Modifying Factors: None  Associated Signs/Symptoms: none    Ulcer Identification:  Ulcer Type: lymphedema  Contributing Factors: edema, venous stasis, lymphedema, and decreased mobility         PAST MEDICAL HISTORY        Diagnosis Date    Abdominal hernia     Atrophy of muscle of multiple sites 2016    CAD (coronary artery disease)     CKD (chronic kidney disease) stage 3, GFR 30-59 ml/min ([facility])     Colon polyp     Crohn disease ([facility])     Crohn's colitis ([facility])     Decubitus ulcer of coccyx, stage 2 ([facility]) 2016    Diarrhea     Diverticulosis     Hemorrhoids     History of atrial fibrillation     Hypertension     Ileostomy in place ([facility]) 2016    Internal hemorrhoids     NSVT (nonsustained ventricular tachycardia) ([facility]) 2016    Other and unspecified hyperlipidemia     HISTORY OF HIGH CHOLESTEROL    Past heart attack     Stage 3 chronic kidney disease ([facility])     Tobacco use disorder     Tubular adenoma        PAST SURGICAL HISTORY    Past Surgical History:   Procedure Laterality Date    ABDOMEN

## 2024-03-05 ENCOUNTER — HOSPITAL ENCOUNTER (OUTPATIENT)
Dept: GENERAL RADIOLOGY | Age: 79
Discharge: HOME | End: 2024-03-07
Payer: MEDICARE

## 2024-03-05 ENCOUNTER — INITIAL CONSULT (OUTPATIENT)
Dept: ONCOLOGY | Age: 79
End: 2024-03-05
Payer: MEDICARE

## 2024-03-05 ENCOUNTER — TELEPHONE (OUTPATIENT)
Dept: ONCOLOGY | Age: 79
End: 2024-03-05

## 2024-03-05 ENCOUNTER — HOSPITAL ENCOUNTER (OUTPATIENT)
Age: 79
Discharge: HOME | End: 2024-03-07
Payer: MEDICARE

## 2024-03-05 ENCOUNTER — HOSPITAL ENCOUNTER (OUTPATIENT)
Age: 79
Discharge: HOME | End: 2024-03-05
Payer: MEDICARE

## 2024-03-05 VITALS
BODY MASS INDEX: 32.79 KG/M2 | TEMPERATURE: 97.7 F | SYSTOLIC BLOOD PRESSURE: 146 MMHG | WEIGHT: 208.9 LBS | HEIGHT: 67 IN | DIASTOLIC BLOOD PRESSURE: 88 MMHG | HEART RATE: 62 BPM

## 2024-03-05 DIAGNOSIS — D64.9 ANEMIA, UNSPECIFIED TYPE: Primary | ICD-10-CM

## 2024-03-05 DIAGNOSIS — D47.2 MGUS (MONOCLONAL GAMMOPATHY OF UNKNOWN SIGNIFICANCE): ICD-10-CM

## 2024-03-05 LAB
BASOPHILS # BLD: 0.05 K/UL (ref 0–0.2)
EOSINOPHIL # BLD: 0.68 K/UL (ref 0–0.44)
EOSINOPHILS RELATIVE PERCENT: 9 % (ref 1–4)
ERYTHROCYTE [DISTWIDTH] IN BLOOD BY AUTOMATED COUNT: 17.1 % (ref 11.8–14.4)
HCT VFR BLD AUTO: 43.4 % (ref 40.7–50.3)
HGB BLD-MCNC: 12.8 G/DL (ref 13–17)
IMM GRANULOCYTES # BLD AUTO: <0.03 K/UL (ref 0–0.3)
IMM GRANULOCYTES NFR BLD: 0 %
LYMPHOCYTES NFR BLD: 0.95 K/UL (ref 1.1–3.7)
LYMPHOCYTES RELATIVE PERCENT: 13 % (ref 24–43)
MCH RBC QN AUTO: 26.9 PG (ref 25.2–33.5)
MCHC RBC AUTO-ENTMCNC: 29.5 G/DL (ref 28.4–34.8)
MCV RBC AUTO: 91.4 FL (ref 82.6–102.9)
MONOCYTES NFR BLD: 0.69 K/UL (ref 0.1–1.2)
MONOCYTES NFR BLD: 10 % (ref 3–12)
NEUTROPHILS NFR BLD: 67 % (ref 36–65)
NEUTS SEG NFR BLD: 4.91 K/UL (ref 1.5–8.1)
NRBC BLD-RTO: 0 PER 100 WBC
PLATELET # BLD AUTO: 161 K/UL (ref 138–453)
PMV BLD AUTO: 10.8 FL (ref 8.1–13.5)
RBC # BLD AUTO: 4.75 M/UL (ref 4.21–5.77)
RBC # BLD: ABNORMAL 10*6/UL
WBC OTHER # BLD: 7.3 K/UL (ref 3.5–11.3)

## 2024-03-05 PROCEDURE — G8484 FLU IMMUNIZE NO ADMIN: HCPCS | Performed by: INTERNAL MEDICINE

## 2024-03-05 PROCEDURE — 36415 COLL VENOUS BLD VENIPUNCTURE: CPT

## 2024-03-05 PROCEDURE — 3079F DIAST BP 80-89 MM HG: CPT | Performed by: INTERNAL MEDICINE

## 2024-03-05 PROCEDURE — 84165 PROTEIN E-PHORESIS SERUM: CPT

## 2024-03-05 PROCEDURE — 83521 IG LIGHT CHAINS FREE EACH: CPT

## 2024-03-05 PROCEDURE — 82784 ASSAY IGA/IGD/IGG/IGM EACH: CPT

## 2024-03-05 PROCEDURE — 3077F SYST BP >= 140 MM HG: CPT | Performed by: INTERNAL MEDICINE

## 2024-03-05 PROCEDURE — G8427 DOCREV CUR MEDS BY ELIG CLIN: HCPCS | Performed by: INTERNAL MEDICINE

## 2024-03-05 PROCEDURE — 82746 ASSAY OF FOLIC ACID SERUM: CPT

## 2024-03-05 PROCEDURE — 86334 IMMUNOFIX E-PHORESIS SERUM: CPT

## 2024-03-05 PROCEDURE — 80053 COMPREHEN METABOLIC PANEL: CPT

## 2024-03-05 PROCEDURE — 82607 VITAMIN B-12: CPT

## 2024-03-05 PROCEDURE — G8417 CALC BMI ABV UP PARAM F/U: HCPCS | Performed by: INTERNAL MEDICINE

## 2024-03-05 PROCEDURE — 85025 COMPLETE CBC W/AUTO DIFF WBC: CPT

## 2024-03-05 PROCEDURE — 84155 ASSAY OF PROTEIN SERUM: CPT

## 2024-03-05 PROCEDURE — 99204 OFFICE O/P NEW MOD 45 MIN: CPT | Performed by: INTERNAL MEDICINE

## 2024-03-05 PROCEDURE — 77075 RADEX OSSEOUS SURVEY COMPL: CPT

## 2024-03-05 RX ORDER — LOPERAMIDE HYDROCHLORIDE 2 MG/1
CAPSULE ORAL
COMMUNITY
Start: 2021-02-25

## 2024-03-05 NOTE — PROGRESS NOTES
Patient ID: Jona Heredia, 1945, [id number], 78 y.o.    Referred by : Yasmeen Dowd APRN - CNP   Reason for consultation:   Anemia  MGUS  HISTORY OF PRESENT ILLNESS:    Hematologic history:  Jona Heredia is a 78 y.o. male with a past medical history of CKD, CAD, Crohn's disease was seen during initial consultation visit for anemia and MGUS.    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  His both kappa and lambda light chains were high with a kappa/lambda ratio of 0.2.  Therefore he was referred to hematology for evaluation.    Patient had a repeat lab work recently in January which showed improved hemoglobin at 11.8.  His iron levels appears low at that time.  He is not taking any iron supplements.  He denies any blood in stool, no chest pain shortness of breath.  He denies any new bone pain back pain.  He is accompanied by his sister-in-law during today's office visit.  He denies any unintentional weight loss night sweats fever chills.  He has a chronic lymphedema of both lower EXTR extremities.    Past Medical History:   Diagnosis Date    Abdominal hernia     Atrophy of muscle of multiple sites 8/19/2016    CAD (coronary artery disease)     CKD (chronic kidney disease) stage 3, GFR 30-59 ml/min ([facility])     Colon polyp     Crohn disease ([facility])     Crohn's colitis ([facility])     Decubitus ulcer of coccyx, stage 2 ([facility]) 8/8/2016    Diarrhea     Diverticulosis     Hemorrhoids     History of atrial fibrillation     Hypertension     Ileostomy in place ([facility]) 8/18/2016    Internal hemorrhoids     NSVT (nonsustained ventricular tachycardia) ([facility]) 08/18/2016    Other and unspecified hyperlipidemia     HISTORY OF HIGH CHOLESTEROL    Past heart attack     Stage 3 chronic kidney disease (HCC)     Tobacco use disorder     Tubular adenoma        Past Surgical History:   Procedure Laterality Date

## 2024-03-05 NOTE — TELEPHONE ENCOUNTER
Labs  Skeletal survey  RTc  3-4 weeks        Labs and bone survey being done now    Rv scheduled for 4/4/24 @ 11am    An After Visit Summary was printed and given to the patient.    Electronically signed by Ailyn Evans on 3/5/2024 at 1:51 PM

## 2024-03-06 LAB
ALBUMIN SERPL-MCNC: 4 G/DL (ref 3.5–5.2)
ALBUMIN/GLOB SERPL: 1.1 {RATIO} (ref 1–2.5)
ALP SERPL-CCNC: 114 U/L (ref 40–129)
ANION GAP SERPL CALCULATED.3IONS-SCNC: 9 MMOL/L (ref 9–17)
BILIRUB SERPL-MCNC: 0.7 MG/DL (ref 0.3–1.2)
BUN SERPL-MCNC: 46 MG/DL (ref 8–23)
CALCIUM SERPL-MCNC: 9.4 MG/DL (ref 8.6–10.4)
CHLORIDE SERPL-SCNC: 101 MMOL/L (ref 98–107)
CO2 SERPL-SCNC: 29 MMOL/L (ref 20–31)
CREAT SERPL-MCNC: 2 MG/DL (ref 0.7–1.2)
FREE KAPPA/LAMBDA RATIO: 0.13 (ref 0.26–1.65)
GFR SERPL CREATININE-BSD FRML MDRD: 34 ML/MIN/1.73M2
GLUCOSE SERPL-MCNC: 80 MG/DL (ref 70–99)
IGA SERPL-MCNC: 373 MG/DL (ref 70–400)
IGG SERPL-MCNC: 1468 MG/DL (ref 700–1600)
KAPPA LC FREE SER-MCNC: 92.1 MG/L (ref 3.7–19.4)
LAMBDA LC FREE SERPL-MCNC: 707.4 MG/L (ref 5.7–26.3)
POTASSIUM SERPL-SCNC: 5.2 MMOL/L (ref 3.7–5.3)
PROT SERPL-MCNC: 7.5 G/DL (ref 6.4–8.3)
SODIUM SERPL-SCNC: 139 MMOL/L (ref 135–144)

## 2024-03-07 LAB
ALBUMIN PERCENT: 58 % (ref 45–65)
ALBUMIN SERPL-MCNC: 4.3 G/DL (ref 3.2–5.2)
ALPHA 2 PERCENT: 12 % (ref 6–13)
ALPHA1 GLOB SERPL ELPH-MCNC: 3 % (ref 3–6)
ALPHA2 GLOB SERPL ELPH-MCNC: 0.9 G/DL (ref 0.5–0.9)
B-GLOBULIN SERPL ELPH-MCNC: 1 G/DL (ref 0.5–1.1)
B-GLOBULIN SERPL ELPH-MCNC: 13 % (ref 11–19)
GAMMA GLOB SERPL ELPH-MCNC: 1.1 G/DL (ref 0.5–1.5)
GAMMA GLOBULIN %: 14 % (ref 9–20)
INTERPRETATION SERPL IFE-IMP: NORMAL
PATH REV: NORMAL
PATHOLOGIST: NORMAL
PROT SERPL-MCNC: 7.4 G/DL (ref 6.4–8.3)
TOTAL PROT. SUM,%: 100 % (ref 98–102)
TOTAL PROT. SUM: 7.5 G/DL (ref 6.3–8.2)

## 2024-04-04 ENCOUNTER — TELEPHONE (OUTPATIENT)
Dept: ONCOLOGY | Age: 79
End: 2024-04-04

## 2024-04-04 ENCOUNTER — OFFICE VISIT (OUTPATIENT)
Dept: ONCOLOGY | Age: 79
End: 2024-04-04
Payer: MEDICARE

## 2024-04-04 ENCOUNTER — HOSPITAL ENCOUNTER (OUTPATIENT)
Age: 79
Discharge: HOME | End: 2024-04-04
Payer: MEDICARE

## 2024-04-04 VITALS
SYSTOLIC BLOOD PRESSURE: 132 MMHG | HEART RATE: 78 BPM | DIASTOLIC BLOOD PRESSURE: 76 MMHG | TEMPERATURE: 97.3 F | BODY MASS INDEX: 34.16 KG/M2 | WEIGHT: 218.1 LBS

## 2024-04-04 DIAGNOSIS — I47.20 VENTRICULAR TACHYCARDIA (HCC): ICD-10-CM

## 2024-04-04 DIAGNOSIS — D47.2 MGUS (MONOCLONAL GAMMOPATHY OF UNKNOWN SIGNIFICANCE): Primary | ICD-10-CM

## 2024-04-04 DIAGNOSIS — I10 ESSENTIAL HYPERTENSION: Chronic | ICD-10-CM

## 2024-04-04 LAB
CHOLEST SERPL-MCNC: 141 MG/DL (ref 0–199)
CHOLESTEROL/HDL RATIO: 3
HDLC SERPL-MCNC: 42 MG/DL
LDLC SERPL CALC-MCNC: 84 MG/DL (ref 0–100)
TRIGL SERPL-MCNC: 76 MG/DL (ref 0–149)
VLDLC SERPL CALC-MCNC: 15 MG/DL

## 2024-04-04 PROCEDURE — 1036F TOBACCO NON-USER: CPT | Performed by: INTERNAL MEDICINE

## 2024-04-04 PROCEDURE — 36415 COLL VENOUS BLD VENIPUNCTURE: CPT

## 2024-04-04 PROCEDURE — 3075F SYST BP GE 130 - 139MM HG: CPT | Performed by: INTERNAL MEDICINE

## 2024-04-04 PROCEDURE — G8417 CALC BMI ABV UP PARAM F/U: HCPCS | Performed by: INTERNAL MEDICINE

## 2024-04-04 PROCEDURE — 3078F DIAST BP <80 MM HG: CPT | Performed by: INTERNAL MEDICINE

## 2024-04-04 PROCEDURE — 99211 OFF/OP EST MAY X REQ PHY/QHP: CPT | Performed by: INTERNAL MEDICINE

## 2024-04-04 PROCEDURE — 1123F ACP DISCUSS/DSCN MKR DOCD: CPT | Performed by: INTERNAL MEDICINE

## 2024-04-04 PROCEDURE — 80061 LIPID PANEL: CPT

## 2024-04-04 PROCEDURE — G8427 DOCREV CUR MEDS BY ELIG CLIN: HCPCS | Performed by: INTERNAL MEDICINE

## 2024-04-04 PROCEDURE — 99214 OFFICE O/P EST MOD 30 MIN: CPT | Performed by: INTERNAL MEDICINE

## 2024-04-04 NOTE — TELEPHONE ENCOUNTER
RTC in 3 months with labs         Labs to be done one week prior to visit    Rv scheduled for 7/18/24 @ 1030am    An After Visit Summary was printed and given to the patient.    Electronically signed by Ailyn Evans on 4/4/2024 at 12:02 PM

## 2024-04-04 NOTE — PROGRESS NOTES
Patient ID: Jona Heredia, 1945, [id number], 78 y.o.    Referred by : Yasmeen Dowd APRN - CNP   DIAGNOSIS:   Anemia  MGUS  HISTORY OF PRESENT ILLNESS:    Hematologic history:  Jona Heredia is a 78 y.o. male with a past medical history of CKD, CAD, Crohn's disease was seen during initial consultation visit for anemia and MGUS.    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  His both kappa and lambda light chains were high with a kappa/lambda ratio of 0.2.  Therefore he was referred to hematology for evaluation.    Patient had a repeat lab work recently in January which showed improved hemoglobin at 11.8.  His iron levels appears low at that time.  He is not taking any iron supplements.  He denies any blood in stool, no chest pain shortness of breath.  He denies any new bone pain back pain.  He is accompanied by his sister-in-law during today's office visit.  He denies any unintentional weight loss night sweats fever chills.  He has a chronic lymphedema of both lower EXTR extremities.    INTERVAL HISTORY:  Patient is return for follow sooner to discuss lab results, imaging studies and further recommendations.  His bone skeletal survey was negative for any lytic lesion.  His lambda light chain appears slightly elevated.  He is taking iron pill daily and his hemoglobin has improved.  His kidney function has been stable.  Denies any new bone pain back pain no unintentional weight loss, drenching night sweats fever chills.    During this visit patient's allergy, social, medical, surgical history and medications were reviewed and updated.   Past Medical History:   Diagnosis Date    Abdominal hernia     Atrophy of muscle of multiple sites 8/19/2016    CAD (coronary artery disease)     CKD (chronic kidney disease) stage 3, GFR 30-59 ml/min (HCC)     Colon polyp     Crohn disease (HCC)

## 2024-04-04 NOTE — RESEARCH
Research Note:     Patient is a 78-year-old alert and oriented male, with a diagnosis of MGUS presented today with his sister-in-law for an office visit with Dr. Rosas. Observational Study [facility] [place] Surveillance, Contact, and Research for MGUS, Myeloma, and Amyloidosis presented and explained to patient by Dr. Rosas as well as Research Coordinator. Patient is agreeable to enter observational study.     Consent obtained by this Research Coordinator in the Oncology exam room. Patient verbalized understanding of observational research study and signed consent along with HIPAA Authorization on 04 APR 2024. Patient verbalized understanding that he could withdraw from the research study at any time by contacting the Research Coordinator and denied any questions at this time.     Copy of signed consent and HIPAA forms provided to patient along with contact information for Research Coordinator. Encouraged patient to call at any time with any questions he may have. Patient verbalized understanding and wheeled to checkout with Research Coordinator and sister-in-law without any difficulty.  Thank you,  Rosetta Cee MBA BSN RN CCDS  Research Coordinator

## 2024-04-15 ENCOUNTER — OFFICE VISIT (OUTPATIENT)
Dept: INTERNAL MEDICINE CLINIC | Age: 79
End: 2024-04-15
Payer: MEDICARE

## 2024-04-15 VITALS
DIASTOLIC BLOOD PRESSURE: 72 MMHG | WEIGHT: 212.8 LBS | SYSTOLIC BLOOD PRESSURE: 128 MMHG | OXYGEN SATURATION: 97 % | BODY MASS INDEX: 33.4 KG/M2 | HEART RATE: 81 BPM | HEIGHT: 67 IN

## 2024-04-15 DIAGNOSIS — I50.23 ACUTE ON CHRONIC SYSTOLIC (CONGESTIVE) HEART FAILURE (HCC): ICD-10-CM

## 2024-04-15 DIAGNOSIS — I48.92 ATRIAL FLUTTER BY ELECTROCARDIOGRAM (HCC): ICD-10-CM

## 2024-04-15 DIAGNOSIS — I10 ESSENTIAL HYPERTENSION: Chronic | ICD-10-CM

## 2024-04-15 DIAGNOSIS — I77.9 PERIPHERAL ARTERIAL OCCLUSIVE DISEASE (HCC): Primary | Chronic | ICD-10-CM

## 2024-04-15 DIAGNOSIS — I49.02 VENTRICULAR FIBRILLATION AND FLUTTER (HCC): ICD-10-CM

## 2024-04-15 DIAGNOSIS — K50.019 CROHN'S DISEASE OF SMALL INTESTINE WITH COMPLICATION (HCC): Chronic | ICD-10-CM

## 2024-04-15 DIAGNOSIS — I49.01 VENTRICULAR FIBRILLATION AND FLUTTER (HCC): ICD-10-CM

## 2024-04-15 DIAGNOSIS — I89.0 LYMPHEDEMA OF BOTH LOWER EXTREMITIES: ICD-10-CM

## 2024-04-15 PROCEDURE — 99214 OFFICE O/P EST MOD 30 MIN: CPT | Performed by: INTERNAL MEDICINE

## 2024-04-15 PROCEDURE — 1123F ACP DISCUSS/DSCN MKR DOCD: CPT | Performed by: INTERNAL MEDICINE

## 2024-04-15 PROCEDURE — 3078F DIAST BP <80 MM HG: CPT | Performed by: INTERNAL MEDICINE

## 2024-04-15 PROCEDURE — 1036F TOBACCO NON-USER: CPT | Performed by: INTERNAL MEDICINE

## 2024-04-15 PROCEDURE — G8417 CALC BMI ABV UP PARAM F/U: HCPCS | Performed by: INTERNAL MEDICINE

## 2024-04-15 PROCEDURE — G8427 DOCREV CUR MEDS BY ELIG CLIN: HCPCS | Performed by: INTERNAL MEDICINE

## 2024-04-15 PROCEDURE — 3074F SYST BP LT 130 MM HG: CPT | Performed by: INTERNAL MEDICINE

## 2024-04-15 ASSESSMENT — ENCOUNTER SYMPTOMS
COUGH: 0
ABDOMINAL DISTENTION: 0
EYE PAIN: 0
SHORTNESS OF BREATH: 0
EYE DISCHARGE: 0
BLOOD IN STOOL: 0
WHEEZING: 0
TROUBLE SWALLOWING: 0
COLOR CHANGE: 0
DIARRHEA: 0

## 2024-04-15 NOTE — PROGRESS NOTES
Visit Information    Have you changed or started any medications since your last visit including any over-the-counter medicines, vitamins, or herbal medicines? no   Are you having any side effects from any of your medications? -  no  Have you stopped taking any of your medications? Is so, why? -  no    Have you seen any other physician or provider since your last visit? Yes - Records Obtained  Have you had any other diagnostic tests since your last visit? Yes - Records Obtained  Have you been seen in the emergency room and/or had an admission to a hospital since we last saw you? Yes - Records Obtained  Have you had your routine dental cleaning in the past 6 months? no    Have you activated your [employer] account? If not, what are your barriers? Yes     Patient Care Team:  Ry Bostno MD as PCP - General (Internal Medicine)  Ry Boston MD as PCP - Empaneled Provider  Jackie Alcantar MD as Consulting Physician (Gastroenterology)    Medical History Review  Past Medical, Family, and Social History reviewed and does contribute to the patient presenting condition    Health Maintenance   Topic Date Due    DTaP/Tdap/Td vaccine (1 - Tdap) Never done    Respiratory Syncytial Virus (RSV) Pregnant or age 60 yrs+ (1 - 1-dose 60+ series) Never done    Pneumococcal 65+ years Vaccine (2 of 2 - PPSV23 or PCV20) 12/10/2018    COVID-19 Vaccine (4 - 2023-24 season) 09/01/2023    Annual Wellness Visit (Medicare)  04/19/2024    Flu vaccine (Season Ended) 08/01/2024    Depression Screen  01/09/2025    Shingles vaccine  Completed    Hepatitis C screen  Completed    Hepatitis A vaccine  Aged Out    Hepatitis B vaccine  Aged Out    Hib vaccine  Aged Out    Polio vaccine  Aged Out    Meningococcal (ACWY) vaccine  Aged Out    A1C test (Diabetic or Prediabetic)  Discontinued    Lipids  Discontinued    AAA screen  Discontinued    Colonoscopy  Discontinued    Prostate Specific Antigen (PSA) Screening or Monitoring  Discontinued     
Systems   Constitutional:  Negative for appetite change, diaphoresis and fatigue.   HENT:  Negative for ear discharge and trouble swallowing.    Eyes:  Negative for pain and discharge.   Respiratory:  Negative for cough, shortness of breath and wheezing.    Cardiovascular:  Positive for leg swelling. Negative for chest pain and palpitations.   Gastrointestinal:  Negative for abdominal distention, blood in stool and diarrhea.   Endocrine: Negative for polydipsia and polyphagia.   Genitourinary:  Negative for difficulty urinating and frequency.   Musculoskeletal:  Positive for arthralgias. Negative for gait problem, myalgias and neck pain.   Skin:  Negative for color change and rash.   Allergic/Immunologic: Negative for environmental allergies and food allergies.   Neurological:  Negative for dizziness and headaches.   Hematological:  Negative for adenopathy. Does not bruise/bleed easily.   Psychiatric/Behavioral:  Negative for behavioral problems and sleep disturbance.        Objective:     Physical Exam  Constitutional:       Appearance: He is well-developed. He is not diaphoretic.   HENT:      Head: Normocephalic and atraumatic.   Eyes:      General:         Right eye: No discharge.         Left eye: No discharge.      Extraocular Movements:      Right eye: Normal extraocular motion.      Left eye: Normal extraocular motion.      Conjunctiva/sclera: Conjunctivae normal.      Right eye: Right conjunctiva is not injected.      Left eye: Left conjunctiva is not injected.   Neck:      Thyroid: No thyroid mass or thyromegaly.      Vascular: No JVD.   Cardiovascular:      Rate and Rhythm: Normal rate and regular rhythm.      Heart sounds: No murmur heard.     No friction rub.   Pulmonary:      Effort: Pulmonary effort is normal. No tachypnea, bradypnea, accessory muscle usage or respiratory distress.      Breath sounds: Normal breath sounds. No wheezing or rales.   Abdominal:      General: Bowel sounds are normal. There

## 2024-05-22 ENCOUNTER — OFFICE VISIT (OUTPATIENT)
Age: 79
End: 2024-05-22
Payer: MEDICARE

## 2024-05-22 ENCOUNTER — HOSPITAL ENCOUNTER (OUTPATIENT)
Age: 79
Discharge: HOME | End: 2024-05-22
Payer: MEDICARE

## 2024-05-22 VITALS
TEMPERATURE: 98.1 F | SYSTOLIC BLOOD PRESSURE: 139 MMHG | HEART RATE: 41 BPM | BODY MASS INDEX: 32.96 KG/M2 | HEIGHT: 67 IN | WEIGHT: 210 LBS | OXYGEN SATURATION: 94 % | DIASTOLIC BLOOD PRESSURE: 69 MMHG

## 2024-05-22 DIAGNOSIS — I50.22 CHRONIC SYSTOLIC CONGESTIVE HEART FAILURE (HCC): ICD-10-CM

## 2024-05-22 DIAGNOSIS — I48.92 ATRIAL FLUTTER BY ELECTROCARDIOGRAM (HCC): ICD-10-CM

## 2024-05-22 DIAGNOSIS — I48.19 PERSISTENT ATRIAL FIBRILLATION (HCC): ICD-10-CM

## 2024-05-22 DIAGNOSIS — I49.01 VF (VENTRICULAR FIBRILLATION) (HCC): Primary | ICD-10-CM

## 2024-05-22 LAB
25(OH)D3 SERPL-MCNC: 32.7 NG/ML (ref 30–100)
ALBUMIN SERPL-MCNC: 4.1 G/DL (ref 3.5–5.2)
ANION GAP SERPL CALCULATED.3IONS-SCNC: 10 MMOL/L (ref 9–16)
BUN SERPL-MCNC: 48 MG/DL (ref 8–23)
CALCIUM SERPL-MCNC: 8.8 MG/DL (ref 8.6–10.4)
CHLORIDE SERPL-SCNC: 100 MMOL/L (ref 98–107)
CO2 SERPL-SCNC: 30 MMOL/L (ref 20–31)
CREAT SERPL-MCNC: 2.5 MG/DL (ref 0.7–1.2)
ERYTHROCYTE [DISTWIDTH] IN BLOOD BY AUTOMATED COUNT: 18 % (ref 11.8–14.4)
GFR, ESTIMATED: 25 ML/MIN/1.73M2
GLUCOSE SERPL-MCNC: 101 MG/DL (ref 74–99)
HCT VFR BLD AUTO: 39.8 % (ref 40.7–50.3)
HGB BLD-MCNC: 12.2 G/DL (ref 13–17)
MAGNESIUM SERPL-MCNC: 2.3 MG/DL (ref 1.6–2.4)
MCH RBC QN AUTO: 28.2 PG (ref 25.2–33.5)
MCHC RBC AUTO-ENTMCNC: 30.7 G/DL (ref 28.4–34.8)
MCV RBC AUTO: 92.1 FL (ref 82.6–102.9)
NRBC BLD-RTO: 0 PER 100 WBC
PHOSPHATE SERPL-MCNC: 2.8 MG/DL (ref 2.5–4.5)
PLATELET # BLD AUTO: 155 K/UL (ref 138–453)
PMV BLD AUTO: 10.6 FL (ref 8.1–13.5)
POTASSIUM SERPL-SCNC: 4.4 MMOL/L (ref 3.7–5.3)
PTH-INTACT SERPL-MCNC: 153 PG/ML (ref 15–65)
RBC # BLD AUTO: 4.32 M/UL (ref 4.21–5.77)
SODIUM SERPL-SCNC: 140 MMOL/L (ref 136–145)
WBC OTHER # BLD: 6.2 K/UL (ref 3.5–11.3)

## 2024-05-22 PROCEDURE — 83970 ASSAY OF PARATHORMONE: CPT

## 2024-05-22 PROCEDURE — 82040 ASSAY OF SERUM ALBUMIN: CPT

## 2024-05-22 PROCEDURE — G8427 DOCREV CUR MEDS BY ELIG CLIN: HCPCS | Performed by: SPECIALIST

## 2024-05-22 PROCEDURE — 99213 OFFICE O/P EST LOW 20 MIN: CPT | Performed by: SPECIALIST

## 2024-05-22 PROCEDURE — 80048 BASIC METABOLIC PNL TOTAL CA: CPT

## 2024-05-22 PROCEDURE — 36415 COLL VENOUS BLD VENIPUNCTURE: CPT

## 2024-05-22 PROCEDURE — 93283 PRGRMG EVAL IMPLANTABLE DFB: CPT | Performed by: SPECIALIST

## 2024-05-22 PROCEDURE — 1123F ACP DISCUSS/DSCN MKR DOCD: CPT | Performed by: SPECIALIST

## 2024-05-22 PROCEDURE — G8417 CALC BMI ABV UP PARAM F/U: HCPCS | Performed by: SPECIALIST

## 2024-05-22 PROCEDURE — 82306 VITAMIN D 25 HYDROXY: CPT

## 2024-05-22 PROCEDURE — 3078F DIAST BP <80 MM HG: CPT | Performed by: SPECIALIST

## 2024-05-22 PROCEDURE — 1036F TOBACCO NON-USER: CPT | Performed by: SPECIALIST

## 2024-05-22 PROCEDURE — 85027 COMPLETE CBC AUTOMATED: CPT

## 2024-05-22 PROCEDURE — 84100 ASSAY OF PHOSPHORUS: CPT

## 2024-05-22 PROCEDURE — 3075F SYST BP GE 130 - 139MM HG: CPT | Performed by: SPECIALIST

## 2024-05-22 PROCEDURE — 83735 ASSAY OF MAGNESIUM: CPT

## 2024-05-22 NOTE — PROGRESS NOTES
[facility] CARDIAC ELECTROPHYSIOLOGY  [address], [address]  [place]  [address]    Date of Visit:  2024  Patient Name: Jona Heredia   Patient :  1945   Referring By:  No ref. provider found     CHIEF COMPLAINT/HPI:     Jona Heredia is a 78 y.o. male who presents today for an general visit to be evaluated for the following condition(s):  Chief Complaint   Patient presents with    Follow-up     6 month f/u for device check *ICD*   Patient presents for routine ICD follow-up.  He has no symptoms suggest treatment or issues related to the ICD.  Wound is healed nicely.    Patient continues on amiodarone since early January and Eliquis and he is faithful in taking both.  Gives no symptoms of CHF decompensation.    He is not aware of recurrence of atrial fibrillation.  Interrogating the device showed that he has been in atrial fibrillation from the end of March till now it is persistent since then.  The patient was not aware of that.    REVIEW OF SYSTEM      Review of Systems  No symptoms suggest CHF decompensation or worsening of his CHF at this point.  REVIEWED INFORMATION      No Known Allergies    Current Outpatient Medications   Medication Sig Dispense Refill    loperamide (IMODIUM) 2 MG capsule Take 1 capsule by mouth as needed for Diarrhea      apixaban (ELIQUIS) 5 MG TABS tablet Take 1 tablet by mouth 2 times daily 180 tablet 3    ranolazine (RANEXA) 500 MG extended release tablet Take 1 tablet by mouth 2 times daily 180 tablet 3    isosorbide mononitrate (IMDUR) 30 MG extended release tablet Take 1 tablet by mouth daily 90 tablet 3    torsemide (DEMADEX) 20 MG tablet Take 2 tablets by mouth daily 180 tablet 3    amiodarone (CORDARONE) 200 MG tablet Take 1 tablet by mouth daily 60 tablet 0     No current facility-administered medications for this visit.        Patient Active Problem List   Diagnosis    Hemorrhoids    Essential hypertension    Coronary artery disease involving native  Yes...

## 2024-05-28 ENCOUNTER — HOSPITAL ENCOUNTER (OUTPATIENT)
Age: 79
Setting detail: SPECIMEN
Discharge: HOME | End: 2024-05-28
Payer: MEDICARE

## 2024-05-28 ENCOUNTER — TELEPHONE (OUTPATIENT)
Dept: INTERNAL MEDICINE CLINIC | Age: 79
End: 2024-05-28

## 2024-05-28 LAB
CREAT UR-MCNC: 37.9 MG/DL (ref 39–259)
TOTAL PROTEIN, URINE: 13 MG/DL
URINE TOTAL PROTEIN CREATININE RATIO: 0.34 (ref 0–0.2)

## 2024-05-28 PROCEDURE — 82570 ASSAY OF URINE CREATININE: CPT

## 2024-05-28 PROCEDURE — 84156 ASSAY OF PROTEIN URINE: CPT

## 2024-06-05 ENCOUNTER — HOSPITAL ENCOUNTER (OUTPATIENT)
Age: 79
Discharge: HOME | End: 2024-06-07
Attending: SPECIALIST
Payer: MEDICARE

## 2024-06-05 VITALS
DIASTOLIC BLOOD PRESSURE: 100 MMHG | RESPIRATION RATE: 16 BRPM | HEART RATE: 84 BPM | SYSTOLIC BLOOD PRESSURE: 124 MMHG | OXYGEN SATURATION: 95 % | TEMPERATURE: 97.9 F

## 2024-06-05 DIAGNOSIS — I48.3 TYPICAL ATRIAL FLUTTER (HCC): Primary | ICD-10-CM

## 2024-06-05 DIAGNOSIS — I48.91 ATRIAL FIBRILLATION, UNSPECIFIED TYPE (HCC): ICD-10-CM

## 2024-06-05 LAB
BUN BLD-MCNC: 35 MG/DL (ref 8–26)
CHLORIDE BLD-SCNC: 105 MMOL/L (ref 98–107)
EGFR, POC: 28 ML/MIN/1.73M2
EKG ATRIAL RATE: 101 BPM
EKG P AXIS: 70 DEGREES
EKG P-R INTERVAL: 246 MS
EKG Q-T INTERVAL: 318 MS
EKG QRS DURATION: 126 MS
EKG QTC CALCULATION (BAZETT): 412 MS
EKG R AXIS: -22 DEGREES
EKG T AXIS: 41 DEGREES
EKG VENTRICULAR RATE: 101 BPM
GLUCOSE BLD-MCNC: 94 MG/DL (ref 74–100)
HCT VFR BLD AUTO: 43 % (ref 41–53)
POC CREATININE: 2.3 MG/DL (ref 0.51–1.19)
POC HEMOGLOBIN (CALC): 14.6 G/DL (ref 13.5–17.5)
POTASSIUM BLD-SCNC: 4.5 MMOL/L (ref 3.5–4.5)
SODIUM BLD-SCNC: 146 MMOL/L (ref 138–146)

## 2024-06-05 PROCEDURE — 6360000002 HC RX W HCPCS: Performed by: SPECIALIST

## 2024-06-05 PROCEDURE — 82947 ASSAY GLUCOSE BLOOD QUANT: CPT

## 2024-06-05 PROCEDURE — 85014 HEMATOCRIT: CPT

## 2024-06-05 PROCEDURE — 82565 ASSAY OF CREATININE: CPT

## 2024-06-05 PROCEDURE — 84295 ASSAY OF SERUM SODIUM: CPT

## 2024-06-05 PROCEDURE — 82435 ASSAY OF BLOOD CHLORIDE: CPT

## 2024-06-05 PROCEDURE — 93005 ELECTROCARDIOGRAM TRACING: CPT | Performed by: SPECIALIST

## 2024-06-05 PROCEDURE — 84520 ASSAY OF UREA NITROGEN: CPT

## 2024-06-05 PROCEDURE — 2580000003 HC RX 258: Performed by: SPECIALIST

## 2024-06-05 PROCEDURE — 7100000011 HC PHASE II RECOVERY - ADDTL 15 MIN: Performed by: SPECIALIST

## 2024-06-05 PROCEDURE — 92960 CARDIOVERSION ELECTRIC EXT: CPT | Performed by: SPECIALIST

## 2024-06-05 PROCEDURE — 92960 CARDIOVERSION ELECTRIC EXT: CPT

## 2024-06-05 PROCEDURE — 93289 INTERROG DEVICE EVAL HEART: CPT | Performed by: SPECIALIST

## 2024-06-05 PROCEDURE — 99234 HOSP IP/OBS SM DT SF/LOW 45: CPT | Performed by: SPECIALIST

## 2024-06-05 PROCEDURE — 84132 ASSAY OF SERUM POTASSIUM: CPT

## 2024-06-05 PROCEDURE — 7100000010 HC PHASE II RECOVERY - FIRST 15 MIN: Performed by: SPECIALIST

## 2024-06-05 RX ORDER — SODIUM CHLORIDE 9 MG/ML
INJECTION, SOLUTION INTRAVENOUS CONTINUOUS
Status: DISCONTINUED | OUTPATIENT
Start: 2024-06-05 | End: 2024-06-08 | Stop reason: HOSPADM

## 2024-06-05 RX ADMIN — PROPOFOL 60 MG: 10 INJECTION, EMULSION INTRAVENOUS at 09:24

## 2024-06-05 RX ADMIN — SODIUM CHLORIDE: 9 INJECTION, SOLUTION INTRAVENOUS at 08:50

## 2024-06-05 NOTE — PROGRESS NOTES
Pt. Received s/p CV.  Pt. NSR with first degree HB and PVC's.  Pt. A&O, VSS.  Drink provided to pt.  Will monitor.

## 2024-06-05 NOTE — PROCEDURES
PROCEDURE NOTE  Date: 6/5/2024   Name: Jona Heredia  YOB: 1945    Procedures      Procedure is DC cardioversion and dual-chamber ICD interrogation.    Preoperative diagnosis atrial flutter despite amiodarone.  Ventricular tachycardia.    Postoperative diagnosis successful DC cardioversion into sinus and paced rhythm, dual-chamber ICD interrogation was completed and within acceptable range of normality.    Patient was brought to the EP lab in the postabsorptive state.  Vitals were stable.  Informed consent was obtained.  Heavy sedation was induced with total of 60 mg of propofol.  A single synchronized DC shock delivered anteroposteriorly and a biphasic mode of 360 J resulted in sinus and paced rhythm.  Patient tolerated the procedure well.    Procedure of ICD interrogation was done, device is by [employer] MRI XT .    Atrial lead impedance 437 ohms RV of 361 ohms.  Patient was in atrial flutter prior to the cardioversion and in sinus and paced rhythm after that.  Been in atrial fibrillation /flutter from sometimes in March.  No malignant arrhythmias.    In the atrium at 0.4 ms voltage threshold 0.75 V, in the RV at 0.4 ms was 0.5 V.  P wave of 1.9 R wave of 3 mV.  No reprogramming was done.    Patient tolerated the procedure well was discharged from the EP lab in stable condition.

## 2024-06-05 NOTE — PROGRESS NOTES
All discharge instructions reviewed, questions answered, paper signed and given copy. Patient discharged per wheelchair with family and belongings.

## 2024-06-05 NOTE — DISCHARGE SUMMARY
Discharge Summary    Date: 6/5/2024  Patient Name: Jona Heredia    YOB: 1945     Age: 78 y.o.    Admit Date: 6/5/2024  Discharge Date: 6/5/2024  Discharge Condition: Good    Admission Diagnosis  Atrial fibrillation, unspecified type (HCC) [I48.91];Typical atrial flutter (HCC) [I48.3]      Discharge Diagnosis  Active Problems:    * No active hospital problems. *  Resolved Problems:    * No resolved hospital problems. *      Hospital Stay  Narrative of Hospital Course:  Successful DC cardioversion and proper dual-chamber ICD interrogation.    Consultants:  None    Surgeries/procedures Performed:      Treatments:    Procedures        Discharge Plan/Disposition:  Home    Hospital/Incidental Findings Requiring Follow Up:    Patient Instructions:    Diet: Low Fat, Low Cholesterol Diet    Activity:Activiity as Tolerated, No Driving for Today, Ambulate in House and No Lifting, Driving or Strenuous Excercise  For number of days (if applicable):      Other Instructions:    Provider Follow-Up:   No follow-ups on file.     Significant Diagnostic Studies:    Recent Labs:  Hospital Outpatient Visit on 06/05/2024  Ventricular Rate                              Date: 06/05/2024  Value: 101         Ref range: BPM                Status: Final  Atrial Rate                                   Date: 06/05/2024  Value: 101         Ref range: BPM                Status: Final  P-R Interval                                  Date: 06/05/2024  Value: 246         Ref range: ms                 Status: Final  QRS Duration                                  Date: 06/05/2024  Value: 126         Ref range: ms                 Status: Final  Q-T Interval                                  Date: 06/05/2024  Value: 318         Ref range: ms                 Status: Final  QTc Calculation (Bazett)                      Date: 06/05/2024  Value: 412         Ref range: ms                 Status: Final  P Axis

## 2024-06-05 NOTE — DISCHARGE INSTRUCTIONS
tolerated (unless you have received specific instructions from your doctor).  \" If you feel nauseated, continue with liquids until the nausea is gone.  \" Notify your physician if you have not urinated within 8 hours after the procedure.  \" Resume your medications unless otherwise instructed.

## 2024-06-05 NOTE — PROGRESS NOTES
Patient admitted, consent signed and questions answered. Patient ready for procedure. Call light to reach with side rails up 2 of 2.  Family at bedside with patient.  History and physical complete.

## 2024-06-05 NOTE — PROGRESS NOTES
TRANSFER - OUT REPORT:    Verbal report given to Ellen RN (name) on Jona Heredia being transferred to [facility] for routine progression of patient care       Report consisted of patient's Situation, Background, Assessment and   Recommendations(SBAR).     Information from the following report(s) Nurse Handoff Report was reviewed with the receiving nurse.    Opportunity for questions and clarification was provided.      Patient transported with:   Registered Nurse

## 2024-06-05 NOTE — H&P
Expenses: Not hard at all     Transportation Problems ([facility] HRSN)   Physical Activity: Inactive (4/19/2023)     Exercise Vital Sign      Days of Exercise per Week: 0 days      Minutes of Exercise per Session: 0 min   Housing Stability: Low Risk  (11/13/2023)     Housing Stability Vital Sign      Unable to Pay for Housing in the Last Year: No      Number of Places Lived in the Last Year: 1      Unstable Housing in the Last Year: No            Interpretation: ICD by [employer] Evera MRI serial number [id number].     Patient been in atrial fibrillation since late March.     Atrial lead impedance 437 RV of 361 ohms.  He was free from atrial fibrillation from October till late March.  So he has been 34% of the time in atrial fibrillation.  Paced in the ventricle and 18.4% of the time.  Failure seems to be under fair control.     In the RV at 0.4 ms voltage threshold 0.5 V.  Flutter waves of 1.1 R wave of 8.3 mV.     Attempts at ATP cardioverting him to sinus and or paced rhythm failed.     PHYSICAL EXAM      /69 (Site: Right Upper Arm, Position: Sitting, Cuff Size: Medium Adult)   Pulse (!) 41   Temp 98.1 °F (36.7 °C) (Temporal)   Ht 1.702 m (5' 7\")   Wt 95.3 kg (210 lb) Comment: per patient  SpO2 94%   BMI 32.89 kg/m²    Physical Exam  Alert oriented cooperative nice gentleman no distress.  No change in his examination.  Bilateral significant feet and legs edema.  ASSESSMENT/PLAN      There are no diagnoses linked to this encounter.  V-fib     CHF     Persistent atrial fibrillation     Persistent atrial flutter           Options of doing nothing versus DC cardioversion versus pulmonary vein isolation and or flutter ablation were discussed with the patient and his family member.  Pros and cons of each approach were discussed.  He decides DC cardioversion would be the proper option for him and I agree with that.  He has had cardioversion done before we will proceed with that and go from there.  If we

## 2024-06-07 LAB
EKG ATRIAL RATE: 96 BPM
EKG P AXIS: 26 DEGREES
EKG P-R INTERVAL: 238 MS
EKG Q-T INTERVAL: 386 MS
EKG QRS DURATION: 132 MS
EKG QTC CALCULATION (BAZETT): 487 MS
EKG R AXIS: -28 DEGREES
EKG T AXIS: 30 DEGREES
EKG VENTRICULAR RATE: 96 BPM

## 2024-06-18 ENCOUNTER — HOSPITAL ENCOUNTER (OUTPATIENT)
Age: 79
Setting detail: SPECIMEN
Discharge: HOME | End: 2024-06-18

## 2024-06-18 DIAGNOSIS — N18.4 CKD (CHRONIC KIDNEY DISEASE) STAGE 4, GFR 15-29 ML/MIN (HCC): ICD-10-CM

## 2024-06-18 DIAGNOSIS — I10 ESSENTIAL HYPERTENSION: ICD-10-CM

## 2024-06-18 DIAGNOSIS — Z86.39 HISTORY OF HYPERKALEMIA: ICD-10-CM

## 2024-06-18 DIAGNOSIS — Z90.49 H/O RESECTION OF SMALL BOWEL: ICD-10-CM

## 2024-06-18 DIAGNOSIS — N20.0 CALCULUS OF KIDNEY: ICD-10-CM

## 2024-06-18 DIAGNOSIS — D47.2 MGUS (MONOCLONAL GAMMOPATHY OF UNKNOWN SIGNIFICANCE): ICD-10-CM

## 2024-06-18 LAB
ANION GAP SERPL CALCULATED.3IONS-SCNC: 10 MMOL/L (ref 9–16)
BUN SERPL-MCNC: 43 MG/DL (ref 8–23)
CALCIUM SERPL-MCNC: 8.8 MG/DL (ref 8.6–10.4)
CHLORIDE SERPL-SCNC: 102 MMOL/L (ref 98–107)
CO2 SERPL-SCNC: 30 MMOL/L (ref 20–31)
CREAT SERPL-MCNC: 2.2 MG/DL (ref 0.7–1.2)
GFR, ESTIMATED: 30 ML/MIN/1.73M2
GLUCOSE SERPL-MCNC: 89 MG/DL (ref 74–99)
POTASSIUM SERPL-SCNC: 5.4 MMOL/L (ref 3.7–5.3)
SODIUM SERPL-SCNC: 142 MMOL/L (ref 136–145)

## 2024-06-26 NOTE — PROGRESS NOTES
Spoke with [place] from [employer], she said device is functioning as it should.  And the 8 beat Vtach this morning was not recorded due to the heart rate not being over 150 bpm. Perfect serve message sent to Dr. Neeraj Sharma with results Detail Level: Detailed Detail Level: Simple Detail Level: Generalized

## 2024-07-01 ENCOUNTER — OFFICE VISIT (OUTPATIENT)
Age: 79
End: 2024-07-01

## 2024-07-01 VITALS
HEIGHT: 67 IN | HEART RATE: 84 BPM | DIASTOLIC BLOOD PRESSURE: 89 MMHG | BODY MASS INDEX: 34.53 KG/M2 | OXYGEN SATURATION: 96 % | WEIGHT: 220 LBS | SYSTOLIC BLOOD PRESSURE: 166 MMHG

## 2024-07-01 DIAGNOSIS — I49.01 VF (VENTRICULAR FIBRILLATION) (HCC): Primary | ICD-10-CM

## 2024-07-01 DIAGNOSIS — I48.19 PERSISTENT ATRIAL FIBRILLATION (HCC): ICD-10-CM

## 2024-07-01 DIAGNOSIS — I50.22 CHRONIC SYSTOLIC CONGESTIVE HEART FAILURE (HCC): ICD-10-CM

## 2024-07-01 NOTE — PROGRESS NOTES
Crohn disease (HCC)     Crohn's colitis (HCC)     Decubitus ulcer of coccyx, stage 2 (HCC) 8/8/2016    Diarrhea     Diverticulosis     Hemorrhoids     History of atrial fibrillation     Hypertension     Ileostomy in place (HCC) 8/18/2016    Internal hemorrhoids     NSVT (nonsustained ventricular tachycardia) ([facility]) 08/18/2016    Other and unspecified hyperlipidemia     HISTORY OF HIGH CHOLESTEROL    Past heart attack     Stage 3 chronic kidney disease (HCC)     Tobacco use disorder     Tubular adenoma        Past Surgical History:   Procedure Laterality Date    ABDOMEN SURGERY  08/02/2016    explor. lap., ileostomy    ABDOMEN SURGERY  02/19/2017    colostomy/ileostomy takedown with wound vac application hernia peristomal repair    CARDIAC CATHETERIZATION  2010    NO STENTS    CARDIAC PROCEDURE N/A 09/11/2023    taleb / Left heart cath / rm 3025 performed by Jon Gutierrez MD at [facility] CARDIAC CATH LAB    CARDIAC PROCEDURE N/A 09/11/2023    Coronary angiography performed by Jon Gutierrez MD at [facility] CARDIAC CATH LAB    CARDIOVERSION  10/18/2023    CARDIOVERSION N/A 06/05/2024    DR. MERA    COLONOSCOPY  06/20/2005    COLONOSCOPY  04/19/2016    extreme spasmatic colon; extensive diverticulosis; ventral herniation; flat polyp; large internal hemorrhoids    COLONOSCOPY  12/06/2016    through ileostomy    EP DEVICE PROCEDURE N/A 09/14/2023    Linda / icd / rm 3025 performed by Candido Mera MD at [facility] CARDIAC CATH LAB    ILEOSTOMY OR JEJUNOSTOMY      POLYPECTOMY      OR REPAIR FIRST ABDOMINAL WALL HERNIA N/A 02/19/2017    HERNIA PERISTOMAL REPAIR RIGHT performed by Lucy Clark MD at [facility] OR    SMALL INTESTINE SURGERY N/A 02/19/2017    COLOSTOMY ILEOSTOMY TAKEDOWN WITH WOUND VAC APPLICATION performed by Lucy Clark MD at [facility] OR        Social History     Socioeconomic History    Marital status: Single   Tobacco Use    Smoking status: Former     Current packs/day: 0.00     Average packs/day: 0.5 packs/day for

## 2024-09-19 ENCOUNTER — TELEPHONE (OUTPATIENT)
Dept: ONCOLOGY | Age: 79
End: 2024-09-19

## 2024-09-30 ENCOUNTER — HOSPITAL ENCOUNTER (OUTPATIENT)
Age: 79
Discharge: HOME | End: 2024-09-30
Payer: MEDICARE

## 2024-09-30 DIAGNOSIS — Z90.49 H/O RESECTION OF SMALL BOWEL: ICD-10-CM

## 2024-09-30 DIAGNOSIS — I10 ESSENTIAL HYPERTENSION: ICD-10-CM

## 2024-09-30 DIAGNOSIS — N18.4 CKD (CHRONIC KIDNEY DISEASE) STAGE 4, GFR 15-29 ML/MIN (HCC): ICD-10-CM

## 2024-09-30 DIAGNOSIS — D47.2 MGUS (MONOCLONAL GAMMOPATHY OF UNKNOWN SIGNIFICANCE): ICD-10-CM

## 2024-09-30 DIAGNOSIS — N20.0 CALCULUS OF KIDNEY: ICD-10-CM

## 2024-09-30 DIAGNOSIS — Z86.39 HISTORY OF HYPERKALEMIA: ICD-10-CM

## 2024-09-30 LAB
25(OH)D3 SERPL-MCNC: 28.4 NG/ML (ref 30–100)
ALBUMIN SERPL-MCNC: 3.9 G/DL (ref 3.5–5.2)
ALBUMIN SERPL-MCNC: 3.9 G/DL (ref 3.5–5.2)
ALP SERPL-CCNC: 111 U/L (ref 40–129)
ALT SERPL-CCNC: 18 U/L (ref 5–41)
ANION GAP SERPL CALCULATED.3IONS-SCNC: 10 MMOL/L (ref 9–17)
ANION GAP SERPL CALCULATED.3IONS-SCNC: 10 MMOL/L (ref 9–17)
AST SERPL-CCNC: 20 U/L
BASOPHILS # BLD: 0 K/UL (ref 0–0.2)
BASOPHILS NFR BLD: 1 % (ref 0–2)
BILIRUB SERPL-MCNC: 1.1 MG/DL (ref 0.3–1.2)
BUN SERPL-MCNC: 38 MG/DL (ref 8–23)
BUN SERPL-MCNC: 38 MG/DL (ref 8–23)
CALCIUM SERPL-MCNC: 8.8 MG/DL (ref 8.6–10.4)
CALCIUM SERPL-MCNC: 8.8 MG/DL (ref 8.6–10.4)
CHLORIDE SERPL-SCNC: 99 MMOL/L (ref 98–107)
CHLORIDE SERPL-SCNC: 99 MMOL/L (ref 98–107)
CO2 SERPL-SCNC: 30 MMOL/L (ref 20–31)
CO2 SERPL-SCNC: 30 MMOL/L (ref 20–31)
CREAT SERPL-MCNC: 2.6 MG/DL (ref 0.7–1.2)
CREAT SERPL-MCNC: 2.6 MG/DL (ref 0.7–1.2)
CREAT UR-MCNC: 77.1 MG/DL (ref 39–259)
EOSINOPHIL # BLD: 0.5 K/UL (ref 0–0.4)
EOSINOPHILS RELATIVE PERCENT: 8 % (ref 0–4)
ERYTHROCYTE [DISTWIDTH] IN BLOOD BY AUTOMATED COUNT: 17.6 % (ref 11.5–14.9)
ERYTHROCYTE [DISTWIDTH] IN BLOOD BY AUTOMATED COUNT: 17.9 % (ref 11.5–14.9)
FREE KAPPA/LAMBDA RATIO: 0.11 (ref 0.22–1.74)
GFR, ESTIMATED: 24 ML/MIN/1.73M2
GFR, ESTIMATED: 24 ML/MIN/1.73M2
GLUCOSE SERPL-MCNC: 113 MG/DL (ref 70–99)
GLUCOSE SERPL-MCNC: 113 MG/DL (ref 70–99)
HCT VFR BLD AUTO: 38.4 % (ref 41–53)
HCT VFR BLD AUTO: 39 % (ref 41–53)
HGB BLD-MCNC: 12.9 G/DL (ref 13.5–17.5)
HGB BLD-MCNC: 12.9 G/DL (ref 13.5–17.5)
IGA SERPL-MCNC: 400 MG/DL (ref 70–400)
IGG SERPL-MCNC: 1671 MG/DL (ref 700–1600)
IGM SERPL-MCNC: 68 MG/DL (ref 40–230)
KAPPA LC FREE SER-MCNC: 122 MG/L
LAMBDA LC FREE SERPL-MCNC: 1143 MG/L (ref 4.2–27.7)
LYMPHOCYTES NFR BLD: 0.6 K/UL (ref 1–4.8)
LYMPHOCYTES RELATIVE PERCENT: 10 % (ref 24–44)
MCH RBC QN AUTO: 31.5 PG (ref 26–34)
MCH RBC QN AUTO: 31.7 PG (ref 26–34)
MCHC RBC AUTO-ENTMCNC: 33 G/DL (ref 31–37)
MCHC RBC AUTO-ENTMCNC: 33.5 G/DL (ref 31–37)
MCV RBC AUTO: 94.6 FL (ref 80–100)
MCV RBC AUTO: 95.4 FL (ref 80–100)
MONOCYTES NFR BLD: 0.5 K/UL (ref 0.1–1.3)
MONOCYTES NFR BLD: 8 % (ref 1–7)
NEUTROPHILS NFR BLD: 73 % (ref 36–66)
NEUTS SEG NFR BLD: 4.3 K/UL (ref 1.3–9.1)
PHOSPHATE SERPL-MCNC: 2.8 MG/DL (ref 2.5–4.5)
PLATELET # BLD AUTO: 160 K/UL (ref 150–450)
PLATELET # BLD AUTO: 164 K/UL (ref 150–450)
PMV BLD AUTO: 7.8 FL (ref 6–12)
PMV BLD AUTO: 7.9 FL (ref 6–12)
POTASSIUM SERPL-SCNC: 4.1 MMOL/L (ref 3.7–5.3)
POTASSIUM SERPL-SCNC: 4.1 MMOL/L (ref 3.7–5.3)
PROT SERPL-MCNC: 7.9 G/DL (ref 6.4–8.3)
PTH-INTACT SERPL-MCNC: 146 PG/ML (ref 15–65)
RBC # BLD AUTO: 4.06 M/UL (ref 4.5–5.9)
RBC # BLD AUTO: 4.09 M/UL (ref 4.5–5.9)
SODIUM SERPL-SCNC: 139 MMOL/L (ref 135–144)
SODIUM SERPL-SCNC: 139 MMOL/L (ref 135–144)
TOTAL PROTEIN, URINE: 14 MG/DL
URINE TOTAL PROTEIN CREATININE RATIO: 0.18 (ref 0–0.2)
WBC OTHER # BLD: 5.7 K/UL (ref 3.5–11)
WBC OTHER # BLD: 5.9 K/UL (ref 3.5–11)

## 2024-09-30 PROCEDURE — 84156 ASSAY OF PROTEIN URINE: CPT

## 2024-09-30 PROCEDURE — 36415 COLL VENOUS BLD VENIPUNCTURE: CPT

## 2024-09-30 PROCEDURE — 82306 VITAMIN D 25 HYDROXY: CPT

## 2024-09-30 PROCEDURE — 84100 ASSAY OF PHOSPHORUS: CPT

## 2024-09-30 PROCEDURE — 82570 ASSAY OF URINE CREATININE: CPT

## 2024-09-30 PROCEDURE — 82040 ASSAY OF SERUM ALBUMIN: CPT

## 2024-09-30 PROCEDURE — 84155 ASSAY OF PROTEIN SERUM: CPT

## 2024-09-30 PROCEDURE — 85027 COMPLETE CBC AUTOMATED: CPT

## 2024-09-30 PROCEDURE — 82784 ASSAY IGA/IGD/IGG/IGM EACH: CPT

## 2024-09-30 PROCEDURE — 80048 BASIC METABOLIC PNL TOTAL CA: CPT

## 2024-09-30 PROCEDURE — 86334 IMMUNOFIX E-PHORESIS SERUM: CPT

## 2024-09-30 PROCEDURE — 83521 IG LIGHT CHAINS FREE EACH: CPT

## 2024-09-30 PROCEDURE — 84165 PROTEIN E-PHORESIS SERUM: CPT

## 2024-09-30 PROCEDURE — 85025 COMPLETE CBC W/AUTO DIFF WBC: CPT

## 2024-09-30 PROCEDURE — 83970 ASSAY OF PARATHORMONE: CPT

## 2024-09-30 PROCEDURE — 80053 COMPREHEN METABOLIC PANEL: CPT

## 2024-10-01 LAB
ALBUMIN PERCENT: 52 % (ref 45–65)
ALBUMIN SERPL-MCNC: 3.9 G/DL (ref 3.2–5.2)
ALPHA 2 PERCENT: 11 % (ref 6–13)
ALPHA1 GLOB SERPL ELPH-MCNC: 0.4 G/DL (ref 0.1–0.4)
ALPHA1 GLOB SERPL ELPH-MCNC: 5 % (ref 3–6)
ALPHA2 GLOB SERPL ELPH-MCNC: 0.8 G/DL (ref 0.5–0.9)
B-GLOBULIN SERPL ELPH-MCNC: 1 G/DL (ref 0.5–1.1)
B-GLOBULIN SERPL ELPH-MCNC: 13 % (ref 11–19)
GAMMA GLOB SERPL ELPH-MCNC: 1.4 G/DL (ref 0.5–1.5)
GAMMA GLOBULIN %: 19 % (ref 9–20)
ITYP INTERPRETATION: NORMAL
PATH REV: NORMAL
PATHOLOGIST: NORMAL
PROT PATTERN SERPL ELPH-IMP: NORMAL
PROT SERPL-MCNC: 7.4 G/DL (ref 6.6–8.7)
TOTAL PROT. SUM,%: 100 % (ref 98–102)
TOTAL PROT. SUM: 7.5 G/DL (ref 6.3–8.2)

## 2024-10-29 ENCOUNTER — OFFICE VISIT (OUTPATIENT)
Dept: ONCOLOGY | Age: 79
End: 2024-10-29
Payer: MEDICARE

## 2024-10-29 ENCOUNTER — TELEPHONE (OUTPATIENT)
Dept: ONCOLOGY | Age: 79
End: 2024-10-29

## 2024-10-29 VITALS
DIASTOLIC BLOOD PRESSURE: 65 MMHG | BODY MASS INDEX: 33.04 KG/M2 | SYSTOLIC BLOOD PRESSURE: 104 MMHG | WEIGHT: 211 LBS | HEART RATE: 67 BPM | OXYGEN SATURATION: 98 % | RESPIRATION RATE: 18 BRPM | TEMPERATURE: 96.8 F

## 2024-10-29 DIAGNOSIS — D47.2 MGUS (MONOCLONAL GAMMOPATHY OF UNKNOWN SIGNIFICANCE): Primary | ICD-10-CM

## 2024-10-29 PROCEDURE — 99211 OFF/OP EST MAY X REQ PHY/QHP: CPT | Performed by: INTERNAL MEDICINE

## 2024-10-29 NOTE — TELEPHONE ENCOUNTER
Instructions   from Dr. Jayro Rosas MD    BM biopsy   PET   RTC one week after BM      IR will call patient to schedule BM biopsy  PET 10/31/24 at 12:30 pm arrival  RV to be after biopsy, will follow to schedule.

## 2024-10-29 NOTE — PROGRESS NOTES
monoclonal protein rising significantly will consider bone marrow biopsy.  Skeletal survey negative for lytic lesions    Anemia; patient had iron deficiency anemia and I will start him on iron pill once daily.    During today's visit, the patient and the family had a number of reasonable questions which were answered to their satisfaction.  They verbalized understanding of the information provided and they agreed to proceed as outlined above.      PLAN:   I reviewed recent lab work, discussed diagnosis treatment recommendations  His light chains have increased significantly  Will arrange for PET scan  We went for bone marrow biopsy  Return to clinic 1 week after bone marrow biopsy        Jayro Rosas MD  Hematologist/Medical Oncologist    On this date 10/29/24  I have spent 40 minutes reviewing previous notes, test results and face to face with the patient discussing the diagnosis and importance of compliance with the treatment plan. Greater than 50% of that time was spent face-to-face with the patient in counseling and coordinating her care.       This note is created with the assistance of a speech recognition program.  While intending to generate a document that actually reflects the content of the visit, the document can still have some errors including those of syntax and sound a like substitutions which may escape proof reading.  It such instances, actual meaning can be extrapolated by contextual diversion.  l

## 2024-10-31 ENCOUNTER — TELEPHONE (OUTPATIENT)
Age: 79
End: 2024-10-31

## 2024-10-31 NOTE — TELEPHONE ENCOUNTER
LVM for patient to william appointment due to the provider no longer seeing patients in the morning

## 2024-11-04 ENCOUNTER — APPOINTMENT (OUTPATIENT)
Dept: ULTRASOUND IMAGING | Age: 79
End: 2024-11-04
Payer: MEDICARE

## 2024-11-04 ENCOUNTER — HOSPITAL ENCOUNTER (INPATIENT)
Age: 79
LOS: 4 days | Discharge: NURSING, REHAB OR LONG-TERM CARE | End: 2024-11-08
Attending: EMERGENCY MEDICINE | Admitting: INTERNAL MEDICINE
Payer: MEDICARE

## 2024-11-04 DIAGNOSIS — N17.9 ACUTE RENAL FAILURE, UNSPECIFIED ACUTE RENAL FAILURE TYPE (HCC): ICD-10-CM

## 2024-11-04 DIAGNOSIS — L03.115 BILATERAL CELLULITIS OF LOWER LEG: ICD-10-CM

## 2024-11-04 DIAGNOSIS — N19 UREMIA: Primary | ICD-10-CM

## 2024-11-04 DIAGNOSIS — E87.5 HYPERKALEMIA: ICD-10-CM

## 2024-11-04 DIAGNOSIS — L03.116 BILATERAL CELLULITIS OF LOWER LEG: ICD-10-CM

## 2024-11-04 DIAGNOSIS — G47.33 OBSTRUCTIVE SLEEP APNEA: ICD-10-CM

## 2024-11-04 LAB
ALBUMIN SERPL-MCNC: 3.9 G/DL (ref 3.5–5.2)
ALP SERPL-CCNC: 109 U/L (ref 40–129)
ALT SERPL-CCNC: 15 U/L (ref 10–50)
ANION GAP SERPL CALCULATED.3IONS-SCNC: 12 MMOL/L (ref 9–16)
ANION GAP SERPL CALCULATED.3IONS-SCNC: 12 MMOL/L (ref 9–16)
ANION GAP SERPL CALCULATED.3IONS-SCNC: 13 MMOL/L (ref 9–16)
AST SERPL-CCNC: 21 U/L (ref 10–50)
BACTERIA URNS QL MICRO: ABNORMAL
BASOPHILS # BLD: 0 K/UL (ref 0–0.2)
BASOPHILS NFR BLD: 1 % (ref 0–2)
BILIRUB SERPL-MCNC: 0.8 MG/DL (ref 0–1.2)
BILIRUB UR QL STRIP: NEGATIVE
BUN SERPL-MCNC: 123 MG/DL (ref 8–23)
BUN SERPL-MCNC: 134 MG/DL (ref 8–23)
BUN SERPL-MCNC: 143 MG/DL (ref 8–23)
CALCIUM SERPL-MCNC: 7.2 MG/DL (ref 8.6–10.4)
CALCIUM SERPL-MCNC: 7.8 MG/DL (ref 8.6–10.4)
CALCIUM SERPL-MCNC: 8 MG/DL (ref 8.6–10.4)
CASTS #/AREA URNS LPF: ABNORMAL /LPF
CHLORIDE SERPL-SCNC: 101 MMOL/L (ref 98–107)
CHLORIDE SERPL-SCNC: 103 MMOL/L (ref 98–107)
CHLORIDE SERPL-SCNC: 104 MMOL/L (ref 98–107)
CHLORIDE UR-SCNC: <20 MMOL/L
CK SERPL-CCNC: 166 U/L (ref 39–308)
CLARITY UR: CLEAR
CO2 SERPL-SCNC: 16 MMOL/L (ref 20–31)
CO2 SERPL-SCNC: 17 MMOL/L (ref 20–31)
CO2 SERPL-SCNC: 18 MMOL/L (ref 20–31)
COLOR UR: YELLOW
CREAT SERPL-MCNC: 4.6 MG/DL (ref 0.7–1.2)
CREAT SERPL-MCNC: 5 MG/DL (ref 0.7–1.2)
CREAT SERPL-MCNC: 5.5 MG/DL (ref 0.7–1.2)
CREAT UR-MCNC: 75.2 MG/DL (ref 39–259)
EKG Q-T INTERVAL: 470 MS
EKG QRS DURATION: 120 MS
EKG QTC CALCULATION (BAZETT): 477 MS
EKG R AXIS: -41 DEGREES
EKG T AXIS: 116 DEGREES
EKG VENTRICULAR RATE: 62 BPM
EOSINOPHIL # BLD: 0.2 K/UL (ref 0–0.4)
EOSINOPHILS RELATIVE PERCENT: 3 % (ref 0–4)
EPI CELLS #/AREA URNS HPF: ABNORMAL /HPF
ERYTHROCYTE [DISTWIDTH] IN BLOOD BY AUTOMATED COUNT: 16.5 % (ref 11.5–14.9)
GFR, ESTIMATED: 10 ML/MIN/1.73M2
GFR, ESTIMATED: 11 ML/MIN/1.73M2
GFR, ESTIMATED: 12 ML/MIN/1.73M2
GLUCOSE BLD-MCNC: 111 MG/DL (ref 75–110)
GLUCOSE BLD-MCNC: 118 MG/DL (ref 75–110)
GLUCOSE BLD-MCNC: 155 MG/DL (ref 75–110)
GLUCOSE BLD-MCNC: 47 MG/DL (ref 75–110)
GLUCOSE BLD-MCNC: 71 MG/DL (ref 75–110)
GLUCOSE BLD-MCNC: 84 MG/DL (ref 75–110)
GLUCOSE BLD-MCNC: 92 MG/DL (ref 75–110)
GLUCOSE SERPL-MCNC: 122 MG/DL (ref 74–99)
GLUCOSE SERPL-MCNC: 133 MG/DL (ref 74–99)
GLUCOSE SERPL-MCNC: 91 MG/DL (ref 74–99)
GLUCOSE UR STRIP-MCNC: NEGATIVE MG/DL
HCT VFR BLD AUTO: 37.5 % (ref 41–53)
HGB BLD-MCNC: 12.5 G/DL (ref 13.5–17.5)
HGB UR QL STRIP.AUTO: ABNORMAL
INR PPP: 2.4
KETONES UR STRIP-MCNC: NEGATIVE MG/DL
LACTATE BLDV-SCNC: 0.8 MMOL/L (ref 0.5–2.2)
LEUKOCYTE ESTERASE UR QL STRIP: ABNORMAL
LYMPHOCYTES NFR BLD: 0.5 K/UL (ref 1–4.8)
LYMPHOCYTES RELATIVE PERCENT: 6 % (ref 24–44)
MAGNESIUM SERPL-MCNC: 3.2 MG/DL (ref 1.6–2.4)
MCH RBC QN AUTO: 31.5 PG (ref 26–34)
MCHC RBC AUTO-ENTMCNC: 33.3 G/DL (ref 31–37)
MCV RBC AUTO: 94.5 FL (ref 80–100)
MONOCYTES NFR BLD: 0.5 K/UL (ref 0.1–1.3)
MONOCYTES NFR BLD: 7 % (ref 1–7)
NEUTROPHILS NFR BLD: 83 % (ref 36–66)
NEUTS SEG NFR BLD: 6.4 K/UL (ref 1.3–9.1)
NITRITE UR QL STRIP: NEGATIVE
PH UR STRIP: 5 [PH] (ref 5–8)
PLATELET # BLD AUTO: 171 K/UL (ref 150–450)
PMV BLD AUTO: 8.1 FL (ref 6–12)
POTASSIUM SERPL-SCNC: 4.8 MMOL/L (ref 3.7–5.3)
POTASSIUM SERPL-SCNC: 5.5 MMOL/L (ref 3.7–5.3)
POTASSIUM SERPL-SCNC: 5.9 MMOL/L (ref 3.7–5.3)
POTASSIUM, UR: 33.4 MMOL/L
PROCALCITONIN SERPL-MCNC: 0.23 NG/ML (ref 0–0.09)
PROT SERPL-MCNC: 8 G/DL (ref 6.6–8.7)
PROT UR STRIP-MCNC: ABNORMAL MG/DL
PROTHROMBIN TIME: 25.9 SEC (ref 11.8–14.6)
RBC # BLD AUTO: 3.97 M/UL (ref 4.5–5.9)
RBC #/AREA URNS HPF: ABNORMAL /HPF
SODIUM SERPL-SCNC: 132 MMOL/L (ref 136–145)
SODIUM UR-SCNC: <20 MMOL/L
SP GR UR STRIP: 1.02 (ref 1–1.03)
TROPONIN I SERPL HS-MCNC: 61 NG/L (ref 0–22)
TROPONIN I SERPL HS-MCNC: 62 NG/L (ref 0–22)
UROBILINOGEN UR STRIP-ACNC: NORMAL EU/DL (ref 0–1)
WBC #/AREA URNS HPF: ABNORMAL /HPF
WBC OTHER # BLD: 7.7 K/UL (ref 3.5–11)

## 2024-11-04 PROCEDURE — 96375 TX/PRO/DX INJ NEW DRUG ADDON: CPT

## 2024-11-04 PROCEDURE — 93010 ELECTROCARDIOGRAM REPORT: CPT | Performed by: INTERNAL MEDICINE

## 2024-11-04 PROCEDURE — 6360000002 HC RX W HCPCS

## 2024-11-04 PROCEDURE — 96374 THER/PROPH/DIAG INJ IV PUSH: CPT

## 2024-11-04 PROCEDURE — 85025 COMPLETE CBC W/AUTO DIFF WBC: CPT

## 2024-11-04 PROCEDURE — 84145 PROCALCITONIN (PCT): CPT

## 2024-11-04 PROCEDURE — 83735 ASSAY OF MAGNESIUM: CPT

## 2024-11-04 PROCEDURE — 82570 ASSAY OF URINE CREATININE: CPT

## 2024-11-04 PROCEDURE — 6370000000 HC RX 637 (ALT 250 FOR IP): Performed by: INTERNAL MEDICINE

## 2024-11-04 PROCEDURE — 82550 ASSAY OF CK (CPK): CPT

## 2024-11-04 PROCEDURE — 2500000003 HC RX 250 WO HCPCS: Performed by: EMERGENCY MEDICINE

## 2024-11-04 PROCEDURE — 2580000003 HC RX 258: Performed by: INTERNAL MEDICINE

## 2024-11-04 PROCEDURE — 99214 OFFICE O/P EST MOD 30 MIN: CPT

## 2024-11-04 PROCEDURE — 2580000003 HC RX 258: Performed by: EMERGENCY MEDICINE

## 2024-11-04 PROCEDURE — 80048 BASIC METABOLIC PNL TOTAL CA: CPT

## 2024-11-04 PROCEDURE — 99285 EMERGENCY DEPT VISIT HI MDM: CPT

## 2024-11-04 PROCEDURE — 99223 1ST HOSP IP/OBS HIGH 75: CPT | Performed by: INTERNAL MEDICINE

## 2024-11-04 PROCEDURE — 84484 ASSAY OF TROPONIN QUANT: CPT

## 2024-11-04 PROCEDURE — 2500000003 HC RX 250 WO HCPCS: Performed by: INTERNAL MEDICINE

## 2024-11-04 PROCEDURE — 82436 ASSAY OF URINE CHLORIDE: CPT

## 2024-11-04 PROCEDURE — 6370000000 HC RX 637 (ALT 250 FOR IP): Performed by: EMERGENCY MEDICINE

## 2024-11-04 PROCEDURE — 82947 ASSAY GLUCOSE BLOOD QUANT: CPT

## 2024-11-04 PROCEDURE — 6360000002 HC RX W HCPCS: Performed by: EMERGENCY MEDICINE

## 2024-11-04 PROCEDURE — 80053 COMPREHEN METABOLIC PANEL: CPT

## 2024-11-04 PROCEDURE — 2000000000 HC ICU R&B

## 2024-11-04 PROCEDURE — 6370000000 HC RX 637 (ALT 250 FOR IP)

## 2024-11-04 PROCEDURE — 81001 URINALYSIS AUTO W/SCOPE: CPT

## 2024-11-04 PROCEDURE — 83605 ASSAY OF LACTIC ACID: CPT

## 2024-11-04 PROCEDURE — 85610 PROTHROMBIN TIME: CPT

## 2024-11-04 PROCEDURE — 36415 COLL VENOUS BLD VENIPUNCTURE: CPT

## 2024-11-04 PROCEDURE — 93005 ELECTROCARDIOGRAM TRACING: CPT | Performed by: EMERGENCY MEDICINE

## 2024-11-04 PROCEDURE — 76775 US EXAM ABDO BACK WALL LIM: CPT

## 2024-11-04 PROCEDURE — 84133 ASSAY OF URINE POTASSIUM: CPT

## 2024-11-04 PROCEDURE — 84300 ASSAY OF URINE SODIUM: CPT

## 2024-11-04 RX ORDER — ONDANSETRON 2 MG/ML
4 INJECTION INTRAMUSCULAR; INTRAVENOUS EVERY 6 HOURS PRN
Status: DISCONTINUED | OUTPATIENT
Start: 2024-11-04 | End: 2024-11-08 | Stop reason: HOSPADM

## 2024-11-04 RX ORDER — RANOLAZINE 500 MG/1
500 TABLET, EXTENDED RELEASE ORAL 2 TIMES DAILY
Status: DISCONTINUED | OUTPATIENT
Start: 2024-11-04 | End: 2024-11-08 | Stop reason: HOSPADM

## 2024-11-04 RX ORDER — ACETAMINOPHEN 650 MG/1
650 SUPPOSITORY RECTAL EVERY 6 HOURS PRN
Status: DISCONTINUED | OUTPATIENT
Start: 2024-11-04 | End: 2024-11-08 | Stop reason: HOSPADM

## 2024-11-04 RX ORDER — LISINOPRIL 5 MG/1
5 TABLET ORAL DAILY
Status: DISCONTINUED | OUTPATIENT
Start: 2024-11-04 | End: 2024-11-08 | Stop reason: HOSPADM

## 2024-11-04 RX ORDER — POLYETHYLENE GLYCOL 3350 17 G/17G
17 POWDER, FOR SOLUTION ORAL DAILY PRN
Status: DISCONTINUED | OUTPATIENT
Start: 2024-11-04 | End: 2024-11-08 | Stop reason: HOSPADM

## 2024-11-04 RX ORDER — FERROUS SULFATE 325(65) MG
325 TABLET ORAL NIGHTLY
Status: DISCONTINUED | OUTPATIENT
Start: 2024-11-04 | End: 2024-11-08 | Stop reason: HOSPADM

## 2024-11-04 RX ORDER — SODIUM CHLORIDE 9 MG/ML
INJECTION, SOLUTION INTRAVENOUS PRN
Status: DISCONTINUED | OUTPATIENT
Start: 2024-11-04 | End: 2024-11-08 | Stop reason: HOSPADM

## 2024-11-04 RX ORDER — ONDANSETRON 4 MG/1
4 TABLET, ORALLY DISINTEGRATING ORAL EVERY 8 HOURS PRN
Status: DISCONTINUED | OUTPATIENT
Start: 2024-11-04 | End: 2024-11-08 | Stop reason: HOSPADM

## 2024-11-04 RX ORDER — DABIGATRAN ETEXILATE 75 MG/1
75 CAPSULE ORAL 2 TIMES DAILY
Status: DISCONTINUED | OUTPATIENT
Start: 2024-11-04 | End: 2024-11-08 | Stop reason: HOSPADM

## 2024-11-04 RX ORDER — CALCITRIOL 0.25 UG/1
0.25 CAPSULE, LIQUID FILLED ORAL
Status: DISCONTINUED | OUTPATIENT
Start: 2024-11-04 | End: 2024-11-08 | Stop reason: HOSPADM

## 2024-11-04 RX ORDER — DEXTROSE MONOHYDRATE 100 MG/ML
INJECTION, SOLUTION INTRAVENOUS CONTINUOUS PRN
Status: DISCONTINUED | OUTPATIENT
Start: 2024-11-04 | End: 2024-11-08 | Stop reason: HOSPADM

## 2024-11-04 RX ORDER — HEPARIN SODIUM 5000 [USP'U]/ML
5000 INJECTION, SOLUTION INTRAVENOUS; SUBCUTANEOUS EVERY 8 HOURS SCHEDULED
Status: DISCONTINUED | OUTPATIENT
Start: 2024-11-04 | End: 2024-11-08

## 2024-11-04 RX ORDER — CALCIUM GLUCONATE 20 MG/ML
1000 INJECTION, SOLUTION INTRAVENOUS ONCE
Status: COMPLETED | OUTPATIENT
Start: 2024-11-04 | End: 2024-11-04

## 2024-11-04 RX ORDER — TORSEMIDE 20 MG/1
40 TABLET ORAL DAILY
Status: DISCONTINUED | OUTPATIENT
Start: 2024-11-04 | End: 2024-11-08 | Stop reason: HOSPADM

## 2024-11-04 RX ORDER — ISOSORBIDE MONONITRATE 30 MG/1
30 TABLET, EXTENDED RELEASE ORAL DAILY
Status: DISCONTINUED | OUTPATIENT
Start: 2024-11-04 | End: 2024-11-08 | Stop reason: HOSPADM

## 2024-11-04 RX ORDER — 0.9 % SODIUM CHLORIDE 0.9 %
1000 INTRAVENOUS SOLUTION INTRAVENOUS ONCE
Status: COMPLETED | OUTPATIENT
Start: 2024-11-04 | End: 2024-11-04

## 2024-11-04 RX ORDER — SODIUM CHLORIDE 0.9 % (FLUSH) 0.9 %
5-40 SYRINGE (ML) INJECTION PRN
Status: DISCONTINUED | OUTPATIENT
Start: 2024-11-04 | End: 2024-11-08 | Stop reason: HOSPADM

## 2024-11-04 RX ORDER — ACETAMINOPHEN 325 MG/1
650 TABLET ORAL EVERY 6 HOURS PRN
Status: DISCONTINUED | OUTPATIENT
Start: 2024-11-04 | End: 2024-11-08 | Stop reason: HOSPADM

## 2024-11-04 RX ORDER — DEXTROSE MONOHYDRATE 100 MG/ML
INJECTION, SOLUTION INTRAVENOUS CONTINUOUS PRN
Status: DISCONTINUED | OUTPATIENT
Start: 2024-11-04 | End: 2024-11-04

## 2024-11-04 RX ORDER — LOPERAMIDE HYDROCHLORIDE 2 MG/1
2 CAPSULE ORAL PRN
Status: DISCONTINUED | OUTPATIENT
Start: 2024-11-04 | End: 2024-11-04

## 2024-11-04 RX ORDER — SODIUM CHLORIDE 0.9 % (FLUSH) 0.9 %
5-40 SYRINGE (ML) INJECTION EVERY 12 HOURS SCHEDULED
Status: DISCONTINUED | OUTPATIENT
Start: 2024-11-04 | End: 2024-11-08 | Stop reason: HOSPADM

## 2024-11-04 RX ORDER — AMIODARONE HYDROCHLORIDE 200 MG/1
200 TABLET ORAL DAILY
Status: DISCONTINUED | OUTPATIENT
Start: 2024-11-04 | End: 2024-11-08 | Stop reason: HOSPADM

## 2024-11-04 RX ORDER — LIDOCAINE HYDROCHLORIDE 20 MG/ML
JELLY TOPICAL ONCE
Status: COMPLETED | OUTPATIENT
Start: 2024-11-04 | End: 2024-11-04

## 2024-11-04 RX ADMIN — DOXYCYCLINE 100 MG: 100 INJECTION, POWDER, LYOPHILIZED, FOR SOLUTION INTRAVENOUS at 08:45

## 2024-11-04 RX ADMIN — SODIUM ZIRCONIUM CYCLOSILICATE 10 G: 5 POWDER, FOR SUSPENSION ORAL at 14:47

## 2024-11-04 RX ADMIN — DEXTROSE MONOHYDRATE 250 ML: 100 INJECTION, SOLUTION INTRAVENOUS at 08:13

## 2024-11-04 RX ADMIN — RANOLAZINE 500 MG: 500 TABLET, EXTENDED RELEASE ORAL at 13:56

## 2024-11-04 RX ADMIN — INSULIN HUMAN 10 UNITS: 100 INJECTION, SOLUTION PARENTERAL at 08:11

## 2024-11-04 RX ADMIN — AMIODARONE HYDROCHLORIDE 200 MG: 200 TABLET ORAL at 13:56

## 2024-11-04 RX ADMIN — CALCIUM GLUCONATE 1000 MG: 20 INJECTION, SOLUTION INTRAVENOUS at 08:25

## 2024-11-04 RX ADMIN — CALCITRIOL 0.25 MCG: 0.25 CAPSULE ORAL at 21:50

## 2024-11-04 RX ADMIN — LIDOCAINE HYDROCHLORIDE: 20 JELLY TOPICAL at 09:23

## 2024-11-04 RX ADMIN — Medication 16 G: at 10:09

## 2024-11-04 RX ADMIN — SODIUM BICARBONATE: 84 INJECTION, SOLUTION INTRAVENOUS at 12:34

## 2024-11-04 RX ADMIN — FERROUS SULFATE TAB 325 MG (65 MG ELEMENTAL FE) 325 MG: 325 (65 FE) TAB at 21:37

## 2024-11-04 RX ADMIN — ISOSORBIDE MONONITRATE 30 MG: 30 TABLET, EXTENDED RELEASE ORAL at 13:56

## 2024-11-04 RX ADMIN — SODIUM CHLORIDE 1000 ML: 9 INJECTION, SOLUTION INTRAVENOUS at 08:26

## 2024-11-04 RX ADMIN — HEPARIN SODIUM 5000 UNITS: 5000 INJECTION INTRAVENOUS; SUBCUTANEOUS at 13:56

## 2024-11-04 RX ADMIN — RANOLAZINE 500 MG: 500 TABLET, EXTENDED RELEASE ORAL at 21:36

## 2024-11-04 RX ADMIN — HEPARIN SODIUM 5000 UNITS: 5000 INJECTION INTRAVENOUS; SUBCUTANEOUS at 21:37

## 2024-11-04 RX ADMIN — ACETAMINOPHEN 650 MG: 325 TABLET ORAL at 16:55

## 2024-11-04 ASSESSMENT — ENCOUNTER SYMPTOMS
COUGH: 0
VOMITING: 0
ABDOMINAL PAIN: 0
DIARRHEA: 0

## 2024-11-04 ASSESSMENT — PAIN DESCRIPTION - DESCRIPTORS: DESCRIPTORS: BURNING

## 2024-11-04 ASSESSMENT — PAIN DESCRIPTION - LOCATION: LOCATION: LEG;FOOT

## 2024-11-04 ASSESSMENT — PAIN - FUNCTIONAL ASSESSMENT: PAIN_FUNCTIONAL_ASSESSMENT: 0-10

## 2024-11-04 ASSESSMENT — PAIN DESCRIPTION - ORIENTATION: ORIENTATION: RIGHT;LEFT

## 2024-11-04 ASSESSMENT — PAIN SCALES - GENERAL
PAINLEVEL_OUTOF10: 0
PAINLEVEL_OUTOF10: 6

## 2024-11-04 NOTE — CONSULTS
NEPHROLOGY CONSULT     Patient :  Jona Heredia; 78 y.o. MRN# [id number]  Location:  2013/2013-01  Attending:  Ry Boston MD  Admit Date:  11/4/2024   Hospital Day: 0      Reason for Consult: Acute kidney injury, hyperkalemia      Chief Complaint: Low blood pressure  History Obtained From:  patient    History of Present Illness:    This is a 78 y.o. 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 Rosas, he has recommended a bone marrow biopsy, history of coronary artery disease with chronic total occlusion of RCA, cardiac cath in 2023, history of CHF with reduced EF of 25% ischemic cardiomyopathy with history of ventricular fibrillation, status post AICD history of severe MR, patient follows up with Dr. Garces.  Patient presented to the hospital with complaints of low blood pressure hypotension fatigue weakness.  Patient also complaining of difficulty urination for last 1 or 2 days.  Patient denied any nausea vomiting headache shortness of breath fever chills or cough.  On initial investigation in the ER, labs showed elevated creatinine 5.5 mg/dL and BUN of 143 mg/dL bicarb of 18 potassium of 5.9 sodium of 132  Bladder scan showed high PVR and Arredondo catheter was placed patient is nonoliguric and making good urine  Blood pressure has improved.  Medications reviewed patient at home taking torsemide and lisinopril which is on hold    Past Medical History:        Diagnosis Date    Abdominal hernia     Atrophy of muscle of multiple sites 8/19/2016    CAD (coronary artery disease)     CKD (chronic kidney disease) stage 3, GFR 30-59 ml/min (HCC)     Colon polyp     Crohn disease (HCC)     Crohn's colitis (HCC)     Decubitus ulcer of coccyx, stage 2 (HCC) 8/8/2016    Diarrhea      Q8H PRN   Or  ondansetron (ZOFRAN) injection 4 mg, Q6H PRN  polyethylene glycol (GLYCOLAX) packet 17 g, Daily PRN  acetaminophen (TYLENOL) tablet 650 mg, Q6H PRN   Or  acetaminophen (TYLENOL) suppository 650 mg, Q6H PRN  amiodarone (CORDARONE) tablet 200 mg, Daily  calcitRIOL (ROCALTROL) capsule 0.25 mcg, Once per day on   ferrous sulfate (IRON 325) tablet 325 mg, Nightly  glucose chewable tablet 16 g, PRN  dextrose bolus 10% 125 mL, PRN   Or  dextrose bolus 10% 250 mL, PRN  glucagon injection 1 mg, PRN  dextrose 10 % infusion, Continuous PRN  isosorbide mononitrate (IMDUR) extended release tablet 30 mg, Daily  ranolazine (RANEXA) extended release tablet 500 mg, BID  [Held by provider] lisinopril (PRINIVIL;ZESTRIL) tablet 5 mg, Daily  [Held by provider] torsemide (DEMADEX) tablet 40 mg, Daily  [Held by provider] dabigatran (PRADAXA) capsule 75 mg, BID  sodium bicarbonate 75 mEq in dextrose 5 % and 0.45 % NaCl 1,000 mL infusion, Continuous        Allergies:  Patient has no known allergies.    Social History:   Social History     Socioeconomic History    Marital status: Single     Spouse name: Not on file    Number of children: Not on file    Years of education: Not on file    Highest education level: Not on file   Occupational History    Not on file   Tobacco Use    Smoking status: Former     Current packs/day: 0.00     Average packs/day: 0.5 packs/day for 25.0 years (12.5 ttl pk-yrs)     Types: Cigarettes     Start date: 1991     Quit date: 2016     Years since quittin.2    Smokeless tobacco: Never   Substance and Sexual Activity    Alcohol use: No     Alcohol/week: 0.0 standard drinks of alcohol    Drug use: No    Sexual activity: Not on file   Other Topics Concern    Not on file   Social History Narrative    Not on file     Social Determinants of Health     Financial Resource Strain: Low Risk  (2023)    Overall Financial Resource Strain (CARDIA)     Difficulty of Paying  injury on CKD, multifactorial most likely secondary to prerenal azotemia due to hypotension use of torsemide and lisinopril in combination with urinary retention high PVR status post Arredondo catheter placement, patient established urine flow since Arredondo catheter has been placed and patient is nonoliguric now, serum creatinine peaked to 5.5 mg/dL and  mg/dL, renal ultrasound no hydronephrosis chronic renal medical disease, UA trace protein small leukocyte Estrace, numerous RBCs large blood  Hyperkalemia  Nongap metabolic acidosis  Hypotension  MGUS       Plan:   Comprehensive urine testing including Urinalysis, Urine sodium, potassium, chloride, Urine protein and creatinine to quantify the proteinuria if present.   CK   Check Post void bladder scan and Renal Ultrasound to assess for urinary obstruction and to assess the kidney size, echogenicity.  IVF.  Recheck BMP if kidney function improving we will continue to monitor kidney function IV fluids gentle hydration if kidney function does not improve or patient remains significantly hyperkalemic then we will plan for dialysis I have discussed the possibility of dialysis and explained the risk and benefit including hypotension bleeding infection cardiac arrhythmias cardiac arrest patient understands that and has no objection for dialysis if needed.      Thank you for the consultation.      Electronically signed by Uday Lira MD on 11/4/2024 at 2:04 PM

## 2024-11-04 NOTE — CONSULTS
[facility]  Consult Note       NAME:  Jona Heredia  MEDICAL RECORD NUMBER:  [id number]  AGE: 78 y.o.   GENDER: male  : 1945  TODAY'S DATE:  2024    Subjective:      Jona Heredia is a 78 y.o. male with inpatient referral to [facility] for:  Buttocks, bilateral legs      Wound Identification:  Wound Type: venous and pressure  Contributing Factors: edema, venous stasis, lymphedema, chronic pressure, decreased mobility, shear force, and decreased tissue oxygenation    Wound History: Patient unaware he had a wound on his buttocks, leg wounds present for about 6 years  Current Wound Care Treatment: None    Patient Goal of Care:  [x] Wound Healing  [] Odor Control  [] Palliative Care  [] Pain Control   [] Other:         PAST MEDICAL HISTORY        Diagnosis Date    Abdominal hernia     Atrophy of muscle of multiple sites 2016    CAD (coronary artery disease)     CKD (chronic kidney disease) stage 3, GFR 30-59 ml/min ([facility])     Colon polyp     Crohn disease ([facility])     Crohn's colitis ([facility])     Decubitus ulcer of coccyx, stage 2 ([facility]) 2016    Diarrhea     Diverticulosis     Hemorrhoids     History of atrial fibrillation     Hypertension     Ileostomy in place ([facility]) 2016    Internal hemorrhoids     NSVT (nonsustained ventricular tachycardia) ([facility]) 2016    Other and unspecified hyperlipidemia     HISTORY OF HIGH CHOLESTEROL    Past heart attack     Stage 3 chronic kidney disease (HCC)     Tobacco use disorder     Tubular adenoma        PAST SURGICAL HISTORY    Past Surgical History:   Procedure Laterality Date    ABDOMEN SURGERY  2016    explor. lap., ileostomy    ABDOMEN SURGERY  2017    colostomy/ileostomy takedown with wound vac application hernia peristomal repair    CARDIAC CATHETERIZATION  2010    NO STENTS    CARDIAC PROCEDURE N/A 2023    kevin / Left heart cath /  [id number] performed by Jon Gutierrez MD at [facility] CARDIAC      Recommend follow-up in the outpatient wound care center if transportation is available to patient      [x] Turn and reposition every 2 hours while in bed.    [x] Float heels off of bed with pillows under calves.    [] Heel protective boots (heel medix boots) at all times while in bed.    [] Sacral foam dressing to sacrococcygeal area. Use skin barrier film prior to placement. Peel back dressing, inspect skin beneath, and re-secure every shift. Change every 3 days and as needed if loose or soiled. Discontinue sacral foam if repeatedly soiled by incontinence.    [] Apply zinc oxide cream twice daily and as needed after incontinent episodes.    [] Perform routine incontinence care with use of foam cleanser.    [x] Use single layer moisture wicking underpad.    [x] Use comfort glide system and wedges to reposition patient.    [x] Keep the head of the bed below 30 degrees unless contraindicated.    [] Pressure reducing chair cushion while up to chair. Reposition every hour while in chair and limit chair time to 2 hour intervals.    [x] Encourage good nutritional intake and fluids. Consult dietician if needed.    Specialty Bed Required : Yes   [] Low Air Loss   [x] Pressure Redistribution  [] Fluid Immersion  [] Bariatric  [] Total Pressure Relief  [] Other:     Current Diet: ADULT DIET; Regular; Low Fat/Low Chol/High Fiber/2 gm Na; Low Potassium (Less than 3000 mg/day); Low Phosphorus (Less than 1000 mg); 1000 ml  Dietician consult:  No    Discharge Plan:  Placement for patient upon discharge: home with support   Patient appropriate for Outpatient Wound Care Center: Yes    Patient/Caregiver Teaching:  Level of patientunderstanding able to:     [x] Indicates understanding       [] Needs reinforcement  [] Unsuccessful      [x] Verbal Understanding  [] Demonstrated understanding       [] No evidence of learning  [] Refused teaching         [] N/A       Electronically signed by Odilia Doss RN on  11/4/2024 at 2:28

## 2024-11-04 NOTE — ED NOTES
Report given to LAMIN Guerrier from ICU.   Report method in person   The following was reviewed with receiving RN:   Current vital signs:  BP (!) 99/48   Pulse 60   Temp 97.5 °F (36.4 °C) (Oral)   Resp 13   Ht 1.702 m (5' 7\")   Wt 95.7 kg (211 lb)   SpO2 100%   BMI 33.05 kg/m²                      Any medication or safety alerts were reviewed. Any pending diagnostics and notifications were also reviewed, as well as any safety concerns or issues, abnormal labs, abnormal imaging, and abnormal assessment findings. Questions were answered.

## 2024-11-04 NOTE — CARE COORDINATION
Case Management Assessment  Initial Evaluation    Date/Time of Evaluation: 11/4/2024 4:04 PM  Assessment Completed by: Lucy Harris RN    If patient is discharged prior to next notation, then this note serves as note for discharge by case management.    Patient Name: Jona Heredia                   YOB: 1945  Diagnosis: Hyperkalemia [E87.5]  Uremia [N19]  Acute renal failure (HCC) [N17.9]  Bilateral cellulitis of lower leg [L03.116, L03.115]  Acute renal failure, unspecified acute renal failure type (HCC) [N17.9]                   Date / Time: 11/4/2024  7:04 AM    Patient Admission Status: Inpatient   Readmission Risk (Low < 19, Mod (19-27), High > 27): Readmission Risk Score: 17.7    Current PCP: Ry Boston MD  PCP verified by CM? Yes    Chart Reviewed: Yes      History Provided by: Patient  Patient Orientation: Alert and Oriented    Patient Cognition: Alert    Hospitalization in the last 30 days (Readmission):  No    If yes, Readmission Assessment in CM Navigator will be completed.    Advance Directives:      Code Status: DNR-CCA   Patient's Primary Decision Maker is: Named in Scanned ACP Document    Primary Decision Maker: Tomy Heredia - Brother/Sister - [phone]    Discharge Planning:    Patient lives with: Alone Type of Home: Apartment  Primary Care Giver: Self  Patient Support Systems include: Family Members   Current Financial resources: Medicare  Current community resources: None  Current services prior to admission: Durable Medical Equipment            Current DME: Walker            Type of Home Care services:  None    ADLS  Prior functional level: Independent in ADLs/IADLs  Current functional level: Independent in ADLs/IADLs    PT AM-PAC:   /24  OT AM-PAC:   /24    Family can provide assistance at DC: Yes  Would you like Case Management to discuss the discharge plan with any other family members/significant others, and if so, who? No  Plans to Return to Present  Housing: Yes  Other Identified Issues/Barriers to RETURNING to current housing: no  Potential Assistance needed at discharge: N/A            Potential DME:  n/a  Patient expects to discharge to: House  Plan for transportation at discharge: Family    Financial    Payor: MEDICARE / Plan: MEDICARE PART A AND B / Product Type: *No Product type* /     Does insurance require precert for SNF: No    Potential assistance Purchasing Medications: No  Meds-to-Beds request:        [id number] [place] [address] [address] [facility] [address] - P [phone] - F [phone]  [address]  [facility] [address]  Phone: [phone] Fax: [phone]    [id number] [address] [facility] [address] - P [phone] - F [phone]  [address] [address]  Phone: [phone] Fax: [phone]    [facility] - [place], [place] - [address] - P [phone] - F [phone]  [address]  [facility] [address]  [facility] [address]  Phone: [phone] Fax: [phone]      Notes:    Factors facilitating achievement of predicted outcomes: Family support, Cooperative, and Pleasant    Barriers to discharge: n/a    Additional Case Management Notes: From home alone, independent and drives. DME: walker. VNS: none/declined Denies needs. MARK - Cre 5.0     The Plan for Transition of Care is related to the following treatment goals of Hyperkalemia [E87.5]  Uremia [N19]  Acute renal failure (HCC) [N17.9]  Bilateral cellulitis of lower leg [L03.116, L03.115]  Acute renal failure, unspecified acute renal failure type (HCC) [N17.9]    IF APPLICABLE: The Patient and/or patient representative Jona and his family were provided with a choice of provider and agrees with the discharge plan. Freedom of choice list with basic dialogue that supports the patient's individualized plan of care/goals and shares the quality data associated with the providers was provided to: Patient   Patient Representative Name:

## 2024-11-04 NOTE — PROGRESS NOTES
Patient stating that legs and feet are burning since wound care this afternoon. Patient requesting that coban on wraps be removed. Writer educated patient on purpose of wraps. Coban removed, will have wound care re-evaluate tomorrow.

## 2024-11-04 NOTE — ED PROVIDER NOTES
[facility] ICU  EMERGENCY DEPARTMENT ENCOUNTER      Pt Name: Jona Heredia  MRN: [id number]  Birthdate 1945  Date of evaluation: 11/4/24      CHIEF COMPLAINT       Chief Complaint   Patient presents with    Hypotension    Leg Swelling    Extremity Weakness       HISTORY OF PRESENT ILLNESS   HPI 78 y.o. male presents with c/o weakness fatigue leg edema.  Patient reports that his home blood pressures have been running low he is feeling generally weak, he also has chronic lymphedema, no unilateral focal weakness, no headaches, no cough.  He has chronic kidney disease, he denies changes in urine output..     REVIEW OF SYSTEMS       Review of Systems   Constitutional:  Positive for activity change, appetite change and fatigue. Negative for fever.   HENT:  Negative for congestion.    Respiratory:  Negative for cough.    Cardiovascular:  Negative for chest pain.   Gastrointestinal:  Negative for abdominal pain, diarrhea and vomiting.   Genitourinary:  Negative for difficulty urinating.   Skin:  Positive for rash.   Neurological:  Negative for headaches.       PAST MEDICAL HISTORY     Past Medical History:   Diagnosis Date    Abdominal hernia     Atrophy of muscle of multiple sites 8/19/2016    CAD (coronary artery disease)     CKD (chronic kidney disease) stage 3, GFR 30-59 ml/min ([facility])     Colon polyp     Crohn disease ([facility])     Crohn's colitis ([facility])     Decubitus ulcer of coccyx, stage 2 ([facility]) 8/8/2016    Diarrhea     Diverticulosis     Hemorrhoids     History of atrial fibrillation     Hypertension     Ileostomy in place ([facility]) 8/18/2016    Internal hemorrhoids     NSVT (nonsustained ventricular tachycardia) ([facility]) 08/18/2016    Other and unspecified hyperlipidemia     HISTORY OF HIGH CHOLESTEROL    Past heart attack     Stage 3 chronic kidney disease (HCC)     Tobacco use disorder     Tubular adenoma        SURGICAL HISTORY       Past Surgical History:   Procedure Laterality Date    ABDOMEN SURGERY  08/02/2016    explor.  bilaterally, no respiratory distress  Abdomen: Soft, nontender, nondistended, with no peritoneal signs  Neurologic: Patient is alert and oriented x3, motor and sensation is intact in all 4 extremities, fluent speech  Extremities: Bilateral circumferential erythema extending to the mid lower legs.  There is scaling and crusting.     MEDICAL DECISION MAKING:   This is a 28-year-old male presenting with weakness, hypotension at home, leg edema.  On physical examination it appears that he has cellulitis of his legs.  He has comorbid conditions including lymphedema coronary artery disease chronic kidney disease Crohn's disease atrial fibrillation MGus.  Differential diagnosis cellulitis sepsis renal failure dehydration electrolyte abnormalities.  EKG obtained IV placed laboratory and imaging studies were obtained and reviewed.  White blood cell count is 7.7 hemoglobin is stable at 12.5 the patient has acute on chronic renal failure creatinine has more than doubled to 5.5, BUN is 143.  The patient also has hyperkalemia at 5.9.  Lactic acid is normal I doubt sepsis, chronic troponin elevation is stable.  The patient is anticoagulated.  Hyperkalemia treated with insulin and dextrose.  Patient also treated with IV fluids.  Bladder scan showed urinary retention with approximately 600 mL of urine in his bladder.  Arredondo catheter placed.  I am also concerned the patient might have cellulitis of the legs.  Starting on antibiotics.  Consult to nephrology I spoke with Dr. Lira.  Consult to the internal medicine service I spoke with Dr. Medina.  Patient and family updated on the findings, they are in agreement with Hospital admission.  Patient admitted to the hospital.Procedures  EKG: All EKG's are interpreted by the Emergency Department Physician who either signs or Co-signs this chart in the absence of a cardiologist.  EKG shows a sinus rhythm but NM is 240, rate is 62, QRS of 120 QTc of 477 no ST segment elevations nonspecific T  within normal limits   MICROSCOPIC URINALYSIS - Abnormal; Notable for the following components:    WBC, UA 0 TO 2 (*)     RBC, UA TOO NUMEROUS TO COUNT (*)     Casts UA 0 TO 2 (*)     All other components within normal limits   BASIC METABOLIC PANEL - Abnormal; Notable for the following components:    Sodium 132 (*)     Potassium 5.5 (*)     CO2 17 (*)     Glucose 122 (*)      (*)     Creatinine 5.0 (*)     Est, Glom Filt Rate 11 (*)     Calcium 7.8 (*)     All other components within normal limits   PROCALCITONIN - Abnormal; Notable for the following components:    Procalcitonin 0.23 (*)     All other components within normal limits   POC GLUCOSE FINGERSTICK - Abnormal; Notable for the following components:    POC Glucose 111 (*)     All other components within normal limits   POC GLUCOSE FINGERSTICK - Abnormal; Notable for the following components:    POC Glucose 47 (*)     All other components within normal limits   POC GLUCOSE FINGERSTICK - Abnormal; Notable for the following components:    POC Glucose 71 (*)     All other components within normal limits   POC GLUCOSE FINGERSTICK - Abnormal; Notable for the following components:    POC Glucose 155 (*)     All other components within normal limits   LACTIC ACID   SODIUM, URINE, RANDOM   CREATININE, RANDOM URINE   CHLORIDE, URINE, RANDOM   POTASSIUM, URINE, RANDOM   CK   BASIC METABOLIC PANEL   BASIC METABOLIC PANEL   POC GLUCOSE FINGERSTICK   POC GLUCOSE FINGERSTICK       Vitals Reviewed:    Vitals:    11/04/24 1430 11/04/24 1445 11/04/24 1500 11/04/24 1515   BP: (!) 99/51 (!) 112/51 (!) 132/59 (!) 123/59   Pulse: 69 70 70 72   Resp: 18 30 17 20   Temp:       TempSrc:       SpO2: 100% 100% 100% 100%   Weight:       Height:         MEDICATIONS GIVEN TO PATIENT THIS ENCOUNTER:  Orders Placed This Encounter   Medications    AND Linked Order Group     insulin regular (HumuLIN R;NovoLIN R) injection 10 Units     dextrose bolus 10% 250 mL    doxycycline

## 2024-11-04 NOTE — ACP (ADVANCE CARE PLANNING)
Advance Care Planning     Advance Care Planning Activator (Inpatient)  Conversation Note      Date of ACP Conversation: 11/4/2024     Conversation Conducted with: Patient with Decision Making Capacity    ACP Activator: Lucy Harris RN    Health Care Decision Maker:     Current Designated Health Care Decision Maker:     Primary Decision Maker: Tomy Heredia ROBYN - Brother/Sister - [phone]  Click here to complete Healthcare Decision Makers including section of the Healthcare Decision Maker Relationship (ie \"Primary\")  Today we documented Decision Maker(s) consistent with ACP documents on file.    Care Preferences    Ventilation:  \"If you were in your present state of health and suddenly became very ill and were unable to breathe on your own, what would your preference be about the use of a ventilator (breathing machine) if it were available to you?\"      Would the patient desire the use of ventilator (breathing machine)?: no    \"If your health worsens and it becomes clear that your chance of recovery is unlikely, what would your preference be about the use of a ventilator (breathing machine) if it were available to you?\"     Would the patient desire the use of ventilator (breathing machine)?: No      Resuscitation  \"CPR works best to restart the heart when there is a sudden event, like a heart attack, in someone who is otherwise healthy. Unfortunately, CPR does not typically restart the heart for people who have serious health conditions or who are very sick.\"    \"In the event your heart stopped as a result of an underlying serious health condition, would you want attempts to be made to restart your heart (answer \"yes\" for attempt to resuscitate) or would you prefer a natural death (answer \"no\" for do not attempt to resuscitate)?\" no       [] Yes   [] No   Educated Patient / Decision Maker regarding differences between Advance Directives and portable DNR orders.    Length of ACP Conversation in minutes:

## 2024-11-04 NOTE — H&P
[facility]  IN-PATIENT SERVICE  [facility]  IN-PATIENT SERVICE   [facility]     HISTORY AND PHYSICAL EXAMINATION            Date:   11/4/2024  Patient name:  Jona Heredia  Date of admission:  11/4/2024  7:04 AM  MRN:   [id number]  Account:  [id number]  YOB: 1945  PCP:    Ry Boston MD  Room:   09/09  Code Status:    Prior    Chief Complaint:     Hypotension, acute renal failure    History Obtained From:     patient, electronic medical record    History of Present Illness:     Mr. Jona Heredia (Joe) is a 78-year-old male patient with a past medical history of MGUS, CAD, CKD stage III, Crohn's disease, atrial fibrillation, HTN, NSVT, and HLD who presents following low blood pressure readings at home.  Patient states that for the last week he has been taking his blood pressure and had consistently low blood pressure readings with systolics in the 80s and 90s.  He reports that he has been feeling increasingly fatigued as well.  He does state that he has been urinating freely all week.  Some weakness of his lower extremities.  He also notes some weight loss over the past several months, states he was over 220 pounds at his last visit to the doctor.  Per our records, he did weigh 218 at the beginning of October.    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  His kappa and lambda light chains were high with a kappa/lambda ratio of 0.2.  He was referred to hematology and oncology who determined his MGUS to be very low risk/mild.  Skeletal survey was negative for lytic lesions.  Dr. Rosas recommending bone marrow biopsy.  PET scan ordered and pending.  He had his last follow-up with nephrology on 10/9.  His baseline creatinine is 2.5.  He is on calcitonin for    calcitRIOL (ROCALTROL) 0.25 MCG capsule Take 1 capsule by mouth three times a week 5/29/24 3/5/25  Yasmeen Dowd APRN - CNP   loperamide (IMODIUM) 2 MG capsule Take 1 capsule by mouth as needed for Diarrhea 21   Provider, MD Flor   torsemide (DEMADEX) 20 MG tablet Take 2 tablets by mouth daily 24   Fritz Garces DO   amiodarone (CORDARONE) 200 MG tablet Take 1 tablet by mouth daily 24   Candido Mckeon MD        Allergies:     Patient has no known allergies.    Social History:     Tobacco:    reports that he quit smoking about 8 years ago. His smoking use included cigarettes. He started smoking about 33 years ago. He has a 12.5 pack-year smoking history. He has never used smokeless tobacco.  Alcohol:      reports no history of alcohol use.  Drug Use:  reports no history of drug use.    Family History:     Family History   Problem Relation Age of Onset    Diabetes Mother     Heart Disease Mother     High Blood Pressure Mother     Heart Failure Mother     Cancer Father         Lymphoma    Heart Disease Father     Osteoarthritis Sister     Thyroid Disease Sister     Cancer Brother         Leukemia    Neuropathy Brother     Melanoma Brother     Atrial Fibrillation Brother     Sleep Apnea Brother     Peripheral Vascular Disease Brother     Ulcerative Colitis Brother     Glaucoma Brother     Unknown Brother         Myelodysplastic Syndrom    Peripheral Vascular Disease Brother     Thyroid Disease Brother         Hyothyiod    Heart Attack Brother     Thyroid Disease Brother     Lung Disease Brother     Liver Disease Brother     Gout Brother     Unknown Brother         Varicose Veins    High Cholesterol Brother        Review of Systems:     Positive and Negative as described in HPI.    Physical Exam:   /65   Pulse 61   Temp 97.5 °F (36.4 °C) (Oral)   Resp 13   Ht 1.702 m (5' 7\")   Wt 95.7 kg (211 lb)   SpO2 97%   BMI 33.05 kg/m²   Temp (24hrs), Av.5 °F (36.4 °C), Min:97.5 °F (36.4  Filt Rate 10 (L) >60 mL/min/1.73m2    Calcium 8.0 (L) 8.6 - 10.4 mg/dL    Total Protein 8.0 6.6 - 8.7 g/dL    Albumin 3.9 3.5 - 5.2 g/dL    Total Bilirubin 0.8 0.0 - 1.2 mg/dL    Alkaline Phosphatase 109 40 - 129 U/L    ALT 15 10 - 50 U/L    AST 21 10 - 50 U/L   Lactic Acid    Collection Time: 11/04/24  7:17 AM   Result Value Ref Range    Lactic Acid 0.8 0.5 - 2.2 mmol/L   Troponin    Collection Time: 11/04/24  7:17 AM   Result Value Ref Range    Troponin, High Sensitivity 62 (HH) 0 - 22 ng/L   Magnesium    Collection Time: 11/04/24  7:17 AM   Result Value Ref Range    Magnesium 3.2 (H) 1.6 - 2.4 mg/dL   Protime-INR    Collection Time: 11/04/24  7:17 AM   Result Value Ref Range    Protime 25.9 (H) 11.8 - 14.6 sec    INR 2.4    EKG 12 Lead    Collection Time: 11/04/24  7:29 AM   Result Value Ref Range    Ventricular Rate 62 BPM    QRS Duration 120 ms    Q-T Interval 470 ms    QTc Calculation (Bazett) 477 ms    R Axis -41 degrees    T Axis 116 degrees   POC Glucose Fingerstick    Collection Time: 11/04/24  8:15 AM   Result Value Ref Range    POC Glucose 92 75 - 110 mg/dL   POC Glucose Fingerstick    Collection Time: 11/04/24  9:12 AM   Result Value Ref Range    POC Glucose 111 (H) 75 - 110 mg/dL   Troponin    Collection Time: 11/04/24  9:30 AM   Result Value Ref Range    Troponin, High Sensitivity 61 (HH) 0 - 22 ng/L   Urinalysis with Reflex to Culture    Collection Time: 11/04/24  9:34 AM    Specimen: Urine   Result Value Ref Range    Color, UA Yellow Yellow    Turbidity UA Clear Clear    Glucose, Ur NEGATIVE NEGATIVE mg/dL    Bilirubin, Urine NEGATIVE NEGATIVE    Ketones, Urine NEGATIVE NEGATIVE mg/dL    Specific Gravity, UA 1.017 1.000 - 1.030    Urine Hgb LARGE (A) NEGATIVE    pH, Urine 5.0 5.0 - 8.0    Protein, UA TRACE (A) NEGATIVE mg/dL    Urobilinogen, Urine Normal 0.0 - 1.0 EU/dL    Nitrite, Urine NEGATIVE NEGATIVE    Leukocyte Esterase, Urine SMALL (A) NEGATIVE   Microscopic Urinalysis    Collection Time:

## 2024-11-04 NOTE — PROGRESS NOTES
11/04/24 1414   Encounter Summary   Encounter Overview/Reason  Encounter   Service Provided For Patient   Referral/Consult From Rounding   Last Encounter  11/04/24   Spiritual/Emotional needs   Type Spiritual Support   Rituals, Rites and Sacraments   Type Sacrament of Sick;P[employer]ism Communion  ([place])

## 2024-11-05 ENCOUNTER — APPOINTMENT (OUTPATIENT)
Dept: GENERAL RADIOLOGY | Age: 79
End: 2024-11-05
Payer: MEDICARE

## 2024-11-05 PROBLEM — L03.115 BILATERAL CELLULITIS OF LOWER LEG: Status: ACTIVE | Noted: 2024-11-05

## 2024-11-05 PROBLEM — N19 UREMIA: Status: ACTIVE | Noted: 2024-11-05

## 2024-11-05 PROBLEM — D47.2 MGUS (MONOCLONAL GAMMOPATHY OF UNKNOWN SIGNIFICANCE): Status: ACTIVE | Noted: 2024-11-05

## 2024-11-05 PROBLEM — L03.116 BILATERAL CELLULITIS OF LOWER LEG: Status: ACTIVE | Noted: 2024-11-05

## 2024-11-05 LAB
ANION GAP SERPL CALCULATED.3IONS-SCNC: 10 MMOL/L (ref 9–16)
ANION GAP SERPL CALCULATED.3IONS-SCNC: 11 MMOL/L (ref 9–16)
BASOPHILS # BLD: 0 K/UL (ref 0–0.2)
BASOPHILS NFR BLD: 0 % (ref 0–2)
BNP SERPL-MCNC: 2498 PG/ML (ref 0–300)
BUN SERPL-MCNC: 109 MG/DL (ref 8–23)
BUN SERPL-MCNC: 117 MG/DL (ref 8–23)
CALCIUM SERPL-MCNC: 7.4 MG/DL (ref 8.6–10.4)
CALCIUM SERPL-MCNC: 7.4 MG/DL (ref 8.6–10.4)
CHLORIDE SERPL-SCNC: 106 MMOL/L (ref 98–107)
CHLORIDE SERPL-SCNC: 108 MMOL/L (ref 98–107)
CO2 SERPL-SCNC: 18 MMOL/L (ref 20–31)
CO2 SERPL-SCNC: 18 MMOL/L (ref 20–31)
CREAT SERPL-MCNC: 4 MG/DL (ref 0.7–1.2)
CREAT SERPL-MCNC: 4.4 MG/DL (ref 0.7–1.2)
EOSINOPHIL # BLD: 0.2 K/UL (ref 0–0.4)
EOSINOPHILS RELATIVE PERCENT: 4 % (ref 0–4)
ERYTHROCYTE [DISTWIDTH] IN BLOOD BY AUTOMATED COUNT: 16.2 % (ref 11.5–14.9)
GFR, ESTIMATED: 13 ML/MIN/1.73M2
GFR, ESTIMATED: 15 ML/MIN/1.73M2
GLUCOSE SERPL-MCNC: 92 MG/DL (ref 74–99)
GLUCOSE SERPL-MCNC: 96 MG/DL (ref 74–99)
HCT VFR BLD AUTO: 32.6 % (ref 41–53)
HGB BLD-MCNC: 10.9 G/DL (ref 13.5–17.5)
LYMPHOCYTES NFR BLD: 0.4 K/UL (ref 1–4.8)
LYMPHOCYTES RELATIVE PERCENT: 6 % (ref 24–44)
MCH RBC QN AUTO: 31.9 PG (ref 26–34)
MCHC RBC AUTO-ENTMCNC: 33.4 G/DL (ref 31–37)
MCV RBC AUTO: 95.5 FL (ref 80–100)
MONOCYTES NFR BLD: 0.6 K/UL (ref 0.1–1.3)
MONOCYTES NFR BLD: 9 % (ref 1–7)
NEUTROPHILS NFR BLD: 81 % (ref 36–66)
NEUTS SEG NFR BLD: 5 K/UL (ref 1.3–9.1)
PLATELET # BLD AUTO: 129 K/UL (ref 150–450)
PMV BLD AUTO: 7.8 FL (ref 6–12)
POTASSIUM SERPL-SCNC: 4.8 MMOL/L (ref 3.7–5.3)
POTASSIUM SERPL-SCNC: 4.8 MMOL/L (ref 3.7–5.3)
RBC # BLD AUTO: 3.41 M/UL (ref 4.5–5.9)
SODIUM SERPL-SCNC: 135 MMOL/L (ref 136–145)
SODIUM SERPL-SCNC: 136 MMOL/L (ref 136–145)
WBC OTHER # BLD: 6.2 K/UL (ref 3.5–11)

## 2024-11-05 PROCEDURE — 71045 X-RAY EXAM CHEST 1 VIEW: CPT

## 2024-11-05 PROCEDURE — 80048 BASIC METABOLIC PNL TOTAL CA: CPT

## 2024-11-05 PROCEDURE — 85025 COMPLETE CBC W/AUTO DIFF WBC: CPT

## 2024-11-05 PROCEDURE — 6370000000 HC RX 637 (ALT 250 FOR IP): Performed by: NURSE PRACTITIONER

## 2024-11-05 PROCEDURE — 97530 THERAPEUTIC ACTIVITIES: CPT

## 2024-11-05 PROCEDURE — 6370000000 HC RX 637 (ALT 250 FOR IP)

## 2024-11-05 PROCEDURE — 6360000002 HC RX W HCPCS

## 2024-11-05 PROCEDURE — 97162 PT EVAL MOD COMPLEX 30 MIN: CPT

## 2024-11-05 PROCEDURE — 83880 ASSAY OF NATRIURETIC PEPTIDE: CPT

## 2024-11-05 PROCEDURE — 97166 OT EVAL MOD COMPLEX 45 MIN: CPT

## 2024-11-05 PROCEDURE — 2500000003 HC RX 250 WO HCPCS: Performed by: INTERNAL MEDICINE

## 2024-11-05 PROCEDURE — 99223 1ST HOSP IP/OBS HIGH 75: CPT | Performed by: INTERNAL MEDICINE

## 2024-11-05 PROCEDURE — 2500000003 HC RX 250 WO HCPCS

## 2024-11-05 PROCEDURE — 99233 SBSQ HOSP IP/OBS HIGH 50: CPT | Performed by: INTERNAL MEDICINE

## 2024-11-05 PROCEDURE — 2580000003 HC RX 258: Performed by: INTERNAL MEDICINE

## 2024-11-05 PROCEDURE — 2060000000 HC ICU INTERMEDIATE R&B

## 2024-11-05 PROCEDURE — 99213 OFFICE O/P EST LOW 20 MIN: CPT

## 2024-11-05 PROCEDURE — 2580000003 HC RX 258

## 2024-11-05 PROCEDURE — 36415 COLL VENOUS BLD VENIPUNCTURE: CPT

## 2024-11-05 RX ORDER — TAMSULOSIN HYDROCHLORIDE 0.4 MG/1
0.4 CAPSULE ORAL DAILY
Status: DISCONTINUED | OUTPATIENT
Start: 2024-11-05 | End: 2024-11-08 | Stop reason: HOSPADM

## 2024-11-05 RX ORDER — GUAIFENESIN 200 MG/10ML
LIQUID ORAL DAILY
Status: DISCONTINUED | OUTPATIENT
Start: 2024-11-05 | End: 2024-11-08 | Stop reason: HOSPADM

## 2024-11-05 RX ORDER — PANTOPRAZOLE SODIUM 40 MG/1
40 TABLET, DELAYED RELEASE ORAL
Status: DISCONTINUED | OUTPATIENT
Start: 2024-11-05 | End: 2024-11-08 | Stop reason: HOSPADM

## 2024-11-05 RX ADMIN — HEPARIN SODIUM 5000 UNITS: 5000 INJECTION INTRAVENOUS; SUBCUTANEOUS at 06:25

## 2024-11-05 RX ADMIN — HEPARIN SODIUM 5000 UNITS: 5000 INJECTION INTRAVENOUS; SUBCUTANEOUS at 21:03

## 2024-11-05 RX ADMIN — TAMSULOSIN HYDROCHLORIDE 0.4 MG: 0.4 CAPSULE ORAL at 17:28

## 2024-11-05 RX ADMIN — RANOLAZINE 500 MG: 500 TABLET, EXTENDED RELEASE ORAL at 21:03

## 2024-11-05 RX ADMIN — ANTI-FUNGAL POWDER MICONAZOLE NITRATE TALC FREE: 1.42 POWDER TOPICAL at 21:05

## 2024-11-05 RX ADMIN — PANTOPRAZOLE SODIUM 40 MG: 40 TABLET, DELAYED RELEASE ORAL at 10:59

## 2024-11-05 RX ADMIN — SODIUM BICARBONATE: 84 INJECTION, SOLUTION INTRAVENOUS at 02:59

## 2024-11-05 RX ADMIN — ACETAMINOPHEN 650 MG: 325 TABLET ORAL at 21:03

## 2024-11-05 RX ADMIN — SODIUM CHLORIDE, PRESERVATIVE FREE 10 ML: 5 INJECTION INTRAVENOUS at 21:06

## 2024-11-05 RX ADMIN — ACETAMINOPHEN 650 MG: 325 TABLET ORAL at 14:01

## 2024-11-05 RX ADMIN — HEPARIN SODIUM 5000 UNITS: 5000 INJECTION INTRAVENOUS; SUBCUTANEOUS at 14:02

## 2024-11-05 RX ADMIN — AMIODARONE HYDROCHLORIDE 200 MG: 200 TABLET ORAL at 08:14

## 2024-11-05 RX ADMIN — FERROUS SULFATE TAB 325 MG (65 MG ELEMENTAL FE) 325 MG: 325 (65 FE) TAB at 21:03

## 2024-11-05 RX ADMIN — Medication: at 14:02

## 2024-11-05 RX ADMIN — RANOLAZINE 500 MG: 500 TABLET, EXTENDED RELEASE ORAL at 08:14

## 2024-11-05 RX ADMIN — ISOSORBIDE MONONITRATE 30 MG: 30 TABLET, EXTENDED RELEASE ORAL at 08:14

## 2024-11-05 RX ADMIN — ANTI-FUNGAL POWDER MICONAZOLE NITRATE TALC FREE: 1.42 POWDER TOPICAL at 10:59

## 2024-11-05 RX ADMIN — SODIUM BICARBONATE: 84 INJECTION, SOLUTION INTRAVENOUS at 18:21

## 2024-11-05 ASSESSMENT — PAIN DESCRIPTION - LOCATION
LOCATION: LEG
LOCATION: LEG;FOOT

## 2024-11-05 ASSESSMENT — PAIN SCALES - GENERAL
PAINLEVEL_OUTOF10: 7
PAINLEVEL_OUTOF10: 8
PAINLEVEL_OUTOF10: 4
PAINLEVEL_OUTOF10: 8

## 2024-11-05 ASSESSMENT — PAIN DESCRIPTION - ORIENTATION
ORIENTATION: RIGHT;LEFT;LOWER
ORIENTATION: LEFT;RIGHT

## 2024-11-05 ASSESSMENT — ENCOUNTER SYMPTOMS
VOMITING: 0
DIARRHEA: 0
NAUSEA: 0
SHORTNESS OF BREATH: 0
COUGH: 1

## 2024-11-05 ASSESSMENT — PAIN DESCRIPTION - DESCRIPTORS
DESCRIPTORS: DISCOMFORT;BURNING
DESCRIPTORS: BURNING

## 2024-11-05 ASSESSMENT — PAIN - FUNCTIONAL ASSESSMENT: PAIN_FUNCTIONAL_ASSESSMENT: ACTIVITIES ARE NOT PREVENTED

## 2024-11-05 NOTE — CARE COORDINATION
ONGOING DISCHARGE PLAN:    Patient is alert and oriented x4.    Spoke with patient regarding discharge plan and patient confirms that plan is now to DC to SNF.     Pt. Wants [facility], has been there in the past. Referral sent. Following for acceptance.     PT/OT on board. Rec SNF.    CR+ Today 4.0, improving. Remains on Bicarb GTT. Nephro following.    Urology/Wound Care on board.     Will continue to follow for additional discharge needs.    If patient is discharged prior to next notation, then this note serves as note for discharge by case management.    Electronically signed by Parul Rose RN on 11/5/2024 at 3:41 PM

## 2024-11-05 NOTE — PROGRESS NOTES
[facility]  IN-PATIENT SERVICE  [facility]    PROGRESS NOTE             11/5/2024    7:29 AM    Name:   Jona Heredia  MRN:     [id number]     Acct:      [id number]   Room:   2013/2013-01   Day:  1  Admit Date:  11/4/2024  7:04 AM    PCP:  Ry Boston MD  Code Status:  DNR-CCA    Subjective:     C/C:   Chief Complaint   Patient presents with    Hypotension    Leg Swelling    Extremity Weakness     Interval History Status: improved.    Patient seen and examined at bedside.  He has been hypotensive overnight.  Creatinine improving.  He does endorse burning of his legs bilaterally.  Unable to tolerate Unna boots.  On exam, he does have decreased sensation of the bilateral toes.    Continuing gentle hydration sodium bicarbonate.      Brief History:     Mr. Jona Heredia (Joe) is a 78-year-old male patient with a past medical history of MGUS, CAD, CKD stage III, Crohn's disease, atrial fibrillation, HTN, NSVT, and HLD who presents following low blood pressure readings at home.  Patient states that for the last week he has been taking his blood pressure and had consistently low blood pressure readings with systolics in the 80s and 90s.  He reports that he has been feeling increasingly fatigued as well.  He does state that he has been urinating freely all week.  Some weakness of his lower extremities.  He also notes some weight loss over the past several months, states he was over 220 pounds at his last visit to the doctor.  Per our records, he did weigh 218 at the beginning of October.     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  His kappa and lambda light chains were high with a kappa/lambda ratio of 0.2.  He was referred to hematology and oncology who determined his MGUS to be very low risk/mild.  Skeletal survey was negative for  g/dL    RDW 16.2 (H) 11.5 - 14.9 %    Platelets 129 (L) 150 - 450 k/uL    MPV 7.8 6.0 - 12.0 fL    Neutrophils % 81 (H) 36 - 66 %    Lymphocytes % 6 (L) 24 - 44 %    Monocytes % 9 (H) 1 - 7 %    Eosinophils % 4 0 - 4 %    Basophils % 0 0 - 2 %    Neutrophils Absolute 5.00 1.3 - 9.1 k/uL    Lymphocytes Absolute 0.40 (L) 1.0 - 4.8 k/uL    Monocytes Absolute 0.60 0.1 - 1.3 k/uL    Eosinophils Absolute 0.20 0.0 - 0.4 k/uL    Basophils Absolute 0.00 0.0 - 0.2 k/uL      Lab Results   Component Value Date/Time    SPECIAL R FOREARM 6ML 09/08/2023 12:00 AM    SPECIAL R HAND 6ML 09/08/2023 12:00 AM     Lab Results   Component Value Date/Time    CULTURE NO GROWTH 09/09/2023 06:14 AM           Radiology:    US RENAL LIMITED    Result Date: 11/4/2024  EXAMINATION: ULTRASOUND OF THE KIDNEYS 11/4/2024 8:40 am COMPARISON: None. HISTORY: ORDERING SYSTEM PROVIDED HISTORY: renal failure TECHNOLOGIST PROVIDED HISTORY: renal failure 78-year-old male with history of renal failure FINDINGS: Right kidney measures 10.5 x 6.1 x 4.9 cm.  Right renal cortical thickness measures 1.2 cm. Left kidney measures 10.6 x 7.1 x 4.8 cm.  Left renal cortical thickness measures 1.2 cm. Lower pole left renal cyst measures 8.0 x 7.3 x 4.9 cm. Lower pole right renal cyst measures 2.2 x 1.7 x 1.8 cm. No hydronephrosis.  No perinephric fluid. No echogenic foci with posterior acoustic shadowing to suggest renal calculi. Increased echogenicity of the bilateral renal cortex consistent with chronic medical renal disease.     1. Bilateral renal cysts. 2. Findings consistent with chronic medical renal disease. 3. No hydronephrosis.         Physical Examination:        Physical Exam  Constitutional:       General: He is not in acute distress.  Cardiovascular:      Rate and Rhythm: Normal rate and regular rhythm.      Pulses: Normal pulses.      Heart sounds: Normal heart sounds.   Pulmonary:      Effort: Pulmonary effort is normal.      Breath sounds: Normal breath

## 2024-11-05 NOTE — DISCHARGE INSTR - COC
Continuity of Care Form    Patient Name: Jona Heredia   :  1945  MRN:  [id number]    Admit date:  2024  Discharge date:  24    Code Status Order: DNR-CCA   Advance Directives:   Advance Care Flowsheet Documentation             Admitting Physician:  Ry Boston MD  PCP: Ry Boston MD    Discharging Nurse: flora RODRIGUEZ   Discharging Hospital Unit/Room#:   Discharging Unit Phone Number: [phone]    Emergency Contact:   Extended Emergency Contact Information  Primary Emergency Contact: Tomy Heredia  Address: [address], [place] [address] [place] [address]  Home Phone: [phone]  Mobile Phone: [phone]  Relation: Brother/Sister  Secondary Emergency Contact: Bhavna Heredia  Address: [address] [place] [address] [facility]  Home Phone: [phone]  Mobile Phone: [phone]  Relation: Other Relative    Past Surgical History:  Past Surgical History:   Procedure Laterality Date    ABDOMEN SURGERY  2016    explor. lap., ileostomy    ABDOMEN SURGERY  2017    colostomy/ileostomy takedown with wound vac application hernia peristomal repair    CARDIAC CATHETERIZATION      NO STENTS    CARDIAC PROCEDURE N/A 2023    taleb / Left heart cath / rm 3025 performed by Jon Gutierrez MD at [facility] CARDIAC CATH LAB    CARDIAC PROCEDURE N/A 2023    Coronary angiography performed by Jon Gutierrez MD at [facility] CARDIAC CATH LAB    CARDIOVERSION  10/18/2023    CARDIOVERSION N/A 2024    DR. MERA    COLONOSCOPY  2005    COLONOSCOPY  2016    extreme spasmatic colon; extensive diverticulosis; ventral herniation; flat polyp; large internal hemorrhoids    COLONOSCOPY  2016    through ileostomy    EP DEVICE PROCEDURE N/A 2023    Linda / icd / rm 3025 performed by Candido Mera MD at [facility] CARDIAC CATH LAB    ILEOSTOMY OR JEJUNOSTOMY      POLYPECTOMY      MI REPAIR  GFR 15-29 ml/min ([facility]) N18.4    Mitral regurgitation I34.0    Secondary hypercoagulable state ([facility]) D68.69    VF (ventricular fibrillation) I49.01    Bilateral renal cysts N28.1    Acute on chronic systolic (congestive) heart failure ([facility]) I50.23    Typical atrial flutter ([facility]) I48.3    Acute renal failure ([facility]) N17.9       Isolation/Infection:   Isolation            No Isolation          Patient Infection Status       None to display                     Nurse Assessment:  Last Vital Signs: BP (!) 125/41   Pulse 66   Temp 97.6 °F (36.4 °C) (Axillary)   Resp 17   Ht 1.702 m (5' 7\")   Wt 95.7 kg (211 lb)   SpO2 100%   BMI 33.05 kg/m²     Last documented pain score (0-10 scale): Pain Level: 6  Last Weight:   Wt Readings from Last 1 Encounters:   11/04/24 95.7 kg (211 lb)     Mental Status:  oriented and alert    IV Access:  - None    Nursing Mobility/ADLs:  Walking   Assisted  Transfer  Assisted  Bathing  Assisted  Dressing  Assisted  Toileting  Assisted  Feeding  Independent  Med Admin  Assisted  Med Delivery   whole    Wound Care Documentation and Therapy:  Wound 11/04/24 Buttocks Right;Left (Active)   Wound Image   11/04/24 1129   Wound Etiology Pressure Stage 3 11/05/24 0800   Dressing Status Clean;Dry;Intact 11/05/24 0800   Wound Cleansed Not Cleansed 11/05/24 0800   Dressing/Treatment Triad hydro/zinc oxide-based hydrophilic paste 11/05/24 0800   Wound Assessment Pink/red 11/05/24 0800   Drainage Amount Small (< 25%) 11/05/24 0800   Drainage Description Sanguinous 11/05/24 0800   Odor None 11/05/24 0800   Olivia-wound Assessment Denuded;Fragile 11/05/24 0800   Margins Defined edges 11/05/24 0800   Number of days: 0       Wound 11/04/24 Leg Left;Lower (Active)   Wound Image   11/04/24 1129   Wound Etiology Venous 11/05/24 0800   Dressing Status Clean;Dry;Intact 11/05/24 0800   Wound Cleansed Soap and water 11/05/24 0800   Dressing/Treatment Other (comment) 11/05/24 0800   Wound Assessment Other (Comment)  Barium Swallow with Video (Video Swallowing Test): not done    Treatments at the Time of Hospital Discharge:   Respiratory Treatments: N/A  Oxygen Therapy:  is on oxygen at 3 L/min per nasal cannula.  Ventilator:    - No ventilator support    Rehab Therapies: Physical Therapy and Occupational Therapy  Weight Bearing Status/Restrictions: No weight bearing restrictions  Other Medical Equipment (for information only, NOT a DME order):  wheelchair and walker  Other Treatments: N/A    Patient's personal belongings (please select all that are sent with patient):  None    RN SIGNATURE:  Electronically signed by Qiana Mckeon RN on 11/8/24 at 3:55 PM EST    CASE MANAGEMENT/SOCIAL WORK SECTION    Inpatient Status Date: 11/4/24    Readmission Risk Assessment Score:  Readmission Risk              Risk of Unplanned Readmission:  20           Discharging to Facility/ Agency   Name: [facility]  Address: [address] [place] [address]  Phone: [phone]  Fax: [phone]      Dialysis Facility (if applicable)   Name:  Address:  Dialysis Schedule:  Phone:  Fax:    / signature: Electronically signed by JOSE Uriostegui on 11/8/24 at 4:30 PM EST    PHYSICIAN SECTION    Prognosis: Good    Condition at Discharge: Stable    Rehab Potential (if transferring to Rehab): Good    Recommended Labs or Other Treatments After Discharge:     Physician Certification: I certify the above information and transfer of Jona Heredia  is necessary for the continuing treatment of the diagnosis listed and that he requires Skilled Nursing Facility for less 30 days.     Update Admission H&P: No change in H&P    PHYSICIAN SIGNATURE:  Electronically signed by Ry Boston MD on 11/5/24 at 11:38 AM EST

## 2024-11-05 NOTE — PROGRESS NOTES
[facility]  Progress Note      NAME:  Jona Heredia  MEDICAL RECORD NUMBER:  [id number]  AGE: 78 y.o.   GENDER: male  : 1945  TODAY'S DATE:  2024    [facility] nurse follow-up visit for bilateral leg wounds.  Patient did not tolerate Unna boots overnight.  He initially complained of burning pain to both legs, but at time of visit he states that he only has pain in his heels.  Dressings removed.  Patient states that he normally applies Eucerin cream at home.  Order for Eucerin cream obtained.  Eucerin applied to dry areas, avoiding wounds.  Patient did not have much drainage and VertiFlex AG was stuck in several areas.  Will change to oil emulsion, wrap with roll gauze and Ace wrap and change daily.    Bilateral legs: Cleanse with soap and water, pat dry.  Apply Eucerin cream to dry areas (avoid wounds). Place oil emulsion dressing over wounds and cover with ABD, wrap with roll gauze and ace wrap (4\" ace wrap from toe to ankle, 6\" ace wrap from ankle to knee, on in the morning, off at bedtime).  Change daily and as needed if loose or soiled.             Odilia Doss, BSN, RN, CWOCN, [facility]  Wound, Ostomy, and Continence Nursing  [phone]

## 2024-11-05 NOTE — PLAN OF CARE
Problem: Safety - Adult  Goal: Free from fall injury  Outcome: Progressing     Problem: Chronic Conditions and Co-morbidities  Goal: Patient's chronic conditions and co-morbidity symptoms are monitored and maintained or improved  Outcome: Progressing     Problem: Discharge Planning  Goal: Discharge to home or other facility with appropriate resources  Outcome: Progressing     Problem: Pain  Goal: Verbalizes/displays adequate comfort level or baseline comfort level  Outcome: Progressing     Problem: Skin/Tissue Integrity  Goal: Absence of new skin breakdown  Description: 1.  Monitor for areas of redness and/or skin breakdown  2.  Assess vascular access sites hourly  3.  Every 4-6 hours minimum:  Change oxygen saturation probe site  4.  Every 4-6 hours:  If on nasal continuous positive airway pressure, respiratory therapy assess nares and determine need for appliance change or resting period.  Outcome: Progressing     Problem: ABCDS Injury Assessment  Goal: Absence of physical injury  Outcome: Progressing     Problem: Neurosensory - Adult  Goal: Achieves stable or improved neurological status  Outcome: Progressing  Goal: Remains free of injury related to seizures activity  Outcome: Progressing     Problem: Cardiovascular - Adult  Goal: Maintains optimal cardiac output and hemodynamic stability  Outcome: Progressing     Problem: Skin/Tissue Integrity - Adult  Goal: Skin integrity remains intact  Outcome: Progressing  Goal: Oral mucous membranes remain intact  Outcome: Progressing     Problem: Musculoskeletal - Adult  Goal: Return mobility to safest level of function  Outcome: Progressing  Goal: Return ADL status to a safe level of function  Outcome: Progressing     Problem: Gastrointestinal - Adult  Goal: Minimal or absence of nausea and vomiting  Outcome: Progressing  Goal: Maintains or returns to baseline bowel function  Outcome: Progressing     Problem: Genitourinary - Adult  Goal: Absence of urinary  retention  Outcome: Progressing  Goal: Urinary catheter remains patent  Outcome: Progressing     Problem: Metabolic/Fluid and Electrolytes - Adult  Goal: Electrolytes maintained within normal limits  Outcome: Progressing  Goal: Hemodynamic stability and optimal renal function maintained  Outcome: Progressing

## 2024-11-05 NOTE — CONSULTS
Department of Urology  Urology Consult Note    Patient:  Jona Heredia  MRN: [id number]  YOB: 1945    Reason for Consult:  urinary retention with acute renal failure  Requesting Physician:  Izaiah Ashley MD     CHIEF COMPLAINT:    Chief Complaint   Patient presents with    Hypotension    Leg Swelling    Extremity Weakness       History Obtained From:   patient, electronic medical record    HISTORY OF PRESENT ILLNESS:    The patient is a 78 y.o. male who presents to the hospital from home with hypotension.  Admitted for management of MARK. He reported that his blood pressures were consistently running low in the 80s or 90s systolically.  He had associated symptoms of fatigue.  He has underlying CKD with a baseline creatinine of 2.5 and was found to be in acute kidney injury with a creatinine of 5.5 and hyperkalemia.  UA showed a large amount of hemoglobin, he was also found to be in urinary retention and retaining approximately 700cc of urine. Orders were placed for a brown cath which is in place. Renal us showed bilateral cysts and no hydronephrosis, appears consistent with renal disease. Creatinine trending downward, 4.0- nephrology following. He previously followed with Dr. Winston as outpatient for hx of bph, elevated PSA and stones.  He has not been seen in approximately 2 years. Last PSA 2022- 1.66. He denies taking any medications for his prostate or bladder. He does use a diuretic which causes him to go frequently. He denies any urinary concerns prior to admission.    Past Medical History:        Diagnosis Date    Abdominal hernia     Atrophy of muscle of multiple sites 8/19/2016    CAD (coronary artery disease)     CKD (chronic kidney disease) stage 3, GFR 30-59 ml/min (HCC)     Colon polyp     Crohn disease (HCC)     Crohn's colitis (HCC)     Decubitus ulcer of coccyx, stage 2 (HCC) 8/8/2016    Diarrhea     Diverticulosis     Hemorrhoids     History of atrial fibrillation     Hypertension  Ulcerative Colitis Brother     Glaucoma Brother     Unknown Brother         Myelodysplastic Syndrom    Peripheral Vascular Disease Brother     Thyroid Disease Brother         Hyothyiod    Heart Attack Brother     Thyroid Disease Brother     Lung Disease Brother     Liver Disease Brother     Gout Brother     Unknown Brother         Varicose Veins    High Cholesterol Brother        Review of Systems:  Constitutional: Negative for fever, chills. Positive for activity change and weakness.   Respiratory: Negative for cough, shortness of breath and wheezing.   Cardiovascular: Negative for chest pain and leg swelling.   Gastrointestinal: Negative for nausea, vomiting and abdominal pain.   Genitourinary: Negative for dysuria, frequency, hematuria, flank pain. Positive for difficulty urinating (this admission, retention stephanie)  Musculoskeletal: Negative for myalgias, back pain and joint swelling.   Skin: Negative for color change, rash and wound.   Neurological: Negative for dizziness, tremors and numbness.       Patient Vitals for the past 24 hrs:   BP Temp Temp src Pulse Resp SpO2   11/05/24 0930 (!) 125/41 -- -- 66 17 100 %   11/05/24 0845 (!) 112/48 -- -- 74 23 100 %   11/05/24 0830 (!) 116/34 -- -- 66 14 100 %   11/05/24 0820 -- -- -- -- -- 100 %   11/05/24 0745 109/74 -- -- 61 14 100 %   11/05/24 0730 (!) 114/51 -- -- 64 17 100 %   11/05/24 0715 (!) 103/54 -- -- 66 16 100 %   11/05/24 0700 127/83 -- -- 69 17 100 %   11/05/24 0645 (!) 109/51 -- -- 70 16 100 %   11/05/24 0630 105/60 -- -- 64 16 100 %   11/05/24 0615 (!) 106/48 -- -- 63 12 100 %   11/05/24 0600 (!) 124/46 -- -- 71 18 100 %   11/05/24 0545 -- -- -- 70 14 100 %   11/05/24 0530 (!) 101/51 -- -- 68 13 100 %   11/05/24 0515 (!) 109/47 -- -- 71 20 100 %   11/05/24 0500 (!) 98/45 -- -- 64 18 100 %   11/05/24 0445 (!) 99/48 -- -- 64 11 100 %   11/05/24 0430 (!) 102/46 -- -- 67 15 100 %   11/05/24 0415 (!) 154/136 -- -- 74 21 100 %   11/05/24 0400 (!) 94/44 97.6

## 2024-11-05 NOTE — PROGRESS NOTES
NEPHROLOGY progress note     Patient :  Jona Heredia; 78 y.o. MRN# [id number]  Location:  2013/2013-01  Attending:  Ry Boston MD  Admit Date:  11/4/2024   Hospital Day: 1      Reason for Consult: Acute kidney injury, hyperkalemia      Chief Complaint: Low blood pressure  History Obtained From:  patient    Subjective  Patient seen and examined, denies any new complains.  No dizziness.  No N/V/D  Bun CR IMPROVING   mg/dl  Scr improved to 4.0 mg/dl  BP stable  UOP 2.8 L      History of Present Illness:    This is a 78 y.o. 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 Rosas, he has recommended a bone marrow biopsy, history of coronary artery disease with chronic total occlusion of RCA, cardiac cath in 2023, history of CHF with reduced EF of 25% ischemic cardiomyopathy with history of ventricular fibrillation, status post AICD history of severe MR, patient follows up with Dr. Garces.  Patient presented to the hospital with complaints of low blood pressure hypotension fatigue weakness.  Patient also complaining of difficulty urination for last 1 or 2 days.  Patient denied any nausea vomiting headache shortness of breath fever chills or cough.  On initial investigation in the ER, labs showed elevated creatinine 5.5 mg/dL and BUN of 143 mg/dL bicarb of 18 potassium of 5.9 sodium of 132  Bladder scan showed high PVR and Arredondo catheter was placed patient is nonoliguric and making good urine  Blood pressure has improved.  Medications reviewed patient at home taking torsemide and lisinopril which is on hold    Past Medical History:        Diagnosis Date    Abdominal hernia     Atrophy of muscle of multiple sites 8/19/2016    CAD (coronary artery disease)     CKD (chronic kidney     Peripheral Vascular Disease Brother     Thyroid Disease Brother         Hyothyiod    Heart Attack Brother     Thyroid Disease Brother     Lung Disease Brother     Liver Disease Brother     Gout Brother     Unknown Brother         Varicose Veins    High Cholesterol Brother            Objective:  CURRENT TEMPERATURE:  Temp: 97.6 °F (36.4 °C)  MAXIMUM TEMPERATURE OVER 24HRS:  Temp (24hrs), Av.8 °F (36.6 °C), Min:97.5 °F (36.4 °C), Max:98.2 °F (36.8 °C)    CURRENT RESPIRATORY RATE:  Respirations: 17  CURRENT PULSE:  Pulse: 66  CURRENT BLOOD PRESSURE:  BP: (!) 125/41  24HR BLOOD PRESSURE RANGE:  Systolic (24hrs), Av , Min:80 , Max:154   ; Diastolic (24hrs), Av, Min:34, Max:136    24HR INTAKE/OUTPUT:    Intake/Output Summary (Last 24 hours) at 2024 1204  Last data filed at 2024 0646  Gross per 24 hour   Intake 819 ml   Output 2200 ml   Net -1381 ml     Patient Vitals for the past 96 hrs (Last 3 readings):   Weight   24 0705 95.7 kg (211 lb)       Physical Exam:  GENERAL APPEARANCE: Alert and cooperative, and appears to be in no acute distress.  HEAD: normocephalic  EYES: EOMI. Not pale, anicteric   NOSE:  No nasal discharge.    THROAT:  Oral cavity and pharynx normal. Moist  CARDIAC: Normal S1 and S2. No S3, S4 or murmurs. Rhythm is regular.  LUNGS: Clear to auscultation  without rales, rhonchi, wheezing or diminished breath sounds.  NECK: Neck supple, non-tender, trachea midline  GI-soft nontender non distended  MUSKULOSKELETAL: Adequately aligned spine. No joint erythema or tenderness.   EXTREMITIES: + edema.  Bilateral leg wounds lymphedema  NEURO: Nonfocal      Labs:   CBC:  Recent Labs     24  0717 24  0613   WBC 7.7 6.2   RBC 3.97* 3.41*   HGB 12.5* 10.9*   HCT 37.5* 32.6*   MCV 94.5 95.5   MCH 31.5 31.9   MCHC 33.3 33.4   RDW 16.5* 16.2*    129*   MPV 8.1 7.8      BMP:   Recent Labs     24  1859 24  0047 24  0613   * 135* 136   K 4.8 4.8 4.8  placed and patient is nonoliguric now, serum creatinine peaked to 5.5 mg/dL and  mg/dL, renal ultrasound no hydronephrosis chronic renal medical disease, UA trace protein small leukocyte Estrace, numerous RBCs large blood, Patient is non oliguric, Scr BUN improving.  Hyperkalemia  Nongap metabolic acidosis  Hypotension  MGUS       Plan:  Gentle hydration IVF.  Continue Arredondo  Diuretics and lisinopril  on hold.  No indication for dialysis as Kidney fx improving.      Thank you for the consultation.      Electronically signed by Uday Lira MD on 11/5/2024 at 12:04 PM

## 2024-11-05 NOTE — CONSULTS
Today's Date: 11/5/2024  Patient Name: Jona Heredia  Date of admission: 11/4/2024  7:04 AM  Patient's age: 78 y.o., 1945  Admission Dx: Hyperkalemia [E87.5]  Uremia [N19]  Acute renal failure (HCC) [N17.9]  Bilateral cellulitis of lower leg [L03.116, L03.115]  Acute renal failure, unspecified acute renal failure type (HCC) [N17.9]    Reason for Consult: management recommendations  Requesting Physician: Ry Boston MD    CHIEF COMPLAINT: Hypotension    History Obtained From:  patient    HISTORY OF PRESENT ILLNESS:      The patient is a 78 y.o.   male who is admitted to the hospital for MARK and hypotension.  The patient is known to us with elevated light chain and small monoclonal protein measuring 0.08 g/dL  The patient has chronic lymphedema and dropping both lower extremity.  Nephrology input appreciated.    Past Medical History:   has a past medical history of Abdominal hernia, Atrophy of muscle of multiple sites, CAD (coronary artery disease), CKD (chronic kidney disease) stage 3, GFR 30-59 ml/min (HCC), Colon polyp, Crohn disease (HCC), Crohn's colitis (HCC), Decubitus ulcer of coccyx, stage 2 (HCC), Diarrhea, Diverticulosis, Hemorrhoids, History of atrial fibrillation, Hypertension, Ileostomy in place (HCC), Internal hemorrhoids, NSVT (nonsustained ventricular tachycardia) (HCC), Other and unspecified hyperlipidemia, Past heart attack, Stage 3 chronic kidney disease (HCC), Tobacco use disorder, and Tubular adenoma.    Past Surgical History:   has a past surgical history that includes polypectomy; Abdomen surgery (08/02/2016); Jejunostomy; Colonoscopy (06/20/2005); Colonoscopy (04/19/2016); Colonoscopy (12/06/2016); Cardiac catheterization (2010); Abdomen surgery (02/19/2017); pr repair first abdominal wall hernia (N/A, 02/19/2017); Small intestine surgery (N/A, 02/19/2017); ep device procedure (N/A, 09/14/2023); Cardiac procedure (N/A, 09/11/2023); Cardiac procedure (N/A, 09/11/2023);  Cardioversion (10/18/2023); and Cardioversion (N/A, 06/05/2024).     Medications:    Reviewed in Epic     Allergies:  Patient has no known allergies.    Social History:   reports that he quit smoking about 8 years ago. His smoking use included cigarettes. He started smoking about 33 years ago. He has a 12.5 pack-year smoking history. He has never used smokeless tobacco. He reports that he does not drink alcohol and does not use drugs.     Family History: family history includes Atrial Fibrillation in his brother; Cancer in his brother and father; Diabetes in his mother; Glaucoma in his brother; Gout in his brother; Heart Attack in his brother; Heart Disease in his father and mother; Heart Failure in his mother; High Blood Pressure in his mother; High Cholesterol in his brother; Liver Disease in his brother; Lung Disease in his brother; Melanoma in his brother; Neuropathy in his brother; Osteoarthritis in his sister; Peripheral Vascular Disease in his brother and brother; Sleep Apnea in his brother; Thyroid Disease in his brother, brother, and sister; Ulcerative Colitis in his brother; Unknown in his brother and brother.    REVIEW OF SYSTEMS:    Constitutional: No fever or chills. No night sweats, no weight loss   Eyes: No eye discharge, double vision, or eye pain   HEENT: negative for sore mouth, sore throat, hoarseness and voice change   Respiratory: negative for cough , sputum, dyspnea, wheezing, hemoptysis, chest pain   Cardiovascular: negative for chest pain, dyspnea, palpitations, orthopnea, PND   Gastrointestinal: negative for nausea, vomiting, diarrhea, constipation, abdominal pain, Dysphagia, hematemesis and hematochezia   Genitourinary: negative for frequency, dysuria, nocturia, urinary incontinence, and hematuria   Integument: negative for rash, skin lesions, bruises.   Hematologic/Lymphatic: negative for easy bruising, bleeding, lymphadenopathy, or petechiae   Endocrine: negative for heat or cold  intolerance,weight changes, change in bowel habits and hair loss   Musculoskeletal: negative for myalgias, arthralgias, pain, joint swelling,and bone pain   Neurological: negative for headaches, dizziness, seizures, weakness, numbness    PHYSICAL EXAM:      BP (!) 124/56   Pulse 70   Temp 98 °F (36.7 °C) (Oral)   Resp 11   Ht 1.702 m (5' 7\")   Wt 95.7 kg (211 lb)   SpO2 100%   BMI 33.05 kg/m²    Temp (24hrs), Av.8 °F (36.6 °C), Min:97.5 °F (36.4 °C), Max:98.2 °F (36.8 °C)    General appearance - well appearing, no in pain or distress   Mental status - alert and cooperative   Eyes - pupils equal and reactive, extraocular eye movements intact   Ears - bilateral TM's and external ear canals normal   Mouth - mucous membranes moist, pharynx normal without lesions   Neck - supple, no significant adenopathy   Lymphatics - no palpable lymphadenopathy, no hepatosplenomegaly   Chest - clear to auscultation, no wheezes, rales or rhonchi, symmetric air entry   Heart - normal rate, regular rhythm, normal S1, S2, no murmurs  Abdomen - soft, nontender, nondistended, no masses or organomegaly   Neurological - alert, oriented, normal speech, no focal findings or movement disorder noted   Musculoskeletal - no joint tenderness, deformity or swelling   Extremities -bilateral lower extremity swelling, both lower extremities are wrapped with significant seepage  Skin - normal coloration and turgor, no rashes, no suspicious skin lesions noted ,    DATA:    Labs:   CBC:   Recent Labs     24  06   WBC 7.7 6.2   HGB 12.5* 10.9*   HCT 37.5* 32.6*    129*     BMP:   Recent Labs     24  0047 24  0613   * 136   K 4.8 4.8   CO2 18* 18*   * 109*   CREATININE 4.4* 4.0*   LABGLOM 13* 15*   GLUCOSE 96 92     PT/INR:   Recent Labs     24  0717   PROTIME 25.9*   INR 2.4       IMAGING DATA:      Primary Problem  Acute renal failure (HCC)    Active Hospital Problems    Diagnosis Date

## 2024-11-05 NOTE — PROGRESS NOTES
[facility]   Occupational Therapy Evaluation  Date: 24  Patient Name: Jona Heredia       Room:   MRN: [id number]  Account: [id number]   : 1945  (78 y.o.) Gender: male     Discharge Recommendations:  Further Occupational Therapy is recommended upon facility discharge.    OT Equipment Recommendations  Other: TBD    Referring Practitioner: Izaiah Ashley MD  Diagnosis: Hyperkalemia        Treatment Diagnosis: Impaired self care status    Past Medical History:  has a past medical history of Abdominal hernia, Atrophy of muscle of multiple sites, CAD (coronary artery disease), CKD (chronic kidney disease) stage 3, GFR 30-59 ml/min ([facility]), Colon polyp, Crohn disease (HCC), Crohn's colitis (HCC), Decubitus ulcer of coccyx, stage 2 (HCC), Diarrhea, Diverticulosis, Hemorrhoids, History of atrial fibrillation, Hypertension, Ileostomy in place ([facility]), Internal hemorrhoids, NSVT (nonsustained ventricular tachycardia) ([facility]), Other and unspecified hyperlipidemia, Past heart attack, Stage 3 chronic kidney disease (HCC), Tobacco use disorder, and Tubular adenoma.    Past Surgical History:   has a past surgical history that includes polypectomy; Abdomen surgery (2016); Jejunostomy; Colonoscopy (2005); Colonoscopy (2016); Colonoscopy (2016); Cardiac catheterization (); Abdomen surgery (2017); pr repair first abdominal wall hernia (N/A, 2017); Small intestine surgery (N/A, 2017); ep device procedure (N/A, 2023); Cardiac procedure (N/A, 2023); Cardiac procedure (N/A, 2023); Cardioversion (10/18/2023); and Cardioversion (N/A, 2024).    Restrictions  Restrictions/Precautions  Restrictions/Precautions: Fall Risk, General Precautions  Required Braces or Orthoses?: No  Implants present? :  (Pt denies)      Vitals  Vitals  SpO2: 100 %  O2 Device: Nasal cannula (1.5 L)     Subjective  Subjective: \"It feels like bricks.\"  with use of AE as needed  Short Term Goal 2: Pt will complete functional transfers/mobility during self care task with SBA and Good safety with use of least restrictive device  Short Term Goal 3: Pt will tolerate standing 5+ minutes during functional activity of choice with SBA and Good safety while maintaining SpO2 above 90%  Short Term Goal 4: Pt will verbalize/demonstrate Good understanding of AE/adaptive strategies/DME to increase safety and independence with self care and mobility  Short Term Goal 5: Pt will participate in 15+ minutes of therapeutic exercises/functional activities to increase safety and independence with self care and mobility    Plan  Occupational Therapy Plan  Times Per Week: 4-6  Current Treatment Recommendations: Self-Care / ADL, Strengthening, Balance training, Functional mobility training, Endurance training, Pain management, Safety education & training, Patient/Caregiver education & training, Equipment evaluation, education, & procurement, Home management training, Co-Treatment      OT Individual Minutes  OT Individual Minutes  Time In: 0820  Time Out: 0847  Minutes: 27  Time Code Minutes   Timed Code Treatment Minutes: 10 Minutes      Electronically signed by BASILIO Sky on 11/5/24 at 11:55 AM EST

## 2024-11-05 NOTE — PROGRESS NOTES
Writer visited w/pt's sister Holly whom writer knows. Holly updated writer about pt's condition and expressed gratitude for 's visit earlier today.    11/04/24 1918   Encounter Summary   Encounter Overview/Reason Spiritual/Emotional Needs   Service Provided For Family   Referral/Consult From [facility]   Support System Family members   Last Encounter  11/04/24   Complexity of Encounter Low   Spiritual/Emotional needs   Type Spiritual Support   Assessment/Intervention/Outcome   Assessment Calm   Intervention Active listening;Discussed illness injury and it’s impact;Prayer (assurance of)/[place];Sustaining Presence/Ministry of presence   Outcome Engaged in conversation;Expressed feelings, needs, and concerns;Coping;Expressed Gratitude;Receptive

## 2024-11-05 NOTE — PROGRESS NOTES
Physical Therapy  Facility/Department: [facility] ICU  Physical Therapy Initial Assessment    Name: Jona Heredia  : 1945  MRN: [id number]  Date of Service: 2024    Discharge Recommendations:  Patient would benefit from continued therapy after discharge, Therapy recommended at discharge   PT Equipment Recommendations  Equipment Needed: No      Patient Diagnosis(es): The primary encounter diagnosis was Uremia. Diagnoses of Hyperkalemia, Acute renal failure, unspecified acute renal failure type (HCC), and Bilateral cellulitis of lower leg were also pertinent to this visit.  Past Medical History:  has a past medical history of Abdominal hernia, Atrophy of muscle of multiple sites, CAD (coronary artery disease), CKD (chronic kidney disease) stage 3, GFR 30-59 ml/min (HCC), Colon polyp, Crohn disease (HCC), Crohn's colitis (HCC), Decubitus ulcer of coccyx, stage 2 (HCC), Diarrhea, Diverticulosis, Hemorrhoids, History of atrial fibrillation, Hypertension, Ileostomy in place (HCC), Internal hemorrhoids, NSVT (nonsustained ventricular tachycardia) ([facility]), Other and unspecified hyperlipidemia, Past heart attack, Stage 3 chronic kidney disease (HCC), Tobacco use disorder, and Tubular adenoma.  Past Surgical History:  has a past surgical history that includes polypectomy; Abdomen surgery (2016); Jejunostomy; Colonoscopy (2005); Colonoscopy (2016); Colonoscopy (2016); Cardiac catheterization (); Abdomen surgery (2017); pr repair first abdominal wall hernia (N/A, 2017); Small intestine surgery (N/A, 2017); ep device procedure (N/A, 2023); Cardiac procedure (N/A, 2023); Cardiac procedure (N/A, 2023); Cardioversion (10/18/2023); and Cardioversion (N/A, 2024).    Assessment  Body Structures, Functions, Activity Limitations Requiring Skilled Therapeutic Intervention: Decreased functional mobility ;Decreased endurance;Decreased balance;Increased  bed without using bedrails?: Total  How much help is needed moving to and from a bed to a chair?: Total  How much help is needed standing up from a chair using your arms?: Total  How much help is needed walking in hospital room?: Total  How much help is needed climbing 3-5 steps with a railing?: Total  AM-PAC Inpatient Mobility Raw Score : 6  AM-PAC Inpatient T-Scale Score : 23.55  Mobility Inpatient CMS 0-100% Score: 100  Mobility Inpatient CMS G-Code Modifier : CN       Goals  Short Term Goals  Time Frame for Short Term Goals: 3-4 days  Short Term Goal 1: pt able to transfer from various surfaces with min/modA of 1  Short Term Goal 2: pt able to ambulate with RW and min/modA of 1 x 10-15ft  Short Term Goal 3: pt able to tolerate 20-25min of therapeutic activity/exercises for improved endurance           Therapy Time   Individual Concurrent Group Co-treatment   Time In 0820         Time Out 0847         Minutes 27         Timed Code Treatment Minutes: 10 Minutes       Christianne Gardner, PT

## 2024-11-05 NOTE — PROGRESS NOTES
Comprehensive Nutrition Assessment    Type and Reason for Visit:  Wound    Nutrition Recommendations/Plan:   Will provide No Added Salt diet with Low K restriction and 1000 ml fluid restriction  Will add Magic Cup twice daily for extra protein and monitor K+     Malnutrition Assessment:  Malnutrition Status:  At risk for malnutrition (11/05/24 1319)    Context:  Acute Illness     Findings of the 6 clinical characteristics of malnutrition:  Energy Intake:  No decrease in energy intake  Weight Loss:   (3% wt loss over 2 weeks)     Body Fat Loss:  No body fat loss     Muscle Mass Loss:  No muscle mass loss    Fluid Accumulation:  Mild Extremities   Strength:  Not Performed    Nutrition Assessment:    Pt admitted due to Uremia/Acute Renal Failure/Cellulitis. Wounds noted. Variable wts prior to admit noted. Pt states he consumed more than 50% of food provided at breakfast.    Nutrition Related Findings:    Mild edema BLE, Labs: K 4.8, (9/24)  Phos 2.8, Meds: Reviewed, PMH: CKD, Crohn's Wound Type: Multiple, Pressure Injury, Stage III, Venous Stasis       Current Nutrition Intake & Therapies:    Average Meal Intake: 51-75%     ADULT DIET; Regular; No Added Salt (3-4 gm); Low Potassium (Less than 3000 mg/day); 1000 ml    Anthropometric Measures:  Height: 170.2 cm (5' 7\")  Ideal Body Weight (IBW): 148 lbs (67 kg)    Admission Body Weight: 95.7 kg (211 lb)  Current Body Weight: 95.7 kg (211 lb), 142.6 % IBW. Weight Source: Not specified  Current BMI (kg/m2): 33  Usual Body Weight: 98.9 kg (218 lb) (10/24)     % Weight Change (Calculated): -3.2                    BMI Categories: Obese Class 1 (BMI 30.0-34.9)    Estimated Daily Nutrient Needs:  Energy Requirements Based On: Formula  Weight Used for Energy Requirements: Admission  Energy (kcal/day): [place] x 1.2= 1900 kcal  Weight Used for Protein Requirements: Admission  Protein (g/day): 1.2g/kg= 115 g  Method Used for Fluid Requirements: Defer to provider    Nutrition  Diagnosis:   Increased nutrient needs related to  (healing) as evidenced by wounds    Nutrition Interventions:   Food and/or Nutrient Delivery: Modify Current Diet, Start Oral Nutrition Supplement  Nutrition Education/Counseling: No recommendation at this time  Coordination of Nutrition Care: Continue to monitor while inpatient       Goals:  Goals: PO intake 75% or greater  Type of Goal: New goal  Previous Goal Met: New Goal    Nutrition Monitoring and Evaluation:      Food/Nutrient Intake Outcomes: Food and Nutrient Intake, Supplement Intake  Physical Signs/Symptoms Outcomes: Biochemical Data, GI Status, Fluid Status or Edema, Skin, Weight    Discharge Planning:    Too soon to determine     Carey Dinh RD, LD  Contact: [phone]

## 2024-11-05 NOTE — PROGRESS NOTES
Spiritual Health History and Assessment/Progress Note  [facility]    (P) Grief, Loss, and Adjustments,  , (P) Adjustment to illness,      Name: Jona Heredia MRN: [id number]    Age: 78 y.o.     Sex: male   Language: English   Adventist: Hindu   Acute renal failure (HCC)     Date: 11/5/2024            Total Time Calculated: (P) 6 min              Spiritual Assessment began in [facility] ICU        Referral/Consult From: (P) Rounding   Encounter Overview/Reason: (P) Grief, Loss, and Adjustments  Service Provided For: (P) Patient    Brandee, Belief, Meaning:   Patient has beliefs or practices that help with coping during difficult times  Family/Friends No family/friends present      Importance and Influence:  Patient has spiritual/personal beliefs that influence decisions regarding their health  Family/Friends No family/friends present    Community:  Patient feels well-supported. Support system includes: Extended family  Family/Friends No family/friends present    Assessment and Plan of Care:     Patient Interventions include: Facilitated expression of thoughts and feelings and Affirmed coping skills/support systems  Family/Friends Interventions include: No family/friends present    Patient Plan of Care: Spiritual Care available upon further referral  Family/Friends Plan of Care: Spiritual Care available upon further referral    Electronically signed by Chaplain Jey on 11/5/2024 at 6:34 PM

## 2024-11-06 LAB
ANION GAP SERPL CALCULATED.3IONS-SCNC: 8 MMOL/L (ref 9–16)
BASOPHILS # BLD: 0.05 K/UL (ref 0–0.2)
BASOPHILS NFR BLD: 1 % (ref 0–2)
BUN SERPL-MCNC: 81 MG/DL (ref 8–23)
CALCIUM SERPL-MCNC: 7.4 MG/DL (ref 8.6–10.4)
CHLORIDE SERPL-SCNC: 109 MMOL/L (ref 98–107)
CO2 SERPL-SCNC: 21 MMOL/L (ref 20–31)
CREAT SERPL-MCNC: 3.1 MG/DL (ref 0.7–1.2)
EOSINOPHIL # BLD: 0.24 K/UL (ref 0–0.4)
EOSINOPHILS RELATIVE PERCENT: 5 % (ref 0–4)
ERYTHROCYTE [DISTWIDTH] IN BLOOD BY AUTOMATED COUNT: 15.7 % (ref 11.5–14.9)
GFR, ESTIMATED: 20 ML/MIN/1.73M2
GLUCOSE SERPL-MCNC: 106 MG/DL (ref 74–99)
HCT VFR BLD AUTO: 30.3 % (ref 41–53)
HGB BLD-MCNC: 10.2 G/DL (ref 13.5–17.5)
LYMPHOCYTES NFR BLD: 0.38 K/UL (ref 1–4.8)
LYMPHOCYTES RELATIVE PERCENT: 8 % (ref 24–44)
MCH RBC QN AUTO: 31.9 PG (ref 26–34)
MCHC RBC AUTO-ENTMCNC: 33.6 G/DL (ref 31–37)
MCV RBC AUTO: 95 FL (ref 80–100)
MONOCYTES NFR BLD: 0.52 K/UL (ref 0.1–1.3)
MONOCYTES NFR BLD: 11 % (ref 1–7)
MORPHOLOGY: ABNORMAL
NEUTROPHILS NFR BLD: 75 % (ref 36–66)
NEUTS SEG NFR BLD: 3.51 K/UL (ref 1.3–9.1)
PLATELET # BLD AUTO: 113 K/UL (ref 150–450)
PMV BLD AUTO: 8.2 FL (ref 6–12)
POTASSIUM SERPL-SCNC: 4.6 MMOL/L (ref 3.7–5.3)
PSA SERPL-MCNC: 1.5 NG/ML (ref 0–4)
RBC # BLD AUTO: 3.19 M/UL (ref 4.5–5.9)
SODIUM SERPL-SCNC: 138 MMOL/L (ref 136–145)
WBC OTHER # BLD: 4.7 K/UL (ref 3.5–11)

## 2024-11-06 PROCEDURE — 84153 ASSAY OF PSA TOTAL: CPT

## 2024-11-06 PROCEDURE — 2060000000 HC ICU INTERMEDIATE R&B

## 2024-11-06 PROCEDURE — 6360000002 HC RX W HCPCS

## 2024-11-06 PROCEDURE — 97116 GAIT TRAINING THERAPY: CPT

## 2024-11-06 PROCEDURE — 6370000000 HC RX 637 (ALT 250 FOR IP): Performed by: NURSE PRACTITIONER

## 2024-11-06 PROCEDURE — 99232 SBSQ HOSP IP/OBS MODERATE 35: CPT | Performed by: INTERNAL MEDICINE

## 2024-11-06 PROCEDURE — 36415 COLL VENOUS BLD VENIPUNCTURE: CPT

## 2024-11-06 PROCEDURE — 99212 OFFICE O/P EST SF 10 MIN: CPT

## 2024-11-06 PROCEDURE — 6370000000 HC RX 637 (ALT 250 FOR IP): Performed by: INTERNAL MEDICINE

## 2024-11-06 PROCEDURE — 85025 COMPLETE CBC W/AUTO DIFF WBC: CPT

## 2024-11-06 PROCEDURE — 2580000003 HC RX 258: Performed by: INTERNAL MEDICINE

## 2024-11-06 PROCEDURE — 6370000000 HC RX 637 (ALT 250 FOR IP)

## 2024-11-06 PROCEDURE — 2500000003 HC RX 250 WO HCPCS: Performed by: INTERNAL MEDICINE

## 2024-11-06 PROCEDURE — 2580000003 HC RX 258

## 2024-11-06 PROCEDURE — 97530 THERAPEUTIC ACTIVITIES: CPT

## 2024-11-06 PROCEDURE — 97110 THERAPEUTIC EXERCISES: CPT

## 2024-11-06 PROCEDURE — 80048 BASIC METABOLIC PNL TOTAL CA: CPT

## 2024-11-06 RX ORDER — FINASTERIDE 5 MG/1
5 TABLET, FILM COATED ORAL DAILY
Status: DISCONTINUED | OUTPATIENT
Start: 2024-11-06 | End: 2024-11-08 | Stop reason: HOSPADM

## 2024-11-06 RX ORDER — SODIUM CHLORIDE 450 MG/100ML
INJECTION, SOLUTION INTRAVENOUS CONTINUOUS
Status: DISCONTINUED | OUTPATIENT
Start: 2024-11-06 | End: 2024-11-08

## 2024-11-06 RX ADMIN — FINASTERIDE 5 MG: 5 TABLET, FILM COATED ORAL at 12:08

## 2024-11-06 RX ADMIN — RANOLAZINE 500 MG: 500 TABLET, EXTENDED RELEASE ORAL at 19:31

## 2024-11-06 RX ADMIN — SODIUM BICARBONATE: 84 INJECTION, SOLUTION INTRAVENOUS at 12:09

## 2024-11-06 RX ADMIN — FERROUS SULFATE TAB 325 MG (65 MG ELEMENTAL FE) 325 MG: 325 (65 FE) TAB at 19:31

## 2024-11-06 RX ADMIN — HEPARIN SODIUM 5000 UNITS: 5000 INJECTION INTRAVENOUS; SUBCUTANEOUS at 21:40

## 2024-11-06 RX ADMIN — SODIUM CHLORIDE: 4.5 INJECTION, SOLUTION INTRAVENOUS at 15:04

## 2024-11-06 RX ADMIN — CALCITRIOL 0.25 MCG: 0.25 CAPSULE ORAL at 21:40

## 2024-11-06 RX ADMIN — AMIODARONE HYDROCHLORIDE 200 MG: 200 TABLET ORAL at 08:36

## 2024-11-06 RX ADMIN — HEPARIN SODIUM 5000 UNITS: 5000 INJECTION INTRAVENOUS; SUBCUTANEOUS at 05:47

## 2024-11-06 RX ADMIN — RANOLAZINE 500 MG: 500 TABLET, EXTENDED RELEASE ORAL at 08:37

## 2024-11-06 RX ADMIN — HEPARIN SODIUM 5000 UNITS: 5000 INJECTION INTRAVENOUS; SUBCUTANEOUS at 14:57

## 2024-11-06 RX ADMIN — ANTI-FUNGAL POWDER MICONAZOLE NITRATE TALC FREE: 1.42 POWDER TOPICAL at 19:30

## 2024-11-06 RX ADMIN — Medication: at 08:38

## 2024-11-06 RX ADMIN — ISOSORBIDE MONONITRATE 30 MG: 30 TABLET, EXTENDED RELEASE ORAL at 08:36

## 2024-11-06 RX ADMIN — ANTI-FUNGAL POWDER MICONAZOLE NITRATE TALC FREE: 1.42 POWDER TOPICAL at 08:37

## 2024-11-06 RX ADMIN — PANTOPRAZOLE SODIUM 40 MG: 40 TABLET, DELAYED RELEASE ORAL at 05:47

## 2024-11-06 RX ADMIN — TAMSULOSIN HYDROCHLORIDE 0.4 MG: 0.4 CAPSULE ORAL at 08:36

## 2024-11-06 RX ADMIN — SODIUM CHLORIDE, PRESERVATIVE FREE 10 ML: 5 INJECTION INTRAVENOUS at 19:31

## 2024-11-06 RX ADMIN — ACETAMINOPHEN 650 MG: 325 TABLET ORAL at 15:02

## 2024-11-06 RX ADMIN — ACETAMINOPHEN 650 MG: 325 TABLET ORAL at 21:40

## 2024-11-06 ASSESSMENT — PAIN DESCRIPTION - DESCRIPTORS
DESCRIPTORS: BURNING
DESCRIPTORS: BURNING

## 2024-11-06 ASSESSMENT — PAIN SCALES - GENERAL
PAINLEVEL_OUTOF10: 4
PAINLEVEL_OUTOF10: 4
PAINLEVEL_OUTOF10: 7
PAINLEVEL_OUTOF10: 6
PAINLEVEL_OUTOF10: 2

## 2024-11-06 ASSESSMENT — PAIN DESCRIPTION - LOCATION
LOCATION: LEG
LOCATION: FOOT
LOCATION: LEG

## 2024-11-06 ASSESSMENT — ENCOUNTER SYMPTOMS
VOMITING: 0
NAUSEA: 0
COUGH: 1
DIARRHEA: 0
SHORTNESS OF BREATH: 0

## 2024-11-06 ASSESSMENT — PAIN DESCRIPTION - ORIENTATION
ORIENTATION: RIGHT;LEFT
ORIENTATION: LEFT;RIGHT
ORIENTATION: RIGHT;LEFT

## 2024-11-06 NOTE — PROGRESS NOTES
[facility]  IN-PATIENT SERVICE  [facility]    PROGRESS NOTE             11/6/2024    9:06 AM    Name:   Jona Heredia  MRN:     [id number]     Acct:      [id number]   Room:   2013/2013-01   Day:  2  Admit Date:  11/4/2024  7:04 AM    PCP:  Ry Boston MD  Code Status:  DNR-CCA    Subjective:     C/C:   Chief Complaint   Patient presents with    Hypotension    Leg Swelling    Extremity Weakness     Interval History Status: improved.    Patient seen and examined at bedside.  He has been hypotensive overnight.  Creatinine improving.  He does endorse burning of his legs bilaterally.  Unable to tolerate Unna boots.  On exam, he does have decreased sensation of the bilateral toes.    Continuing gentle hydration sodium bicarbonate.      Brief History:     Mr. Jona Heredia (Joe) is a 78-year-old male patient with a past medical history of MGUS, CAD, CKD stage III, Crohn's disease, atrial fibrillation, HTN, NSVT, and HLD who presents following low blood pressure readings at home.  Patient states that for the last week he has been taking his blood pressure and had consistently low blood pressure readings with systolics in the 80s and 90s.  He reports that he has been feeling increasingly fatigued as well.  He does state that he has been urinating freely all week.  Some weakness of his lower extremities.  He also notes some weight loss over the past several months, states he was over 220 pounds at his last visit to the doctor.  Per our records, he did weigh 218 at the beginning of October.     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  His kappa and lambda light chains were high with a kappa/lambda ratio of 0.2.  He was referred to hematology and oncology who determined his MGUS to be very low risk/mild.  Skeletal survey was negative for  in acute distress.  Cardiovascular:      Rate and Rhythm: Normal rate and regular rhythm.      Pulses: Normal pulses.      Heart sounds: Normal heart sounds.   Pulmonary:      Effort: Pulmonary effort is normal.      Breath sounds: Normal breath sounds.   Abdominal:      General: Abdomen is flat. Bowel sounds are normal.      Palpations: Abdomen is soft.   Skin:     General: Skin is warm and dry.   Neurological:      Mental Status: He is alert and oriented to person, place, and time.      Sensory: Sensory deficit present.           Assessment:        Primary Problem  Acute renal failure (HCC)    Active Hospital Problems    Diagnosis Date Noted    Uremia [N19] 11/05/2024    Bilateral cellulitis of lower leg [L03.116, L03.115] 11/05/2024    MGUS (monoclonal gammopathy of unknown significance) [D47.2] 11/05/2024    Acute renal failure (HCC) [N17.9] 11/04/2024       Plan:        Acute Renal Failure in setting of CKD Stage III  CMP: , creatinine 5.5, potassium 5.9, sodium 132 on admission  11/5: , creatinine 3 3  3  3  Nephrology on board  Low potassium, low phosphorus diet  Renal ultrasound demonstrating bilateral renal cyst, no hydronephrosis  Patient had urinary retention on admission with need for Coude catheter placement      MGUS  Follows with Dr. Rosas  Plan was for bone marrow biopsy and PET scan out pt  Heme-onc on board    Lymphedema, chronic  Bilateral lower leg  Wound ostomy on board  Recommending [employer], wrapped with zinc Unna boots  Patient not tolerating, asked to have wraps removed    Gluteal Wound, decubitus ulcer stage 3  Likely secondary to chronic pressure  Patient does report incontinence  Utilizing Triad cream per wound care  Turn and reposition every 2 hours  Micatin powder for skin folds in groin area    Hypotension   systolic,  diastolic  Continue to monitor, holding lisinopril and torsemide    CHF with EF of 35-40% February 2024  Patient is hypotensive  Will get  chest XR  BNP    Atrial Fibrillation  Pulse normal in the ED, 60s  Will hold home dabigatran, start heparin      DVT prophylaxis: heparin (porcine) injection 5,000 TID  GI prophylaxis: Protonix 40 mg daily    Ok to pcu vs dc back to [facility]  Pending nephro clearance   MARK due to retention, add proscar 5 mg  Already on flomax   Out pt with urology      Ry Boston MD    11/6/2024 9:06 AM

## 2024-11-06 NOTE — PROGRESS NOTES
Today's Date: 11/6/2024  Patient Name: Jona Heredia  Date of admission: 11/4/2024  7:04 AM  Patient's age: 78 y.o., 1945  Admission Dx: Hyperkalemia [E87.5]  Uremia [N19]  Acute renal failure (HCC) [N17.9]  Bilateral cellulitis of lower leg [L03.116, L03.115]  Acute renal failure, unspecified acute renal failure type (HCC) [N17.9]    Reason for Consult: management recommendations  Requesting Physician: Ry Boston MD    CHIEF COMPLAINT: Hypotension    INTERIM HISTORY  Patient is seen and evaluated.  He overall feels well.  Swelling in the lower extremity is improving but he has neuropathic pain bilaterally.  HISTORY OF PRESENT ILLNESS:      The patient is a 78 y.o.   male who is admitted to the hospital for MARK and hypotension.  The patient is known to us with elevated light chain and small monoclonal protein measuring 0.08 g/dL  The patient has chronic lymphedema and dropping both lower extremity.  Nephrology input appreciated.    Past Medical History:   has a past medical history of Abdominal hernia, Atrophy of muscle of multiple sites, CAD (coronary artery disease), CKD (chronic kidney disease) stage 3, GFR 30-59 ml/min (HCC), Colon polyp, Crohn disease (HCC), Crohn's colitis (HCC), Decubitus ulcer of coccyx, stage 2 (HCC), Diarrhea, Diverticulosis, Hemorrhoids, History of atrial fibrillation, Hypertension, Ileostomy in place (HCC), Internal hemorrhoids, NSVT (nonsustained ventricular tachycardia) (HCC), Other and unspecified hyperlipidemia, Past heart attack, Stage 3 chronic kidney disease (HCC), Tobacco use disorder, and Tubular adenoma.    Past Surgical History:   has a past surgical history that includes polypectomy; Abdomen surgery (08/02/2016); Jejunostomy; Colonoscopy (06/20/2005); Colonoscopy (04/19/2016); Colonoscopy (12/06/2016); Cardiac catheterization (2010); Abdomen surgery (02/19/2017); pr repair first abdominal wall hernia (N/A, 02/19/2017); Small intestine surgery (N/A,  25.9*   INR 2.4       IMAGING DATA:      Primary Problem  Acute renal failure (HCC)    Active Hospital Problems    Diagnosis Date Noted    Uremia [N19] 11/05/2024    Bilateral cellulitis of lower leg [L03.116, L03.115] 11/05/2024    MGUS (monoclonal gammopathy of unknown significance) [D47.2] 11/05/2024    Acute renal failure (HCC) [N17.9] 11/04/2024         IMPRESSION:   Acute renal failure  Elevation of light chains secondary to CKD  Small monoclonal gammopathy  Bilateral lower extremity  Hypotension    RECOMMENDATIONS:  The patient has small monoclonal protein that has been stable  Fluctuation in light chain is likely secondary to fluctuation of kidney dysfunction  Ultimately, bone marrow aspiration biopsy might be needed to exclude light chain myeloma, clinical suspicion is very low.  Likely to do the bone marrow as an outpatient.  Neuropathic pain in both lower extremities concerning.  While this is most likely related to vascular changes and renal disease, light chain deposition is not excluded.  After discussion, I decided to continue current therapy.  We will complete the workup as an outpatient  Continue supportive care  We will follow with you       Discussed with patient and Nurse.      Thank you for asking us to see this patient.    Jeanette Velazquez MD  Hematologist/Medical Oncologist  Cell: [phone]

## 2024-11-06 NOTE — PROGRESS NOTES
Physical Therapy  Rehabilitation Physical Therapy    Date: 2024  Patient Name: Jona Heredia      Room:   MRN: [id number]    : 1945  (78 y.o.)  Gender: male     Discharge Recommendations:  Patient would benefit from continued therapy after discharge, Therapy recommended at discharge  Equipment Needed: No    Assessment  Assessment  Activity Tolerance: Patient limited by endurance;Patient limited by pain  Discharge Recommendations: Patient would benefit from continued therapy after discharge;Therapy recommended at discharge    Plan  Physical Therapy Plan  General Plan: 5-7 times per week  Current Treatment Recommendations: Strengthening, Balance training, Functional mobility training, Gait training, Transfer training, Endurance training, Therapeutic activities     Restrictions  Restrictions/Precautions: Fall Risk, General Precautions  Implants present? :  (Pt denies)    Subjective  Subjective  Subjective: Patient resting in bed upon approach, agreeable to PT/OT co-treat with LINDA Urena. LAMIN Guerrier approved treatment. Noted bilat feel bleeding through bandages at close of session, LAMIN Child notified stating plans to follow up.  Pain: 5/10 in left hip.     Overall Orientation Status: Within Functional Limits    Vitals  Temp: 98 °F (36.7 °C)  Pulse: 65  Heart Rate Source: Monitor  Respirations: 16  SpO2: 95 %  O2 Device: Nasal cannula  BP: (!) 99/47  MAP (Calculated): 64  BP Location: Right upper arm  BP Method: Automatic  Patient Position: Semi fowlers    Objective  Bed Mobility  Bed Mobility: Yes  Interventions: Safety awareness training, Verbal cues (cueing for hand placement & sequencing)  Supine to Sit: Moderate assistance, Assist X2, Additional time  Sit to Supine: Moderate assistance, Assist X2, Additional time  Scooting: Stand-by assistance (SBA hip to EOB; maxA x2 for supine scooting)    Transfer Training  Transfer Training: Yes  Overall Level of Assistance: Minimum assistance, Assist  X2, Additional time  Interventions: Verbal cues, Safety awareness training (vcs for hand placement)  Sit to Stand: Minimum assistance, Assist X2, Additional time  Stand to Sit: Contact-guard assistance, Assist X2    Gait Training  Gait Training: Yes  Left Side Weight Bearing: Full  Right Side Weight Bearing: Full  Overall Level of Assistance: Contact-guard assistance  Distance (ft): 4 Feet (4' forward 4' backward)  Assistive Device: Walker, rolling  Interventions: Safety awareness training, Verbal cues (cueing for posture & forward gaze)  Base of Support: Widened  Speed/Alyssa: Delayed, Shuffled  Step Length: Left shortened, Right shortened  Swing Pattern: Left symmetrical, Right symmetrical  Stance: Weight shift  Gait Abnormalities: Antalgic, Decreased step clearance, Shuffling gait    Balance  Sitting: Intact  Standing: With support (@ RW, SBA for static; CGA for dynamic)     PT Exercises  Exercise Treatment: fucntinal mobility; see above  A/AROM Exercises: seated bilat LE marches, LAQ, hip abd x20 reps each  Pressure Relief Exercises: self adjusts; writer plaed pillow under right hip for offloading  Functional Mobility Circuit Training: STS from EOB<>RW  Static Sitting Balance Exercises: sat trunk unsupported, feet on floor ~25 min  Dynamic Sitting Balance Exercises: weight shifting, scooting, postural ex  Static Standing Balance Exercises: standing @ RW ~5 min  Dynamic Standing Balance Exercises: lateral stepping left<>right, weight shifting, postural ex with bilat UE support  Postural Correction Exercises: cueing for trunk extension & forward gaze; fair return  Breathing Techniques: cueing for exhaling upon exertion & pursed lip breathing  Motor Control/Coordination: cueing for proper execution of ex  Disease-specific Exercises: elevating bilat feet after treatment     Education  Education Given To: Patient  Education Provided: Plan of Care;Home Exercise Program;Transfer Training;Fall Prevention

## 2024-11-06 NOTE — PROGRESS NOTES
Substance and Sexual Activity    Alcohol use: No     Alcohol/week: 0.0 standard drinks of alcohol    Drug use: No    Sexual activity: Not on file   Other Topics Concern    Not on file   Social History Narrative    Not on file     Social Determinants of Health     Financial Resource Strain: Low Risk  (4/19/2023)    Overall Financial Resource Strain (CARDIA)     Difficulty of Paying Living Expenses: Not hard at all   Food Insecurity: Food Insecurity Present (11/4/2024)    Hunger Vital Sign     Worried About Running Out of Food in the Last Year: Never true     Ran Out of Food in the Last Year: Sometimes true   Transportation Needs: No Transportation Needs (11/4/2024)    PRAPARE - Transportation     Lack of Transportation (Medical): No     Lack of Transportation (Non-Medical): No   Physical Activity: Inactive (4/19/2023)    Exercise Vital Sign     Days of Exercise per Week: 0 days     Minutes of Exercise per Session: 0 min   Stress: Not on file   Social Connections: Moderately Integrated (11/6/2024)    Social Connections ([facility])     If for any reason you need help with day-to-day activities such as bathing, preparing meals, shopping, managing finances, etc., do you get the help you need?: Not on file   Intimate Partner Violence: Not on file   Housing Stability: Low Risk  (11/4/2024)    Housing Stability Vital Sign     Unable to Pay for Housing in the Last Year: No     Number of Times Moved in the Last Year: 0     Homeless in the Last Year: No       Family History:   Family History   Problem Relation Age of Onset    Diabetes Mother     Heart Disease Mother     High Blood Pressure Mother     Heart Failure Mother     Cancer Father         Lymphoma    Heart Disease Father     Osteoarthritis Sister     Thyroid Disease Sister     Cancer Brother         Leukemia    Neuropathy Brother     Melanoma Brother     Atrial Fibrillation Brother     Sleep Apnea Brother     Peripheral Vascular Disease Brother     Ulcerative Colitis     129* 113*   MPV 8.1 7.8 8.2      BMP:   Recent Labs     11/05/24  0047 11/05/24  0613 11/06/24  0408   * 136 138   K 4.8 4.8 4.6    108* 109*   CO2 18* 18* 21   * 109* 81*   CREATININE 4.4* 4.0* 3.1*   GLUCOSE 96 92 106*   CALCIUM 7.4* 7.4* 7.4*      Phosphorus:  No results for input(s): \"PHOS\" in the last 72 hours.  Magnesium:   Recent Labs     11/04/24  0717   MG 3.2*     Albumin: No results for input(s): \"LABALBU\" in the last 72 hours.    IRON:  No results for input(s): \"IRON\" in the last 72 hours.  Iron Saturation:  Invalid input(s): \"PERCENTFE\"  TIBC:  No results for input(s): \"TIBC\" in the last 72 hours.  FERRITIN:  No results for input(s): \"FERRITIN\" in the last 72 hours.  SPEP: No results for input(s): \"SPEP\" in the last 72 hours. No results for input(s): \"ALBCAL\", \"ALBPCT\", \"LABALPH\", \"A1PCT\", \"A2PCT\", \"LABBETA\", \"BETAPCT\", \"GAMGLOB\", \"GGPCT\", \"PATH\" in the last 72 hours.    Invalid input(s): \"PROT\"  UPEP: No results for input(s): \"TPU\" in the last 72 hours.   Urine Sodium:  Invalid input(s): \"LB\"   Urine Potassium:   Recent Labs     11/04/24  1156   KUR 33.4     Urine Chloride:  No results for input(s): \"CLU\" in the last 72 hours.  Urine Ph:  Invalid input(s): \"PO4U\"  Urine Osmolarity: No results for input(s): \"OSMOU\" in the last 72 hours.  Urine Creatinine:  No results for input(s): \"LABCREA\" in the last 72 hours.  Urine Eosinophils: Invalid input(s): \"EOSU\"  Urine Protein:  No results for input(s): \"TPU\" in the last 72 hours.  Urinalysis:    Recent Labs     11/04/24  0934   NITRU NEGATIVE   COLORU Yellow   PHUR 5.0   WBCUA 0 TO 2*   RBCUA TOO NUMEROUS TO COUNT*   BACTERIA None   LEUKOCYTESUR SMALL*   UROBILINOGEN Normal   BILIRUBINUR NEGATIVE   GLUCOSEU NEGATIVE   KETUA NEGATIVE         Radiology:  Reviewed as available.    Assessment:  Acute kidney injury on CKD, multifactorial most likely secondary to prerenal azotemia due to hypotension use of torsemide and lisinopril in

## 2024-11-06 NOTE — PLAN OF CARE
Problem: Safety - Adult  Goal: Free from fall injury  Outcome: Progressing     Problem: Chronic Conditions and Co-morbidities  Goal: Patient's chronic conditions and co-morbidity symptoms are monitored and maintained or improved  Outcome: Progressing     Problem: Discharge Planning  Goal: Discharge to home or other facility with appropriate resources  Outcome: Progressing     Problem: Pain  Goal: Verbalizes/displays adequate comfort level or baseline comfort level  Outcome: Progressing     Problem: Skin/Tissue Integrity  Goal: Absence of new skin breakdown  Description: 1.  Monitor for areas of redness and/or skin breakdown  2.  Assess vascular access sites hourly  3.  Every 4-6 hours minimum:  Change oxygen saturation probe site  4.  Every 4-6 hours:  If on nasal continuous positive airway pressure, respiratory therapy assess nares and determine need for appliance change or resting period.  Outcome: Progressing     Problem: ABCDS Injury Assessment  Goal: Absence of physical injury  Outcome: Progressing     Problem: Neurosensory - Adult  Goal: Achieves stable or improved neurological status  Outcome: Progressing  Goal: Remains free of injury related to seizures activity  Outcome: Progressing     Problem: Cardiovascular - Adult  Goal: Maintains optimal cardiac output and hemodynamic stability  Outcome: Progressing     Problem: Skin/Tissue Integrity - Adult  Goal: Skin integrity remains intact  Outcome: Progressing  Goal: Oral mucous membranes remain intact  Outcome: Progressing     Problem: Musculoskeletal - Adult  Goal: Return mobility to safest level of function  Outcome: Progressing  Goal: Return ADL status to a safe level of function  Outcome: Progressing     Problem: Gastrointestinal - Adult  Goal: Minimal or absence of nausea and vomiting  Outcome: Progressing  Goal: Maintains or returns to baseline bowel function  Outcome: Progressing     Problem: Genitourinary - Adult  Goal: Absence of urinary

## 2024-11-06 NOTE — PROGRESS NOTES
Occupational Therapy  [facility]   INPATIENT OCCUPATIONAL THERAPY  PROGRESS NOTE  Date: 2024  Patient Name: Jona Heredia       Room:   MRN: [id number]    : 1945  (78 y.o.)  Gender: male   Referring Practitioner: Izaiah Ashley MD  Diagnosis: Hyperkalemia      Discharge Recommendations:  Further Occupational Therapy is recommended upon facility discharge.    OT Equipment Recommendations  Other: TBD    Restrictions/Precautions  Restrictions/Precautions  Restrictions/Precautions: Fall Risk;General Precautions  Required Braces or Orthoses?: No  Implants present? :  (Pt denies)    SpO2: 100 %  O2 Device: Nasal cannula (1.5 L)    Subjective  Subjective  Subjective: pt states that his feet leel like he is walking on pillows 2* swelling.  Subjective  Pain: Pt reports 5/10 at rest, 10/10 in  feet.  Comments: RN henry tx, co-tx c PJ PTA    Objective  Orientation  Overall Orientation Status: Within Functional Limits    Activities of Daily Living  Grooming: Setup  Grooming Skilled Clinical Factors: pt washes face/hands sitting EOB  UE Bathing: Stand by assistance  Putting On/Taking Off Footwear: Dependent/Total  Putting On/Taking Off Footwear Skilled Clinical Factors: TA to don/doff B socks  Toileting: Dependent/Total  Toileting Skilled Clinical Factors: Arredondo catheter/brief  Additional Comments: Pt currently limited due to decreased strength, balance, activity tolerance, and pain impacting safety and independence with self care and mobility    Balance  Balance  Sitting Balance: Stand by assistance (pt tolerates sitting EOB unsupported x 25 minutes for ADL tasks, BUE AROM, and LB exercises. no LOB noted)  Standing Balance: Contact guard assistance  Standing Balance  Time: 5 minutes  Activity: pt steps L<>R laterally and forward/back  Comment: VCs for hand placement and safety c increased time. no LOB noted    Transfers/Mobility  Bed mobility  Supine to Sit: 2 Person  5: Pt will participate in 15+ minutes of therapeutic exercises/functional activities to increase safety and independence with self care and mobility  Occupational Therapy Plan  Times Per Week: 4-6  Current Treatment Recommendations: Self-Care / ADL, Strengthening, Balance training, Functional mobility training, Endurance training, Pain management, Safety education & training, Patient/Caregiver education & training, Equipment evaluation, education, & procurement, Home management training, Co-Treatment    Assessment  Activity Tolerance  Activity Tolerance: Patient Tolerated treatment well, Patient limited by fatigue, Patient limited by pain  Assessment  Performance deficits / Impairments: Decreased ADL status, Decreased functional mobility , Decreased strength, Decreased safe awareness, Decreased endurance, Decreased balance, Decreased high-level IADLs, Decreased posture  Treatment Diagnosis: Impaired self care status  Prognosis: Good  Decision Making: Medium Complexity  Discharge Recommendations: Patient would benefit from continued therapy after discharge  OT Equipment Recommendations  Other: TBD  Safety Devices  Type of Devices: Patient at risk for falls, Bed alarm in place, Left in bed, Call light within reach, Nurse notified, Gait belt    AM-Merged with [facility] Daily Activities Inpatient  AM-PAC Daily Activity - Inpatient   How much help is needed for putting on and taking off regular lower body clothing?: Total  How much help is needed for bathing (which includes washing, rinsing, drying)?: A Lot  How much help is needed for toileting (which includes using toilet, bedpan, or urinal)?: Total  How much help is needed for putting on and taking off regular upper body clothing?: A Little  How much help is needed for taking care of personal grooming?: A Little  How much help for eating meals?: None  AM-Merged with [facility] Inpatient Daily Activity Raw Score: 14  AM-PAC Inpatient ADL T-Scale Score : 33.39  ADL Inpatient CMS 0-100% Score: 59.67  ADL

## 2024-11-06 NOTE — PROGRESS NOTES
[facility]  Progress Note      NAME:  Jona Heredia  MEDICAL RECORD NUMBER:  [id number]  AGE: 78 y.o.   GENDER: male  : 1945  TODAY'S DATE:  2024    [facility] nurse follow up visit for leg wounds. Patient working with therapy at time of visit. Per patient, he had nurse remove ace wraps this morning because he felt they were too tight and his legs were burning. He states that his legs feel fine without the ace wrap or coban on. Unfortunately, compression is what he needs to assist in wound healing, however he cannot be forced to wear it. Discussed this with patient and he is agreeable to wearing ace wraps and taking them off if they are painful or feeling tight. Staff to continue to apply ace wraps in the morning and remove if patient requests. Will continue with oil emulsion/ABD/roll gauze. Patient has followed with vascular in the past and may benefit from follow up with them since he is not able to tolerate compression and has had intermittent rest pain and burning sensation in his legs with history of venous insufficiency and peripheral arterial occlusive disease. It does not appear that he has had any recent arterial/venous studies completed. Will continue to follow.        Odilia Doss, CHRISTINAN, RN, CWOCN, [facility]  Wound, Ostomy, and Continence Nursing  [phone]

## 2024-11-06 NOTE — CARE COORDINATION
DISCHARGE PLANNING NOTE:    LSW following for potential SNF placement. Patient has been accepted by [facility] per Lazaro in admissions. No insurance authorization needed for admission to facility. Can admit on 11/7 if medically ready for discharge.

## 2024-11-06 NOTE — PROGRESS NOTES
11/06/24 1213   Encounter Summary   Encounter Overview/Reason Volunteer Encounter   Service Provided For Patient   Referral/Consult From Rounding   Last Encounter  11/06/24   Complexity of Encounter Low   Spiritual/Emotional needs   Type Spiritual Support   Rituals, Rites and Sacraments   Type Jew Communion

## 2024-11-07 LAB
ANION GAP SERPL CALCULATED.3IONS-SCNC: 5 MMOL/L (ref 9–16)
BASOPHILS # BLD: 0 K/UL (ref 0–0.2)
BASOPHILS NFR BLD: 0 % (ref 0–2)
BUN SERPL-MCNC: 57 MG/DL (ref 8–23)
CALCIUM SERPL-MCNC: 7.7 MG/DL (ref 8.6–10.4)
CHLORIDE SERPL-SCNC: 107 MMOL/L (ref 98–107)
CO2 SERPL-SCNC: 27 MMOL/L (ref 20–31)
CREAT SERPL-MCNC: 2.5 MG/DL (ref 0.7–1.2)
EOSINOPHIL # BLD: 0.2 K/UL (ref 0–0.4)
EOSINOPHILS RELATIVE PERCENT: 5 % (ref 0–4)
ERYTHROCYTE [DISTWIDTH] IN BLOOD BY AUTOMATED COUNT: 15.7 % (ref 11.5–14.9)
GFR, ESTIMATED: 26 ML/MIN/1.73M2
GLUCOSE SERPL-MCNC: 92 MG/DL (ref 74–99)
HCT VFR BLD AUTO: 28.7 % (ref 41–53)
HGB BLD-MCNC: 9.8 G/DL (ref 13.5–17.5)
LYMPHOCYTES NFR BLD: 0.4 K/UL (ref 1–4.8)
LYMPHOCYTES RELATIVE PERCENT: 8 % (ref 24–44)
MCH RBC QN AUTO: 31.9 PG (ref 26–34)
MCHC RBC AUTO-ENTMCNC: 34.2 G/DL (ref 31–37)
MCV RBC AUTO: 93.3 FL (ref 80–100)
MONOCYTES NFR BLD: 0.4 K/UL (ref 0.1–1.3)
MONOCYTES NFR BLD: 9 % (ref 1–7)
NEUTROPHILS NFR BLD: 78 % (ref 36–66)
NEUTS SEG NFR BLD: 3.8 K/UL (ref 1.3–9.1)
PLATELET # BLD AUTO: 104 K/UL (ref 150–450)
PMV BLD AUTO: 8.1 FL (ref 6–12)
POTASSIUM SERPL-SCNC: 4.9 MMOL/L (ref 3.7–5.3)
RBC # BLD AUTO: 3.08 M/UL (ref 4.5–5.9)
SODIUM SERPL-SCNC: 139 MMOL/L (ref 136–145)
WBC OTHER # BLD: 4.9 K/UL (ref 3.5–11)

## 2024-11-07 PROCEDURE — 85025 COMPLETE CBC W/AUTO DIFF WBC: CPT

## 2024-11-07 PROCEDURE — 80048 BASIC METABOLIC PNL TOTAL CA: CPT

## 2024-11-07 PROCEDURE — 6360000002 HC RX W HCPCS

## 2024-11-07 PROCEDURE — 97535 SELF CARE MNGMENT TRAINING: CPT

## 2024-11-07 PROCEDURE — 36415 COLL VENOUS BLD VENIPUNCTURE: CPT

## 2024-11-07 PROCEDURE — 99232 SBSQ HOSP IP/OBS MODERATE 35: CPT | Performed by: INTERNAL MEDICINE

## 2024-11-07 PROCEDURE — 97110 THERAPEUTIC EXERCISES: CPT

## 2024-11-07 PROCEDURE — 97116 GAIT TRAINING THERAPY: CPT

## 2024-11-07 PROCEDURE — 94660 CPAP INITIATION&MGMT: CPT

## 2024-11-07 PROCEDURE — 6370000000 HC RX 637 (ALT 250 FOR IP): Performed by: INTERNAL MEDICINE

## 2024-11-07 PROCEDURE — 6370000000 HC RX 637 (ALT 250 FOR IP): Performed by: NURSE PRACTITIONER

## 2024-11-07 PROCEDURE — 6370000000 HC RX 637 (ALT 250 FOR IP)

## 2024-11-07 PROCEDURE — 1200000000 HC SEMI PRIVATE

## 2024-11-07 PROCEDURE — 2580000003 HC RX 258: Performed by: INTERNAL MEDICINE

## 2024-11-07 PROCEDURE — 5A09357 ASSISTANCE WITH RESPIRATORY VENTILATION, LESS THAN 24 CONSECUTIVE HOURS, CONTINUOUS POSITIVE AIRWAY PRESSURE: ICD-10-PCS

## 2024-11-07 RX ORDER — FINASTERIDE 5 MG/1
5 TABLET, FILM COATED ORAL DAILY
Qty: 30 TABLET | Refills: 3 | Status: CANCELLED | OUTPATIENT
Start: 2024-11-08

## 2024-11-07 RX ORDER — TAMSULOSIN HYDROCHLORIDE 0.4 MG/1
0.4 CAPSULE ORAL DAILY
Qty: 30 CAPSULE | Refills: 3 | Status: CANCELLED | OUTPATIENT
Start: 2024-11-08

## 2024-11-07 RX ADMIN — PANTOPRAZOLE SODIUM 40 MG: 40 TABLET, DELAYED RELEASE ORAL at 05:29

## 2024-11-07 RX ADMIN — HEPARIN SODIUM 5000 UNITS: 5000 INJECTION INTRAVENOUS; SUBCUTANEOUS at 05:30

## 2024-11-07 RX ADMIN — ANTI-FUNGAL POWDER MICONAZOLE NITRATE TALC FREE: 1.42 POWDER TOPICAL at 08:05

## 2024-11-07 RX ADMIN — AMIODARONE HYDROCHLORIDE 200 MG: 200 TABLET ORAL at 08:04

## 2024-11-07 RX ADMIN — ISOSORBIDE MONONITRATE 30 MG: 30 TABLET, EXTENDED RELEASE ORAL at 08:04

## 2024-11-07 RX ADMIN — TAMSULOSIN HYDROCHLORIDE 0.4 MG: 0.4 CAPSULE ORAL at 08:04

## 2024-11-07 RX ADMIN — HEPARIN SODIUM 5000 UNITS: 5000 INJECTION INTRAVENOUS; SUBCUTANEOUS at 13:30

## 2024-11-07 RX ADMIN — Medication: at 08:05

## 2024-11-07 RX ADMIN — ACETAMINOPHEN 650 MG: 325 TABLET ORAL at 21:36

## 2024-11-07 RX ADMIN — FINASTERIDE 5 MG: 5 TABLET, FILM COATED ORAL at 08:04

## 2024-11-07 RX ADMIN — FERROUS SULFATE TAB 325 MG (65 MG ELEMENTAL FE) 325 MG: 325 (65 FE) TAB at 21:32

## 2024-11-07 RX ADMIN — ANTI-FUNGAL POWDER MICONAZOLE NITRATE TALC FREE: 1.42 POWDER TOPICAL at 21:33

## 2024-11-07 RX ADMIN — HEPARIN SODIUM 5000 UNITS: 5000 INJECTION INTRAVENOUS; SUBCUTANEOUS at 21:33

## 2024-11-07 RX ADMIN — RANOLAZINE 500 MG: 500 TABLET, EXTENDED RELEASE ORAL at 08:04

## 2024-11-07 RX ADMIN — ACETAMINOPHEN 650 MG: 325 TABLET ORAL at 13:09

## 2024-11-07 RX ADMIN — RANOLAZINE 500 MG: 500 TABLET, EXTENDED RELEASE ORAL at 21:32

## 2024-11-07 RX ADMIN — SODIUM CHLORIDE: 4.5 INJECTION, SOLUTION INTRAVENOUS at 04:54

## 2024-11-07 ASSESSMENT — PAIN DESCRIPTION - DESCRIPTORS
DESCRIPTORS: BURNING
DESCRIPTORS: BURNING;DISCOMFORT

## 2024-11-07 ASSESSMENT — PAIN SCALES - GENERAL
PAINLEVEL_OUTOF10: 2
PAINLEVEL_OUTOF10: 5
PAINLEVEL_OUTOF10: 7
PAINLEVEL_OUTOF10: 8
PAINLEVEL_OUTOF10: 5

## 2024-11-07 ASSESSMENT — ENCOUNTER SYMPTOMS
COUGH: 0
NAUSEA: 0
SHORTNESS OF BREATH: 0
VOMITING: 0
DIARRHEA: 0

## 2024-11-07 ASSESSMENT — PAIN DESCRIPTION - LOCATION
LOCATION: FOOT
LOCATION: FOOT

## 2024-11-07 ASSESSMENT — PAIN DESCRIPTION - ORIENTATION
ORIENTATION: RIGHT;LEFT
ORIENTATION: LEFT;RIGHT

## 2024-11-07 ASSESSMENT — PAIN - FUNCTIONAL ASSESSMENT: PAIN_FUNCTIONAL_ASSESSMENT: PREVENTS OR INTERFERES SOME ACTIVE ACTIVITIES AND ADLS

## 2024-11-07 NOTE — CONSULTS
[facility] PULMONARY & CRITICAL CARE SPECIALISTS  Kee Cota MD/Bharath LEE AGACNP-BC, NP-C      Keyana LEE NP-C      Horacio LEE NP-C     Pulmonary and Critical Care CONSULT NOTE:      DATE OF CONSULT 11/7/2024    REASON FOR CONSULTATION:  Hypoxia      PCP Ry Boston MD     CHIEF COMPLAINT: Generalized weakness and leg edema    HISTORY OF PRESENT ILLNESS:   This is a 78-year-old gentleman with multiple chronic health problems who presented to the hospital with progressive generalized weakness and leg edema.    He was found to have acute kidney injury superimposed on chronic kidney disease peak creatinine 5.0 currently down to 2.5.  He does follow with nephrology.  This was felt to be related to prerenal acidemia and hypotension with concomitant use of ACE inhibitor resulting in elevated potassium as well as urinary retention with high postvoid residual.    Patient has monoclonal gammopathy of undetermined significance undergoing evaluation with hematology oncology.    He follows with cardiology with history of ischemic cardiomyopathy ejection fraction 25% with AICD atrial fibrillation and atrial flutter.  He also has severe mitral regurgitation.  He is sees both general cardiology and EP cardiology.    While he was here we were asked to see him because he was having oxygen desaturation into the 60s when he sleeps.  He is currently wearing oxygen even sitting up at rest.  He denies being short of breath in general.    The patient lives at home alone but does have a brother and sister that live nearby.  He lives in the [place] area.  He tells me he does not do his own yard work for the last 1 year because he gets too tired to easily.  He also does not do his own grocery shopping his brother and sister do the grocery shopping so essentially he is just a shot and at baseline.    Despite being told that he has sleep apnea a year ago he never pursued a  chlorhexidine 11/07/2024 04:01 AM    MCH 31.9 11/07/2024 04:01 AM    MCHC 34.2 11/07/2024 04:01 AM    RDW 15.7 11/07/2024 04:01 AM    METASPCT 11 08/02/2016 03:44 PM    LYMPHOPCT 8 11/07/2024 04:01 AM    MONOPCT 9 11/07/2024 04:01 AM    MYELOPCT 1 08/02/2016 03:44 PM    EOSPCT 5 11/07/2024 04:01 AM    BASOPCT 0 11/07/2024 04:01 AM    MONOSABS 0.40 11/07/2024 04:01 AM    LYMPHSABS 0.40 11/07/2024 04:01 AM    EOSABS 0.20 11/07/2024 04:01 AM    BASOSABS 0.00 11/07/2024 04:01 AM    DIFFTYPE NOT REPORTED 09/03/2020 10:30 AM       BMP   Lab Results   Component Value Date/Time     11/07/2024 04:01 AM    K 4.9 11/07/2024 04:01 AM     11/07/2024 04:01 AM    CO2 27 11/07/2024 04:01 AM    BUN 57 11/07/2024 04:01 AM    CREATININE 2.5 11/07/2024 04:01 AM    GLUCOSE 92 11/07/2024 04:01 AM    GLUCOSE 94 04/18/2012 10:16 AM    CALCIUM 7.7 11/07/2024 04:01 AM    MG 3.2 11/04/2024 07:17 AM    PHOS 2.8 09/30/2024 10:28 AM       LFTS  Lab Results   Component Value Date/Time    ALKPHOS 109 11/04/2024 07:17 AM    ALT 15 11/04/2024 07:17 AM    AST 21 11/04/2024 07:17 AM    BILITOT 0.8 11/04/2024 07:17 AM    BILIDIR 0.55 08/17/2016 07:27 PM    IBILI 0.29 08/17/2016 07:27 PM       INR  No results for input(s): \"PROTIME\", \"INR\" in the last 72 hours.    APTT  No results for input(s): \"APTT\" in the last 72 hours.    Lactic Acid  Lab Results   Component Value Date/Time    LACTA 0.8 11/04/2024 07:17 AM    LACTA 1.3 08/17/2016 07:27 PM    LACTA 0.8 08/04/2016 07:42 AM        PRO-BNP   Recent Labs     11/05/24  1032   PROBNP 2,498*           ABGs:   Lab Results   Component Value Date/Time    PHART 7.466 08/11/2016 04:30 AM    PO2ART 91.7 08/11/2016 04:30 AM    FUQ7XKC 39.4 08/11/2016 04:30 AM       Lab Results   Component Value Date/Time    MODE [facility] 08/11/2016 04:30 AM         Impression:  Acute kidney injury superimposed on chronic kidney disease slowly improving multifactorial, most prominently related to urinary retention and ACE inhibitor I  chlorhexidine chlorhexidine povidone iodine (if allergic to chlorhexidine) chlorhexidine/Adherence to aseptic technique: hand hygiene prior to donning barriers (gown, gloves), don cap and mask, sterile drape over patient chlorhexidine chlorhexidine/Adherence to aseptic technique: hand hygiene prior to donning barriers (gown, gloves), don cap and mask, sterile drape over patient chlorhexidine/Adherence to aseptic technique: hand hygiene prior to donning barriers (gown, gloves), don cap and mask, sterile drape over patient

## 2024-11-07 NOTE — PROGRESS NOTES
[facility]  IN-PATIENT SERVICE  [facility]    PROGRESS NOTE             11/7/2024    7:53 AM    Name:   Jona Heredia  MRN:     [id number]     Acct:      [id number]   Room:   2013/2013-01   Day:  3  Admit Date:  11/4/2024  7:04 AM    PCP:  Ry Boston MD  Code Status:  DNR-CCA    Subjective:     C/C:   Chief Complaint   Patient presents with    Hypotension    Leg Swelling    Extremity Weakness     Interval History Status: improved.    Patient seen and examined.  He is upright sitting in his chair. Per nursing, patient had prolonged apneic episodes with SpO2 dropping to 65%.  He has a history of sleep apnea according to previous notes but no record of sleep study.  Not on home CPAP.  Currently on 3 L nasal cannula.    He is not endorsing any complaints this morning.  Plan is for discharge to [place] once patient cleared by nephrology.    Brief History:     Mr. Jona Heredia (Joe) is a 78-year-old male patient with a past medical history of MGUS, CAD, CKD stage III, Crohn's disease, atrial fibrillation, HTN, NSVT, and HLD who presents following low blood pressure readings at home.  Patient states that for the last week he has been taking his blood pressure and had consistently low blood pressure readings with systolics in the 80s and 90s.  He reports that he has been feeling increasingly fatigued as well.  He does state that he has been urinating freely all week.  Some weakness of his lower extremities.  He also notes some weight loss over the past several months, states he was over 220 pounds at his last visit to the doctor.  Per our records, he did weigh 218 at the beginning of October.     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  His kappa and lambda light chains were high with a kappa/lambda ratio of 0.2.  He was referred to  Daily    tamsulosin  0.4 mg Oral Daily    sodium chloride flush  5-40 mL IntraVENous 2 times per day    heparin (porcine)  5,000 Units SubCUTAneous 3 times per day    amiodarone  200 mg Oral Daily    calcitRIOL  0.25 mcg Oral Once per day on Monday Wednesday Friday    ferrous sulfate  325 mg Oral Nightly    isosorbide mononitrate  30 mg Oral Daily    ranolazine  500 mg Oral BID    [Held by provider] lisinopril  5 mg Oral Daily    [Held by provider] torsemide  40 mg Oral Daily    [Held by provider] dabigatran  75 mg Oral BID     Continuous Infusions:    sodium chloride 75 mL/hr at 11/07/24 0454    sodium chloride      dextrose       PRN Meds: sodium chloride flush, sodium chloride, ondansetron **OR** ondansetron, polyethylene glycol, acetaminophen **OR** acetaminophen, glucose, dextrose bolus **OR** dextrose bolus, glucagon (rDNA), dextrose    Data:     Past Medical History:   has a past medical history of Abdominal hernia, Atrophy of muscle of multiple sites, CAD (coronary artery disease), CKD (chronic kidney disease) stage 3, GFR 30-59 ml/min ([facility]), Colon polyp, Crohn disease (HCC), Crohn's colitis (HCC), Decubitus ulcer of coccyx, stage 2 (HCC), Diarrhea, Diverticulosis, Hemorrhoids, History of atrial fibrillation, Hypertension, Ileostomy in place (HCC), Internal hemorrhoids, NSVT (nonsustained ventricular tachycardia) ([facility]), Other and unspecified hyperlipidemia, Past heart attack, Stage 3 chronic kidney disease (HCC), Tobacco use disorder, and Tubular adenoma.    Social History:   reports that he quit smoking about 8 years ago. His smoking use included cigarettes. He started smoking about 33 years ago. He has a 12.5 pack-year smoking history. He has never used smokeless tobacco. He reports that he does not drink alcohol and does not use drugs.     Family History:   Family History   Problem Relation Age of Onset    Diabetes Mother     Heart Disease Mother     High Blood Pressure Mother     Heart Failure Mother      pg    MCHC 34.2 31 - 37 g/dL    RDW 15.7 (H) 11.5 - 14.9 %    Platelets 104 (L) 150 - 450 k/uL    MPV 8.1 6.0 - 12.0 fL    Neutrophils % 78 (H) 36 - 66 %    Lymphocytes % 8 (L) 24 - 44 %    Monocytes % 9 (H) 1 - 7 %    Eosinophils % 5 (H) 0 - 4 %    Basophils % 0 0 - 2 %    Neutrophils Absolute 3.80 1.3 - 9.1 k/uL    Lymphocytes Absolute 0.40 (L) 1.0 - 4.8 k/uL    Monocytes Absolute 0.40 0.1 - 1.3 k/uL    Eosinophils Absolute 0.20 0.0 - 0.4 k/uL    Basophils Absolute 0.00 0.0 - 0.2 k/uL      Lab Results   Component Value Date/Time    SPECIAL R FOREARM 6ML 09/08/2023 12:00 AM    SPECIAL R HAND 6ML 09/08/2023 12:00 AM     Lab Results   Component Value Date/Time    CULTURE NO GROWTH 09/09/2023 06:14 AM           Radiology:    US RENAL LIMITED    Result Date: 11/4/2024  EXAMINATION: ULTRASOUND OF THE KIDNEYS 11/4/2024 8:40 am COMPARISON: None. HISTORY: ORDERING SYSTEM PROVIDED HISTORY: renal failure TECHNOLOGIST PROVIDED HISTORY: renal failure 78-year-old male with history of renal failure FINDINGS: Right kidney measures 10.5 x 6.1 x 4.9 cm.  Right renal cortical thickness measures 1.2 cm. Left kidney measures 10.6 x 7.1 x 4.8 cm.  Left renal cortical thickness measures 1.2 cm. Lower pole left renal cyst measures 8.0 x 7.3 x 4.9 cm. Lower pole right renal cyst measures 2.2 x 1.7 x 1.8 cm. No hydronephrosis.  No perinephric fluid. No echogenic foci with posterior acoustic shadowing to suggest renal calculi. Increased echogenicity of the bilateral renal cortex consistent with chronic medical renal disease.     1. Bilateral renal cysts. 2. Findings consistent with chronic medical renal disease. 3. No hydronephrosis.         Physical Examination:        Physical Exam  Constitutional:       General: He is not in acute distress.  Cardiovascular:      Rate and Rhythm: Normal rate and regular rhythm.      Pulses: Normal pulses.      Heart sounds: Normal heart sounds.   Pulmonary:      Effort: Pulmonary effort is normal.

## 2024-11-07 NOTE — PROGRESS NOTES
Writer notified MISSY Enriquez patient had rhythm change but converted back to NSR. Writer showed NP printed strips and NP states to notify if it occurs again.

## 2024-11-07 NOTE — PLAN OF CARE
Problem: Safety - Adult  Goal: Free from fall injury  Outcome: Progressing     Problem: Chronic Conditions and Co-morbidities  Goal: Patient's chronic conditions and co-morbidity symptoms are monitored and maintained or improved  Outcome: Progressing  Flowsheets (Taken 11/6/2024 2000)  Care Plan - Patient's Chronic Conditions and Co-Morbidity Symptoms are Monitored and Maintained or Improved: Monitor and assess patient's chronic conditions and comorbid symptoms for stability, deterioration, or improvement     Problem: Discharge Planning  Goal: Discharge to home or other facility with appropriate resources  Outcome: Progressing  Flowsheets (Taken 11/6/2024 2000)  Discharge to home or other facility with appropriate resources: Identify barriers to discharge with patient and caregiver     Problem: Pain  Goal: Verbalizes/displays adequate comfort level or baseline comfort level  Outcome: Progressing     Problem: Skin/Tissue Integrity  Goal: Absence of new skin breakdown  Description: 1.  Monitor for areas of redness and/or skin breakdown  2.  Assess vascular access sites hourly  3.  Every 4-6 hours minimum:  Change oxygen saturation probe site  4.  Every 4-6 hours:  If on nasal continuous positive airway pressure, respiratory therapy assess nares and determine need for appliance change or resting period.  Outcome: Progressing     Problem: ABCDS Injury Assessment  Goal: Absence of physical injury  Outcome: Progressing     Problem: Neurosensory - Adult  Goal: Achieves stable or improved neurological status  Outcome: Progressing  Flowsheets (Taken 11/6/2024 2000)  Achieves stable or improved neurological status:   Assess for and report changes in neurological status   Initiate measures to prevent increased intracranial pressure   Maintain blood pressure and fluid volume within ordered parameters to optimize cerebral perfusion and minimize risk of hemorrhage  Goal: Remains free of injury related to seizures  Administer IV fluids as ordered to ensure adequate hydration     Problem: Genitourinary - Adult  Goal: Absence of urinary retention  Outcome: Progressing  Flowsheets (Taken 11/6/2024 2000)  Absence of urinary retention:   Assess patient’s ability to void and empty bladder   Monitor intake/output and perform bladder scan as needed  Goal: Urinary catheter remains patent  Outcome: Progressing  Flowsheets (Taken 11/6/2024 2000)  Urinary catheter remains patent: Assess patency of urinary catheter     Problem: Metabolic/Fluid and Electrolytes - Adult  Goal: Electrolytes maintained within normal limits  Outcome: Progressing  Flowsheets (Taken 11/6/2024 2000)  Electrolytes maintained within normal limits:   Monitor labs and assess patient for signs and symptoms of electrolyte imbalances   Administer electrolyte replacement as ordered  Goal: Hemodynamic stability and optimal renal function maintained  Outcome: Progressing  Flowsheets (Taken 11/6/2024 2000)  Hemodynamic stability and optimal renal function maintained:   Monitor labs and assess for signs and symptoms of volume excess or deficit   Monitor intake, output and patient weight

## 2024-11-07 NOTE — PROGRESS NOTES
NEPHROLOGY progress note     Patient :  Jona Heredia; 78 y.o. MRN# [id number]  Location:  2013/2013-01  Attending:  Ry Boston MD  Admit Date:  11/4/2024   Hospital Day: 3      Reason for Consult: Acute kidney injury, hyperkalemia      Chief Complaint: Low blood pressure  History Obtained From:  patient    Subjective  Patient seen and examined, denies any new complains.  No dizziness.  No N/V/D  Bun CR IMPROVING  --->57 mg/dl  Scr improved to 2.5 mg/dl  BP stable  UOP 2 L      History of Present Illness:    This is a 78 y.o. 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 Rsoas, he has recommended a bone marrow biopsy, history of coronary artery disease with chronic total occlusion of RCA, cardiac cath in 2023, history of CHF with reduced EF of 25% ischemic cardiomyopathy with history of ventricular fibrillation, status post AICD history of severe MR, patient follows up with Dr. Garces.  Patient presented to the hospital with complaints of low blood pressure hypotension fatigue weakness.  Patient also complaining of difficulty urination for last 1 or 2 days.  Patient denied any nausea vomiting headache shortness of breath fever chills or cough.  On initial investigation in the ER, labs showed elevated creatinine 5.5 mg/dL and BUN of 143 mg/dL bicarb of 18 potassium of 5.9 sodium of 132  Bladder scan showed high PVR and Arredondo catheter was placed patient is nonoliguric and making good urine  Blood pressure has improved.  Medications reviewed patient at home taking torsemide and lisinopril which is on hold    Past Medical History:        Diagnosis Date    Abdominal hernia     Atrophy of muscle of multiple sites 8/19/2016    CAD (coronary artery disease)     CKD (chronic kidney  lisinopril in combination with urinary retention high PVR status post Arredondo catheter placement, patient established urine flow since Arredondo catheter has been placed and patient is nonoliguric now, serum creatinine peaked to 5.5 mg/dL and  mg/dL, renal ultrasound no hydronephrosis chronic renal medical disease, UA trace protein small leukocyte Estrace, numerous RBCs large blood, Patient is non oliguric, Scr BUN improving.  Hyperkalemia- resolved  [place] metabolic acidosis- resolved  Hypotension-- improved  MGUS       Plan:  Gentle hydration IVF.  Void trial  Diuretics and lisinopril  on hold.  No indication for dialysis as Kidney fx improving.      Thank you for the consultation.      Electronically signed by Uday Lira MD on 11/7/2024 at 1:30 PM

## 2024-11-07 NOTE — CARE COORDINATION
DISCHARGE PLANNING NOTE:    LSW following for potential SNF placement. Patient has been accepted by [facility]. No insurance authorization needed for admission to facility. Can admit when medically ready for discharge per Lazaro in admissions.

## 2024-11-07 NOTE — PROGRESS NOTES
Physical Therapy  Rehabilitation Physical Therapy    Date: 2024  Patient Name: Jona Heredia      Room:   MRN: [id number]    : 1945  (78 y.o.)  Gender: male     Discharge Recommendations:  Patient would benefit from continued therapy after discharge, Therapy recommended at discharge  Equipment Needed: No    Assessment  Assessment  Activity Tolerance: Patient limited by endurance;Patient limited by pain  Discharge Recommendations: Patient would benefit from continued therapy after discharge;Therapy recommended at discharge    Plan  Physical Therapy Plan  General Plan: 5-7 times per week  Current Treatment Recommendations: Strengthening, Balance training, Functional mobility training, Gait training, Transfer training, Endurance training, Therapeutic activities     Restrictions  Restrictions/Precautions: Fall Risk, General Precautions  Implants present? :  (Denies)  Other position/activity restrictions: 1 L NC O2, continuous pulse ox, telemetry, BLE ace wrapped.    Subjective  Subjective  Subjective: Patient resting in chair upon approach, agreeable to PT/OT co-treat with KADEN Craig. LAMIN Guerrier approved treatment.  Pain: Yes (8/10 B feet)   Overall Cognitive Status: WFL  Overall Orientation Status: Within Functional Limits  Orientation Level: Oriented X4    Bed Mobility  No (Pt sitting in bedside chair prior to session and following session.)    Transfer Training  Transfer Training: Yes  Overall Level of Assistance: Minimum assistance, Assist X1 (Min Ax1 from bedside chair with RW.)  Interventions: Safety awareness training, Verbal cues (Cues for safety, pacing, hand placement.)  Sit to Stand: Minimum assistance, Assist X1  Stand to Sit: Minimum assistance, Assist X1    Gait Training  Gait  Gait Training: Yes  Left Side Weight Bearing: Full  Right Side Weight Bearing: Full  Overall Level of Assistance: Contact-guard assistance, Assist X2 (Pt needing standing RB.)  Distance (ft): 32 (Pt

## 2024-11-07 NOTE — PROGRESS NOTES
Occupational Therapy  Facility/Department: [facility] ICU  Daily Treatment Note  NAME: Jona Heredia  : 1945  MRN: [id number]    Date of Service: 2024    RN reports patient is medically stable for therapy treatment this date.    Chart reviewed prior to treatment and patient is agreeable for therapy.  All lines intact and patient positioned comfortably at end of treatment.  All patient needs addressed prior to ending therapy session.      Discharge Recommendations:  Patient would benefit from continued therapy after discharge  Pt currently functioning below baseline.  Recommend daily inpatient skilled therapy at time of discharge to maximize long term outcomes and prevent re-admission. Please refer to AM-PAC score for current level of function.  OT Equipment Recommendations  Equipment Needed:  (CTA)    Patient Diagnosis(es): The primary encounter diagnosis was Uremia. Diagnoses of Hyperkalemia, Acute renal failure, unspecified acute renal failure type (HCC), and Bilateral cellulitis of lower leg were also pertinent to this visit.     Assessment   Activity Tolerance: Patient limited by endurance;Patient tolerated treatment well;Patient limited by pain  Discharge Recommendations: Patient would benefit from continued therapy after discharge  Equipment Needed:  (CTA)     Plan  Occupational Therapy Plan  Times Per Week: 4-6  Current Treatment Recommendations: Self-Care / ADL;Strengthening;Balance training;Functional mobility training;Endurance training;Pain management;Safety education & training;Patient/Caregiver education & training;Equipment evaluation, education, & procurement;Home management training;Co-Treatment    Restrictions  Restrictions/Precautions  Restrictions/Precautions: Fall Risk;General Precautions  Required Braces or Orthoses?: No  Implants present? :  (Denies)  Position Activity Restriction  Other position/activity restrictions: 1 L NC O2, continuous pulse ox, telemetry, BLE ace  wrapped.    Subjective  Subjective  Subjective: Pt in bedside chair prior to session. Pt pleasant and receptive to session. RN approved session.  Pain: Pain in B feet reporting during mobility, no number given.  Orientation  Overall Orientation Status: Within Functional Limits  Orientation Level: Oriented X4  Pain: Yes (8/10 B feet)  Cognition  Overall Cognitive Status: WFL        Bed Mobility Training  Bed Mobility Training: No (Pt sitting in bedside chair prior to session and following session.)  Overall Level of Assistance: Moderate assistance;Assist X2  Interventions: Safety awareness training;Verbal cues (cueing for hand placement & sequencing)  Supine to Sit: Moderate assistance;Assist X2;Additional time  Sit to Supine: Moderate assistance;Assist X2;Additional time  Scooting: Stand-by assistance (SBA hip to EOB; maxA x2 for supine scooting)    Balance  Sitting: Intact (Pt sitting at edge of chair without trunk support for ~15 minutes total to complete exercises and ADLs.)  Standing: With support (Pt standing with RW with CGA)    Transfer Training  Transfer Training: Yes  Overall Level of Assistance: Minimum assistance;Assist X1 (Min Ax1 from bedside chair with RW.)  Interventions: Safety awareness training;Verbal cues (Cues for safety, pacing, hand placement.)  Sit to Stand: Minimum assistance;Assist X1  Stand to Sit: Minimum assistance;Assist X1    Functional Mobility:   Overall Level of Assistance: Contact-guard assistance (With RW from bedside chair forward and backward x4 times. Pt needing standing RB.)  Distance (ft):  (Pt ambulating 4 feet forward and 4 feet back x4 trials.)  Assistive Device: Gait belt;Walker, rolling  Interventions: Safety awareness training;Tactile cues;Verbal cues (Cues for safety, pacing, hand placement, positioning.)     ADL  Grooming: Setup  Grooming Skilled Clinical Factors: Pt seated in bedside chair to complete oral care with set up.  Functional Mobility: Contact guard

## 2024-11-07 NOTE — PLAN OF CARE
Problem: Safety - Adult  Goal: Free from fall injury  Outcome: Progressing  Note: Patient remains free of falls and injuries throughout shift. Bed remains in the lowest position, wheels locked, call light and bedside table are within reach.      Problem: Pain  Goal: Verbalizes/displays adequate comfort level or baseline comfort level  Outcome: Progressing  Note: Assess the location, characteristics, onset, duration, frequency, quality, and severity of pain. Encourage immediate report of pain. Use appropriate pain scale to rate pain. Manage pain using nonpharmacologic/pharmacologic interventions.

## 2024-11-08 VITALS
HEIGHT: 67 IN | OXYGEN SATURATION: 98 % | RESPIRATION RATE: 16 BRPM | SYSTOLIC BLOOD PRESSURE: 128 MMHG | HEART RATE: 63 BPM | BODY MASS INDEX: 33.12 KG/M2 | WEIGHT: 211 LBS | TEMPERATURE: 97.7 F | DIASTOLIC BLOOD PRESSURE: 58 MMHG

## 2024-11-08 LAB
ANION GAP SERPL CALCULATED.3IONS-SCNC: 3 MMOL/L (ref 9–16)
BASOPHILS # BLD: 0 K/UL (ref 0–0.2)
BASOPHILS NFR BLD: 0 % (ref 0–2)
BUN SERPL-MCNC: 44 MG/DL (ref 8–23)
CALCIUM SERPL-MCNC: 7.8 MG/DL (ref 8.6–10.4)
CHLORIDE SERPL-SCNC: 109 MMOL/L (ref 98–107)
CO2 SERPL-SCNC: 28 MMOL/L (ref 20–31)
CREAT SERPL-MCNC: 2.2 MG/DL (ref 0.7–1.2)
EOSINOPHIL # BLD: 0.17 K/UL (ref 0–0.4)
EOSINOPHILS RELATIVE PERCENT: 4 % (ref 0–4)
ERYTHROCYTE [DISTWIDTH] IN BLOOD BY AUTOMATED COUNT: 16.5 % (ref 11.5–14.9)
GFR, ESTIMATED: 30 ML/MIN/1.73M2
GLUCOSE SERPL-MCNC: 84 MG/DL (ref 74–99)
HCT VFR BLD AUTO: 29.2 % (ref 41–53)
HGB BLD-MCNC: 9.9 G/DL (ref 13.5–17.5)
LYMPHOCYTES NFR BLD: 0.42 K/UL (ref 1–4.8)
LYMPHOCYTES RELATIVE PERCENT: 10 % (ref 24–44)
MCH RBC QN AUTO: 32.7 PG (ref 26–34)
MCHC RBC AUTO-ENTMCNC: 33.8 G/DL (ref 31–37)
MCV RBC AUTO: 96.8 FL (ref 80–100)
MONOCYTES NFR BLD: 0.21 K/UL (ref 0.1–1.3)
MONOCYTES NFR BLD: 5 % (ref 1–7)
MORPHOLOGY: ABNORMAL
NEUTROPHILS NFR BLD: 81 % (ref 36–66)
NEUTS SEG NFR BLD: 3.4 K/UL (ref 1.3–9.1)
PLATELET # BLD AUTO: 101 K/UL (ref 150–450)
PMV BLD AUTO: 7.7 FL (ref 6–12)
POTASSIUM SERPL-SCNC: 5.1 MMOL/L (ref 3.7–5.3)
RBC # BLD AUTO: 3.02 M/UL (ref 4.5–5.9)
SODIUM SERPL-SCNC: 140 MMOL/L (ref 136–145)
WBC OTHER # BLD: 4.2 K/UL (ref 3.5–11)

## 2024-11-08 PROCEDURE — 97116 GAIT TRAINING THERAPY: CPT

## 2024-11-08 PROCEDURE — 99232 SBSQ HOSP IP/OBS MODERATE 35: CPT | Performed by: INTERNAL MEDICINE

## 2024-11-08 PROCEDURE — 6370000000 HC RX 637 (ALT 250 FOR IP)

## 2024-11-08 PROCEDURE — 97530 THERAPEUTIC ACTIVITIES: CPT

## 2024-11-08 PROCEDURE — 97535 SELF CARE MNGMENT TRAINING: CPT

## 2024-11-08 PROCEDURE — 97110 THERAPEUTIC EXERCISES: CPT

## 2024-11-08 PROCEDURE — 2580000003 HC RX 258: Performed by: INTERNAL MEDICINE

## 2024-11-08 PROCEDURE — 80048 BASIC METABOLIC PNL TOTAL CA: CPT

## 2024-11-08 PROCEDURE — 99233 SBSQ HOSP IP/OBS HIGH 50: CPT | Performed by: INTERNAL MEDICINE

## 2024-11-08 PROCEDURE — 51798 US URINE CAPACITY MEASURE: CPT

## 2024-11-08 PROCEDURE — 6360000002 HC RX W HCPCS

## 2024-11-08 PROCEDURE — 94761 N-INVAS EAR/PLS OXIMETRY MLT: CPT

## 2024-11-08 PROCEDURE — 85025 COMPLETE CBC W/AUTO DIFF WBC: CPT

## 2024-11-08 PROCEDURE — 6370000000 HC RX 637 (ALT 250 FOR IP): Performed by: NURSE PRACTITIONER

## 2024-11-08 PROCEDURE — 36415 COLL VENOUS BLD VENIPUNCTURE: CPT

## 2024-11-08 PROCEDURE — 6370000000 HC RX 637 (ALT 250 FOR IP): Performed by: INTERNAL MEDICINE

## 2024-11-08 PROCEDURE — 94660 CPAP INITIATION&MGMT: CPT

## 2024-11-08 RX ORDER — TORSEMIDE 20 MG/1
10 TABLET ORAL EVERY OTHER DAY
Qty: 180 TABLET | Refills: 3 | Status: SHIPPED | OUTPATIENT
Start: 2024-11-08

## 2024-11-08 RX ORDER — TAMSULOSIN HYDROCHLORIDE 0.4 MG/1
0.4 CAPSULE ORAL DAILY
Qty: 30 CAPSULE | Refills: 3 | Status: SHIPPED | OUTPATIENT
Start: 2024-11-09

## 2024-11-08 RX ORDER — GUAIFENESIN 200 MG/10ML
LIQUID ORAL DAILY
Qty: 113 G | Refills: 0 | Status: SHIPPED | OUTPATIENT
Start: 2024-11-08

## 2024-11-08 RX ORDER — FINASTERIDE 5 MG/1
5 TABLET, FILM COATED ORAL DAILY
Qty: 30 TABLET | Refills: 3 | Status: SHIPPED | OUTPATIENT
Start: 2024-11-09

## 2024-11-08 RX ADMIN — HEPARIN SODIUM 5000 UNITS: 5000 INJECTION INTRAVENOUS; SUBCUTANEOUS at 05:32

## 2024-11-08 RX ADMIN — PANTOPRAZOLE SODIUM 40 MG: 40 TABLET, DELAYED RELEASE ORAL at 05:32

## 2024-11-08 RX ADMIN — ANTI-FUNGAL POWDER MICONAZOLE NITRATE TALC FREE: 1.42 POWDER TOPICAL at 07:57

## 2024-11-08 RX ADMIN — SODIUM CHLORIDE: 4.5 INJECTION, SOLUTION INTRAVENOUS at 08:00

## 2024-11-08 RX ADMIN — RANOLAZINE 500 MG: 500 TABLET, EXTENDED RELEASE ORAL at 07:56

## 2024-11-08 RX ADMIN — ISOSORBIDE MONONITRATE 30 MG: 30 TABLET, EXTENDED RELEASE ORAL at 07:53

## 2024-11-08 RX ADMIN — Medication: at 07:57

## 2024-11-08 RX ADMIN — FINASTERIDE 5 MG: 5 TABLET, FILM COATED ORAL at 07:53

## 2024-11-08 RX ADMIN — TAMSULOSIN HYDROCHLORIDE 0.4 MG: 0.4 CAPSULE ORAL at 07:54

## 2024-11-08 RX ADMIN — AMIODARONE HYDROCHLORIDE 200 MG: 200 TABLET ORAL at 07:53

## 2024-11-08 ASSESSMENT — PAIN DESCRIPTION - LOCATION: LOCATION: FOOT

## 2024-11-08 ASSESSMENT — PAIN - FUNCTIONAL ASSESSMENT: PAIN_FUNCTIONAL_ASSESSMENT: PREVENTS OR INTERFERES SOME ACTIVE ACTIVITIES AND ADLS

## 2024-11-08 ASSESSMENT — ENCOUNTER SYMPTOMS
COUGH: 0
SHORTNESS OF BREATH: 0
DIARRHEA: 0
VOMITING: 0
NAUSEA: 0

## 2024-11-08 ASSESSMENT — PAIN DESCRIPTION - DESCRIPTORS: DESCRIPTORS: BURNING

## 2024-11-08 ASSESSMENT — PAIN DESCRIPTION - ORIENTATION: ORIENTATION: LEFT;RIGHT

## 2024-11-08 ASSESSMENT — PAIN SCALES - GENERAL
PAINLEVEL_OUTOF10: 10
PAINLEVEL_OUTOF10: 7

## 2024-11-08 NOTE — PROGRESS NOTES
Rehabilitation Physical Therapy    Date: 2024  Patient Name: Jona Heredia      Room:   MRN: [id number]    : 1945  (78 y.o.)  Gender: male              Discharge Recommendations:  Patient would benefit from continued therapy after discharge, Therapy recommended at discharge  Equipment Needed: No    Assessment  Assessment  Activity Tolerance: Patient limited by endurance;Patient tolerated treatment well;Patient limited by pain  Discharge Recommendations: Patient would benefit from continued therapy after discharge;Therapy recommended at discharge    Plan  Physical Therapy Plan  General Plan: 5-7 times per week  Current Treatment Recommendations: Strengthening, Balance training, Functional mobility training, Gait training, Transfer training, Endurance training, Therapeutic activities     Restrictions  Restrictions/Precautions: Fall Risk, General Precautions  Implants present? :  (Denies)  Other position/activity restrictions: 1 L NC O2, continuous pulse ox, telemetry, BLE ace wrapped.    Subjective  Subjective  Subjective: Pt is sitting EOB upon arrival with PCA preparing to get pt up to bedside commode. Pt is pleasant and cooperative with PT/OT. Co-treat with LINDA Urena.    Pain: 5/10 bilat feet at rest: 10/10 bilat Feet while active and standing     Overall Cognitive Status: WFL    Overall Orientation Status: Within Functional Limits  Orientation Level: Oriented X4    Vitals  Temp: 97.7 °F (36.5 °C)  Pulse: 63  Heart Rate Source: Monitor  Respirations: 16  SpO2: 91 % (ambulating on room air in room with PT/OT.)  O2 Device: None (Room air)  BP: (!) 128/58  MAP (Calculated): 81  BP Location: Right upper arm  BP Method: Automatic  Patient Position: Semi fowlers    Objective    Bed Mobility  No (Pt was EOB upon arrival and in bedside chair upon exit (chair pad alarm in place))    Transfer Training  Transfer Training: Yes  Overall Level of Assistance: Minimum assistance, Assist X1 (Min Ax1 from  bedside chair with RW.)  Interventions: Safety awareness training, Verbal cues (Cues for safety, pacing, hand placement.)  Sit to Stand: Minimum assistance, Assist X1  Stand to Sit: Minimum assistance, Assist X1  Stand Pivot Transfers: Contact-guard assistance      Gait Training  Gait Training: Yes  Left Side Weight Bearing: Full  Right Side Weight Bearing: Full  Overall Level of Assistance: Contact-guard assistance, Assist X1, Adaptive equipment, Additional time  Distance (ft): 15 Feet (15ft from one side of bed to the other side, 3ft from EOB to bedside commode)  Assistive Device: Gait belt, Walker, rolling  Interventions: Safety awareness training, Tactile cues, Verbal cues (Cues for safety, pacing, hand placement, positioning.)  Base of Support: Widened  Speed/Alyssa: Shuffled  Step Length: Left shortened, Right shortened  Swing Pattern: Left symmetrical, Right symmetrical  Stance: Weight shift  Gait Abnormalities: Antalgic, Decreased step clearance, Shuffling gait (Bilat Feet swollen and painful, no dorsiflexion, flat foot, shuffling gait, forward flexed shoulders and head and gaze downward.)    Balance  Sitting: Intact (Pt sitting at edge of chair without trunk support for ~15 minutes total to complete exercises and ADLs.)  Standing: Impaired  Standing - Static:  (Good -)  Standing - Dynamic: Fair     PT Exercises  Exercise Treatment: HEP Handout for seated exercises given with education  A/AROM Exercises: seated bilat LE marches, LAQ, hip abd x20 reps each  Static Standing Balance Exercises: Standing tolerance x2, 1 minute and 7 minutes  Dynamic Standing Balance Exercises: Standing with RW reaching slightly out of base of support, unsteady however no LOB  Postural Correction Exercises: Cues for upright posture and bringing bilat feet together.  Breathing Techniques: Cues to breathe on exertion, pt holds breath frequently when exerting      Education  Education Given To: Patient  Education Provided: Plan of

## 2024-11-08 NOTE — PLAN OF CARE
Problem: Safety - Adult  Goal: Free from fall injury  11/8/2024 0340 by Sin Paul, RN  Outcome: Progressing  Flowsheets (Taken 11/7/2024 2154)  Free From Fall Injury: Instruct family/caregiver on patient safety     Problem: Chronic Conditions and Co-morbidities  Goal: Patient's chronic conditions and co-morbidity symptoms are monitored and maintained or improved  Outcome: Progressing  Flowsheets (Taken 11/7/2024 2147)  Care Plan - Patient's Chronic Conditions and Co-Morbidity Symptoms are Monitored and Maintained or Improved: Monitor and assess patient's chronic conditions and comorbid symptoms for stability, deterioration, or improvement     Problem: Discharge Planning  Goal: Discharge to home or other facility with appropriate resources  Outcome: Progressing  Flowsheets (Taken 11/7/2024 2147)  Discharge to home or other facility with appropriate resources: Identify barriers to discharge with patient and caregiver     Problem: Pain  Goal: Verbalizes/displays adequate comfort level or baseline comfort level  11/8/2024 0340 by Sin Paul, RN  Outcome: Progressing     Problem: Skin/Tissue Integrity  Goal: Absence of new skin breakdown  Description: 1.  Monitor for areas of redness and/or skin breakdown  2.  Assess vascular access sites hourly  3.  Every 4-6 hours minimum:  Change oxygen saturation probe site  4.  Every 4-6 hours:  If on nasal continuous positive airway pressure, respiratory therapy assess nares and determine need for appliance change or resting period.  Outcome: Progressing     Problem: ABCDS Injury Assessment  Goal: Absence of physical injury  Outcome: Progressing  Flowsheets (Taken 11/7/2024 2154)  Absence of Physical Injury: Implement safety measures based on patient assessment     Problem: Neurosensory - Adult  Goal: Achieves stable or improved neurological status  Outcome: Progressing  Flowsheets (Taken 11/7/2024 2147)  Achieves stable or improved neurological status: Assess for and  Administer IV fluids as ordered to ensure adequate hydration     Problem: Gastrointestinal - Adult  Goal: Maintains or returns to baseline bowel function  Outcome: Progressing  Flowsheets (Taken 11/7/2024 2147)  Maintains or returns to baseline bowel function: Assess bowel function     Problem: Genitourinary - Adult  Goal: Absence of urinary retention  Outcome: Progressing  Flowsheets (Taken 11/7/2024 2147)  Absence of urinary retention: Monitor intake/output and perform bladder scan as needed     Problem: Genitourinary - Adult  Goal: Urinary catheter remains patent  Outcome: Progressing  Flowsheets (Taken 11/7/2024 2147)  Urinary catheter remains patent: Assess patency of urinary catheter     Problem: Metabolic/Fluid and Electrolytes - Adult  Goal: Electrolytes maintained within normal limits  Outcome: Progressing  Flowsheets (Taken 11/7/2024 2147)  Electrolytes maintained within normal limits: Monitor labs and assess patient for signs and symptoms of electrolyte imbalances     Problem: Metabolic/Fluid and Electrolytes - Adult  Goal: Hemodynamic stability and optimal renal function maintained  Outcome: Progressing  Flowsheets (Taken 11/7/2024 2147)  Hemodynamic stability and optimal renal function maintained: Monitor labs and assess for signs and symptoms of volume excess or deficit

## 2024-11-08 NOTE — CARE COORDINATION
HENS complete.  Document ID : [id number]  Electronically signed by JOSE Uriostegui on 11/8/2024 at 5:35 PM

## 2024-11-08 NOTE — CARE COORDINATION
DISCHARGE PLANNING NOTE:    Writer is following for potential discharge to [facility]. Writer met with pt for update, no further questions at this time.  Electronically signed by JOSE Uriostegui on 11/8/2024 at 11:35 AM    Writer placed perfectserve message to bedside LAMIN Kiran to inquire about discharge, pt is not medically ready at this time. Writer placed call to [facility] with [facility] for update.  Electronically signed by JOSE Uriostegui on 11/8/2024 at 3:33 PM    Writer received message from LAMIN Kiran that pt can discharge today. Writer placed call back to [facility] for update. Forms faxed to [facility] for scheduling.  Electronically signed by JOSE Uriostegui on 11/8/2024 at 3:59 PM

## 2024-11-08 NOTE — DISCHARGE INSTRUCTIONS
Please make an appointment with your kidney doctor in 2 weeks    Please make an appointment with the oncologist Dr.Al Eaton in 3 to 4 weeks    Please make an appointment with your PCP in 1 week    Please attend your appointment with Dr. Darnell in one week      -If you begin to experience any symptoms such as chest pain, shortness of breath, nausea, vomiting, dizziness, drowsiness, abdominal pain, loss of consciousness, or any other symptoms you find concerning please return to the ED for follow-up evaluation.  -If you have been given medication please take them as prescribed. Do not take more medication than recommended at any given time. Finish all antibiotic  -Please follow-up with your primary care provider within 7 days for continued care.   -Please feel free return to the hospital if your symptoms worsen or any new concerning symptoms develop.  Follow-up with your primary care physician as needed for all other the concerns.

## 2024-11-08 NOTE — PROGRESS NOTES
11/07/24 0920   Encounter Summary   Encounter Overview/Reason Volunteer Encounter   Service Provided For Patient and family together   Referral/Consult From Rounding   Complexity of Encounter Low   Spiritual/Emotional needs   Type Spiritual Support   Rituals, Rites and Sacraments   Type Jehovah's witness Communion

## 2024-11-08 NOTE — PROGRESS NOTES
11/08/24 1144   Encounter Summary   Encounter Overview/Reason Volunteer Encounter   Service Provided For Patient   Referral/Consult From Rounding   Last Encounter  11/08/24   Complexity of Encounter Low   Spiritual/Emotional needs   Type Spiritual Support   Rituals, Rites and Sacraments   Type Congregational Communion

## 2024-11-08 NOTE — CARE COORDINATION
Transportation arranged for patient to go to [facility] at 1830 via [employer] by wheelchair. Facility informed. Bedside nurse informed.     Number for Report: [phone]     Writer placed call to pt brother Tomy per pt request. No answer, voicemail left.  Electronically signed by JOSE Uriostegui on 11/8/2024 at 4:31 PM    Writer received call back from Tomy. No further questions at this time.  Electronically signed by JOSE Uriostegui on 11/8/2024 at 4:39 PM

## 2024-11-08 NOTE — PROGRESS NOTES
[facility]  IN-PATIENT SERVICE  [facility]    PROGRESS NOTE             11/8/2024    7:29 AM    Name:   Jona Heredia  MRN:     [id number]     Acct:      [id number]   Room:   2064/2064-01   Day:  4  Admit Date:  11/4/2024  7:04 AM    PCP:  Ry Boston MD  Code Status:  DNR-CCA    Subjective:     C/C:   Chief Complaint   Patient presents with    Hypotension    Leg Swelling    Extremity Weakness     Interval History Status: improved.    Patient seen and examined.  He is upright sitting in his chair.  Blood pressure is within normal limits. Currently on RA.     Plan for void trial today. Patient has not urinated yet. Creatinine is down to 2.2, BUN 44    Dietician will see patient for general Diet Education.    He is not endorsing any complaints this morning.  Plan is for discharge to [place] once patient cleared by nephrology.    Per oncology; bone marrow aspiration as outpatient. Doubt multiple myeloma     Brief History:     Mr. Jona Heredia (Joe) is a 78-year-old male patient with a past medical history of MGUS, CAD, CKD stage III, Crohn's disease, atrial fibrillation, HTN, NSVT, and HLD who presents following low blood pressure readings at home.  Patient states that for the last week he has been taking his blood pressure and had consistently low blood pressure readings with systolics in the 80s and 90s.  He reports that he has been feeling increasingly fatigued as well.  He does state that he has been urinating freely all week.  Some weakness of his lower extremities.  He also notes some weight loss over the past several months, states he was over 220 pounds at his last visit to the doctor.  Per our records, he did weigh 218 at the beginning of October.     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  His kappa and lambda light    Pulmonary:      Effort: Pulmonary effort is normal.      Breath sounds: Normal breath sounds.   Abdominal:      General: Abdomen is flat. Bowel sounds are normal.      Palpations: Abdomen is soft.   Skin:     General: Skin is warm and dry.   Neurological:      Mental Status: He is alert and oriented to person, place, and time.      Sensory: Sensory deficit present.           Assessment:        Primary Problem  Acute renal failure (HCC)    Active Hospital Problems    Diagnosis Date Noted    Uremia [N19] 11/05/2024    Bilateral cellulitis of lower leg [L03.116, L03.115] 11/05/2024    MGUS (monoclonal gammopathy of unknown significance) [D47.2] 11/05/2024    Acute renal failure (HCC) [N17.9] 11/04/2024       Plan:        Acute Renal Failure in setting of CKD Stage III  CMP: , creatinine 5.5, potassium 5.9, sodium 132 on admission  11/8: Creatinine 2.2, BUN 44, continuing to downtrend  Nephrology on board, no need for dialysis as patient kidney function improving  Low potassium, low phosphorus diet  Renal ultrasound demonstrating bilateral renal cyst, no hydronephrosis  Patient had urinary retention on admission with need for Coude catheter placement  Started Proscar in addition to Flomax for urinary retention  Will need outpatient urology follow-up    MGUS  Follows with Dr. Rosas  Plan was for bone marrow biopsy and PET scan out pt  Heme-onc on board  Recommending continuing current therapy and completing workup as outpatient    Lymphedema, chronic  Bilateral lower leg  Wound ostomy on board  Recommending Bablic AG, wrapped with zinc Unna boots  Patient not tolerating, asked to have wraps removed  Patient may need vascular surgery outpatient follow-up    Gluteal Wound, decubitus ulcer stage 3  Likely secondary to chronic pressure  Patient does report incontinence  Utilizing Triad cream per wound care  Turn and reposition every 2 hours  Micatin powder for skin folds in groin area    DIONI  Patient having  prolonged apneic episodes at night with saturations dropping into the 60s  Has documentation indicating DIONI diagnosis but no sleep studies in record  May benefit from home CPAP    Hypotension   systolic,  diastolic  Continue to monitor, holding lisinopril and torsemide    CHF with EF of 35-40% February 2024  Patient is hypotensive  Chest x-ray with cardiac enlargement, unchanged from 1 year ago  BNP 2498    Atrial Fibrillation  Pulse normal in the ED, 60s  Will hold home dabigatran, start heparin      DVT prophylaxis: heparin (porcine) injection 5,000 TID  GI prophylaxis: Protonix 40 mg daily      Sandy Harris MD  Transitional year resident  11/8/2024 7:29 AM       Electronically signed by Sandy Harris MD   Attending Physician Statement  I have discussed the care of Jona Heredia and I have examined the patient myself and taken ROS and HPI, including pertinent history and exam findings, with the resident. I have reviewed the key elements of all parts of the encounter with the resident.  I agree with the assessment, plan and orders as documented by the resident.  Patient clinically doing better  Still have a lot of swelling in legs  Will discuss with nephro if we can start patient low-dose torsemide  Resuming Pradaxa  Electronically signed by Christoph Chicas MD

## 2024-11-08 NOTE — CARE COORDINATION
IMM letter provided to patient.  Patient offered four hours to make informed decision regarding appeal process; patient agreeable to discharge.   Electronically signed by JOSE Uriostegui on 11/8/2024 at 3:58 PM

## 2024-11-08 NOTE — PROGRESS NOTES
minutes,  Activity: bed>BSC transfers, toileting/functional mobility  Comment: VCs for hand placement and safety c increased time. no LOB noted    Transfers/Mobility  Bed mobility  Bed Mobility Comments: Patient sitting EOB as writer arrives c PCT. pt up in chair at end of session  Transfers  Sit to stand: Contact guard assistance  Stand to sit: Contact guard assistance  Transfer Comments: VCs for hand placement and safety with increased time c F return noted  Toilet Transfers  Toilet - Technique: Ambulating (c RW)  Equipment Used: Standard bedside commode  Toilet Transfer: Contact guard assistance  Toilet Transfers Comments: VCs for hand placement and safety with increased time c F return noted    Functional Mobility  Functional - Mobility Device: Rolling Walker  Activity: Other (pt steps L<>R laterally and forward/back)  Assist Level: Contact guard assistance  Functional Mobility Comments: pt demo shuffling gait, VCs for hand placement, picking up feet to advance and safety c increased time. pt unsteady but no LOB noted      OT Exercises  A/AROM Exercises: pt engages in BUE AROM x 15 reps in all available planes to increase safety and independence with self care and mobility. pt tolerates well    Patient Education  Patient Education  Education Given To: Patient  Education Provided: Role of Therapy, Plan of Care, Transfer Training, Home Exercise Program, Fall Prevention Strategies, Mobility Training  Education Method: Verbal, Demonstration, Printed Information/Hand-outs  Barriers to Learning: None  Education Outcome: Verbalized understanding, Demonstrated understanding, Continued education needed    Goals  Short Term Goals  Time Frame for Short Term Goals: By discharge  Short Term Goal 1: Pt will complete lower body dressing/bathing with Min A and Good safety with use of AE as needed  Short Term Goal 2: Pt will complete functional transfers/mobility during self care task with SBA and Good safety with use of least  restrictive device  Short Term Goal 3: Pt will tolerate standing 5+ minutes during functional activity of choice with SBA and Good safety while maintaining SpO2 above 90%  Short Term Goal 4: Pt will verbalize/demonstrate Good understanding of AE/adaptive strategies/DME to increase safety and independence with self care and mobility  Short Term Goal 5: Pt will participate in 15+ minutes of therapeutic exercises/functional activities to increase safety and independence with self care and mobility  Occupational Therapy Plan  Times Per Week: 4-6  Current Treatment Recommendations: Self-Care / ADL, Strengthening, Balance training, Functional mobility training, Endurance training, Pain management, Safety education & training, Patient/Caregiver education & training, Equipment evaluation, education, & procurement, Home management training, Co-Treatment    Assessment  Activity Tolerance  Activity Tolerance: Patient Tolerated treatment well, Patient limited by fatigue, Patient limited by pain  Assessment  Performance deficits / Impairments: Decreased ADL status, Decreased functional mobility , Decreased strength, Decreased safe awareness, Decreased endurance, Decreased balance, Decreased high-level IADLs, Decreased posture  Treatment Diagnosis: Impaired self care status  Prognosis: Good  Decision Making: Medium Complexity  Discharge Recommendations: Patient would benefit from continued therapy after discharge  OT Equipment Recommendations  Equipment Needed:  (CTA)  Other: TBD  Safety Devices  Type of Devices: Call light within reach, Gait belt, Left in chair, Nurse notified, Chair alarm in place   AM-PAC Daily Activities Inpatient  AM-PAC Daily Activity - Inpatient   How much help is needed for putting on and taking off regular lower body clothing?: Total  How much help is needed for bathing (which includes washing, rinsing, drying)?: A Lot  How much help is needed for toileting (which includes using toilet, bedpan, or  urinal)?: A Lot  How much help is needed for putting on and taking off regular upper body clothing?: A Little  How much help is needed for taking care of personal grooming?: A Little  How much help for eating meals?: None  AM-PAC Inpatient Daily Activity Raw Score: 15  AM-PAC Inpatient ADL T-Scale Score : 34.69  ADL Inpatient CMS 0-100% Score: 56.46  ADL Inpatient CMS G-Code Modifier : CK    OT Minutes  OT Individual Minutes  Time In: 1015  Time Out: 1055  Minutes: 40      Electronically signed by WILTON Woodall on 11/8/24 at 11:19 AM EST

## 2024-11-08 NOTE — PROGRESS NOTES
11/07/24 0920   Encounter Summary   Encounter Overview/Reason Volunteer Encounter   Service Provided For Patient and family together   Referral/Consult From Rounding   Complexity of Encounter Low   Spiritual/Emotional needs   Type Spiritual Support   Rituals, Rites and Sacraments   Type Temple Communion

## 2024-11-08 NOTE — PROGRESS NOTES
[facility] PULMONARY,CRITICAL CARE & SLEEP   Kee Cota MD/Bharath Ibrahim MD/Ryland LEE AGAP-BC, NP-C      Keyana LEE NP-C    Horacio LEE NP-C                                         Pulmonary Progress Note    Patient - Jona Heredia   Age - 78 y.o.   - 1945  MRN - [id number]  [id number]  Date of Admission - 2024  7:04 AM    Consulting Service/Physician:       Primary Care Physician: Ry Boston MD    SUBJECTIVE:     Chief Complaint:   Chief Complaint   Patient presents with    Hypotension    Leg Swelling    Extremity Weakness     Subjective:    He is resting in the chair  He tolerated BiPAP overnight  Discussed importance of following up with PSG, will order split-night sleep study at [facility]  Denies any dyspnea, cough, wheeze      VITALS  BP (!) 128/58   Pulse 63   Temp 97.7 °F (36.5 °C) (Oral)   Resp 16   Ht 1.702 m (5' 7\")   Wt 95.7 kg (211 lb)   SpO2 91% Comment: ambulating on room air in room with PT/OT.  BMI 33.05 kg/m²   Wt Readings from Last 3 Encounters:   24 95.7 kg (211 lb)   10/29/24 95.7 kg (211 lb)   10/09/24 98.9 kg (218 lb)     I/O (24 Hours)    Intake/Output Summary (Last 24 hours) at 2024 1523  Last data filed at 2024 1509  Gross per 24 hour   Intake 300 ml   Output 1325 ml   Net -1025 ml     Ventilator:   Settings  FiO2 : 21 %  Insp Rise Time (%): 2 %  Exam:   Physical Exam   Constitutional: Appears well-developed and well-nourished.   HENT: Unremarkable  Head: Normocephalic and atraumatic.   Eyes: EOM are normal. Pupils are equal, round, and reactive to light.   Neck: Neck supple.   Cardiovascular:  Regular rate and rhythm.  Normal heart tones.  No JVD.    Pulmonary/Chest: Effort normal and breath sounds normal.   Abdominal: Soft. Bowel sounds are normal. There is no tenderness.   Musculoskeletal: Normal range of motion.  Neurological:patient is alert and oriented to  MD    dextrose bolus 10% 125 mL, 125 mL, IntraVENous, PRN **OR** dextrose bolus 10% 250 mL, 250 mL, IntraVENous, PRN, Izaiah Ashley MD    glucagon injection 1 mg, 1 mg, SubCUTAneous, PRN, Izaiah Ashley MD    dextrose 10 % infusion, , IntraVENous, Continuous PRN, Izaiah Ashley MD    isosorbide mononitrate (IMDUR) extended release tablet 30 mg, 30 mg, Oral, Daily, Izaiah Ashley MD, 30 mg at 11/08/24 0753    ranolazine (RANEXA) extended release tablet 500 mg, 500 mg, Oral, BID, Izaiah Ashley MD, 500 mg at 11/08/24 0756    [Held by provider] lisinopril (PRINIVIL;ZESTRIL) tablet 5 mg, 5 mg, Oral, Daily, Izaiah Ashley MD    [Held by provider] torsemide (DEMADEX) tablet 40 mg, 40 mg, Oral, Daily, Izaiah Ashley MD    dabigatran (PRADAXA) capsule 75 mg, 75 mg, Oral, BID, Urmila Quintero MD    Lab Results:     Lab Results   Component Value Date    WBC 4.2 11/08/2024    HGB 9.9 (L) 11/08/2024    HCT 29.2 (L) 11/08/2024    MCV 96.8 11/08/2024     (L) 11/08/2024     Lab Results   Component Value Date    CALCIUM 7.8 (L) 11/08/2024     11/08/2024    K 5.1 11/08/2024    CO2 28 11/08/2024     (H) 11/08/2024    BUN 44 (H) 11/08/2024    CREATININE 2.2 (H) 11/08/2024     No components found for: \"ABGSAMPLETYP\", \"ABGBODYTEMP\", \"ABGPHCORRFOR\", \"[id number]\", \"ABGPHCORRFOOR\", \"ABGPH\", \"ABGPCO2\", \"ABGPO2\", \"ABGBASEEXCES\", \"ABGBASEDEFIC\", \"ABGHCO3\", \"UPFI3QYD\", \"ABGENDTIDALC\", \"ABGALLENSTES\", \"ABGSPO2\", \"ABGSAMEPLESIT\", \"[id number]\", \"ABGOXYGENSOU\"  Lab Results   Component Value Date    INR 2.4 11/04/2024    PROTIME 25.9 (H) 11/04/2024       Radiology:   [unfilled]  My reading of film: Previous imaging reviewed    ASSESSMENT:       Acute kidney injury superimposed on chronic kidney disease slowly improving multifactorial, most prominently related to urinary retention and ACE inhibitor I suspect  Chronic heart failure with reduced ejection fraction still exhibiting signs of

## 2024-11-08 NOTE — PROGRESS NOTES
NEPHROLOGY progress note     Patient :  Jona Heredia; 78 y.o. MRN# [id number]  Location:  2064/2064-01  Attending:  Ry Boston MD  Admit Date:  11/4/2024   Hospital Day: 4      Reason for Consult: Acute kidney injury, hyperkalemia      Chief Complaint: Low blood pressure  History Obtained From:  patient    Subjective  Patient seen and examined, denies any new complains.  No dizziness.  No N/V/D  Bun CR IMPROVING  --->44 mg/dl  Scr improved to 2.2 mg/dl  BP stable  UOP 2 L      History of Present Illness:    This is a 78 y.o. 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 Rosas, he has recommended a bone marrow biopsy, history of coronary artery disease with chronic total occlusion of RCA, cardiac cath in 2023, history of CHF with reduced EF of 25% ischemic cardiomyopathy with history of ventricular fibrillation, status post AICD history of severe MR, patient follows up with Dr. Garces.  Patient presented to the hospital with complaints of low blood pressure hypotension fatigue weakness.  Patient also complaining of difficulty urination for last 1 or 2 days.  Patient denied any nausea vomiting headache shortness of breath fever chills or cough.  On initial investigation in the ER, labs showed elevated creatinine 5.5 mg/dL and BUN of 143 mg/dL bicarb of 18 potassium of 5.9 sodium of 132  Bladder scan showed high PVR and Arredondo catheter was placed patient is nonoliguric and making good urine  Blood pressure has improved.  Medications reviewed patient at home taking torsemide and lisinopril which is on hold    Past Medical History:        Diagnosis Date    Abdominal hernia     Atrophy of muscle of multiple sites 8/19/2016    CAD (coronary artery disease)     CKD (chronic kidney  Unknown Brother         Myelodysplastic Syndrom    Peripheral Vascular Disease Brother     Thyroid Disease Brother         Hyothyiod    Heart Attack Brother     Thyroid Disease Brother     Lung Disease Brother     Liver Disease Brother     Gout Brother     Unknown Brother         Varicose Veins    High Cholesterol Brother            Objective:  CURRENT TEMPERATURE:  Temp: 97.7 °F (36.5 °C)  MAXIMUM TEMPERATURE OVER 24HRS:  Temp (24hrs), Av °F (36.7 °C), Min:97.7 °F (36.5 °C), Max:98.2 °F (36.8 °C)    CURRENT RESPIRATORY RATE:  Respirations: 16  CURRENT PULSE:  Pulse: 63  CURRENT BLOOD PRESSURE:  BP: (!) 128/58  24HR BLOOD PRESSURE RANGE:  Systolic (24hrs), Av , Min:93 , Max:128   ; Diastolic (24hrs), Av, Min:50, Max:66    24HR INTAKE/OUTPUT:    Intake/Output Summary (Last 24 hours) at 2024 1103  Last data filed at 2024 0536  Gross per 24 hour   Intake 300 ml   Output 1550 ml   Net -1250 ml     No data found.      Physical Exam:  GENERAL APPEARANCE: Alert and cooperative, and appears to be in no acute distress.  HEAD: normocephalic  EYES: EOMI. Not pale, anicteric   NOSE:  No nasal discharge.    THROAT:  Oral cavity and pharynx normal. Moist  CARDIAC: Normal S1 and S2. No S3, S4 or murmurs. Rhythm is regular.  LUNGS: Clear to auscultation  without rales, rhonchi, wheezing or diminished breath sounds.  NECK: Neck supple, non-tender, trachea midline  GI-soft nontender non distended  MUSKULOSKELETAL: Adequately aligned spine. No joint erythema or tenderness.   EXTREMITIES: + edema.  Bilateral leg wounds lymphedema  NEURO: Nonfocal      Labs:   CBC:  Recent Labs     24  0408 24  0401 24  0551   WBC 4.7 4.9 4.2   RBC 3.19* 3.08* 3.02*   HGB 10.2* 9.8* 9.9*   HCT 30.3* 28.7* 29.2*   MCV 95.0 93.3 96.8   MCH 31.9 31.9 32.7   MCHC 33.6 34.2 33.8   RDW 15.7* 15.7* 16.5*   * 104* 101*   MPV 8.2 8.1 7.7      BMP:   Recent Labs     24  0408 24  0401 24  0551   NA  138 139 140   K 4.6 4.9 5.1   * 107 109*   CO2 21 27 28   BUN 81* 57* 44*   CREATININE 3.1* 2.5* 2.2*   GLUCOSE 106* 92 84   CALCIUM 7.4* 7.7* 7.8*              Radiology:  Reviewed as available.    Assessment:  Acute kidney injury on CKD, multifactorial most likely secondary to prerenal azotemia due to hypotension use of torsemide and lisinopril in combination with urinary retention high PVR status post Arredondo catheter placement, patient established urine flow since Arredondo catheter has been placed and patient is nonoliguric now, serum creatinine peaked to 5.5 mg/dL and  mg/dL, renal ultrasound no hydronephrosis chronic renal medical disease, UA trace protein small leukocyte Estrace, numerous RBCs large blood, Patient is non oliguric, Scr BUN improving.  CKD 4 with baseline scr 2.2-2.5 mg/dl  Hyperkalemia- resolved  Nongap metabolic acidosis- resolved  Hypotension-- improved  MGUS       Plan:  DC IVF  Void trial pending.  Diuretics and lisinopril  on hold.  Can resume Torsemide on low dose.  No objections on discharge.      Thank you for the consultation.      Electronically signed by Uday Lira MD on 11/8/2024 at 11:03 AM

## 2024-11-08 NOTE — PROGRESS NOTES
Today's Date: 11/8/2024  Patient Name: Jona Heredia  Date of admission: 11/4/2024  7:04 AM  Patient's age: 78 y.o., 1945  Admission Dx: Hyperkalemia [E87.5]  Uremia [N19]  Acute renal failure (HCC) [N17.9]  Bilateral cellulitis of lower leg [L03.116, L03.115]  Acute renal failure, unspecified acute renal failure type (HCC) [N17.9]    Reason for Consult: management recommendations  Requesting Physician: Ry Boston MD    CHIEF COMPLAINT: Hypotension    INTERIM HISTORY  Patient is seen and evaluated.  He overall feels well.  The swelling and burning sensation in both lower extremities slowly improving.  Overall condition seems to be improving.  The plan is to discharge to [facility]  HISTORY OF PRESENT ILLNESS:      The patient is a 78 y.o.   male who is admitted to the hospital for MARK and hypotension.  The patient is known to us with elevated light chain and small monoclonal protein measuring 0.08 g/dL  The patient has chronic lymphedema and dropping both lower extremity.  Nephrology input appreciated.    Past Medical History:   has a past medical history of Abdominal hernia, Atrophy of muscle of multiple sites, CAD (coronary artery disease), CKD (chronic kidney disease) stage 3, GFR 30-59 ml/min (HCC), Colon polyp, Crohn disease (HCC), Crohn's colitis (HCC), Decubitus ulcer of coccyx, stage 2 (HCC), Diarrhea, Diverticulosis, Hemorrhoids, History of atrial fibrillation, Hypertension, Ileostomy in place (HCC), Internal hemorrhoids, NSVT (nonsustained ventricular tachycardia) (HCC), Other and unspecified hyperlipidemia, Past heart attack, Stage 3 chronic kidney disease (HCC), Tobacco use disorder, and Tubular adenoma.    Past Surgical History:   has a past surgical history that includes polypectomy; Abdomen surgery (08/02/2016); Jejunostomy; Colonoscopy (06/20/2005); Colonoscopy (04/19/2016); Colonoscopy (12/06/2016); Cardiac catheterization (2010); Abdomen surgery (02/19/2017); pr repair first abdominal  wall hernia (N/A, 02/19/2017); Small intestine surgery (N/A, 02/19/2017); ep device procedure (N/A, 09/14/2023); Cardiac procedure (N/A, 09/11/2023); Cardiac procedure (N/A, 09/11/2023); Cardioversion (10/18/2023); and Cardioversion (N/A, 06/05/2024).     Medications:    Reviewed in Epic     Allergies:  Patient has no known allergies.    Social History:   reports that he quit smoking about 8 years ago. His smoking use included cigarettes. He started smoking about 33 years ago. He has a 12.5 pack-year smoking history. He has never used smokeless tobacco. He reports that he does not drink alcohol and does not use drugs.     Family History: family history includes Atrial Fibrillation in his brother; Cancer in his brother and father; Diabetes in his mother; Glaucoma in his brother; Gout in his brother; Heart Attack in his brother; Heart Disease in his father and mother; Heart Failure in his mother; High Blood Pressure in his mother; High Cholesterol in his brother; Liver Disease in his brother; Lung Disease in his brother; Melanoma in his brother; Neuropathy in his brother; Osteoarthritis in his sister; Peripheral Vascular Disease in his brother and brother; Sleep Apnea in his brother; Thyroid Disease in his brother, brother, and sister; Ulcerative Colitis in his brother; Unknown in his brother and brother.    REVIEW OF SYSTEMS:    Constitutional: No fever or chills. No night sweats, no weight loss   Eyes: No eye discharge, double vision, or eye pain   HEENT: negative for sore mouth, sore throat, hoarseness and voice change   Respiratory: negative for cough , sputum, dyspnea, wheezing, hemoptysis, chest pain   Cardiovascular: negative for chest pain, dyspnea, palpitations, orthopnea, PND   Gastrointestinal: negative for nausea, vomiting, diarrhea, constipation, abdominal pain, Dysphagia, hematemesis and hematochezia   Genitourinary: negative for frequency, dysuria, nocturia, urinary incontinence, and hematuria

## 2024-11-08 NOTE — DISCHARGE SUMMARY
[facility]   IN-PATIENT SERVICE   [facility]    Discharge Summary     Patient ID: Jona Heredia  :  1945   MRN: [id number]     ACCOUNT:  [id number]   Patient's PCP: Ry Boston MD  Admit Date: 2024   Discharge Date: 2024     Length of Stay: 4  Code Status:  DNR-CCA  Admitting Physician: Ry Boston MD  Discharge Physician: Sandy Harris MD     Active Discharge Diagnoses:       Primary Problem  Acute renal failure (HCC)      Hospital Problems  Active Hospital Problems    Diagnosis Date Noted    Uremia [N19] 2024    Bilateral cellulitis of lower leg [L03.116, L03.115] 2024    MGUS (monoclonal gammopathy of unknown significance) [D47.2] 2024    Acute renal failure (HCC) [N17.9] 2024       Admission Condition:  fair     Discharged Condition: fair    Hospital Stay:     Mr. Jona Heredia (Joe) is a 78-year-old male patient with a past medical history of MGUS, CAD, CKD stage III, Crohn's disease, atrial fibrillation, HTN, NSVT, and HLD who presents following low blood pressure readings at home.  Patient states that for the last week he has been taking his blood pressure and had consistently low blood pressure readings with systolics in the 80s and 90s.  He reports that he has been feeling increasingly fatigued as well.  He does state that he has been urinating freely all week.  Some weakness of his lower extremities.  He also notes some weight loss over the past several months, states he was over 220 pounds at his last visit to the doctor.  Per our records, he did weigh 218 at the beginning of October.     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  His kappa and lambda light chains were high with a kappa/lambda ratio of 0.2.  He was referred to hematology and oncology who determined his MGUS to be

## 2024-11-08 NOTE — PROGRESS NOTES
Report called to [facility] and given to Luci, all questions answered, notified of 630  time.

## 2024-11-08 NOTE — CARE COORDINATION
Continuity of Care Form    Patient Name: Jona Heredia   :  1945  MRN:  [id number]    Admit date:  2024  Discharge date:  24    Code Status Order: DNR-CCA   Advance Directives:   Advance Care Flowsheet Documentation             Admitting Physician:  Ry Boston MD  PCP: Ry Boston MD    Discharging Nurse: flora RODRIGUEZ   Discharging Hospital Unit/Room#:   Discharging Unit Phone Number: [phone]    Emergency Contact:   Extended Emergency Contact Information  Primary Emergency Contact: Tomy Heredia  Address: [address] [place], [place] [address] [place] [address]  Home Phone: [phone]  Mobile Phone: [phone]  Relation: Brother/Sister  Secondary Emergency Contact: Bhavna Hreedia  Address: [address] [place] [address] [facility]  Home Phone: [phone]  Mobile Phone: [phone]  Relation: Other Relative    Past Surgical History:  Past Surgical History:   Procedure Laterality Date    ABDOMEN SURGERY  2016    explor. lap., ileostomy    ABDOMEN SURGERY  2017    colostomy/ileostomy takedown with wound vac application hernia peristomal repair    CARDIAC CATHETERIZATION      NO STENTS    CARDIAC PROCEDURE N/A 2023    taleb / Left heart cath / rm 3025 performed by Jon Gutierrez MD at [facility] CARDIAC CATH LAB    CARDIAC PROCEDURE N/A 2023    Coronary angiography performed by Jon Gutierrez MD at [facility] CARDIAC CATH LAB    CARDIOVERSION  10/18/2023    CARDIOVERSION N/A 2024    DR. MERA    COLONOSCOPY  2005    COLONOSCOPY  2016    extreme spasmatic colon; extensive diverticulosis; ventral herniation; flat polyp; large internal hemorrhoids    COLONOSCOPY  2016    through ileostomy    EP DEVICE PROCEDURE N/A 2023    Linda / icd / rm 3025 performed by Candido Mera MD at [facility] CARDIAC CATH LAB    ILEOSTOMY OR JEJUNOSTOMY      POLYPECTOMY      NE REPAIR  11/05/24 0800   Drainage Amount Small (< 25%) 11/04/24 1600   Drainage Description Serosanguinous 11/04/24 1600   Odor Moderate 11/04/24 1600   Olivia-wound Assessment Fragile 11/04/24 1600   Margins Attached edges 11/04/24 1600   Number of days: 0       Wound 11/04/24 Leg Right;Lower (Active)   Wound Image    11/04/24 1129   Wound Etiology Venous 11/05/24 0800   Dressing Status Clean;Dry;Intact 11/05/24 0800   Wound Cleansed Not Cleansed 11/05/24 0800   Dressing/Treatment Other (comment) 11/05/24 0800   Wound Assessment Other (Comment) 11/05/24 0800   Drainage Amount Moderate (25-50%) 11/04/24 1600   Drainage Description Serosanguinous 11/04/24 1600   Odor Moderate 11/04/24 1600   Olivia-wound Assessment Fragile 11/04/24 1600   Margins Defined edges 11/04/24 1600   Number of days: 0     Bilateral legs: Cleanse with soap and water, pat dry.  Apply Eucerin cream to dry areas (avoid wounds). Place oil emulsion dressing over wounds and cover with ABD, wrap with roll gauze and ace wrap (4\" ace wrap from toe to ankle, 6\" ace wrap from ankle to knee, on in the morning, off at bedtime).  Change daily and as needed if loose or soiled.     Buttocks: Cleanse with soap and water, pat dry.  Apply Triad cream twice daily and as needed.      Elimination:  Continence:   Bowel: Yes  Bladder: Yes  Urinary Catheter: None   Colostomy/Ileostomy/Ileal Conduit: No       Date of Last BM: 11/8/2024    Intake/Output Summary (Last 24 hours) at 11/5/2024 1138  Last data filed at 11/5/2024 0646  Gross per 24 hour   Intake 819 ml   Output 2800 ml   Net -1981 ml     I/O last 3 completed shifts:  In: 819 [P.O.:420; I.V.:399]  Out: 2800 [Urine:2800]    Safety Concerns:     At Risk for Falls    Impairments/Disabilities:      None    Nutrition Therapy:  Current Nutrition Therapy:   - Oral Diet:  General, Low Sodium (3-4gm), and low potassium    Routes of Feeding: Oral  Liquids: No Restrictions  Daily Fluid Restriction: yes - amount 1000ml  Last Modified

## 2024-11-08 NOTE — CONSULTS
Nutrition Education    .Pt with questions about diet. Familiar with some foods that are high in potassium that he tries to limit. Also avoids adding salt to foods and tries to limit processed foods. Pt willint to try Brant supplements that may promote wound healing. Will add Brant x 2 daily.     Educated on Low Sodium, Low Potassium, Low Phosphorus diet. Suggested pt clarify with nephrologist whether fluid restriction needs to continue.   Learners: Patient  Readiness: Eager  Method: Explanation and Handouts. Low-Sodium Nutrition Therapy, Phosphorus Content of Foods, potassium content of foods list.   Response: Verbalizes Understanding  Contact name and number provided.    Dinora Rudolph RD, LD  Contact Number: [phone]

## 2024-11-11 ENCOUNTER — TELEPHONE (OUTPATIENT)
Age: 79
End: 2024-11-11

## 2024-11-11 ENCOUNTER — COMMUNITY CARE MANAGEMENT (OUTPATIENT)
Facility: CLINIC | Age: 79
End: 2024-11-11

## 2024-11-11 NOTE — PROGRESS NOTES
TCM Care Coordination Summary  [facility] [phone] 11/11/24 Patient discharged to [place] 11/08/24. Call made to facility and spoke with Xiao, patient's nurse. Xiao confirmed that patient discharged to their facility and stated that there are no needs. Xiao stated that she has scheduled needed appts. Discharge plan unknown at this time.-ALEXIS RIGGINS

## 2024-11-12 ENCOUNTER — TELEPHONE (OUTPATIENT)
Dept: INTERNAL MEDICINE CLINIC | Age: 79
End: 2024-11-12

## 2024-11-12 ENCOUNTER — TELEPHONE (OUTPATIENT)
Age: 79
End: 2024-11-12

## 2024-11-12 ENCOUNTER — OUTSIDE SERVICES (OUTPATIENT)
Dept: PRIMARY CARE CLINIC | Age: 79
End: 2024-11-12

## 2024-11-12 DIAGNOSIS — I50.22 CHRONIC SYSTOLIC CONGESTIVE HEART FAILURE (HCC): ICD-10-CM

## 2024-11-12 DIAGNOSIS — I89.0 LYMPHEDEMA OF BOTH LOWER EXTREMITIES: ICD-10-CM

## 2024-11-12 DIAGNOSIS — N17.0 ACUTE RENAL FAILURE WITH TUBULAR NECROSIS (HCC): Primary | ICD-10-CM

## 2024-11-12 ASSESSMENT — ENCOUNTER SYMPTOMS
NAUSEA: 0
VOMITING: 0
DIARRHEA: 0
COUGH: 0
SHORTNESS OF BREATH: 0

## 2024-11-12 NOTE — TELEPHONE ENCOUNTER
Attempted to reach patient regarding overdue Annual Wellness Visit. Writer left a detailed voice message for patient to schedule. Awaiting call back.

## 2024-11-12 NOTE — TELEPHONE ENCOUNTER
Spouse/female returning vm from [place].   Spouse/female questioned if appt was marked as a no show or cancellation; pt was contacted by office that clinic had been cancelled due to an emergent procedure.     PSC- confirmed the appt had been marked as cancelled by the provider. Questioned if the pt is ready to r/s visit.     Spouse/female- not going to r/s at this time, the pt is still at [facility] for Rehab.     Spouse/female questioned if the pt should be wearing the monitor he was given, while at rehab?   Pt was admitted to the Rehab Center on 11/4. Since he isnt wearing the monitor there will be no readings available.     PSC- advised spouse I will forward question team and physician and will give a call back.     Call back number [phone]

## 2024-11-12 NOTE — PROGRESS NOTES
& Plan:  Recheck labs   2. Lymphedema of both lower extremities  Assessment & Plan:  On lower dose or torsemide due to renal function.  Monitor closely for increase edema or signs of CHF   3. Chronic systolic congestive heart failure (HCC)  Assessment & Plan:   With lower dose of diuretics and off ACE- will monitor closely for worsening CHF

## 2024-11-12 NOTE — ASSESSMENT & PLAN NOTE
On lower dose or torsemide due to renal function.  Monitor closely for increase edema or signs of CHF

## 2024-11-12 NOTE — TELEPHONE ENCOUNTER
Bhavna states that the patient is in [facility]. She will take monitor to rehab and set up.

## 2024-11-18 PROCEDURE — 93295 DEV INTERROG REMOTE 1/2/MLT: CPT | Performed by: SPECIALIST

## 2024-11-18 PROCEDURE — 93296 REM INTERROG EVL PM/IDS: CPT | Performed by: SPECIALIST

## 2024-11-19 ENCOUNTER — OUTSIDE SERVICES (OUTPATIENT)
Dept: PRIMARY CARE CLINIC | Age: 79
End: 2024-11-19

## 2024-11-19 ENCOUNTER — HOSPITAL ENCOUNTER (OUTPATIENT)
Dept: NUCLEAR MEDICINE | Age: 79
Discharge: HOME | End: 2024-11-21
Attending: INTERNAL MEDICINE
Payer: MEDICARE

## 2024-11-19 DIAGNOSIS — I89.0 LYMPHEDEMA OF BOTH LOWER EXTREMITIES: ICD-10-CM

## 2024-11-19 DIAGNOSIS — N18.4 CKD (CHRONIC KIDNEY DISEASE) STAGE 4, GFR 15-29 ML/MIN (HCC): ICD-10-CM

## 2024-11-19 DIAGNOSIS — D47.2 MGUS (MONOCLONAL GAMMOPATHY OF UNKNOWN SIGNIFICANCE): ICD-10-CM

## 2024-11-19 DIAGNOSIS — I50.22 CHRONIC SYSTOLIC CONGESTIVE HEART FAILURE (HCC): Primary | ICD-10-CM

## 2024-11-19 LAB — GLUCOSE BLD-MCNC: 91 MG/DL (ref 75–110)

## 2024-11-19 PROCEDURE — A9552 F18 FDG: HCPCS | Performed by: INTERNAL MEDICINE

## 2024-11-19 PROCEDURE — 3430000000 HC RX DIAGNOSTIC RADIOPHARMACEUTICAL: Performed by: INTERNAL MEDICINE

## 2024-11-19 PROCEDURE — 2580000003 HC RX 258: Performed by: INTERNAL MEDICINE

## 2024-11-19 PROCEDURE — 82947 ASSAY GLUCOSE BLOOD QUANT: CPT

## 2024-11-19 PROCEDURE — 78815 PET IMAGE W/CT SKULL-THIGH: CPT

## 2024-11-19 PROCEDURE — A9609 HC RX DIAGNOSTIC RADIOPHARMACEUTICAL: HCPCS | Performed by: INTERNAL MEDICINE

## 2024-11-19 RX ORDER — SODIUM CHLORIDE 0.9 % (FLUSH) 0.9 %
10 SYRINGE (ML) INJECTION ONCE
Status: COMPLETED | OUTPATIENT
Start: 2024-11-19 | End: 2024-11-19

## 2024-11-19 RX ORDER — FLUDEOXYGLUCOSE F 18 200 MCI/ML
10 INJECTION, SOLUTION INTRAVENOUS
Status: COMPLETED | OUTPATIENT
Start: 2024-11-19 | End: 2024-11-19

## 2024-11-19 RX ADMIN — SODIUM CHLORIDE, PRESERVATIVE FREE 10 ML: 5 INJECTION INTRAVENOUS at 09:50

## 2024-11-19 RX ADMIN — FLUDEOXYGLUCOSE F 18 11.49 MILLICURIE: 200 INJECTION, SOLUTION INTRAVENOUS at 09:49

## 2024-11-19 ASSESSMENT — ENCOUNTER SYMPTOMS
NAUSEA: 0
DIARRHEA: 0
SHORTNESS OF BREATH: 0
VOMITING: 0
COUGH: 0

## 2024-11-19 NOTE — PROGRESS NOTES
Plan:  Continue current meds.  No SOB or CP.    2. CKD (chronic kidney disease) stage 4, GFR 15-29 ml/min ([facility])  Assessment & Plan:  Continue to monitor labs.  Continue current dose of diuretics   3. Lymphedema of both lower extremities  Assessment & Plan:  Continue same meds.  Has been keeping his feet elevated more and that is helping.

## 2024-11-20 ENCOUNTER — TELEPHONE (OUTPATIENT)
Age: 79
End: 2024-11-20

## 2024-11-26 ENCOUNTER — OUTSIDE SERVICES (OUTPATIENT)
Dept: PRIMARY CARE CLINIC | Age: 79
End: 2024-11-26

## 2024-11-26 ENCOUNTER — TELEPHONE (OUTPATIENT)
Age: 79
End: 2024-11-26
Payer: MEDICARE

## 2024-11-26 DIAGNOSIS — I89.0 LYMPHEDEMA OF BOTH LOWER EXTREMITIES: ICD-10-CM

## 2024-11-26 DIAGNOSIS — U07.1 COVID: ICD-10-CM

## 2024-11-26 DIAGNOSIS — I50.22 CHRONIC SYSTOLIC CONGESTIVE HEART FAILURE (HCC): Primary | ICD-10-CM

## 2024-11-26 ASSESSMENT — ENCOUNTER SYMPTOMS
COUGH: 1
DIARRHEA: 0
SHORTNESS OF BREATH: 0
VOMITING: 0
NAUSEA: 0

## 2024-11-26 NOTE — PROGRESS NOTES
Jona Heredia is a 79 y.o. male being seen for his weekly follow up.  Location of visit: [facility]    HPI:  New today:  Pt denies any new complaints except some cough.  Diagnosed with covid, but no increased SOB.          Review of Systems   Constitutional:  Negative for activity change, appetite change, chills, diaphoresis and fever.   HENT:  Positive for congestion.    Respiratory:  Positive for cough. Negative for shortness of breath.    Cardiovascular:  Negative for chest pain and palpitations.   Gastrointestinal:  Negative for diarrhea, nausea and vomiting.   Genitourinary:  Negative for hematuria.   Musculoskeletal:  Negative for arthralgias and myalgias.   Skin:  Negative for rash.   Neurological:  Negative for weakness and headaches.   Psychiatric/Behavioral:  Negative for agitation, dysphoric mood and sleep disturbance. The patient is not nervous/anxious.           Physical Exam  Vitals reviewed.   Constitutional:       General: He is not in acute distress.  HENT:      Head: Normocephalic and atraumatic.      Nose: No congestion or rhinorrhea.   Eyes:      General:         Right eye: No discharge.         Left eye: No discharge.   Cardiovascular:      Rate and Rhythm: Normal rate and regular rhythm.   Pulmonary:      Effort: Pulmonary effort is normal. No respiratory distress.      Breath sounds: Normal breath sounds.   Abdominal:      Palpations: Abdomen is soft.      Tenderness: There is no abdominal tenderness.   Musculoskeletal:      Right lower leg: No edema.      Left lower leg: No edema.   Skin:     General: Skin is warm and dry.   Neurological:      Mental Status: He is alert. Mental status is at baseline.          ASSESSMENT:   Diagnosis Orders   1. Chronic systolic congestive heart failure (HCC)        2. COVID        3. Lymphedema of both lower extremities            PLAN:  1. Chronic systolic congestive heart failure (HCC)  Assessment & Plan:  Followed by cardiology.  Continue same

## 2024-12-03 ENCOUNTER — OUTSIDE SERVICES (OUTPATIENT)
Dept: PRIMARY CARE CLINIC | Age: 79
End: 2024-12-03

## 2024-12-03 DIAGNOSIS — I10 ESSENTIAL HYPERTENSION: Chronic | ICD-10-CM

## 2024-12-03 DIAGNOSIS — I50.22 CHRONIC SYSTOLIC CONGESTIVE HEART FAILURE (HCC): ICD-10-CM

## 2024-12-03 DIAGNOSIS — U07.1 COVID: Primary | ICD-10-CM

## 2024-12-03 DIAGNOSIS — I89.0 LYMPHEDEMA OF BOTH LOWER EXTREMITIES: ICD-10-CM

## 2024-12-03 ASSESSMENT — ENCOUNTER SYMPTOMS
SHORTNESS OF BREATH: 0
VOMITING: 0
COUGH: 1
NAUSEA: 0
DIARRHEA: 0

## 2024-12-03 NOTE — ASSESSMENT & PLAN NOTE
Continue cough meds as needed.  Out of Covid isolation on 12/5 as pt is not having any worsening symptoms.

## 2024-12-03 NOTE — PROGRESS NOTES
Jona Heredia is a 79 y.o. male being seen for his weekly follow up.  Location of visit: [facility]    HPI:  New today:  Pt states just having slight cough from covid, but bringing up mucous now instead of dry cough.  No CP or SOB.         Review of Systems   Constitutional:  Negative for activity change, appetite change, chills, diaphoresis and fever.   HENT:  Negative for congestion.    Respiratory:  Positive for cough. Negative for shortness of breath.    Cardiovascular:  Negative for chest pain and palpitations.   Gastrointestinal:  Negative for diarrhea, nausea and vomiting.   Genitourinary:  Negative for hematuria.   Musculoskeletal:  Negative for arthralgias and myalgias.   Skin:  Negative for rash.   Neurological:  Negative for weakness and headaches.   Psychiatric/Behavioral:  Negative for agitation, dysphoric mood and sleep disturbance. The patient is not nervous/anxious.           Physical Exam  Vitals reviewed.   Constitutional:       General: He is not in acute distress.  HENT:      Head: Normocephalic and atraumatic.      Nose: No congestion or rhinorrhea.   Eyes:      General:         Right eye: No discharge.         Left eye: No discharge.   Cardiovascular:      Rate and Rhythm: Normal rate and regular rhythm.   Pulmonary:      Effort: Pulmonary effort is normal. No respiratory distress.      Breath sounds: Normal breath sounds.   Abdominal:      Palpations: Abdomen is soft.      Tenderness: There is no abdominal tenderness.   Musculoskeletal:      Right lower leg: No edema.      Left lower leg: No edema.   Skin:     General: Skin is warm and dry.   Neurological:      Mental Status: He is alert. Mental status is at baseline.          ASSESSMENT:   Diagnosis Orders   1. COVID        2. Chronic systolic congestive heart failure (HCC)        3. Essential hypertension        4. Lymphedema of both lower extremities            PLAN:  1. COVID  Assessment & Plan:  Continue cough meds as needed.

## 2024-12-09 ENCOUNTER — HOSPITAL ENCOUNTER (OUTPATIENT)
Age: 79
Discharge: HOME | End: 2024-12-09
Payer: MEDICARE

## 2024-12-09 LAB
25(OH)D3 SERPL-MCNC: 17.3 NG/ML (ref 30–100)
ALBUMIN SERPL-MCNC: 3.6 G/DL (ref 3.5–5.2)
ANION GAP SERPL CALCULATED.3IONS-SCNC: 10 MMOL/L (ref 9–16)
BUN SERPL-MCNC: 37 MG/DL (ref 8–23)
CA-I BLD-SCNC: 1.22 MMOL/L (ref 1.13–1.33)
CALCIUM SERPL-MCNC: 8.8 MG/DL (ref 8.6–10.4)
CHLORIDE SERPL-SCNC: 108 MMOL/L (ref 98–107)
CO2 SERPL-SCNC: 27 MMOL/L (ref 20–31)
CREAT SERPL-MCNC: 2.1 MG/DL (ref 0.7–1.2)
CREAT UR-MCNC: 60.2 MG/DL (ref 39–259)
ERYTHROCYTE [DISTWIDTH] IN BLOOD BY AUTOMATED COUNT: 18 % (ref 11.5–14.9)
GFR, ESTIMATED: 31 ML/MIN/1.73M2
GLUCOSE SERPL-MCNC: 89 MG/DL (ref 74–99)
HCT VFR BLD AUTO: 34 % (ref 41–53)
HGB BLD-MCNC: 11.4 G/DL (ref 13.5–17.5)
MCH RBC QN AUTO: 31.9 PG (ref 26–34)
MCHC RBC AUTO-ENTMCNC: 33.4 G/DL (ref 31–37)
MCV RBC AUTO: 95.5 FL (ref 80–100)
PHOSPHATE SERPL-MCNC: 2.1 MG/DL (ref 2.5–4.5)
PLATELET # BLD AUTO: 135 K/UL (ref 150–450)
PMV BLD AUTO: 7.8 FL (ref 6–12)
POTASSIUM SERPL-SCNC: 4.3 MMOL/L (ref 3.7–5.3)
PTH-INTACT SERPL-MCNC: 90 PG/ML (ref 15–65)
RBC # BLD AUTO: 3.56 M/UL (ref 4.5–5.9)
SODIUM SERPL-SCNC: 145 MMOL/L (ref 136–145)
TOTAL PROTEIN, URINE: 14 MG/DL
URINE TOTAL PROTEIN CREATININE RATIO: 0.23
WBC OTHER # BLD: 4.7 K/UL (ref 3.5–11)

## 2024-12-09 PROCEDURE — 82306 VITAMIN D 25 HYDROXY: CPT

## 2024-12-09 PROCEDURE — 82040 ASSAY OF SERUM ALBUMIN: CPT

## 2024-12-09 PROCEDURE — 36415 COLL VENOUS BLD VENIPUNCTURE: CPT

## 2024-12-09 PROCEDURE — 84156 ASSAY OF PROTEIN URINE: CPT

## 2024-12-09 PROCEDURE — 84100 ASSAY OF PHOSPHORUS: CPT

## 2024-12-09 PROCEDURE — 83970 ASSAY OF PARATHORMONE: CPT

## 2024-12-09 PROCEDURE — 82330 ASSAY OF CALCIUM: CPT

## 2024-12-09 PROCEDURE — 85027 COMPLETE CBC AUTOMATED: CPT

## 2024-12-09 PROCEDURE — 80048 BASIC METABOLIC PNL TOTAL CA: CPT

## 2024-12-09 PROCEDURE — 82570 ASSAY OF URINE CREATININE: CPT

## 2024-12-10 ENCOUNTER — OFFICE VISIT (OUTPATIENT)
Dept: UROLOGY | Age: 79
End: 2024-12-10
Payer: MEDICARE

## 2024-12-10 ENCOUNTER — OUTSIDE SERVICES (OUTPATIENT)
Dept: PRIMARY CARE CLINIC | Age: 79
End: 2024-12-10

## 2024-12-10 VITALS
BODY MASS INDEX: 33.12 KG/M2 | TEMPERATURE: 97.8 F | DIASTOLIC BLOOD PRESSURE: 76 MMHG | HEART RATE: 61 BPM | OXYGEN SATURATION: 97 % | SYSTOLIC BLOOD PRESSURE: 128 MMHG | WEIGHT: 211 LBS | HEIGHT: 67 IN

## 2024-12-10 DIAGNOSIS — N18.4 CKD (CHRONIC KIDNEY DISEASE) STAGE 4, GFR 15-29 ML/MIN (HCC): ICD-10-CM

## 2024-12-10 DIAGNOSIS — R33.9 URINARY RETENTION: Primary | ICD-10-CM

## 2024-12-10 DIAGNOSIS — I89.0 LYMPHEDEMA OF BOTH LOWER EXTREMITIES: Primary | ICD-10-CM

## 2024-12-10 DIAGNOSIS — I50.22 CHRONIC SYSTOLIC CONGESTIVE HEART FAILURE (HCC): ICD-10-CM

## 2024-12-10 PROCEDURE — 3074F SYST BP LT 130 MM HG: CPT | Performed by: UROLOGY

## 2024-12-10 PROCEDURE — 1123F ACP DISCUSS/DSCN MKR DOCD: CPT | Performed by: UROLOGY

## 2024-12-10 PROCEDURE — 1159F MED LIST DOCD IN RCRD: CPT | Performed by: UROLOGY

## 2024-12-10 PROCEDURE — 51798 US URINE CAPACITY MEASURE: CPT | Performed by: UROLOGY

## 2024-12-10 PROCEDURE — G8484 FLU IMMUNIZE NO ADMIN: HCPCS | Performed by: UROLOGY

## 2024-12-10 PROCEDURE — 1036F TOBACCO NON-USER: CPT | Performed by: UROLOGY

## 2024-12-10 PROCEDURE — 3078F DIAST BP <80 MM HG: CPT | Performed by: UROLOGY

## 2024-12-10 PROCEDURE — G8427 DOCREV CUR MEDS BY ELIG CLIN: HCPCS | Performed by: UROLOGY

## 2024-12-10 PROCEDURE — G8417 CALC BMI ABV UP PARAM F/U: HCPCS | Performed by: UROLOGY

## 2024-12-10 PROCEDURE — 99213 OFFICE O/P EST LOW 20 MIN: CPT | Performed by: UROLOGY

## 2024-12-10 ASSESSMENT — ENCOUNTER SYMPTOMS
BACK PAIN: 0
EYE REDNESS: 0
SHORTNESS OF BREATH: 0
EYE PAIN: 0
RESPIRATORY NEGATIVE: 1
GASTROINTESTINAL NEGATIVE: 1
COUGH: 0
ABDOMINAL PAIN: 0
NAUSEA: 0
EYES NEGATIVE: 1
WHEEZING: 0
VOMITING: 0
COLOR CHANGE: 0
ALLERGIC/IMMUNOLOGIC NEGATIVE: 1

## 2024-12-10 NOTE — PROGRESS NOTES
[facility] PHYSICIANS [facility] UROLOGY CENTER  [address]  [facility] [address]  Dept: [phone]    [facility] Urology Office Note - Established    Patient:  Jona Heredia  YOB: 1945  Date: 12/10/2024    The patient is a 79 y.o. male who presents todayfor evaluation of the following problems:   Chief Complaint   Patient presents with    Post-Op Check      hospital fu renal us done 11/4/2024           HPI  Here in follow up for retention.   He had his brown removed and he is doing better.   He feels like he is voiding well.   He continues on Flomax and finasteride.   No issues with constipation.       Summary of old records: N/A    Additional History: N/A    Procedures Today: N/A    Urinalysis today:  No results found for this visit on 12/10/24.  Last several PSA's:  Lab Results   Component Value Date    PSA 1.50 11/06/2024    PSA 1.66 09/28/2022    PSA 4.43 (H) 09/18/2019     Last total testosterone:  No results found for: \"TESTOSTERONE\"    AUA Symptom Score (12/10/2024):                               Last BUN and creatinine:  Lab Results   Component Value Date    BUN 37 (H) 12/09/2024     Lab Results   Component Value Date    CREATININE 2.1 (H) 12/09/2024       Additional Lab/Culture results: none    Imaging Reviewed during this Office Visit: none  (results were independently reviewed by physician and radiology report verified)    PAST MEDICAL, FAMILY AND SOCIAL HISTORY UPDATE:  Past Medical History:   Diagnosis Date    Abdominal hernia     Atrophy of muscle of multiple sites 8/19/2016    CAD (coronary artery disease)     CKD (chronic kidney disease) stage 3, GFR 30-59 ml/min ([facility])     Colon polyp     Crohn disease (HCC)     Crohn's colitis ([facility])     Decubitus ulcer of coccyx, stage 2 ([facility]) 8/8/2016    Diarrhea     Diverticulosis     Hemorrhoids     History of atrial fibrillation     Hypertension     Ileostomy in place ([facility]) 8/18/2016

## 2024-12-11 ASSESSMENT — ENCOUNTER SYMPTOMS
SHORTNESS OF BREATH: 0
VOMITING: 0
COUGH: 0
NAUSEA: 0
DIARRHEA: 0

## 2024-12-11 NOTE — PROGRESS NOTES
Jona Heredia is a 79 y.o. male being seen for his weekly follow up.  Location of visit: [facility]    HPI:  New today:  pt doing well.  No new issues.  Plan is for home soon.          Review of Systems   Constitutional:  Negative for activity change, appetite change, chills, diaphoresis and fever.   HENT:  Negative for congestion.    Respiratory:  Negative for cough and shortness of breath.    Cardiovascular:  Negative for chest pain and palpitations.   Gastrointestinal:  Negative for diarrhea, nausea and vomiting.   Genitourinary:  Negative for hematuria.   Musculoskeletal:  Negative for arthralgias and myalgias.   Skin:  Negative for rash.   Neurological:  Negative for weakness and headaches.   Psychiatric/Behavioral:  Negative for agitation, dysphoric mood and sleep disturbance. The patient is not nervous/anxious.           Physical Exam  Vitals reviewed.   Constitutional:       General: He is not in acute distress.  HENT:      Head: Normocephalic and atraumatic.      Nose: No congestion or rhinorrhea.   Eyes:      General:         Right eye: No discharge.         Left eye: No discharge.   Cardiovascular:      Rate and Rhythm: Normal rate and regular rhythm.   Pulmonary:      Effort: Pulmonary effort is normal. No respiratory distress.      Breath sounds: Normal breath sounds.   Abdominal:      Palpations: Abdomen is soft.      Tenderness: There is no abdominal tenderness.   Musculoskeletal:      Right lower leg: No edema.      Left lower leg: No edema.   Skin:     General: Skin is warm and dry.   Neurological:      Mental Status: He is alert. Mental status is at baseline.          ASSESSMENT:   Diagnosis Orders   1. Lymphedema of both lower extremities        2. Chronic systolic congestive heart failure (HCC)        3. CKD (chronic kidney disease) stage 4, GFR 15-29 ml/min ([facility])            PLAN:  1. Lymphedema of both lower extremities  Assessment & Plan:  Continues with wraps.  Looks better.  No new

## 2024-12-13 ENCOUNTER — HOSPITAL ENCOUNTER (OUTPATIENT)
Dept: CT IMAGING | Age: 79
Discharge: HOME | End: 2024-12-15
Attending: INTERNAL MEDICINE
Payer: MEDICARE

## 2024-12-13 VITALS
WEIGHT: 212.7 LBS | HEIGHT: 67 IN | TEMPERATURE: 97.3 F | SYSTOLIC BLOOD PRESSURE: 133 MMHG | OXYGEN SATURATION: 97 % | RESPIRATION RATE: 14 BRPM | HEART RATE: 65 BPM | DIASTOLIC BLOOD PRESSURE: 62 MMHG | BODY MASS INDEX: 33.38 KG/M2

## 2024-12-13 DIAGNOSIS — D47.2 MGUS (MONOCLONAL GAMMOPATHY OF UNKNOWN SIGNIFICANCE): ICD-10-CM

## 2024-12-13 LAB
BASOPHILS # BLD: 0 K/UL (ref 0–0.2)
BASOPHILS NFR BLD: 1 % (ref 0–2)
EOSINOPHIL # BLD: 0.3 K/UL (ref 0–0.4)
EOSINOPHILS RELATIVE PERCENT: 7 % (ref 0–4)
ERYTHROCYTE [DISTWIDTH] IN BLOOD BY AUTOMATED COUNT: 17.9 % (ref 11.5–14.9)
HCT VFR BLD AUTO: 35.4 % (ref 41–53)
HGB BLD-MCNC: 11.8 G/DL (ref 13.5–17.5)
INR PPP: 1.3
LYMPHOCYTES NFR BLD: 0.4 K/UL (ref 1–4.8)
LYMPHOCYTES RELATIVE PERCENT: 10 % (ref 24–44)
MCH RBC QN AUTO: 31.7 PG (ref 26–34)
MCHC RBC AUTO-ENTMCNC: 33.3 G/DL (ref 31–37)
MCV RBC AUTO: 95.2 FL (ref 80–100)
MONOCYTES NFR BLD: 0.4 K/UL (ref 0.1–1.3)
MONOCYTES NFR BLD: 11 % (ref 1–7)
NEUTROPHILS NFR BLD: 71 % (ref 36–66)
NEUTS SEG NFR BLD: 3 K/UL (ref 1.3–9.1)
PARTIAL THROMBOPLASTIN TIME: 36.3 SEC (ref 24–36)
PLATELET # BLD AUTO: 119 K/UL (ref 150–450)
PMV BLD AUTO: 7.6 FL (ref 6–12)
PROTHROMBIN TIME: 16.2 SEC (ref 11.8–14.6)
RBC # BLD AUTO: 3.72 M/UL (ref 4.5–5.9)
RETICS # AUTO: 0.07 M/UL (ref 0.02–0.1)
RETICS/RBC NFR AUTO: 1.9 % (ref 0.5–2)
WBC OTHER # BLD: 4.2 K/UL (ref 3.5–11)

## 2024-12-13 PROCEDURE — 7100000010 HC PHASE II RECOVERY - FIRST 15 MIN: Performed by: RADIOLOGY

## 2024-12-13 PROCEDURE — 88185 FLOWCYTOMETRY/TC ADD-ON: CPT

## 2024-12-13 PROCEDURE — 88305 TISSUE EXAM BY PATHOLOGIST: CPT

## 2024-12-13 PROCEDURE — 88313 SPECIAL STAINS GROUP 2: CPT

## 2024-12-13 PROCEDURE — 88275 CYTOGENETICS 100-300: CPT

## 2024-12-13 PROCEDURE — 88237 TISSUE CULTURE BONE MARROW: CPT

## 2024-12-13 PROCEDURE — 88360 TUMOR IMMUNOHISTOCHEM/MANUAL: CPT

## 2024-12-13 PROCEDURE — 88184 FLOWCYTOMETRY/ TC 1 MARKER: CPT

## 2024-12-13 PROCEDURE — 85730 THROMBOPLASTIN TIME PARTIAL: CPT

## 2024-12-13 PROCEDURE — 88364 INSITU HYBRIDIZATION (FISH): CPT

## 2024-12-13 PROCEDURE — 77012 CT SCAN FOR NEEDLE BIOPSY: CPT

## 2024-12-13 PROCEDURE — 88311 DECALCIFY TISSUE: CPT

## 2024-12-13 PROCEDURE — 7100000011 HC PHASE II RECOVERY - ADDTL 15 MIN: Performed by: RADIOLOGY

## 2024-12-13 PROCEDURE — 88271 CYTOGENETICS DNA PROBE: CPT

## 2024-12-13 PROCEDURE — 88280 CHROMOSOME KARYOTYPE STUDY: CPT

## 2024-12-13 PROCEDURE — 85025 COMPLETE CBC W/AUTO DIFF WBC: CPT

## 2024-12-13 PROCEDURE — 88312 SPECIAL STAINS GROUP 1: CPT

## 2024-12-13 PROCEDURE — 85610 PROTHROMBIN TIME: CPT

## 2024-12-13 PROCEDURE — 85045 AUTOMATED RETICULOCYTE COUNT: CPT

## 2024-12-13 PROCEDURE — 88365 INSITU HYBRIDIZATION (FISH): CPT

## 2024-12-13 PROCEDURE — 36415 COLL VENOUS BLD VENIPUNCTURE: CPT

## 2024-12-13 PROCEDURE — 88264 CHROMOSOME ANALYSIS 20-25: CPT

## 2024-12-13 RX ORDER — SODIUM CHLORIDE 9 MG/ML
INJECTION, SOLUTION INTRAVENOUS PRN
Status: DISCONTINUED | OUTPATIENT
Start: 2024-12-13 | End: 2024-12-16 | Stop reason: HOSPADM

## 2024-12-13 RX ORDER — ACETAMINOPHEN 325 MG/1
650 TABLET ORAL EVERY 6 HOURS PRN
COMMUNITY

## 2024-12-13 RX ORDER — SODIUM CHLORIDE 0.9 % (FLUSH) 0.9 %
5-40 SYRINGE (ML) INJECTION PRN
Status: DISCONTINUED | OUTPATIENT
Start: 2024-12-13 | End: 2024-12-16 | Stop reason: HOSPADM

## 2024-12-13 RX ORDER — SODIUM CHLORIDE 0.9 % (FLUSH) 0.9 %
5-40 SYRINGE (ML) INJECTION EVERY 12 HOURS SCHEDULED
Status: DISCONTINUED | OUTPATIENT
Start: 2024-12-13 | End: 2024-12-16 | Stop reason: HOSPADM

## 2024-12-13 RX ORDER — BENZONATATE 100 MG/1
200 CAPSULE ORAL 3 TIMES DAILY PRN
COMMUNITY

## 2024-12-13 ASSESSMENT — ENCOUNTER SYMPTOMS
TROUBLE SWALLOWING: 0
SORE THROAT: 0
COUGH: 0
GASTROINTESTINAL NEGATIVE: 1
SHORTNESS OF BREATH: 0
ABDOMINAL PAIN: 0
APNEA: 1
BACK PAIN: 0

## 2024-12-13 ASSESSMENT — PAIN - FUNCTIONAL ASSESSMENT
PAIN_FUNCTIONAL_ASSESSMENT: NONE - DENIES PAIN

## 2024-12-13 NOTE — BRIEF OP NOTE
Brief Postoperative Note for Bone Marrow Biopsy    Jona Heredia  YOB: 1945  [id number]    Pre-operative Diagnosis: MGUS     Post-operative Diagnosis: Same     Procedure: Right posterior iliac bone marrow aspiration with and without heparin & biopsy     Anesthesia: 1% lidocaine      Surgeons/Assistants: Dr. Maryana MD; Rosetta Galvan PA-C     Estimated Blood Loss: Minimal       Complications: None     Specimens Were Obtained: from the Right posterior iliac spine using an Arrow Oncontrol Device 11 g x 4 in. 6 cc was collected with heparin and 4 cc was collected without heparin. A bone biopsy was also obtained. Specimens were received by hem/onc at the time of collection. Presence of spicules were verified by heme/onc tech. Vital signs were reviewed and were stable after the procedure. Patient tolerated procedure well.    Electronically signed by Rosetta Galvan PA-C on 12/13/2024 at 10:33 AM

## 2024-12-13 NOTE — H&P
HISTORY and PHYSICAL  [facility]       NAME:  Jona Heredia  MRN: [id number]   YOB: 1945   Date: 12/13/2024   Age: 79 y.o.  Gender: male       COMPLAINT AND PRESENT HISTORY:     Jona Heredia is 79 y.o.,  male, presents for IR BIOPSY W CT  Primary dx: MGUS (monoclonal gammopathy of unknown significance) [D47.2]     Office note per Dr Rosas Hematology Oncology on 10/29/2024  HISTORY OF PRESENT ILLNESS:   Hematologic history:  Jona Hereida is a 78 y.o. male with a past medical history of CKD, CAD, Crohn's disease was seen during initial consultation visit for anemia and MGUS.     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  His both kappa and lambda light chains were high with a kappa/lambda ratio of 0.2.  Therefore he was referred to hematology for evaluation.     Patient had a repeat lab work recently in January which showed improved hemoglobin at 11.8.  His iron levels appears low at that time.  He is not taking any iron supplements.  He denies any blood in stool, no chest pain shortness of breath.  He denies any new bone pain back pain.  He is accompanied by his sister-in-law during today's office visit.  He denies any unintentional weight loss night sweats fever chills.  He has a chronic lymphedema of both lower EXTR extremities.     INTERVAL HISTORY:  Patient is returning for follow-up visit and to discuss lab results and further recommendations.  His lambda chains have significantly increased from 700 to 1100.  I discussed about the need of bone marrow biopsy.  He denies any new bone pain back pain denies any chest pain shortness of breath.     UPDATE 12/13/2024  Jona Heredia is 79 y.o.,  male, here for IR BIOPSY W CT  Primary dx: MGUS (monoclonal gammopathy of unknown significance) [D47.2]     Pt denies pain.  Denies recent fever/chills.  Denies

## 2024-12-16 ENCOUNTER — TELEPHONE (OUTPATIENT)
Age: 79
End: 2024-12-16

## 2024-12-16 ENCOUNTER — TELEPHONE (OUTPATIENT)
Dept: INTERNAL MEDICINE CLINIC | Age: 79
End: 2024-12-16

## 2024-12-16 RX ORDER — AMIODARONE HYDROCHLORIDE 200 MG/1
200 TABLET ORAL DAILY
Qty: 90 TABLET | Refills: 0 | Status: SHIPPED | OUTPATIENT
Start: 2024-12-16

## 2024-12-17 DIAGNOSIS — R33.9 URINARY RETENTION: Primary | ICD-10-CM

## 2024-12-17 LAB
BONE MARROW REPORT: NORMAL
FLOW CYTOMETRY, BM: NORMAL

## 2024-12-17 RX ORDER — TAMSULOSIN HYDROCHLORIDE 0.4 MG/1
0.4 CAPSULE ORAL DAILY
Qty: 90 CAPSULE | Refills: 0 | Status: SHIPPED | OUTPATIENT
Start: 2024-12-17 | End: 2024-12-18

## 2024-12-17 RX ORDER — FINASTERIDE 5 MG/1
5 TABLET, FILM COATED ORAL DAILY
Qty: 90 TABLET | Refills: 0 | Status: SHIPPED | OUTPATIENT
Start: 2024-12-17 | End: 2024-12-18

## 2024-12-18 RX ORDER — TAMSULOSIN HYDROCHLORIDE 0.4 MG/1
0.4 CAPSULE ORAL DAILY
Qty: 30 CAPSULE | Refills: 0 | Status: SHIPPED | OUTPATIENT
Start: 2024-12-18

## 2024-12-18 RX ORDER — FINASTERIDE 5 MG/1
5 TABLET, FILM COATED ORAL DAILY
Qty: 30 TABLET | Refills: 0 | Status: SHIPPED | OUTPATIENT
Start: 2024-12-18

## 2024-12-18 NOTE — TELEPHONE ENCOUNTER
LOV 12/10/2024    NOV 6/10/2025    Pt is no longer in facility,  ( was getting meds from facility) would like a one month supply sent to [facility] [address].   
Medication was sent to the wrong pharmacy.  Emergency contact Bhavna states a 30 day supply needs to be sent to [facility] pharmacy and in the future a 90 day supply to the mail in pharmacy.   
Refills for both medications sent to the pharmacy.  He needs follow-up in our office.  
85

## 2024-12-19 ENCOUNTER — OFFICE VISIT (OUTPATIENT)
Dept: ONCOLOGY | Age: 79
End: 2024-12-19
Payer: MEDICARE

## 2024-12-19 ENCOUNTER — TELEPHONE (OUTPATIENT)
Dept: ONCOLOGY | Age: 79
End: 2024-12-19

## 2024-12-19 VITALS
WEIGHT: 212 LBS | DIASTOLIC BLOOD PRESSURE: 83 MMHG | TEMPERATURE: 97.2 F | BODY MASS INDEX: 33.2 KG/M2 | OXYGEN SATURATION: 97 % | RESPIRATION RATE: 18 BRPM | SYSTOLIC BLOOD PRESSURE: 152 MMHG

## 2024-12-19 DIAGNOSIS — D47.2 MGUS (MONOCLONAL GAMMOPATHY OF UNKNOWN SIGNIFICANCE): Primary | ICD-10-CM

## 2024-12-19 LAB — CHROMOSOME STUDY: NORMAL

## 2024-12-19 PROCEDURE — 1123F ACP DISCUSS/DSCN MKR DOCD: CPT | Performed by: INTERNAL MEDICINE

## 2024-12-19 PROCEDURE — 3079F DIAST BP 80-89 MM HG: CPT | Performed by: INTERNAL MEDICINE

## 2024-12-19 PROCEDURE — 1036F TOBACCO NON-USER: CPT | Performed by: INTERNAL MEDICINE

## 2024-12-19 PROCEDURE — 1160F RVW MEDS BY RX/DR IN RCRD: CPT | Performed by: INTERNAL MEDICINE

## 2024-12-19 PROCEDURE — G8484 FLU IMMUNIZE NO ADMIN: HCPCS | Performed by: INTERNAL MEDICINE

## 2024-12-19 PROCEDURE — G8417 CALC BMI ABV UP PARAM F/U: HCPCS | Performed by: INTERNAL MEDICINE

## 2024-12-19 PROCEDURE — G8427 DOCREV CUR MEDS BY ELIG CLIN: HCPCS | Performed by: INTERNAL MEDICINE

## 2024-12-19 PROCEDURE — 1126F AMNT PAIN NOTED NONE PRSNT: CPT | Performed by: INTERNAL MEDICINE

## 2024-12-19 PROCEDURE — 1159F MED LIST DOCD IN RCRD: CPT | Performed by: INTERNAL MEDICINE

## 2024-12-19 PROCEDURE — 99214 OFFICE O/P EST MOD 30 MIN: CPT | Performed by: INTERNAL MEDICINE

## 2024-12-19 PROCEDURE — 99211 OFF/OP EST MAY X REQ PHY/QHP: CPT | Performed by: INTERNAL MEDICINE

## 2024-12-19 PROCEDURE — 3077F SYST BP >= 140 MM HG: CPT | Performed by: INTERNAL MEDICINE

## 2024-12-19 NOTE — TELEPHONE ENCOUNTER
Instructions   from Dr. Jayro Rosas MD    RTc in 3 months w labs a week prior    Rv scheduled on 3/20/2025 @9:15; gave pt a copy of their AVS and their labs

## 2024-12-19 NOTE — PROGRESS NOTES
115 and lambda light chain 551 with kappa/lambda ratio of 0.23.  Overall his MGUS appears very low risk/mild but I will complete myeloma workup including immunoglobulin panel and repeat his light chains as well as SPEP  Bone marrow biopsy showed 8% plasma cell.  Overall picture consistent with MGUS and we will continue to monitor.    His recent PET scan is negative for lytic lesions    Anemia; patient had iron deficiency anemia and I will start him on iron pill once daily.  His hemoglobin now improved to 11.8    During today's visit, the patient and the family had a number of reasonable questions which were answered to their satisfaction.  They verbalized understanding of the information provided and they agreed to proceed as outlined above.      PLAN:   I reviewed recent lab work, bone marrow biopsy results, PET scan results, discussed diagnosis, prognosis and treatment recommendations   Overall no evidence of multiple myeloma based on the workup so far   Bone marrow biopsy showed 8% plasma cells   His creatinine has been stable around 2.1 and has been stable for past several years   His hemoglobin now improved to 11.8   Does not have hypercalcemia   His lambda/kappa ratio is around 9 even though his lambda light chains are high at 1143  Will continue surveillance for now  Return to clinic in 3 months with labs 1 week prior or earlier if needed      Jayro Rosas MD  Hematologist/Medical Oncologist    On this date 12/19/24  I have spent 40 minutes reviewing previous notes, test results and face to face with the patient discussing the diagnosis and importance of compliance with the treatment plan. Greater than 50% of that time was spent face-to-face with the patient in counseling and coordinating her care.       This note is created with the assistance of a speech recognition program.  While intending to generate a document that actually reflects the content of the visit, the document can still have some errors

## 2024-12-27 ENCOUNTER — TELEPHONE (OUTPATIENT)
Dept: ONCOLOGY | Age: 79
End: 2024-12-27

## 2025-01-06 ENCOUNTER — OFFICE VISIT (OUTPATIENT)
Age: 80
End: 2025-01-06

## 2025-01-06 VITALS
BODY MASS INDEX: 32.26 KG/M2 | WEIGHT: 206 LBS | OXYGEN SATURATION: 95 % | SYSTOLIC BLOOD PRESSURE: 131 MMHG | DIASTOLIC BLOOD PRESSURE: 55 MMHG | HEART RATE: 83 BPM

## 2025-01-06 DIAGNOSIS — I50.22 CHRONIC SYSTOLIC CONGESTIVE HEART FAILURE (HCC): ICD-10-CM

## 2025-01-06 DIAGNOSIS — I49.01 VF (VENTRICULAR FIBRILLATION): Primary | ICD-10-CM

## 2025-01-06 DIAGNOSIS — E66.01 SEVERE OBESITY (BMI 35.0-39.9) WITH COMORBIDITY: ICD-10-CM

## 2025-01-06 DIAGNOSIS — I48.19 PERSISTENT ATRIAL FIBRILLATION (HCC): ICD-10-CM

## 2025-01-06 NOTE — PROGRESS NOTES
[facility] CARDIAC ELECTROPHYSIOLOGY  [address], [address]  [facility]  [address]    Date of Visit:  2025  Patient Name: Jona Heredia   Patient :  1945   Referring By:  No ref. provider found     CHIEF COMPLAINT/HPI:     Jona Heredia is a 79 y.o. male who presents today for an general visit to be evaluated for the following condition(s):  Chief Complaint   Patient presents with    Follow-up     FOLLOW UP - follow up for VF, Persistent Afib, CHF    Patient came in for routine scheduled visit.  In the past for A-fib we cardioverted him and he remained now in sinus rhythm and feels significantly better.  Gives no symptoms of complication.  He does have also an ICD that was interrogated and reprogrammed.  Medication list was reviewed.    Recently he was admitted to the hospital with low blood pressure and probably was infected with UTI.  Now he lives in a recovery home.    REVIEW OF SYSTEM      Review of Systems otherwise noncontributory other than his legs issues.    REVIEWED INFORMATION      No Known Allergies    Current Outpatient Medications   Medication Sig Dispense Refill    tamsulosin (FLOMAX) 0.4 MG capsule Take 1 capsule by mouth daily 30 capsule 0    finasteride (PROSCAR) 5 MG tablet Take 1 tablet by mouth daily 30 tablet 0    isosorbide mononitrate (IMDUR) 30 MG extended release tablet Take 1 tablet by mouth daily 90 tablet 3    calcitRIOL (ROCALTROL) 0.25 MCG capsule TAKE 1 CAPSULE BY MOUTH 3 TIMES  WEEKLY 39 capsule 3    amiodarone (CORDARONE) 200 MG tablet TAKE 1 TABLET BY MOUTH DAILY 90 tablet 0    acetaminophen (TYLENOL) 325 MG tablet Take 2 tablets by mouth every 6 hours as needed for Pain      vitamin D (ERGOCALCIFEROL) 1.25 MG (03098 UT) CAPS capsule Take 1 capsule by mouth once a week 12 capsule 1    Hydrocerin (EUCERIN) CREA cream Apply topically daily 113 g 0    torsemide (DEMADEX) 20 MG tablet Take 0.5 tablets by mouth every other day 180 tablet 3    dabigatran

## 2025-01-31 NOTE — TELEPHONE ENCOUNTER
Last seen 12/10/24  Assessment & Plan  Doing better after episode of retention.   Continue flomax and finasteride.   PVR was acceptable today at under 200.  F/U in 1 year.      Return in about 6 months (around 6/10/2025).    Next OV 6/10/25  Needs 90 day supply

## 2025-02-02 RX ORDER — TAMSULOSIN HYDROCHLORIDE 0.4 MG/1
0.4 CAPSULE ORAL DAILY
Qty: 90 CAPSULE | Refills: 3 | Status: SHIPPED | OUTPATIENT
Start: 2025-02-02

## 2025-02-28 DIAGNOSIS — I49.01 VF (VENTRICULAR FIBRILLATION) (HCC): Primary | ICD-10-CM

## 2025-03-25 ENCOUNTER — TELEPHONE (OUTPATIENT)
Dept: INTERNAL MEDICINE CLINIC | Age: 80
End: 2025-03-25

## 2025-03-26 ENCOUNTER — HOSPITAL ENCOUNTER (OUTPATIENT)
Age: 80
Discharge: HOME | End: 2025-03-26
Payer: MEDICARE

## 2025-03-26 DIAGNOSIS — Z90.49 H/O RESECTION OF SMALL BOWEL: ICD-10-CM

## 2025-03-26 DIAGNOSIS — Z86.39 HISTORY OF HYPERKALEMIA: ICD-10-CM

## 2025-03-26 DIAGNOSIS — D47.2 MGUS (MONOCLONAL GAMMOPATHY OF UNKNOWN SIGNIFICANCE): ICD-10-CM

## 2025-03-26 DIAGNOSIS — E55.9 HYPOVITAMINOSIS D: ICD-10-CM

## 2025-03-26 DIAGNOSIS — N18.4 CKD (CHRONIC KIDNEY DISEASE) STAGE 4, GFR 15-29 ML/MIN (HCC): ICD-10-CM

## 2025-03-26 DIAGNOSIS — D63.1 ANEMIA IN STAGE 4 CHRONIC KIDNEY DISEASE: ICD-10-CM

## 2025-03-26 DIAGNOSIS — N18.4 ANEMIA IN STAGE 4 CHRONIC KIDNEY DISEASE: ICD-10-CM

## 2025-03-26 DIAGNOSIS — N20.0 CALCULUS OF KIDNEY: ICD-10-CM

## 2025-03-26 DIAGNOSIS — I10 ESSENTIAL HYPERTENSION: ICD-10-CM

## 2025-03-26 LAB
25(OH)D3 SERPL-MCNC: 40.9 NG/ML (ref 30–100)
ABSOLUTE BANDS: 0.05 K/UL (ref 0–1)
ALBUMIN SERPL-MCNC: 3.3 G/DL (ref 3.5–5.2)
ALBUMIN SERPL-MCNC: 3.4 G/DL (ref 3.5–5.2)
ALP SERPL-CCNC: 93 U/L (ref 40–129)
ALT SERPL-CCNC: 17 U/L (ref 10–50)
ANION GAP SERPL CALCULATED.3IONS-SCNC: 12 MMOL/L (ref 9–16)
ANION GAP SERPL CALCULATED.3IONS-SCNC: 13 MMOL/L (ref 9–16)
AST SERPL-CCNC: 16 U/L (ref 10–50)
BANDS: 1 % (ref 0–10)
BASOPHILS # BLD: 0 K/UL (ref 0–0.2)
BASOPHILS NFR BLD: 0 % (ref 0–2)
BILIRUB SERPL-MCNC: 0.6 MG/DL (ref 0–1.2)
BUN SERPL-MCNC: 68 MG/DL (ref 8–23)
BUN SERPL-MCNC: 70 MG/DL (ref 8–23)
CALCIUM SERPL-MCNC: 8.7 MG/DL (ref 8.6–10.4)
CALCIUM SERPL-MCNC: 8.7 MG/DL (ref 8.6–10.4)
CHLORIDE SERPL-SCNC: 101 MMOL/L (ref 98–107)
CHLORIDE SERPL-SCNC: 103 MMOL/L (ref 98–107)
CO2 SERPL-SCNC: 25 MMOL/L (ref 20–31)
CO2 SERPL-SCNC: 26 MMOL/L (ref 20–31)
CREAT SERPL-MCNC: 3 MG/DL (ref 0.7–1.2)
CREAT SERPL-MCNC: 3.1 MG/DL (ref 0.7–1.2)
CREAT UR-MCNC: 102 MG/DL (ref 39–259)
EOSINOPHIL # BLD: 0.22 K/UL (ref 0–0.4)
EOSINOPHILS RELATIVE PERCENT: 4 % (ref 0–4)
ERYTHROCYTE [DISTWIDTH] IN BLOOD BY AUTOMATED COUNT: 17.1 % (ref 11.5–14.9)
ERYTHROCYTE [DISTWIDTH] IN BLOOD BY AUTOMATED COUNT: 17.2 % (ref 11.5–14.9)
FREE KAPPA/LAMBDA RATIO: 0.11 (ref 0.22–1.74)
GFR, ESTIMATED: 20 ML/MIN/1.73M2
GFR, ESTIMATED: 20 ML/MIN/1.73M2
GLUCOSE SERPL-MCNC: 102 MG/DL (ref 74–99)
GLUCOSE SERPL-MCNC: 102 MG/DL (ref 74–99)
HCT VFR BLD AUTO: 33.4 % (ref 41–53)
HCT VFR BLD AUTO: 34.7 % (ref 41–53)
HGB BLD-MCNC: 11.1 G/DL (ref 13.5–17.5)
HGB BLD-MCNC: 11.1 G/DL (ref 13.5–17.5)
IGA SERPL-MCNC: 355 MG/DL (ref 70–400)
IGG SERPL-MCNC: 1416 MG/DL (ref 700–1600)
IGM SERPL-MCNC: 82 MG/DL (ref 40–230)
KAPPA LC FREE SER-MCNC: 127 MG/L
LAMBDA LC FREE SERPL-MCNC: 1206 MG/L (ref 4.2–27.7)
LYMPHOCYTES NFR BLD: 0.32 K/UL (ref 1–4.8)
LYMPHOCYTES RELATIVE PERCENT: 6 % (ref 24–44)
MCH RBC QN AUTO: 29 PG (ref 26–34)
MCH RBC QN AUTO: 29.4 PG (ref 26–34)
MCHC RBC AUTO-ENTMCNC: 32 G/DL (ref 31–37)
MCHC RBC AUTO-ENTMCNC: 33.2 G/DL (ref 31–37)
MCV RBC AUTO: 88.6 FL (ref 80–100)
MCV RBC AUTO: 90.5 FL (ref 80–100)
MONOCYTES NFR BLD: 0.11 K/UL (ref 0.1–1.3)
MONOCYTES NFR BLD: 2 % (ref 1–7)
MORPHOLOGY: ABNORMAL
MYELOCYTES ABSOLUTE COUNT: 0.05 K/UL
MYELOCYTES: 1 %
NEUTROPHILS NFR BLD: 86 % (ref 36–66)
NEUTS SEG NFR BLD: 4.65 K/UL (ref 1.3–9.1)
PHOSPHATE SERPL-MCNC: 3 MG/DL (ref 2.5–4.5)
PLATELET # BLD AUTO: 201 K/UL (ref 150–450)
PLATELET # BLD AUTO: 209 K/UL (ref 150–450)
PMV BLD AUTO: 7.3 FL (ref 6–12)
PMV BLD AUTO: 7.5 FL (ref 6–12)
POTASSIUM SERPL-SCNC: 4.3 MMOL/L (ref 3.7–5.3)
POTASSIUM SERPL-SCNC: 4.4 MMOL/L (ref 3.7–5.3)
PROT SERPL-MCNC: 7.1 G/DL (ref 6.6–8.7)
RBC # BLD AUTO: 3.77 M/UL (ref 4.5–5.9)
RBC # BLD AUTO: 3.83 M/UL (ref 4.5–5.9)
SODIUM SERPL-SCNC: 139 MMOL/L (ref 136–145)
SODIUM SERPL-SCNC: 141 MMOL/L (ref 136–145)
TOTAL PROTEIN, URINE: 77 MG/DL
URATE SERPL-MCNC: 12.4 MG/DL (ref 3.4–7)
URINE TOTAL PROTEIN CREATININE RATIO: 0.75
WBC OTHER # BLD: 5.1 K/UL (ref 3.5–11)
WBC OTHER # BLD: 5.4 K/UL (ref 3.5–11)

## 2025-03-26 PROCEDURE — 84100 ASSAY OF PHOSPHORUS: CPT

## 2025-03-26 PROCEDURE — 85025 COMPLETE CBC W/AUTO DIFF WBC: CPT

## 2025-03-26 PROCEDURE — 84165 PROTEIN E-PHORESIS SERUM: CPT

## 2025-03-26 PROCEDURE — 86334 IMMUNOFIX E-PHORESIS SERUM: CPT

## 2025-03-26 PROCEDURE — 80053 COMPREHEN METABOLIC PANEL: CPT

## 2025-03-26 PROCEDURE — 85027 COMPLETE CBC AUTOMATED: CPT

## 2025-03-26 PROCEDURE — 36415 COLL VENOUS BLD VENIPUNCTURE: CPT

## 2025-03-26 PROCEDURE — 82040 ASSAY OF SERUM ALBUMIN: CPT

## 2025-03-26 PROCEDURE — 80048 BASIC METABOLIC PNL TOTAL CA: CPT

## 2025-03-26 PROCEDURE — 84550 ASSAY OF BLOOD/URIC ACID: CPT

## 2025-03-26 PROCEDURE — 82784 ASSAY IGA/IGD/IGG/IGM EACH: CPT

## 2025-03-26 PROCEDURE — 84155 ASSAY OF PROTEIN SERUM: CPT

## 2025-03-26 PROCEDURE — 82306 VITAMIN D 25 HYDROXY: CPT

## 2025-03-26 PROCEDURE — 84156 ASSAY OF PROTEIN URINE: CPT

## 2025-03-26 PROCEDURE — 83521 IG LIGHT CHAINS FREE EACH: CPT

## 2025-03-26 PROCEDURE — 82570 ASSAY OF URINE CREATININE: CPT

## 2025-03-28 LAB
ALBUMIN PERCENT: 46 % (ref 56–66)
ALBUMIN SERPL-MCNC: 3 G/DL (ref 3.2–5.2)
ALPHA 2 PERCENT: 14 % (ref 7–12)
ALPHA1 GLOB SERPL ELPH-MCNC: 0.6 G/DL (ref 0.1–0.4)
ALPHA1 GLOB SERPL ELPH-MCNC: 9 % (ref 3–5)
ALPHA2 GLOB SERPL ELPH-MCNC: 0.9 G/DL (ref 0.5–0.9)
B-GLOBULIN SERPL ELPH-MCNC: 0.9 G/DL (ref 0.7–1.4)
B-GLOBULIN SERPL ELPH-MCNC: 13 % (ref 8–13)
GAMMA GLOB SERPL ELPH-MCNC: 1.2 G/DL (ref 0.5–1.5)
GAMMA GLOBULIN %: 19 % (ref 11–19)
ITYP INTERPRETATION: NORMAL
PATH REV: NORMAL
PATHOLOGIST: ABNORMAL
PROT PATTERN SERPL ELPH-IMP: ABNORMAL
PROT SERPL-MCNC: 6.6 G/DL (ref 6.6–8.7)
TOTAL PROT. SUM,%: 101 % (ref 98–102)
TOTAL PROT. SUM: 6.6 G/DL (ref 6.3–8.2)

## 2025-04-01 ENCOUNTER — OFFICE VISIT (OUTPATIENT)
Dept: ONCOLOGY | Age: 80
End: 2025-04-01
Payer: MEDICARE

## 2025-04-01 VITALS
RESPIRATION RATE: 18 BRPM | DIASTOLIC BLOOD PRESSURE: 70 MMHG | BODY MASS INDEX: 32.04 KG/M2 | TEMPERATURE: 97.5 F | WEIGHT: 204.6 LBS | HEART RATE: 79 BPM | OXYGEN SATURATION: 95 % | SYSTOLIC BLOOD PRESSURE: 144 MMHG

## 2025-04-01 DIAGNOSIS — D47.2 MGUS (MONOCLONAL GAMMOPATHY OF UNKNOWN SIGNIFICANCE): Primary | ICD-10-CM

## 2025-04-01 PROCEDURE — 99204 OFFICE O/P NEW MOD 45 MIN: CPT | Performed by: INTERNAL MEDICINE

## 2025-04-01 PROCEDURE — 1159F MED LIST DOCD IN RCRD: CPT | Performed by: INTERNAL MEDICINE

## 2025-04-01 PROCEDURE — G8417 CALC BMI ABV UP PARAM F/U: HCPCS | Performed by: INTERNAL MEDICINE

## 2025-04-01 PROCEDURE — 3078F DIAST BP <80 MM HG: CPT | Performed by: INTERNAL MEDICINE

## 2025-04-01 PROCEDURE — G8427 DOCREV CUR MEDS BY ELIG CLIN: HCPCS | Performed by: INTERNAL MEDICINE

## 2025-04-01 PROCEDURE — 1123F ACP DISCUSS/DSCN MKR DOCD: CPT | Performed by: INTERNAL MEDICINE

## 2025-04-01 PROCEDURE — 1160F RVW MEDS BY RX/DR IN RCRD: CPT | Performed by: INTERNAL MEDICINE

## 2025-04-01 PROCEDURE — 99211 OFF/OP EST MAY X REQ PHY/QHP: CPT | Performed by: INTERNAL MEDICINE

## 2025-04-01 PROCEDURE — 1036F TOBACCO NON-USER: CPT | Performed by: INTERNAL MEDICINE

## 2025-04-01 PROCEDURE — 3077F SYST BP >= 140 MM HG: CPT | Performed by: INTERNAL MEDICINE

## 2025-04-01 PROCEDURE — 1126F AMNT PAIN NOTED NONE PRSNT: CPT | Performed by: INTERNAL MEDICINE

## 2025-04-01 NOTE — PROGRESS NOTES
Patient ID: Jona Heredia, 1945, [id number], 79 y.o.    Referred by : Yasmeen Dowd APRN - CNP   DIAGNOSIS:   Anemia  MGUS  HISTORY OF PRESENT ILLNESS:    Hematologic history:  Jona Heredia is a 79 y.o. male with a past medical history of CKD, CAD, Crohn's disease was seen during initial consultation visit for anemia and MGUS.    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  His both kappa and lambda light chains were high with a kappa/lambda ratio of 0.2.  Therefore he was referred to hematology for evaluation.    Patient had a repeat lab work recently in January which showed improved hemoglobin at 11.8.  His iron levels appears low at that time.  He is not taking any iron supplements.  He denies any blood in stool, no chest pain shortness of breath.  He denies any new bone pain back pain.  He is accompanied by his sister-in-law during today's office visit.  He denies any unintentional weight loss night sweats fever chills.  He has a chronic lymphedema of both lower EXTR extremities.    INTERVAL HISTORY:  Patient is returning for follow-up visit and to discuss lab results and further recommendations.  He denied any new bone pain back pain denied any chest pain shortness of breath.  His recent lab work showed increase in his kappa as well as lambda light chain and the ratio of involved/uninvolved appears to be around 9     His hemoglobin is stable around 11 and creatinine has slightly increased.    During this visit patient's allergy, social, medical, surgical history and medications were reviewed and updated.   Past Medical History:   Diagnosis Date    Abdominal hernia     Atrophy of muscle of multiple sites 8/19/2016    CAD (coronary artery disease)     CKD (chronic kidney disease) stage 3, GFR 30-59 ml/min (HCC)     Colon polyp     Crohn disease (HCC)     Crohn's colitis (HCC)

## 2025-04-03 NOTE — TELEPHONE ENCOUNTER
2nd attempt to contact patient to schedule AWV & Depression screen. LM for patient to call office to schedule appt.

## 2025-04-14 DIAGNOSIS — Z45.02 ENCOUNTER FOR MANAGEMENT OF BIVENTRICULAR IMPLANTABLE CARDIOVERTER-DEFIBRILLATOR (ICD): ICD-10-CM

## 2025-04-14 DIAGNOSIS — Z86.79 HISTORY OF ATRIAL FIBRILLATION: Chronic | ICD-10-CM

## 2025-04-14 DIAGNOSIS — I49.01 VENTRICULAR FIBRILLATION AND FLUTTER: ICD-10-CM

## 2025-04-14 DIAGNOSIS — Z45.02 ENCOUNTER FOR ASSESSMENT OF IMPLANTABLE CARDIOVERTER-DEFIBRILLATOR (ICD): ICD-10-CM

## 2025-04-14 DIAGNOSIS — I49.01 VF (VENTRICULAR FIBRILLATION) (HCC): ICD-10-CM

## 2025-04-14 DIAGNOSIS — I50.22 CHRONIC SYSTOLIC CONGESTIVE HEART FAILURE (HCC): ICD-10-CM

## 2025-04-14 DIAGNOSIS — I49.02 VENTRICULAR FIBRILLATION AND FLUTTER: ICD-10-CM

## 2025-04-14 DIAGNOSIS — I48.19 PERSISTENT ATRIAL FIBRILLATION (HCC): ICD-10-CM

## 2025-04-14 DIAGNOSIS — I49.01 VENTRICULAR FIBRILLATION (HCC): Primary | ICD-10-CM

## 2025-04-14 DIAGNOSIS — Z95.810 PRESENCE OF IMPLANTABLE CARDIOVERTER-DEFIBRILLATOR (ICD): ICD-10-CM

## 2025-04-14 DIAGNOSIS — R00.0 TACHYCARDIA: ICD-10-CM

## 2025-04-14 DIAGNOSIS — Z95.810 HISTORY OF IMPLANTABLE CARDIAC DEFIBRILLATOR (ICD): ICD-10-CM

## 2025-04-14 DIAGNOSIS — Z95.810 ICD (IMPLANTABLE CARDIOVERTER-DEFIBRILLATOR) IN PLACE: ICD-10-CM

## 2025-04-14 DIAGNOSIS — I48.92 ATRIAL FLUTTER BY ELECTROCARDIOGRAM (HCC): ICD-10-CM

## 2025-04-14 DIAGNOSIS — I48.3 TYPICAL ATRIAL FLUTTER (HCC): ICD-10-CM

## 2025-04-14 DIAGNOSIS — I47.20 VENTRICULAR TACHYCARDIA (HCC): ICD-10-CM

## 2025-04-14 DIAGNOSIS — R00.0 TACHYCARDIA: Primary | ICD-10-CM

## 2025-04-14 NOTE — TELEPHONE ENCOUNTER
3rd attempt to contact patient to schedule appt for AWV & Depression screen. LM for patient to call office to schedule or use mychart ticket sent on 03/25/25. Letter mailed.

## 2025-05-05 NOTE — TELEPHONE ENCOUNTER
Medication name:  finasteride (PROSCAR)   Medication dosage:5 mg  Preferred pharmacy:[facility] - [address] [phone] - F [phone]     Last office visit:12/10/24  Next office (or follow up plan- may copy and paste from last office visit at bottom):  Assessment & Plan  Doing better after episode of retention.   Continue flomax and finasteride.   PVR was acceptable today at under 200.  F/U in 1 year.      Return in about 6 months (around 6/10/2025).

## 2025-05-12 NOTE — TELEPHONE ENCOUNTER
Medication name:finasteride (PROSCAR)   Medication dosage:5 mg  [facility] [address] [phone] - F [phone]       Last office visit:12/10/2024  Next office (or follow up plan- may copy and paste from last office visit at bottom):  ssessment & Plan  Doing better after episode of retention.   Continue flomax and finasteride.   PVR was acceptable today at under 200.  F/U in 1 year.      Return in about 6 months (around 6/10/2025).    Patient follow up is schedule for 6/10/25 at 11:10am

## 2025-05-13 RX ORDER — FINASTERIDE 5 MG/1
5 TABLET, FILM COATED ORAL DAILY
Qty: 90 TABLET | Refills: 3 | Status: SHIPPED | OUTPATIENT
Start: 2025-05-13

## 2025-05-28 RX ORDER — FINASTERIDE 5 MG/1
5 TABLET, FILM COATED ORAL DAILY
Qty: 90 TABLET | Refills: 1 | OUTPATIENT
Start: 2025-05-28

## 2025-05-29 ENCOUNTER — TELEPHONE (OUTPATIENT)
Dept: INTERNAL MEDICINE CLINIC | Age: 80
End: 2025-05-29

## 2025-06-09 SDOH — HEALTH STABILITY: PHYSICAL HEALTH: ON AVERAGE, HOW MANY MINUTES DO YOU ENGAGE IN EXERCISE AT THIS LEVEL?: 0 MIN

## 2025-06-09 SDOH — HEALTH STABILITY: PHYSICAL HEALTH: ON AVERAGE, HOW MANY DAYS PER WEEK DO YOU ENGAGE IN MODERATE TO STRENUOUS EXERCISE (LIKE A BRISK WALK)?: 0 DAYS

## 2025-06-09 ASSESSMENT — PATIENT HEALTH QUESTIONNAIRE - PHQ9
SUM OF ALL RESPONSES TO PHQ QUESTIONS 1-9: 7
10. IF YOU CHECKED OFF ANY PROBLEMS, HOW DIFFICULT HAVE THESE PROBLEMS MADE IT FOR YOU TO DO YOUR WORK, TAKE CARE OF THINGS AT HOME, OR GET ALONG WITH OTHER PEOPLE: SOMEWHAT DIFFICULT
9. THOUGHTS THAT YOU WOULD BE BETTER OFF DEAD, OR OF HURTING YOURSELF: NOT AT ALL
8. MOVING OR SPEAKING SO SLOWLY THAT OTHER PEOPLE COULD HAVE NOTICED. OR THE OPPOSITE, BEING SO FIGETY OR RESTLESS THAT YOU HAVE BEEN MOVING AROUND A LOT MORE THAN USUAL: NOT AT ALL
2. FEELING DOWN, DEPRESSED OR HOPELESS: NEARLY EVERY DAY
7. TROUBLE CONCENTRATING ON THINGS, SUCH AS READING THE NEWSPAPER OR WATCHING TELEVISION: NOT AT ALL
SUM OF ALL RESPONSES TO PHQ QUESTIONS 1-9: 7
5. POOR APPETITE OR OVEREATING: NOT AT ALL
1. LITTLE INTEREST OR PLEASURE IN DOING THINGS: NEARLY EVERY DAY
4. FEELING TIRED OR HAVING LITTLE ENERGY: NOT AT ALL
3. TROUBLE FALLING OR STAYING ASLEEP: NOT AT ALL
SUM OF ALL RESPONSES TO PHQ QUESTIONS 1-9: 7
6. FEELING BAD ABOUT YOURSELF - OR THAT YOU ARE A FAILURE OR HAVE LET YOURSELF OR YOUR FAMILY DOWN: SEVERAL DAYS
SUM OF ALL RESPONSES TO PHQ QUESTIONS 1-9: 7

## 2025-06-09 ASSESSMENT — LIFESTYLE VARIABLES
HOW OFTEN DO YOU HAVE A DRINK CONTAINING ALCOHOL: NEVER
HOW MANY STANDARD DRINKS CONTAINING ALCOHOL DO YOU HAVE ON A TYPICAL DAY: PATIENT DOES NOT DRINK
HOW OFTEN DO YOU HAVE SIX OR MORE DRINKS ON ONE OCCASION: 1
HOW OFTEN DO YOU HAVE A DRINK CONTAINING ALCOHOL: 1
HOW MANY STANDARD DRINKS CONTAINING ALCOHOL DO YOU HAVE ON A TYPICAL DAY: 0

## 2025-06-10 ENCOUNTER — OFFICE VISIT (OUTPATIENT)
Dept: UROLOGY | Age: 80
End: 2025-06-10
Payer: MEDICARE

## 2025-06-10 ENCOUNTER — OFFICE VISIT (OUTPATIENT)
Dept: INTERNAL MEDICINE CLINIC | Age: 80
End: 2025-06-10
Payer: MEDICARE

## 2025-06-10 VITALS
BODY MASS INDEX: 31.48 KG/M2 | RESPIRATION RATE: 18 BRPM | DIASTOLIC BLOOD PRESSURE: 72 MMHG | HEART RATE: 94 BPM | SYSTOLIC BLOOD PRESSURE: 110 MMHG | TEMPERATURE: 97.9 F | WEIGHT: 200.6 LBS | OXYGEN SATURATION: 99 % | HEIGHT: 67 IN

## 2025-06-10 VITALS — DIASTOLIC BLOOD PRESSURE: 79 MMHG | SYSTOLIC BLOOD PRESSURE: 127 MMHG | HEART RATE: 95 BPM | TEMPERATURE: 97.9 F

## 2025-06-10 DIAGNOSIS — Z00.00 MEDICARE ANNUAL WELLNESS VISIT, SUBSEQUENT: Primary | ICD-10-CM

## 2025-06-10 DIAGNOSIS — N13.8 BPH WITH OBSTRUCTION/LOWER URINARY TRACT SYMPTOMS: Primary | ICD-10-CM

## 2025-06-10 DIAGNOSIS — N40.1 BPH WITH OBSTRUCTION/LOWER URINARY TRACT SYMPTOMS: Primary | ICD-10-CM

## 2025-06-10 PROCEDURE — 3074F SYST BP LT 130 MM HG: CPT | Performed by: UROLOGY

## 2025-06-10 PROCEDURE — 3078F DIAST BP <80 MM HG: CPT | Performed by: UROLOGY

## 2025-06-10 PROCEDURE — 1036F TOBACCO NON-USER: CPT | Performed by: UROLOGY

## 2025-06-10 PROCEDURE — G8417 CALC BMI ABV UP PARAM F/U: HCPCS | Performed by: UROLOGY

## 2025-06-10 PROCEDURE — 3074F SYST BP LT 130 MM HG: CPT | Performed by: INTERNAL MEDICINE

## 2025-06-10 PROCEDURE — 1123F ACP DISCUSS/DSCN MKR DOCD: CPT | Performed by: UROLOGY

## 2025-06-10 PROCEDURE — 3078F DIAST BP <80 MM HG: CPT | Performed by: INTERNAL MEDICINE

## 2025-06-10 PROCEDURE — 1159F MED LIST DOCD IN RCRD: CPT | Performed by: INTERNAL MEDICINE

## 2025-06-10 PROCEDURE — 99213 OFFICE O/P EST LOW 20 MIN: CPT | Performed by: UROLOGY

## 2025-06-10 PROCEDURE — 1123F ACP DISCUSS/DSCN MKR DOCD: CPT | Performed by: INTERNAL MEDICINE

## 2025-06-10 PROCEDURE — 1159F MED LIST DOCD IN RCRD: CPT | Performed by: UROLOGY

## 2025-06-10 PROCEDURE — G8427 DOCREV CUR MEDS BY ELIG CLIN: HCPCS | Performed by: UROLOGY

## 2025-06-10 PROCEDURE — G0439 PPPS, SUBSEQ VISIT: HCPCS | Performed by: INTERNAL MEDICINE

## 2025-06-10 ASSESSMENT — ENCOUNTER SYMPTOMS
COLOR CHANGE: 0
WHEEZING: 0
SHORTNESS OF BREATH: 0
ALLERGIC/IMMUNOLOGIC NEGATIVE: 1
COUGH: 0
EYES NEGATIVE: 1
NAUSEA: 0
EYE PAIN: 0
RESPIRATORY NEGATIVE: 1
GASTROINTESTINAL NEGATIVE: 1
VOMITING: 0
BACK PAIN: 0
ABDOMINAL PAIN: 0
EYE REDNESS: 0

## 2025-06-10 NOTE — PROGRESS NOTES
take care of things at home, or get along with other people? Somewhat difficult  --   PHQ-9 Total Score 7  0   PHQ-9 Total Score 7  0   AMB C-SSRS Suicide Screening       Alcohol Screening Results    Flowsheet Row Office Visit from 6/10/2025 in [facility] ED to Hosp-Admission (Discharged) from 11/4/2024 in [facility] Med Surg   Q1: How often do you have a drink containing alcohol? Never  Never   Q2: How many drinks containing alcohol do you have on a typical day when you are drinking? Patient does not drink  Patient does not drink   Q3: How often do you have six or more drinks on one occasion? Never  Never   AUDIT-C Score 0  -1     DAST-10 Screening Results    Flowsheet [facility] Office Visit from 6/10/2025 in [facility] Office Visit from 4/19/2023 in [facility]   How many times in the past year have you used a recreational drug or used a prescription medication for nonmedical reasons? None  None     Health Risk Assessment Results    Flowsheet [facility] Office Visit from 6/10/2025 in [facility] Office Visit from 4/19/2023 in [facility]   General     In general, how would you say your health is? Fair  Very Good   In the past 7 days, have you experienced any of the following: New or Increased Pain, New or Increased Fatigue, Loneliness, Social Isolation, Stress or Anger? Yes Abnormal   No   Select all that apply New or Increased Pain Abnormal   --   Health Habits/Nutrition     On average, how many days per week do you engage in moderate to strenuous exercise (like a brisk walk)? 0 days  0 days   On average, how many minutes do you engage in exercise at this level? 0 min  0 min   Do you eat balanced/healthy meals regularly? Yes  --   Have you seen the dentist within the past year? No Abnormal   No Abnormal    Hearing/Vision     Have you had an eye exam within the past year? No Abnormal   No Abnormal    Do you have difficulty driving, watching TV, or doing any of your daily activities because

## 2025-06-10 NOTE — PATIENT INSTRUCTIONS
of the screen. Most new TVs can do this.  TTY (text telephone). This lets you type messages back and forth on the telephone instead of talking or listening. These devices are also called TDD. When messages are typed on the keyboard, they are sent over the phone line to a receiving TTY. The message is shown on a monitor.  Use text messaging, social media, and email if it is hard for you to communicate by telephone.  Try to learn a listening technique called speechreading. It is not lipreading. You pay attention to people's gestures, expressions, posture, and tone of voice. These clues can help you understand what a person is saying. Face the person you are talking to, and have them face you. Make sure the lighting is good. You need to see the other person's face clearly.  Think about counseling if you need help to adjust to your hearing loss.  When should you call for help?  Watch closely for changes in your health, and be sure to contact your doctor if:    You think your hearing is getting worse.     You have new symptoms, such as dizziness or nausea.   Where can you learn more?  Go to https://www.[employer].net/patientEd and enter R798 to learn more about \"Hearing Loss: Care Instructions.\"  Current as of: October 27, 2024  Content Version: 14.5  © 2155-7717 [employer].   Care instructions adapted under license by [employer]. If you have questions about a medical condition or this instruction, always ask your healthcare professional. [employer], disclaims any warranty or liability for your use of this information.         Learning About Vision Tests  What are vision tests?     The four most common vision tests are visual acuity tests, refraction, visual field tests, and color vision tests.  Visual acuity (sharpness) tests  These tests are used:  To see if you need glasses or contact lenses.  To monitor an eye problem.  To check an eye injury.  Visual acuity tests are done as part of routine

## 2025-06-10 NOTE — PROGRESS NOTES
Review of Systems   Constitutional: Negative.  Negative for appetite change, chills and fever.   HENT: Negative.     Eyes: Negative.  Negative for pain, redness and visual disturbance.   Respiratory: Negative.  Negative for cough, shortness of breath and wheezing.    Cardiovascular: Negative.  Negative for chest pain and leg swelling.   Gastrointestinal: Negative.  Negative for abdominal pain, nausea and vomiting.   Endocrine: Negative.    Genitourinary: Negative.  Negative for difficulty urinating, dysuria, flank pain, frequency, hematuria, testicular pain and urgency.   Musculoskeletal: Negative.  Negative for back pain, joint swelling and myalgias.   Skin: Negative.  Negative for color change, rash and wound.   Allergic/Immunologic: Negative.    Neurological: Negative.  Negative for dizziness, tremors, weakness, numbness and headaches.   Hematological: Negative.  Negative for adenopathy. Does not bruise/bleed easily.   Psychiatric/Behavioral: Negative.       
Flomax daily and finasteride.   Will hold off on doubling Flomax, unless his symptoms worsen.   F/U in 1 year.     Return in about 1 year (around 6/10/2026).    Prescriptions Ordered:  No orders of the defined types were placed in this encounter.    Orders Placed:  No orders of the defined types were placed in this encounter.          Yoan Darnell MD    Agree with the ROS entered by the MA.

## 2025-06-16 RX ORDER — AMIODARONE HYDROCHLORIDE 200 MG/1
200 TABLET ORAL DAILY
Qty: 90 TABLET | Refills: 0 | Status: SHIPPED | OUTPATIENT
Start: 2025-06-16

## 2025-07-01 ENCOUNTER — TELEPHONE (OUTPATIENT)
Dept: FAMILY MEDICINE CLINIC | Age: 80
End: 2025-07-01

## 2025-07-01 NOTE — TELEPHONE ENCOUNTER
Our records indicate that Jona Heredia may benefit from medication optimization to meet the three pillars of CHF care (ACE/ARB/ARNI, Beta Blocker, and MRA therapy). The ask is to change the patient's medications below, as appropriate. If the patient is being followed by a cardiologist, please ensure they have a follow up appointment with that provider to discuss further.     The measure definition, denominator, inclusions, and exclusions are below:

## 2025-07-07 PROCEDURE — 93296 REM INTERROG EVL PM/IDS: CPT | Performed by: SPECIALIST

## 2025-07-07 PROCEDURE — 93295 DEV INTERROG REMOTE 1/2/MLT: CPT | Performed by: SPECIALIST

## 2025-07-10 ENCOUNTER — TELEPHONE (OUTPATIENT)
Dept: INTERNAL MEDICINE CLINIC | Age: 80
End: 2025-07-10

## 2025-07-10 DIAGNOSIS — I50.22 CHRONIC SYSTOLIC CONGESTIVE HEART FAILURE (HCC): Primary | ICD-10-CM

## 2025-07-10 NOTE — TELEPHONE ENCOUNTER
LV 6/10/2025    [facility] Bhavna is asking for an order for a shower chair. They are in the process of installing a walk in shower and the one he uses belonged to his mother and is very old and will not fit the new shower.     They will pick it up.     Please advise

## 2025-07-11 NOTE — TELEPHONE ENCOUNTER
Spoke with Loida and she said forget the shower chair. She spoke with [facility] and was told insurance will not cover the chair. Loida states she will just purchase the chair herself. Loida was told to call office back if she changes her mind.

## 2025-07-21 ENCOUNTER — OFFICE VISIT (OUTPATIENT)
Age: 80
End: 2025-07-21

## 2025-07-21 VITALS
BODY MASS INDEX: 31.48 KG/M2 | HEART RATE: 102 BPM | SYSTOLIC BLOOD PRESSURE: 112 MMHG | DIASTOLIC BLOOD PRESSURE: 74 MMHG | OXYGEN SATURATION: 96 % | WEIGHT: 201 LBS

## 2025-07-21 DIAGNOSIS — Z79.899 ON AMIODARONE THERAPY: ICD-10-CM

## 2025-07-21 DIAGNOSIS — I47.20 VENTRICULAR TACHYCARDIA (HCC): Primary | ICD-10-CM

## 2025-07-21 DIAGNOSIS — I47.20 VENTRICULAR TACHYARRHYTHMIA (HCC): ICD-10-CM

## 2025-07-21 RX ORDER — AMIODARONE HYDROCHLORIDE 200 MG/1
200 TABLET ORAL 2 TIMES DAILY
Qty: 42 TABLET | Refills: 0 | Status: SHIPPED | OUTPATIENT
Start: 2025-07-21 | End: 2025-08-11

## 2025-07-21 RX ORDER — METOPROLOL SUCCINATE 50 MG/1
50 TABLET, EXTENDED RELEASE ORAL DAILY
Qty: 90 TABLET | Refills: 3 | Status: SHIPPED | OUTPATIENT
Start: 2025-07-21 | End: 2025-07-21

## 2025-07-21 RX ORDER — METOPROLOL SUCCINATE 50 MG/1
50 TABLET, EXTENDED RELEASE ORAL DAILY
Qty: 90 TABLET | Refills: 3 | Status: SHIPPED | OUTPATIENT
Start: 2025-07-21

## 2025-07-21 NOTE — PROGRESS NOTES
[facility] CARDIAC ELECTROPHYSIOLOGY  [address], [address]  [place]  [id number]    Date of Visit:  2025  Patient Name: Jona Heredia   Patient :  1945   Referring By:  No ref. provider found     CHIEF COMPLAINT/HPI:     Jona Heredia is a 79 y.o. male who presents today for an general visit to be evaluated for the following condition(s):  Chief Complaint   Patient presents with    Follow-up     FOLLOW UP - -6 month f/u for a fib,chf   Patient is in for routine scheduled follow-up.  He feels significantly well from what he says.  Does not have complaints.  Breathing is okay no swelling increased.  And he is taking his medication the way he is supposed to.    REVIEW OF SYSTEM      Review of Systems noncontributory    REVIEWED INFORMATION      No Known Allergies    Current Outpatient Medications   Medication Sig Dispense Refill    amiodarone (CORDARONE) 200 MG tablet Take 1 tablet by mouth 2 times daily for 21 days 42 tablet 0    metoprolol succinate (TOPROL XL) 50 MG extended release tablet Take 1 tablet by mouth daily 90 tablet 3    amiodarone (CORDARONE) 200 MG tablet Take 1 tablet by mouth daily 90 tablet 0    finasteride (PROSCAR) 5 MG tablet TAKE 1 TABLET BY MOUTH DAILY 90 tablet 3    dabigatran (PRADAXA) 75 MG capsule Take 1 capsule by mouth 2 times daily 180 capsule 1    isosorbide mononitrate (IMDUR) 30 MG extended release tablet Take 1 tablet by mouth daily 90 tablet 3    tamsulosin (FLOMAX) 0.4 MG capsule Take 1 capsule by mouth daily 90 capsule 3    calcitRIOL (ROCALTROL) 0.25 MCG capsule TAKE 1 CAPSULE BY MOUTH 3 TIMES  WEEKLY 39 capsule 3    acetaminophen (TYLENOL) 325 MG tablet Take 2 tablets by mouth every 6 hours as needed for Pain      Hydrocerin (EUCERIN) CREA cream Apply topically daily 113 g 0    torsemide (DEMADEX) 20 MG tablet Take 0.5 tablets by mouth every other day 180 tablet 3    ranolazine (RANEXA) 500 MG extended release tablet Take 1 tablet by mouth 2 times

## 2025-07-24 DIAGNOSIS — I49.01 VF (VENTRICULAR FIBRILLATION) (HCC): ICD-10-CM

## 2025-07-24 DIAGNOSIS — I49.01 VF (VENTRICULAR FIBRILLATION) (HCC): Primary | ICD-10-CM

## 2025-08-01 ENCOUNTER — HOSPITAL ENCOUNTER (OUTPATIENT)
Age: 80
Discharge: HOME | End: 2025-08-01
Payer: MEDICARE

## 2025-08-01 DIAGNOSIS — I10 ESSENTIAL HYPERTENSION: ICD-10-CM

## 2025-08-01 DIAGNOSIS — D47.2 MGUS (MONOCLONAL GAMMOPATHY OF UNKNOWN SIGNIFICANCE): ICD-10-CM

## 2025-08-01 DIAGNOSIS — E55.9 HYPOVITAMINOSIS D: ICD-10-CM

## 2025-08-01 DIAGNOSIS — N18.4 CKD (CHRONIC KIDNEY DISEASE) STAGE 4, GFR 15-29 ML/MIN (HCC): ICD-10-CM

## 2025-08-01 DIAGNOSIS — I47.20 VENTRICULAR TACHYCARDIA (HCC): ICD-10-CM

## 2025-08-01 DIAGNOSIS — N18.4 ANEMIA IN STAGE 4 CHRONIC KIDNEY DISEASE (HCC): ICD-10-CM

## 2025-08-01 DIAGNOSIS — Z90.49 H/O RESECTION OF SMALL BOWEL: ICD-10-CM

## 2025-08-01 DIAGNOSIS — N20.0 CALCULUS OF KIDNEY: ICD-10-CM

## 2025-08-01 DIAGNOSIS — Z86.39 HISTORY OF HYPERKALEMIA: ICD-10-CM

## 2025-08-01 DIAGNOSIS — D63.1 ANEMIA IN STAGE 4 CHRONIC KIDNEY DISEASE (HCC): ICD-10-CM

## 2025-08-01 DIAGNOSIS — Z79.899 ON AMIODARONE THERAPY: ICD-10-CM

## 2025-08-01 LAB
25(OH)D3 SERPL-MCNC: 41 NG/ML (ref 30–100)
ALBUMIN PERCENT: ABNORMAL %
ALBUMIN SERPL-MCNC: 3.8 G/DL (ref 3.5–5.2)
ALBUMIN SERPL-MCNC: 3.9 G/DL (ref 3.5–5.2)
ALBUMIN SERPL-MCNC: ABNORMAL G/DL
ALP SERPL-CCNC: 96 U/L (ref 40–129)
ALPHA 2 PERCENT: ABNORMAL %
ALPHA1 GLOB SERPL ELPH-MCNC: ABNORMAL %
ALPHA1 GLOB SERPL ELPH-MCNC: ABNORMAL G/DL
ALPHA2 GLOB SERPL ELPH-MCNC: ABNORMAL G/DL
ALT SERPL-CCNC: 13 U/L (ref 10–50)
ANION GAP SERPL CALCULATED.3IONS-SCNC: 12 MMOL/L (ref 9–16)
ANION GAP SERPL CALCULATED.3IONS-SCNC: 12 MMOL/L (ref 9–16)
AST SERPL-CCNC: 17 U/L (ref 10–50)
B-GLOBULIN SERPL ELPH-MCNC: ABNORMAL %
B-GLOBULIN SERPL ELPH-MCNC: ABNORMAL G/DL
BASOPHILS # BLD: 0 K/UL (ref 0–0.2)
BASOPHILS NFR BLD: 0 % (ref 0–2)
BILIRUB SERPL-MCNC: 0.8 MG/DL (ref 0–1.2)
BUN SERPL-MCNC: 44 MG/DL (ref 8–23)
BUN SERPL-MCNC: 44 MG/DL (ref 8–23)
CALCIUM SERPL-MCNC: 9 MG/DL (ref 8.6–10.4)
CALCIUM SERPL-MCNC: 9 MG/DL (ref 8.6–10.4)
CHLORIDE SERPL-SCNC: 104 MMOL/L (ref 98–107)
CHLORIDE SERPL-SCNC: 105 MMOL/L (ref 98–107)
CO2 SERPL-SCNC: 25 MMOL/L (ref 20–31)
CO2 SERPL-SCNC: 25 MMOL/L (ref 20–31)
CREAT SERPL-MCNC: 2.1 MG/DL (ref 0.7–1.2)
CREAT SERPL-MCNC: 2.1 MG/DL (ref 0.7–1.2)
CREAT UR-MCNC: 47 MG/DL (ref 39–259)
EOSINOPHIL # BLD: 0.23 K/UL (ref 0–0.44)
EOSINOPHILS RELATIVE PERCENT: 5 % (ref 0–4)
ERYTHROCYTE [DISTWIDTH] IN BLOOD BY AUTOMATED COUNT: 16 % (ref 11.5–14.9)
ERYTHROCYTE [DISTWIDTH] IN BLOOD BY AUTOMATED COUNT: 16 % (ref 11.5–14.9)
FREE KAPPA/LAMBDA RATIO: 0.06 (ref 0.22–1.74)
GAMMA GLOB SERPL ELPH-MCNC: ABNORMAL G/DL
GAMMA GLOBULIN %: ABNORMAL %
GFR, ESTIMATED: 31 ML/MIN/1.73M2
GFR, ESTIMATED: 31 ML/MIN/1.73M2
GLUCOSE SERPL-MCNC: 94 MG/DL (ref 74–99)
GLUCOSE SERPL-MCNC: 95 MG/DL (ref 74–99)
HCT VFR BLD AUTO: 37.7 % (ref 41–53)
HCT VFR BLD AUTO: 37.7 % (ref 41–53)
HGB BLD-MCNC: 11.8 G/DL (ref 13.5–17.5)
HGB BLD-MCNC: 11.8 G/DL (ref 13.5–17.5)
IGA SERPL-MCNC: 273 MG/DL (ref 70–400)
IGG SERPL-MCNC: 1319 MG/DL (ref 700–1600)
IGM SERPL-MCNC: 59 MG/DL (ref 40–230)
IMM GRANULOCYTES # BLD AUTO: 0 K/UL (ref 0–0.3)
IMM GRANULOCYTES NFR BLD: 0 %
KAPPA LC FREE SER-MCNC: 715 MG/L
LAMBDA LC FREE SERPL-MCNC: ABNORMAL MG/L (ref 4.2–27.7)
LYMPHOCYTES NFR BLD: 0.45 K/UL (ref 1.1–3.7)
LYMPHOCYTES RELATIVE PERCENT: 10 % (ref 24–44)
M PROTEIN 2 SERPL ELPH-MCNC: ABNORMAL G/DL
M PROTEIN SERPL ELPH-MCNC: ABNORMAL G/DL
MCH RBC QN AUTO: 29.9 PG (ref 26–34)
MCH RBC QN AUTO: 29.9 PG (ref 26–34)
MCHC RBC AUTO-ENTMCNC: 31.3 G/DL (ref 31–37)
MCHC RBC AUTO-ENTMCNC: 31.3 G/DL (ref 31–37)
MCV RBC AUTO: 95.4 FL (ref 80–100)
MCV RBC AUTO: 95.4 FL (ref 80–100)
MONOCYTES NFR BLD: 0.41 K/UL (ref 0.1–1.2)
MONOCYTES NFR BLD: 9 % (ref 3–12)
MORPHOLOGY: NORMAL
NEUTROPHILS NFR BLD: 76 % (ref 36–66)
NEUTS SEG NFR BLD: 3.41 K/UL (ref 1.5–8.1)
NRBC BLD-RTO: 0 PER 100 WBC
NRBC BLD-RTO: 0 PER 100 WBC
PATHOLOGIST: ABNORMAL
PHOSPHATE SERPL-MCNC: 2.5 MG/DL (ref 2.5–4.5)
PLATELET # BLD AUTO: 108 K/UL (ref 150–450)
PLATELET # BLD AUTO: 108 K/UL (ref 150–450)
PMV BLD AUTO: 9.5 FL (ref 8–13.5)
PMV BLD AUTO: 9.5 FL (ref 8–13.5)
POTASSIUM SERPL-SCNC: 4.6 MMOL/L (ref 3.7–5.3)
POTASSIUM SERPL-SCNC: 4.6 MMOL/L (ref 3.7–5.3)
PROT PATTERN SERPL ELPH-IMP: ABNORMAL
PROT SERPL-MCNC: 6.5 G/DL (ref 6.6–8.7)
PROT SERPL-MCNC: 6.9 G/DL (ref 6.6–8.7)
PTH-INTACT SERPL-MCNC: 40 PG/ML (ref 17.9–58.6)
RBC # BLD AUTO: 3.95 M/UL (ref 4.21–5.77)
RBC # BLD AUTO: 3.95 M/UL (ref 4.21–5.77)
SODIUM SERPL-SCNC: 141 MMOL/L (ref 136–145)
SODIUM SERPL-SCNC: 142 MMOL/L (ref 136–145)
TOTAL PROT. SUM,%: ABNORMAL %
TOTAL PROT. SUM: ABNORMAL G/DL
TOTAL PROTEIN, URINE: 29 MG/DL
TSH SERPL DL<=0.05 MIU/L-ACNC: 10.6 UIU/ML (ref 0.27–4.2)
URINE TOTAL PROTEIN CREATININE RATIO: 0.62
WBC OTHER # BLD: 4.5 K/UL (ref 3.5–11)
WBC OTHER # BLD: 4.5 K/UL (ref 3.5–11)

## 2025-08-01 PROCEDURE — 84156 ASSAY OF PROTEIN URINE: CPT

## 2025-08-01 PROCEDURE — 83521 IG LIGHT CHAINS FREE EACH: CPT

## 2025-08-01 PROCEDURE — 80053 COMPREHEN METABOLIC PANEL: CPT

## 2025-08-01 PROCEDURE — 82306 VITAMIN D 25 HYDROXY: CPT

## 2025-08-01 PROCEDURE — 82570 ASSAY OF URINE CREATININE: CPT

## 2025-08-01 PROCEDURE — 83970 ASSAY OF PARATHORMONE: CPT

## 2025-08-01 PROCEDURE — 84443 ASSAY THYROID STIM HORMONE: CPT

## 2025-08-01 PROCEDURE — 82784 ASSAY IGA/IGD/IGG/IGM EACH: CPT

## 2025-08-01 PROCEDURE — 84165 PROTEIN E-PHORESIS SERUM: CPT

## 2025-08-01 PROCEDURE — 36415 COLL VENOUS BLD VENIPUNCTURE: CPT

## 2025-08-01 PROCEDURE — 85025 COMPLETE CBC W/AUTO DIFF WBC: CPT

## 2025-08-01 PROCEDURE — 84100 ASSAY OF PHOSPHORUS: CPT

## 2025-08-01 PROCEDURE — 84155 ASSAY OF PROTEIN SERUM: CPT

## 2025-08-01 PROCEDURE — 86334 IMMUNOFIX E-PHORESIS SERUM: CPT

## 2025-08-04 LAB
FREE KAPPA/LAMBDA RATIO: 0.06 (ref 0.22–1.74)
KAPPA LC FREE SER-MCNC: 71.5 MG/L
LAMBDA LC FREE SERPL-MCNC: 1233 MG/L (ref 4.2–27.7)

## 2025-08-05 ENCOUNTER — OFFICE VISIT (OUTPATIENT)
Age: 80
End: 2025-08-05
Payer: MEDICARE

## 2025-08-05 ENCOUNTER — TELEPHONE (OUTPATIENT)
Age: 80
End: 2025-08-05

## 2025-08-05 VITALS
WEIGHT: 198.7 LBS | RESPIRATION RATE: 14 BRPM | SYSTOLIC BLOOD PRESSURE: 148 MMHG | DIASTOLIC BLOOD PRESSURE: 77 MMHG | TEMPERATURE: 97.3 F | OXYGEN SATURATION: 98 % | BODY MASS INDEX: 31.12 KG/M2 | HEART RATE: 65 BPM

## 2025-08-05 DIAGNOSIS — D47.2 MGUS (MONOCLONAL GAMMOPATHY OF UNKNOWN SIGNIFICANCE): Primary | ICD-10-CM

## 2025-08-05 LAB
ALBUMIN PERCENT: 55 % (ref 56–66)
ALBUMIN SERPL-MCNC: 3.5 G/DL (ref 3.2–5.2)
ALPHA 2 PERCENT: 11 % (ref 7–12)
ALPHA1 GLOB SERPL ELPH-MCNC: 0.4 G/DL (ref 0.1–0.4)
ALPHA1 GLOB SERPL ELPH-MCNC: 6 % (ref 3–5)
ALPHA2 GLOB SERPL ELPH-MCNC: 0.7 G/DL (ref 0.5–0.9)
B-GLOBULIN SERPL ELPH-MCNC: 0.8 G/DL (ref 0.7–1.4)
B-GLOBULIN SERPL ELPH-MCNC: 12 % (ref 8–13)
GAMMA GLOB SERPL ELPH-MCNC: 1.1 G/DL (ref 0.5–1.5)
GAMMA GLOBULIN %: 17 % (ref 11–19)
ITYP INTERPRETATION: NORMAL
PATH REV: NORMAL
PATHOLOGIST: ABNORMAL
PROT PATTERN SERPL ELPH-IMP: ABNORMAL
PROT SERPL-MCNC: 6.5 G/DL (ref 6.6–8.7)
TOTAL PROT. SUM,%: 101 % (ref 98–102)
TOTAL PROT. SUM: 6.5 G/DL (ref 6.3–8.2)

## 2025-08-05 PROCEDURE — 3078F DIAST BP <80 MM HG: CPT | Performed by: INTERNAL MEDICINE

## 2025-08-05 PROCEDURE — 1159F MED LIST DOCD IN RCRD: CPT | Performed by: INTERNAL MEDICINE

## 2025-08-05 PROCEDURE — 99214 OFFICE O/P EST MOD 30 MIN: CPT | Performed by: INTERNAL MEDICINE

## 2025-08-05 PROCEDURE — 1160F RVW MEDS BY RX/DR IN RCRD: CPT | Performed by: INTERNAL MEDICINE

## 2025-08-05 PROCEDURE — 1036F TOBACCO NON-USER: CPT | Performed by: INTERNAL MEDICINE

## 2025-08-05 PROCEDURE — G8427 DOCREV CUR MEDS BY ELIG CLIN: HCPCS | Performed by: INTERNAL MEDICINE

## 2025-08-05 PROCEDURE — 99211 OFF/OP EST MAY X REQ PHY/QHP: CPT | Performed by: INTERNAL MEDICINE

## 2025-08-05 PROCEDURE — 1123F ACP DISCUSS/DSCN MKR DOCD: CPT | Performed by: INTERNAL MEDICINE

## 2025-08-05 PROCEDURE — 99213 OFFICE O/P EST LOW 20 MIN: CPT | Performed by: INTERNAL MEDICINE

## 2025-08-05 PROCEDURE — G8417 CALC BMI ABV UP PARAM F/U: HCPCS | Performed by: INTERNAL MEDICINE

## 2025-08-05 PROCEDURE — 1126F AMNT PAIN NOTED NONE PRSNT: CPT | Performed by: INTERNAL MEDICINE

## 2025-08-05 PROCEDURE — 3077F SYST BP >= 140 MM HG: CPT | Performed by: INTERNAL MEDICINE

## 2025-08-06 RX ORDER — METOPROLOL SUCCINATE 50 MG/1
50 TABLET, EXTENDED RELEASE ORAL DAILY
Qty: 90 TABLET | Refills: 0 | Status: SHIPPED | OUTPATIENT
Start: 2025-08-06

## (undated) DEVICE — NO USE 18 MONTHS GOWN STD LG  1153D

## (undated) DEVICE — GOWN SURGICALXL REUSE REPROC

## (undated) DEVICE — MEDI-VAC YANKAUER SUCTION HANDLE W/BULBOUS TIP: Brand: CARDINAL HEALTH

## (undated) DEVICE — ST CHARLES MAJOR ABDOMINAL PK: Brand: MEDLINE INDUSTRIES, INC.

## (undated) DEVICE — STERILE POLYISOPRENE POWDER-FREE SURGICAL GLOVES: Brand: PROTEXIS

## (undated) DEVICE — SUTURE ABSORBABLE MONOFILAMENT 3-0 SH 27 IN VIO PDS + PDP316H

## (undated) DEVICE — PAD,NON-ADHERENT,3X8,STERILE,LF,1/PK: Brand: MEDLINE

## (undated) DEVICE — Device

## (undated) DEVICE — COVER LT HNDL BLU PLAS

## (undated) DEVICE — 3M™ WARMING BLANKET, UPPER BODY, 10 PER CASE, 42268: Brand: BAIR HUGGER™

## (undated) DEVICE — SOLUTION IV IRRIG POUR BRL 0.9% SODIUM CHL 2F7124

## (undated) DEVICE — SUTURE PERMAHAND SZ 0 L30IN NONABSORBABLE BLK FSL L30MM 3/8 680H

## (undated) DEVICE — KIT EVAC 100CC W10MMXL20CM SIL FULL PERF HUBLESS FLAT DRN

## (undated) DEVICE — BARRIER, ABSORBABLE, ADHESION: Brand: SEPRAFILM®

## (undated) DEVICE — CATHETER KIT 3L 7 FRX6 IN PRESSURE INJ FLX ARROWG+ARD BLU +

## (undated) DEVICE — INTRODUCER SHTH 6FR L11CM 0.038IN STD SIDEPRT EXTN 3 W

## (undated) DEVICE — PENCIL ES L3M BTTN SWCH HOLSTER W/ BLDE ELECTRD EDGE

## (undated) DEVICE — 3M™ TEGADERM™ TRANSPARENT FILM DRESSING FRAME STYLE, 1627, 4 IN X 10 IN (10 CM X 25 CM), 20/CT 4CT/CASE: Brand: 3M™ TEGADERM™

## (undated) DEVICE — SUTURE PERMAHAND SZ 3-0 L18IN NONABSORBABLE BLK L26MM SH C013D

## (undated) DEVICE — SURGICAL PROCEDURE PACK GWN W/ BTC A

## (undated) DEVICE — SUTURE PDS II SZ 0 L60IN ABSRB VLT L48MM CTX 1/2 CIR Z990G

## (undated) DEVICE — TRAY CATH 16FR F INCLUDE LUB DRNGE BG STATLOK STBL DEV

## (undated) DEVICE — KIT DRSG SM W12.5XH3.2XL18CM VAC SH DRP PD W/ CONN DISP RUL

## (undated) DEVICE — SUTURE VCRL + SZ 4-0 L27IN ABSRB UD L26MM SH 1/2 CIR VCP415H

## (undated) DEVICE — SUTURE PDS + SZ 4 0 L27IN ABSRB VLT L26MM SH 1 2 CIR PDP315H

## (undated) DEVICE — ANGIOGRAPHIC CATHETER: Brand: EXPO™

## (undated) DEVICE — RADIFOCUS GLIDEWIRE: Brand: GLIDEWIRE

## (undated) DEVICE — PACK,LAPAROTOMY,NO GOWNS: Brand: MEDLINE

## (undated) DEVICE — SUTURE PERMA-HAND SZ 2-0 L30IN NONABSORBABLE BLK L26MM SH K833H

## (undated) DEVICE — SUTURE VCRL + SZ 2-0 L54IN ABSRB VLT TIE POLYGLACTIN 910 VCP286G

## (undated) DEVICE — KIT INTRO 9FR L13CM DIA0.118IN SPLITTABLE HEMSTAT ROBUST

## (undated) DEVICE — SURGICAL PROCEDURE PACK PLAS C

## (undated) DEVICE — INTRODUCER SHTH L13CM OD7FR SH ORNG HUB SEAMLESS SAFSHTH

## (undated) DEVICE — DRAPE IRRIG FLD WRM W44XL44IN W/ AORN STD PRTBL INTRATEMP

## (undated) DEVICE — SOLUTION IV IRRIG WATER 1000ML POUR BRL 2F7114

## (undated) DEVICE — 1200CC GUARDIAN II: Brand: GUARDIAN

## (undated) DEVICE — GLIDESHEATH SLENDER STAINLESS STEEL KIT: Brand: GLIDESHEATH SLENDER

## (undated) DEVICE — KIT MICRO INTRO 4FR STIFF 40CM NIGHTENALL TUNGSTEN 7SMT

## (undated) DEVICE — TUBE ET DIA7.5MM ORAL NSL CUF MURPHY EYE HI LO RADPQ LN

## (undated) DEVICE — Z DISCONTINUED BY MEDLINE USE 2711682 TRAY SKIN PREP DRY W/ PREM GLV

## (undated) DEVICE — GUIDEWIRE 35/260/FC/PTFE/3J: Brand: GUIDEWIRE

## (undated) DEVICE — DISCONTINUED USE 390648 LIGASURE IMPACT NEW

## (undated) DEVICE — CANISTER NEG PRSS 500ML WHT SENSATRAC TBNG CLMP CONN

## (undated) DEVICE — SUTURE VCRL + SZ 0 L27IN ABSRB UD CT-1 L36MM 1/2 CIR TAPR VCP260H

## (undated) DEVICE — Device: Brand: LEVEL 1

## (undated) DEVICE — SHEET, T, LAPAROTOMY, STERILE: Brand: MEDLINE

## (undated) DEVICE — RADIFOCUS GLIDECATH: Brand: GLIDECATH

## (undated) DEVICE — SUTURE PERMAHAND SZ 0 L30IN NONABSORBABLE BLK SILK BRAID A306H

## (undated) DEVICE — SURGICAL PROCEDURE TRAY CRD CATH SVMMC